# Patient Record
Sex: FEMALE | Race: WHITE | NOT HISPANIC OR LATINO | Employment: OTHER | RURAL
[De-identification: names, ages, dates, MRNs, and addresses within clinical notes are randomized per-mention and may not be internally consistent; named-entity substitution may affect disease eponyms.]

---

## 2020-04-23 ENCOUNTER — HISTORICAL (OUTPATIENT)
Dept: ADMINISTRATIVE | Facility: HOSPITAL | Age: 60
End: 2020-04-23

## 2020-07-06 ENCOUNTER — HISTORICAL (OUTPATIENT)
Dept: ADMINISTRATIVE | Facility: HOSPITAL | Age: 60
End: 2020-07-06

## 2020-07-20 ENCOUNTER — HISTORICAL (OUTPATIENT)
Dept: ADMINISTRATIVE | Facility: HOSPITAL | Age: 60
End: 2020-07-20

## 2020-07-24 ENCOUNTER — HISTORICAL (OUTPATIENT)
Dept: ADMINISTRATIVE | Facility: HOSPITAL | Age: 60
End: 2020-07-24

## 2020-07-25 LAB — SARS-COV-2 RNA AMPLIFICATION, QUAL: NEGATIVE

## 2020-08-11 ENCOUNTER — HISTORICAL (OUTPATIENT)
Dept: ADMINISTRATIVE | Facility: HOSPITAL | Age: 60
End: 2020-08-11

## 2020-08-18 ENCOUNTER — HISTORICAL (OUTPATIENT)
Dept: ADMINISTRATIVE | Facility: HOSPITAL | Age: 60
End: 2020-08-18

## 2020-09-15 ENCOUNTER — HISTORICAL (OUTPATIENT)
Dept: ADMINISTRATIVE | Facility: HOSPITAL | Age: 60
End: 2020-09-15

## 2020-09-17 LAB
LAB AP CLINICAL INFORMATION: NORMAL
LAB AP GENERAL CAT - HISTORICAL: NORMAL
LAB AP INTERPRETATION/RESULT - HISTORICAL: NEGATIVE
LAB AP SPECIMEN ADEQUACY - HISTORICAL: NORMAL
LAB AP SPECIMEN SUBMITTED - HISTORICAL: NORMAL

## 2020-09-18 ENCOUNTER — HISTORICAL (OUTPATIENT)
Dept: ADMINISTRATIVE | Facility: HOSPITAL | Age: 60
End: 2020-09-18

## 2020-09-22 ENCOUNTER — HISTORICAL (OUTPATIENT)
Dept: ADMINISTRATIVE | Facility: HOSPITAL | Age: 60
End: 2020-09-22

## 2020-10-06 ENCOUNTER — HISTORICAL (OUTPATIENT)
Dept: ADMINISTRATIVE | Facility: HOSPITAL | Age: 60
End: 2020-10-06

## 2020-10-06 LAB
ALBUMIN SERPL BCP-MCNC: 3.7 G/DL (ref 3.5–5)
ALBUMIN/GLOB SERPL: 1 {RATIO}
ALP SERPL-CCNC: 109 U/L (ref 50–130)
ALT SERPL W P-5'-P-CCNC: 16 U/L (ref 13–56)
ANION GAP SERPL CALCULATED.3IONS-SCNC: 7 MMOL/L (ref 7–16)
AST SERPL W P-5'-P-CCNC: 13 U/L (ref 15–37)
BACTERIA #/AREA URNS HPF: ABNORMAL /HPF
BASOPHILS # BLD AUTO: 0.05 X10E3/UL (ref 0–0.2)
BASOPHILS NFR BLD AUTO: 0.5 % (ref 0–1)
BILIRUB SERPL-MCNC: 0.2 MG/DL (ref 0–1.2)
BILIRUB UR QL STRIP: NEGATIVE MG/DL
BUN SERPL-MCNC: 12 MG/DL (ref 7–18)
BUN/CREAT SERPL: 19
CALCIUM SERPL-MCNC: 9 MG/DL (ref 8.5–10.1)
CHLORIDE SERPL-SCNC: 106 MMOL/L (ref 98–107)
CLARITY UR: CLEAR
CO2 SERPL-SCNC: 32 MMOL/L (ref 21–32)
COLOR UR: YELLOW
CREAT SERPL-MCNC: 0.64 MG/DL (ref 0.5–1.02)
EOSINOPHIL # BLD AUTO: 0.16 X10E3/UL (ref 0–0.5)
EOSINOPHIL NFR BLD AUTO: 1.7 % (ref 1–4)
ERYTHROCYTE [DISTWIDTH] IN BLOOD BY AUTOMATED COUNT: 16.6 % (ref 11.5–14.5)
GLOBULIN SER-MCNC: 3.8 G/DL (ref 2–4)
GLUCOSE SERPL-MCNC: 64 MG/DL (ref 74–106)
GLUCOSE UR STRIP-MCNC: NEGATIVE MG/DL
HCT VFR BLD AUTO: 41.5 % (ref 38–47)
HGB BLD-MCNC: 13.2 G/DL (ref 12–16)
IMM GRANULOCYTES # BLD AUTO: 0.01 X10E3/UL (ref 0–0.04)
IMM GRANULOCYTES NFR BLD: 0.1 % (ref 0–0.4)
KETONES UR STRIP-SCNC: NEGATIVE MG/DL
LEUKOCYTE ESTERASE UR QL STRIP: NEGATIVE LEU/UL
LYMPHOCYTES # BLD AUTO: 2.63 X10E3/UL (ref 1–4.8)
LYMPHOCYTES NFR BLD AUTO: 27.4 % (ref 27–41)
MCH RBC QN AUTO: 29.3 PG (ref 27–31)
MCHC RBC AUTO-ENTMCNC: 31.8 G/DL (ref 32–36)
MCV RBC AUTO: 92 FL (ref 80–96)
MONOCYTES # BLD AUTO: 0.98 X10E3/UL (ref 0–0.8)
MONOCYTES NFR BLD AUTO: 10.2 % (ref 2–6)
MPC BLD CALC-MCNC: 9.9 FL (ref 9.4–12.4)
NEUTROPHILS # BLD AUTO: 5.77 X10E3/UL (ref 1.8–7.7)
NEUTROPHILS NFR BLD AUTO: 60.1 % (ref 53–65)
NITRITE UR QL STRIP: NEGATIVE
NRBC # BLD AUTO: 0 X10E3/UL (ref 0–0)
NRBC, AUTO (.00): 0 /100 (ref 0–0)
PH UR STRIP: 6 PH UNITS (ref 5–8)
PLATELET # BLD AUTO: 297 X10E3/UL (ref 150–400)
POTASSIUM SERPL-SCNC: 4.1 MMOL/L (ref 3.5–5.1)
PROT SERPL-MCNC: 7.5 G/DL (ref 6.4–8.2)
PROT UR QL STRIP: NEGATIVE MG/DL
RBC # BLD AUTO: 4.51 X10E6/UL (ref 4.2–5.4)
RBC # UR STRIP: NEGATIVE ERY/UL
RBC #/AREA URNS HPF: ABNORMAL /HPF (ref 0–3)
SODIUM SERPL-SCNC: 141 MMOL/L (ref 136–145)
SP GR UR STRIP: 1.01 (ref 1–1.03)
SQUAMOUS #/AREA URNS LPF: ABNORMAL /LPF
UROBILINOGEN UR STRIP-ACNC: 0.2 EU/DL
WBC # BLD AUTO: 9.6 X10E3/UL (ref 4.5–11)
WBC #/AREA URNS HPF: ABNORMAL /HPF (ref 0–5)

## 2020-10-12 ENCOUNTER — HISTORICAL (OUTPATIENT)
Dept: ADMINISTRATIVE | Facility: HOSPITAL | Age: 60
End: 2020-10-12

## 2020-10-12 LAB
ALBUMIN SERPL BCP-MCNC: 4 G/DL (ref 3.5–5)
ALBUMIN/GLOB SERPL: 1 {RATIO}
ALP SERPL-CCNC: 121 U/L (ref 50–130)
ALT SERPL W P-5'-P-CCNC: 18 U/L (ref 13–56)
ANION GAP SERPL CALCULATED.3IONS-SCNC: 7 MMOL/L (ref 7–16)
AST SERPL W P-5'-P-CCNC: 16 U/L (ref 15–37)
BACTERIA #/AREA URNS HPF: NORMAL /HPF
BASOPHILS # BLD AUTO: 0.07 X10E3/UL (ref 0–0.2)
BASOPHILS NFR BLD AUTO: 0.6 % (ref 0–1)
BILIRUB SERPL-MCNC: 0.3 MG/DL (ref 0–1.2)
BILIRUB UR QL STRIP: NEGATIVE MG/DL
BUN SERPL-MCNC: 11 MG/DL (ref 7–18)
BUN/CREAT SERPL: 16
CALCIUM SERPL-MCNC: 9.5 MG/DL (ref 8.5–10.1)
CHLORIDE SERPL-SCNC: 104 MMOL/L (ref 98–107)
CLARITY UR: CLEAR
CO2 SERPL-SCNC: 32 MMOL/L (ref 21–32)
COLOR UR: YELLOW
CREAT SERPL-MCNC: 0.7 MG/DL (ref 0.5–1.02)
EOSINOPHIL # BLD AUTO: 0.04 X10E3/UL (ref 0–0.5)
EOSINOPHIL NFR BLD AUTO: 0.3 % (ref 1–4)
ERYTHROCYTE [DISTWIDTH] IN BLOOD BY AUTOMATED COUNT: 16.4 % (ref 11.5–14.5)
GLOBULIN SER-MCNC: 4 G/DL (ref 2–4)
GLUCOSE SERPL-MCNC: 94 MG/DL (ref 74–106)
GLUCOSE UR STRIP-MCNC: NEGATIVE MG/DL
HCT VFR BLD AUTO: 41.5 % (ref 38–47)
HGB BLD-MCNC: 13.4 G/DL (ref 12–16)
IMM GRANULOCYTES # BLD AUTO: 0.04 X10E3/UL (ref 0–0.04)
IMM GRANULOCYTES NFR BLD: 0.3 % (ref 0–0.4)
KETONES UR STRIP-SCNC: NEGATIVE MG/DL
LEUKOCYTE ESTERASE UR QL STRIP: NEGATIVE LEU/UL
LYMPHOCYTES # BLD AUTO: 2.99 X10E3/UL (ref 1–4.8)
LYMPHOCYTES NFR BLD AUTO: 24.1 % (ref 27–41)
MCH RBC QN AUTO: 29.6 PG (ref 27–31)
MCHC RBC AUTO-ENTMCNC: 32.3 G/DL (ref 32–36)
MCV RBC AUTO: 91.8 FL (ref 80–96)
MONOCYTES # BLD AUTO: 1.02 X10E3/UL (ref 0–0.8)
MONOCYTES NFR BLD AUTO: 8.2 % (ref 2–6)
MPC BLD CALC-MCNC: 9.9 FL (ref 9.4–12.4)
NEUTROPHILS # BLD AUTO: 8.27 X10E3/UL (ref 1.8–7.7)
NEUTROPHILS NFR BLD AUTO: 66.5 % (ref 53–65)
NITRITE UR QL STRIP: NEGATIVE
NRBC # BLD AUTO: 0 X10E3/UL (ref 0–0)
NRBC, AUTO (.00): 0 /100 (ref 0–0)
PH UR STRIP: 6.5 PH UNITS (ref 5–8)
PLATELET # BLD AUTO: 356 X10E3/UL (ref 150–400)
POTASSIUM SERPL-SCNC: 4.4 MMOL/L (ref 3.5–5.1)
PROT SERPL-MCNC: 8 G/DL (ref 6.4–8.2)
PROT UR QL STRIP: NEGATIVE MG/DL
RBC # BLD AUTO: 4.52 X10E6/UL (ref 4.2–5.4)
RBC # UR STRIP: NEGATIVE ERY/UL
RBC #/AREA URNS HPF: NORMAL /HPF (ref 0–3)
SARS-COV+SARS-COV-2 AG RESP QL IA.RAPID: NEGATIVE
SODIUM SERPL-SCNC: 139 MMOL/L (ref 136–145)
SP GR UR STRIP: 1.02 (ref 1–1.03)
SQUAMOUS #/AREA URNS LPF: NORMAL /LPF
UROBILINOGEN UR STRIP-ACNC: 0.2 EU/DL
WBC # BLD AUTO: 12.43 X10E3/UL (ref 4.5–11)
WBC #/AREA URNS HPF: NORMAL /HPF (ref 0–5)

## 2020-10-26 ENCOUNTER — HISTORICAL (OUTPATIENT)
Dept: ADMINISTRATIVE | Facility: HOSPITAL | Age: 60
End: 2020-10-26

## 2020-10-27 ENCOUNTER — HISTORICAL (OUTPATIENT)
Dept: ADMINISTRATIVE | Facility: HOSPITAL | Age: 60
End: 2020-10-27

## 2020-11-11 ENCOUNTER — HISTORICAL (OUTPATIENT)
Dept: ADMINISTRATIVE | Facility: HOSPITAL | Age: 60
End: 2020-11-11

## 2020-12-14 ENCOUNTER — HISTORICAL (OUTPATIENT)
Dept: ADMINISTRATIVE | Facility: HOSPITAL | Age: 60
End: 2020-12-14

## 2021-01-06 ENCOUNTER — HISTORICAL (OUTPATIENT)
Dept: ADMINISTRATIVE | Facility: HOSPITAL | Age: 61
End: 2021-01-06

## 2021-01-11 ENCOUNTER — HISTORICAL (OUTPATIENT)
Dept: ADMINISTRATIVE | Facility: HOSPITAL | Age: 61
End: 2021-01-11

## 2021-02-23 ENCOUNTER — HISTORICAL (OUTPATIENT)
Dept: ADMINISTRATIVE | Facility: HOSPITAL | Age: 61
End: 2021-02-23

## 2021-02-23 LAB
APTT PPP: 27.2 SECONDS (ref 25.2–37.3)
INR BLD: 1.07 (ref 0–3.3)
PROTHROMBIN TIME: 13.4 SECONDS (ref 11.7–14.7)

## 2021-05-26 ENCOUNTER — OFFICE VISIT (OUTPATIENT)
Dept: FAMILY MEDICINE | Facility: CLINIC | Age: 61
End: 2021-05-26
Payer: MEDICAID

## 2021-05-26 VITALS
TEMPERATURE: 98 F | RESPIRATION RATE: 18 BRPM | OXYGEN SATURATION: 97 % | HEIGHT: 64 IN | BODY MASS INDEX: 30.42 KG/M2 | SYSTOLIC BLOOD PRESSURE: 144 MMHG | DIASTOLIC BLOOD PRESSURE: 80 MMHG | WEIGHT: 178.19 LBS | HEART RATE: 91 BPM

## 2021-05-26 DIAGNOSIS — M19.90 ARTHRITIS: Primary | ICD-10-CM

## 2021-05-26 DIAGNOSIS — R13.19 ESOPHAGEAL DYSPHAGIA: ICD-10-CM

## 2021-05-26 PROCEDURE — 96372 THER/PROPH/DIAG INJ SC/IM: CPT | Mod: ,,, | Performed by: NURSE PRACTITIONER

## 2021-05-26 PROCEDURE — 99214 PR OFFICE/OUTPT VISIT, EST, LEVL IV, 30-39 MIN: ICD-10-PCS | Mod: 25,,, | Performed by: NURSE PRACTITIONER

## 2021-05-26 PROCEDURE — 99214 OFFICE O/P EST MOD 30 MIN: CPT | Mod: 25,,, | Performed by: NURSE PRACTITIONER

## 2021-05-26 PROCEDURE — 96372 PR INJECTION,THERAP/PROPH/DIAG2ST, IM OR SUBCUT: ICD-10-PCS | Mod: ,,, | Performed by: NURSE PRACTITIONER

## 2021-05-26 RX ORDER — HYDROCHLOROTHIAZIDE 12.5 MG/1
1 TABLET ORAL DAILY
COMMUNITY
Start: 2021-05-20 | End: 2021-05-26

## 2021-05-26 RX ORDER — METHYLPREDNISOLONE ACETATE 40 MG/ML
40 INJECTION, SUSPENSION INTRA-ARTICULAR; INTRALESIONAL; INTRAMUSCULAR; SOFT TISSUE
Status: COMPLETED | OUTPATIENT
Start: 2021-05-26 | End: 2021-05-26

## 2021-05-26 RX ORDER — LOVASTATIN 20 MG/1
1 TABLET ORAL DAILY
COMMUNITY
Start: 2021-05-03 | End: 2021-10-18 | Stop reason: SDUPTHER

## 2021-05-26 RX ORDER — LISINOPRIL 10 MG/1
1 TABLET ORAL DAILY
COMMUNITY
Start: 2021-05-03 | End: 2021-09-07

## 2021-05-26 RX ORDER — ALBUTEROL SULFATE 90 UG/1
2 AEROSOL, METERED RESPIRATORY (INHALATION) EVERY 4 HOURS PRN
COMMUNITY
Start: 2021-02-23 | End: 2022-11-22 | Stop reason: SDUPTHER

## 2021-05-26 RX ORDER — PANTOPRAZOLE SODIUM 40 MG/1
1 TABLET, DELAYED RELEASE ORAL DAILY
COMMUNITY
Start: 2021-05-03 | End: 2022-02-28

## 2021-05-26 RX ORDER — CITALOPRAM 20 MG/1
1 TABLET, FILM COATED ORAL DAILY
COMMUNITY
End: 2021-10-18 | Stop reason: SDUPTHER

## 2021-05-26 RX ORDER — CITALOPRAM 40 MG/1
1 TABLET, FILM COATED ORAL DAILY
COMMUNITY
Start: 2021-03-04 | End: 2021-06-23 | Stop reason: HOSPADM

## 2021-05-26 RX ORDER — METHYLPREDNISOLONE 4 MG/1
TABLET ORAL
Qty: 1 PACKAGE | Refills: 0 | Status: SHIPPED | OUTPATIENT
Start: 2021-05-26 | End: 2021-06-16

## 2021-05-26 RX ORDER — KETOROLAC TROMETHAMINE 30 MG/ML
60 INJECTION, SOLUTION INTRAMUSCULAR; INTRAVENOUS
Status: COMPLETED | OUTPATIENT
Start: 2021-05-26 | End: 2021-05-26

## 2021-05-26 RX ORDER — ASPIRIN 81 MG/1
81 TABLET ORAL DAILY
Status: ON HOLD | COMMUNITY
End: 2022-01-17 | Stop reason: HOSPADM

## 2021-05-26 RX ORDER — DEXAMETHASONE SODIUM PHOSPHATE 4 MG/ML
4 INJECTION, SOLUTION INTRA-ARTICULAR; INTRALESIONAL; INTRAMUSCULAR; INTRAVENOUS; SOFT TISSUE
Status: COMPLETED | OUTPATIENT
Start: 2021-05-26 | End: 2021-05-26

## 2021-05-26 RX ORDER — MELOXICAM 15 MG/1
1 TABLET ORAL DAILY
COMMUNITY
Start: 2021-05-25 | End: 2021-08-24 | Stop reason: SDUPTHER

## 2021-05-26 RX ADMIN — KETOROLAC TROMETHAMINE 60 MG: 30 INJECTION, SOLUTION INTRAMUSCULAR; INTRAVENOUS at 04:05

## 2021-05-26 RX ADMIN — DEXAMETHASONE SODIUM PHOSPHATE 4 MG: 4 INJECTION, SOLUTION INTRA-ARTICULAR; INTRALESIONAL; INTRAMUSCULAR; INTRAVENOUS; SOFT TISSUE at 04:05

## 2021-05-26 RX ADMIN — METHYLPREDNISOLONE ACETATE 40 MG: 40 INJECTION, SUSPENSION INTRA-ARTICULAR; INTRALESIONAL; INTRAMUSCULAR; SOFT TISSUE at 04:05

## 2021-05-28 ENCOUNTER — TELEPHONE (OUTPATIENT)
Dept: FAMILY MEDICINE | Facility: CLINIC | Age: 61
End: 2021-05-28

## 2021-05-28 DIAGNOSIS — G47.00 INSOMNIA, UNSPECIFIED TYPE: Primary | ICD-10-CM

## 2021-05-28 RX ORDER — ZOLPIDEM TARTRATE 5 MG/1
5 TABLET ORAL NIGHTLY PRN
Qty: 30 TABLET | Refills: 0 | Status: SHIPPED | OUTPATIENT
Start: 2021-05-28 | End: 2021-07-02 | Stop reason: SDUPTHER

## 2021-06-02 DIAGNOSIS — R19.5 HEME POSITIVE STOOL: Primary | ICD-10-CM

## 2021-06-10 ENCOUNTER — OFFICE VISIT (OUTPATIENT)
Dept: FAMILY MEDICINE | Facility: CLINIC | Age: 61
End: 2021-06-10
Payer: MEDICAID

## 2021-06-10 ENCOUNTER — APPOINTMENT (OUTPATIENT)
Dept: RADIOLOGY | Facility: CLINIC | Age: 61
End: 2021-06-10
Attending: NURSE PRACTITIONER
Payer: MEDICAID

## 2021-06-10 VITALS
DIASTOLIC BLOOD PRESSURE: 70 MMHG | OXYGEN SATURATION: 96 % | BODY MASS INDEX: 30.28 KG/M2 | HEART RATE: 86 BPM | SYSTOLIC BLOOD PRESSURE: 110 MMHG | HEIGHT: 64 IN | RESPIRATION RATE: 18 BRPM | WEIGHT: 177.38 LBS | TEMPERATURE: 98 F

## 2021-06-10 DIAGNOSIS — M54.6 PAIN IN THORACIC SPINE: ICD-10-CM

## 2021-06-10 DIAGNOSIS — M25.512 PAIN IN JOINT OF LEFT SHOULDER: ICD-10-CM

## 2021-06-10 DIAGNOSIS — M75.52 BURSITIS OF LEFT SHOULDER: ICD-10-CM

## 2021-06-10 DIAGNOSIS — E78.2 MIXED HYPERLIPIDEMIA: ICD-10-CM

## 2021-06-10 DIAGNOSIS — M25.552 PAIN IN LEFT HIP: Primary | ICD-10-CM

## 2021-06-10 LAB
CHOLEST SERPL-MCNC: 173 MG/DL (ref 0–200)
CHOLEST/HDLC SERPL: 2.4 {RATIO}
HDLC SERPL-MCNC: 71 MG/DL (ref 40–60)
LDLC SERPL CALC-MCNC: 85 MG/DL
LDLC/HDLC SERPL: 1.2 {RATIO}
NONHDLC SERPL-MCNC: 102 MG/DL
TRIGL SERPL-MCNC: 86 MG/DL (ref 35–150)
VLDLC SERPL-MCNC: 17 MG/DL

## 2021-06-10 PROCEDURE — 99214 PR OFFICE/OUTPT VISIT, EST, LEVL IV, 30-39 MIN: ICD-10-PCS | Mod: 25,,, | Performed by: NURSE PRACTITIONER

## 2021-06-10 PROCEDURE — 73030 X-RAY EXAM OF SHOULDER: CPT | Mod: TC,RHCUB,LT | Performed by: NURSE PRACTITIONER

## 2021-06-10 PROCEDURE — 80061 LIPID PANEL: ICD-10-PCS | Mod: ,,, | Performed by: CLINICAL MEDICAL LABORATORY

## 2021-06-10 PROCEDURE — 96372 PR INJECTION,THERAP/PROPH/DIAG2ST, IM OR SUBCUT: ICD-10-PCS | Mod: ,,, | Performed by: NURSE PRACTITIONER

## 2021-06-10 PROCEDURE — 96372 THER/PROPH/DIAG INJ SC/IM: CPT | Mod: ,,, | Performed by: NURSE PRACTITIONER

## 2021-06-10 PROCEDURE — 73030 X-RAY EXAM OF SHOULDER: CPT | Mod: 26,LT,, | Performed by: RADIOLOGY

## 2021-06-10 PROCEDURE — 99214 OFFICE O/P EST MOD 30 MIN: CPT | Mod: 25,,, | Performed by: NURSE PRACTITIONER

## 2021-06-10 PROCEDURE — 80061 LIPID PANEL: CPT | Mod: ,,, | Performed by: CLINICAL MEDICAL LABORATORY

## 2021-06-10 PROCEDURE — 73030 XR SHOULDER COMPLETE 2 OR MORE VIEWS LEFT: ICD-10-PCS | Mod: 26,LT,, | Performed by: RADIOLOGY

## 2021-06-10 RX ORDER — FUROSEMIDE 20 MG/1
20 TABLET ORAL DAILY
COMMUNITY
End: 2021-11-01

## 2021-06-10 RX ORDER — ALBUTEROL SULFATE 0.83 MG/ML
SOLUTION RESPIRATORY (INHALATION)
COMMUNITY

## 2021-06-10 RX ORDER — KETOROLAC TROMETHAMINE 30 MG/ML
60 INJECTION, SOLUTION INTRAMUSCULAR; INTRAVENOUS
Status: COMPLETED | OUTPATIENT
Start: 2021-06-10 | End: 2021-06-10

## 2021-06-10 RX ORDER — DICLOFENAC SODIUM 10 MG/G
2 GEL TOPICAL 3 TIMES DAILY
COMMUNITY
Start: 2021-05-26 | End: 2022-12-07

## 2021-06-10 RX ADMIN — KETOROLAC TROMETHAMINE 60 MG: 30 INJECTION, SOLUTION INTRAMUSCULAR; INTRAVENOUS at 10:06

## 2021-06-14 ENCOUNTER — HOSPITAL ENCOUNTER (OUTPATIENT)
Dept: RADIOLOGY | Facility: HOSPITAL | Age: 61
Discharge: HOME | End: 2021-06-14
Attending: NURSE PRACTITIONER
Payer: MEDICAID

## 2021-06-14 DIAGNOSIS — M25.552 PAIN IN LEFT HIP: ICD-10-CM

## 2021-06-14 PROCEDURE — 73700 CT LOWER EXTREMITY W/O DYE: CPT | Mod: TC,LT

## 2021-06-16 DIAGNOSIS — M25.552 PAIN IN LEFT HIP: Primary | ICD-10-CM

## 2021-06-17 ENCOUNTER — OFFICE VISIT (OUTPATIENT)
Dept: FAMILY MEDICINE | Facility: CLINIC | Age: 61
End: 2021-06-17
Payer: MEDICAID

## 2021-06-17 VITALS
HEIGHT: 63 IN | BODY MASS INDEX: 31.12 KG/M2 | TEMPERATURE: 98 F | OXYGEN SATURATION: 96 % | WEIGHT: 175.63 LBS | HEART RATE: 71 BPM | DIASTOLIC BLOOD PRESSURE: 68 MMHG | RESPIRATION RATE: 20 BRPM | SYSTOLIC BLOOD PRESSURE: 112 MMHG

## 2021-06-17 DIAGNOSIS — M25.552 PAIN IN LEFT HIP: Primary | ICD-10-CM

## 2021-06-17 DIAGNOSIS — G89.29 CHRONIC LEFT SHOULDER PAIN: ICD-10-CM

## 2021-06-17 DIAGNOSIS — M25.512 CHRONIC LEFT SHOULDER PAIN: ICD-10-CM

## 2021-06-17 PROCEDURE — 96372 THER/PROPH/DIAG INJ SC/IM: CPT | Mod: ,,, | Performed by: NURSE PRACTITIONER

## 2021-06-17 PROCEDURE — 99214 OFFICE O/P EST MOD 30 MIN: CPT | Mod: 25,,, | Performed by: NURSE PRACTITIONER

## 2021-06-17 PROCEDURE — 96372 PR INJECTION,THERAP/PROPH/DIAG2ST, IM OR SUBCUT: ICD-10-PCS | Mod: ,,, | Performed by: NURSE PRACTITIONER

## 2021-06-17 PROCEDURE — 99214 PR OFFICE/OUTPT VISIT, EST, LEVL IV, 30-39 MIN: ICD-10-PCS | Mod: 25,,, | Performed by: NURSE PRACTITIONER

## 2021-06-17 RX ORDER — KETOROLAC TROMETHAMINE 30 MG/ML
60 INJECTION, SOLUTION INTRAMUSCULAR; INTRAVENOUS
Status: COMPLETED | OUTPATIENT
Start: 2021-06-17 | End: 2021-06-17

## 2021-06-17 RX ORDER — OXYCODONE AND ACETAMINOPHEN 7.5; 325 MG/1; MG/1
1 TABLET ORAL EVERY 12 HOURS PRN
Status: ON HOLD | COMMUNITY
End: 2021-12-14 | Stop reason: HOSPADM

## 2021-06-17 RX ORDER — METHYLPREDNISOLONE ACETATE 40 MG/ML
40 INJECTION, SUSPENSION INTRA-ARTICULAR; INTRALESIONAL; INTRAMUSCULAR; SOFT TISSUE
Status: COMPLETED | OUTPATIENT
Start: 2021-06-17 | End: 2021-06-17

## 2021-06-17 RX ORDER — DEXAMETHASONE SODIUM PHOSPHATE 4 MG/ML
4 INJECTION, SOLUTION INTRA-ARTICULAR; INTRALESIONAL; INTRAMUSCULAR; INTRAVENOUS; SOFT TISSUE
Status: COMPLETED | OUTPATIENT
Start: 2021-06-17 | End: 2021-06-17

## 2021-06-17 RX ADMIN — KETOROLAC TROMETHAMINE 60 MG: 30 INJECTION, SOLUTION INTRAMUSCULAR; INTRAVENOUS at 10:06

## 2021-06-17 RX ADMIN — DEXAMETHASONE SODIUM PHOSPHATE 4 MG: 4 INJECTION, SOLUTION INTRA-ARTICULAR; INTRALESIONAL; INTRAMUSCULAR; INTRAVENOUS; SOFT TISSUE at 10:06

## 2021-06-17 RX ADMIN — METHYLPREDNISOLONE ACETATE 40 MG: 40 INJECTION, SUSPENSION INTRA-ARTICULAR; INTRALESIONAL; INTRAMUSCULAR; SOFT TISSUE at 10:06

## 2021-06-23 ENCOUNTER — ANESTHESIA EVENT (OUTPATIENT)
Dept: GASTROENTEROLOGY | Facility: HOSPITAL | Age: 61
End: 2021-06-23
Payer: MEDICAID

## 2021-06-23 ENCOUNTER — ANESTHESIA (OUTPATIENT)
Dept: GASTROENTEROLOGY | Facility: HOSPITAL | Age: 61
End: 2021-06-23
Payer: MEDICAID

## 2021-06-23 ENCOUNTER — HOSPITAL ENCOUNTER (OUTPATIENT)
Dept: GASTROENTEROLOGY | Facility: HOSPITAL | Age: 61
Discharge: HOME | End: 2021-06-23
Attending: INTERNAL MEDICINE
Payer: MEDICAID

## 2021-06-23 VITALS
DIASTOLIC BLOOD PRESSURE: 53 MMHG | WEIGHT: 175 LBS | BODY MASS INDEX: 30.61 KG/M2 | HEART RATE: 66 BPM | OXYGEN SATURATION: 96 % | RESPIRATION RATE: 22 BRPM | SYSTOLIC BLOOD PRESSURE: 97 MMHG | TEMPERATURE: 98 F

## 2021-06-23 DIAGNOSIS — R19.5 HEME POSITIVE STOOL: ICD-10-CM

## 2021-06-23 DIAGNOSIS — K20.80 FUNGAL ESOPHAGITIS: ICD-10-CM

## 2021-06-23 DIAGNOSIS — K29.00 ACUTE SUPERFICIAL GASTRITIS WITHOUT HEMORRHAGE: ICD-10-CM

## 2021-06-23 DIAGNOSIS — K44.9 HH (HIATUS HERNIA): ICD-10-CM

## 2021-06-23 DIAGNOSIS — B49 FUNGAL ESOPHAGITIS: ICD-10-CM

## 2021-06-23 PROCEDURE — 25000003 PHARM REV CODE 250: Performed by: NURSE ANESTHETIST, CERTIFIED REGISTERED

## 2021-06-23 PROCEDURE — 88305 TISSUE EXAM BY PATHOLOGIST: CPT | Mod: 26,,, | Performed by: PATHOLOGY

## 2021-06-23 PROCEDURE — 63600175 PHARM REV CODE 636 W HCPCS: Performed by: NURSE ANESTHETIST, CERTIFIED REGISTERED

## 2021-06-23 PROCEDURE — C1889 IMPLANT/INSERT DEVICE, NOC: HCPCS

## 2021-06-23 PROCEDURE — D9220A PRA ANESTHESIA: ICD-10-PCS | Mod: ,,, | Performed by: NURSE ANESTHETIST, CERTIFIED REGISTERED

## 2021-06-23 PROCEDURE — D9220A PRA ANESTHESIA: Mod: ,,, | Performed by: NURSE ANESTHETIST, CERTIFIED REGISTERED

## 2021-06-23 PROCEDURE — 88305 TISSUE EXAM BY PATHOLOGIST: CPT | Mod: SUR | Performed by: INTERNAL MEDICINE

## 2021-06-23 PROCEDURE — 88342 SURGICAL PATHOLOGY: ICD-10-PCS | Mod: 26,,, | Performed by: PATHOLOGY

## 2021-06-23 PROCEDURE — 27201423 OPTIME MED/SURG SUP & DEVICES STERILE SUPPLY

## 2021-06-23 PROCEDURE — 88342 IMHCHEM/IMCYTCHM 1ST ANTB: CPT | Mod: 26,,, | Performed by: PATHOLOGY

## 2021-06-23 PROCEDURE — 43239 EGD BIOPSY SINGLE/MULTIPLE: CPT

## 2021-06-23 PROCEDURE — 37000008 HC ANESTHESIA 1ST 15 MINUTES

## 2021-06-23 PROCEDURE — 88305 SURGICAL PATHOLOGY: ICD-10-PCS | Mod: 26,,, | Performed by: PATHOLOGY

## 2021-06-23 RX ORDER — FLUCONAZOLE 100 MG/1
100 TABLET ORAL DAILY
Qty: 14 TABLET | Refills: 0 | Status: SHIPPED | OUTPATIENT
Start: 2021-06-23 | End: 2021-07-23

## 2021-06-23 RX ORDER — SODIUM CHLORIDE 0.9 % (FLUSH) 0.9 %
10 SYRINGE (ML) INJECTION
Status: DISCONTINUED | OUTPATIENT
Start: 2021-06-23 | End: 2021-06-24 | Stop reason: HOSPADM

## 2021-06-23 RX ORDER — PROPOFOL 10 MG/ML
VIAL (ML) INTRAVENOUS
Status: DISCONTINUED | OUTPATIENT
Start: 2021-06-23 | End: 2021-06-23

## 2021-06-23 RX ORDER — LIDOCAINE HYDROCHLORIDE 20 MG/ML
INJECTION INTRAVENOUS
Status: DISCONTINUED | OUTPATIENT
Start: 2021-06-23 | End: 2021-06-23

## 2021-06-23 RX ADMIN — LIDOCAINE HYDROCHLORIDE 50 MG: 20 INJECTION, SOLUTION INTRAVENOUS at 11:06

## 2021-06-23 RX ADMIN — SODIUM CHLORIDE: 9 INJECTION, SOLUTION INTRAVENOUS at 11:06

## 2021-06-23 RX ADMIN — PROPOFOL 100 MG: 10 INJECTION, EMULSION INTRAVENOUS at 11:06

## 2021-06-24 LAB
DHEA SERPL-MCNC: NORMAL
ESTROGEN SERPL-MCNC: NORMAL PG/ML
LAB AP GROSS DESCRIPTION: NORMAL
LAB AP LABORATORY NOTES: NORMAL
T3RU NFR SERPL: NORMAL %

## 2021-06-25 ENCOUNTER — TELEPHONE (OUTPATIENT)
Dept: GASTROENTEROLOGY | Facility: CLINIC | Age: 61
End: 2021-06-25

## 2021-06-30 ENCOUNTER — HOSPITAL ENCOUNTER (OUTPATIENT)
Dept: RADIOLOGY | Facility: HOSPITAL | Age: 61
Discharge: HOME | End: 2021-06-30
Attending: NURSE PRACTITIONER
Payer: MEDICAID

## 2021-06-30 DIAGNOSIS — M25.552 PAIN IN LEFT HIP: ICD-10-CM

## 2021-06-30 DIAGNOSIS — M16.12 OSTEOARTHRITIS OF LEFT HIP: Primary | ICD-10-CM

## 2021-06-30 DIAGNOSIS — Z11.59 SPECIAL SCREENING EXAMINATION FOR UNSPECIFIED VIRAL DISEASE: Primary | ICD-10-CM

## 2021-06-30 PROCEDURE — 73700 CT HIP WITHOUT CONTRAST LEFT W/MAKO PROTOCOL: ICD-10-PCS | Mod: 26,LT,, | Performed by: RADIOLOGY

## 2021-06-30 PROCEDURE — 73700 CT LOWER EXTREMITY W/O DYE: CPT | Mod: TC,LT

## 2021-06-30 PROCEDURE — 73700 CT LOWER EXTREMITY W/O DYE: CPT | Mod: 26,LT,, | Performed by: RADIOLOGY

## 2021-06-30 RX ORDER — CHLORHEXIDINE GLUCONATE ORAL RINSE 1.2 MG/ML
10 SOLUTION DENTAL
Status: CANCELLED | OUTPATIENT
Start: 2021-06-30

## 2021-06-30 RX ORDER — MUPIROCIN 20 MG/G
OINTMENT TOPICAL
Status: CANCELLED | OUTPATIENT
Start: 2021-06-30

## 2021-06-30 RX ORDER — SODIUM CHLORIDE 0.9 % (FLUSH) 0.9 %
10 SYRINGE (ML) INJECTION
Status: DISCONTINUED | OUTPATIENT
Start: 2021-06-30 | End: 2021-07-28

## 2021-07-02 DIAGNOSIS — G47.00 INSOMNIA, UNSPECIFIED TYPE: ICD-10-CM

## 2021-07-02 RX ORDER — ZOLPIDEM TARTRATE 5 MG/1
5 TABLET ORAL NIGHTLY PRN
Qty: 30 TABLET | Refills: 0 | Status: SHIPPED | OUTPATIENT
Start: 2021-07-02 | End: 2021-07-28 | Stop reason: SDUPTHER

## 2021-07-22 ENCOUNTER — OFFICE VISIT (OUTPATIENT)
Dept: FAMILY MEDICINE | Facility: CLINIC | Age: 61
End: 2021-07-22
Payer: MEDICAID

## 2021-07-22 VITALS
OXYGEN SATURATION: 96 % | DIASTOLIC BLOOD PRESSURE: 61 MMHG | WEIGHT: 175 LBS | HEIGHT: 63 IN | HEART RATE: 94 BPM | BODY MASS INDEX: 31.01 KG/M2 | RESPIRATION RATE: 20 BRPM | SYSTOLIC BLOOD PRESSURE: 92 MMHG | TEMPERATURE: 98 F

## 2021-07-22 DIAGNOSIS — J44.9 CHRONIC OBSTRUCTIVE PULMONARY DISEASE, UNSPECIFIED COPD TYPE: Primary | ICD-10-CM

## 2021-07-22 DIAGNOSIS — J40 BRONCHITIS: ICD-10-CM

## 2021-07-22 DIAGNOSIS — Z01.818 PRE-OPERATIVE CLEARANCE: Primary | ICD-10-CM

## 2021-07-22 DIAGNOSIS — J44.9 CHRONIC OBSTRUCTIVE PULMONARY DISEASE, UNSPECIFIED COPD TYPE: ICD-10-CM

## 2021-07-22 LAB
CTP QC/QA: YES
FLUAV AG NPH QL: NEGATIVE
FLUBV AG NPH QL: NEGATIVE
SARS-COV-2 AG RESP QL IA.RAPID: NEGATIVE

## 2021-07-22 PROCEDURE — 87428 SARSCOV & INF VIR A&B AG IA: CPT | Mod: RHCUB | Performed by: NURSE PRACTITIONER

## 2021-07-22 PROCEDURE — 99214 PR OFFICE/OUTPT VISIT, EST, LEVL IV, 30-39 MIN: ICD-10-PCS | Mod: ,,, | Performed by: NURSE PRACTITIONER

## 2021-07-22 PROCEDURE — 99214 OFFICE O/P EST MOD 30 MIN: CPT | Mod: ,,, | Performed by: NURSE PRACTITIONER

## 2021-07-28 ENCOUNTER — OFFICE VISIT (OUTPATIENT)
Dept: FAMILY MEDICINE | Facility: CLINIC | Age: 61
End: 2021-07-28
Payer: MEDICAID

## 2021-07-28 VITALS
DIASTOLIC BLOOD PRESSURE: 78 MMHG | BODY MASS INDEX: 31.01 KG/M2 | HEIGHT: 63 IN | HEART RATE: 92 BPM | OXYGEN SATURATION: 97 % | WEIGHT: 175 LBS | SYSTOLIC BLOOD PRESSURE: 150 MMHG | TEMPERATURE: 99 F | RESPIRATION RATE: 20 BRPM

## 2021-07-28 DIAGNOSIS — M25.552 LEFT HIP PAIN: Primary | ICD-10-CM

## 2021-07-28 DIAGNOSIS — Z79.899 ENCOUNTER FOR LONG-TERM (CURRENT) USE OF OTHER MEDICATIONS: ICD-10-CM

## 2021-07-28 DIAGNOSIS — G47.00 INSOMNIA, UNSPECIFIED TYPE: ICD-10-CM

## 2021-07-28 LAB

## 2021-07-28 PROCEDURE — 99214 PR OFFICE/OUTPT VISIT, EST, LEVL IV, 30-39 MIN: ICD-10-PCS | Mod: 25,,, | Performed by: NURSE PRACTITIONER

## 2021-07-28 PROCEDURE — 96372 PR INJECTION,THERAP/PROPH/DIAG2ST, IM OR SUBCUT: ICD-10-PCS | Mod: ,,, | Performed by: NURSE PRACTITIONER

## 2021-07-28 PROCEDURE — 99214 OFFICE O/P EST MOD 30 MIN: CPT | Mod: 25,,, | Performed by: NURSE PRACTITIONER

## 2021-07-28 PROCEDURE — 80305 DRUG TEST PRSMV DIR OPT OBS: CPT | Mod: RHCUB | Performed by: NURSE PRACTITIONER

## 2021-07-28 PROCEDURE — 96372 THER/PROPH/DIAG INJ SC/IM: CPT | Mod: ,,, | Performed by: NURSE PRACTITIONER

## 2021-07-28 RX ORDER — ZOLPIDEM TARTRATE 5 MG/1
5 TABLET ORAL NIGHTLY PRN
Qty: 30 TABLET | Refills: 0 | Status: SHIPPED | OUTPATIENT
Start: 2021-07-28 | End: 2021-08-24 | Stop reason: SDUPTHER

## 2021-07-28 RX ORDER — KETOROLAC TROMETHAMINE 30 MG/ML
60 INJECTION, SOLUTION INTRAMUSCULAR; INTRAVENOUS
Status: COMPLETED | OUTPATIENT
Start: 2021-07-28 | End: 2021-07-28

## 2021-07-28 RX ADMIN — KETOROLAC TROMETHAMINE 60 MG: 30 INJECTION, SOLUTION INTRAMUSCULAR; INTRAVENOUS at 03:07

## 2021-08-09 ENCOUNTER — OFFICE VISIT (OUTPATIENT)
Dept: PULMONOLOGY | Facility: CLINIC | Age: 61
End: 2021-08-09
Payer: MEDICAID

## 2021-08-09 ENCOUNTER — CLINICAL SUPPORT (OUTPATIENT)
Dept: PULMONOLOGY | Facility: HOSPITAL | Age: 61
End: 2021-08-09
Attending: INTERNAL MEDICINE
Payer: MEDICAID

## 2021-08-09 VITALS
SYSTOLIC BLOOD PRESSURE: 110 MMHG | RESPIRATION RATE: 16 BRPM | WEIGHT: 175 LBS | HEIGHT: 63 IN | HEART RATE: 72 BPM | DIASTOLIC BLOOD PRESSURE: 68 MMHG | OXYGEN SATURATION: 99 % | BODY MASS INDEX: 31.01 KG/M2

## 2021-08-09 DIAGNOSIS — J44.9 CHRONIC OBSTRUCTIVE PULMONARY DISEASE, UNSPECIFIED COPD TYPE: Primary | ICD-10-CM

## 2021-08-09 PROCEDURE — 99214 PR OFFICE/OUTPT VISIT, EST, LEVL IV, 30-39 MIN: ICD-10-PCS | Mod: S$PBB,,, | Performed by: INTERNAL MEDICINE

## 2021-08-09 PROCEDURE — 99214 OFFICE O/P EST MOD 30 MIN: CPT | Mod: S$PBB,,, | Performed by: INTERNAL MEDICINE

## 2021-08-09 PROCEDURE — 36600 WITHDRAWAL OF ARTERIAL BLOOD: CPT

## 2021-08-09 PROCEDURE — 99214 OFFICE O/P EST MOD 30 MIN: CPT | Mod: PBBFAC | Performed by: INTERNAL MEDICINE

## 2021-08-12 ENCOUNTER — TELEPHONE (OUTPATIENT)
Dept: PULMONOLOGY | Facility: CLINIC | Age: 61
End: 2021-08-12

## 2021-08-23 ENCOUNTER — TELEPHONE (OUTPATIENT)
Dept: FAMILY MEDICINE | Facility: CLINIC | Age: 61
End: 2021-08-23

## 2021-08-24 DIAGNOSIS — G47.00 INSOMNIA, UNSPECIFIED TYPE: ICD-10-CM

## 2021-08-30 RX ORDER — ZOLPIDEM TARTRATE 5 MG/1
5 TABLET ORAL NIGHTLY PRN
Qty: 30 TABLET | Refills: 0 | Status: SHIPPED | OUTPATIENT
Start: 2021-08-30 | End: 2021-09-29 | Stop reason: SDUPTHER

## 2021-08-30 RX ORDER — MELOXICAM 15 MG/1
15 TABLET ORAL DAILY
Qty: 30 TABLET | Refills: 0 | Status: SHIPPED | OUTPATIENT
Start: 2021-08-30 | End: 2021-09-29 | Stop reason: SDUPTHER

## 2021-09-07 ENCOUNTER — OFFICE VISIT (OUTPATIENT)
Dept: FAMILY MEDICINE | Facility: CLINIC | Age: 61
End: 2021-09-07
Payer: MEDICAID

## 2021-09-07 VITALS
RESPIRATION RATE: 20 BRPM | TEMPERATURE: 98 F | SYSTOLIC BLOOD PRESSURE: 100 MMHG | DIASTOLIC BLOOD PRESSURE: 64 MMHG | OXYGEN SATURATION: 90 % | HEART RATE: 78 BPM

## 2021-09-07 DIAGNOSIS — M25.552 CHRONIC LEFT HIP PAIN: Primary | ICD-10-CM

## 2021-09-07 DIAGNOSIS — G89.29 CHRONIC LEFT SHOULDER PAIN: ICD-10-CM

## 2021-09-07 DIAGNOSIS — G89.29 CHRONIC LEFT HIP PAIN: Primary | ICD-10-CM

## 2021-09-07 DIAGNOSIS — M25.512 CHRONIC LEFT SHOULDER PAIN: ICD-10-CM

## 2021-09-07 PROCEDURE — 99214 PR OFFICE/OUTPT VISIT, EST, LEVL IV, 30-39 MIN: ICD-10-PCS | Mod: ,,, | Performed by: NURSE PRACTITIONER

## 2021-09-07 PROCEDURE — 99214 OFFICE O/P EST MOD 30 MIN: CPT | Mod: ,,, | Performed by: NURSE PRACTITIONER

## 2021-09-15 DIAGNOSIS — Z79.899 MEDICATION MANAGEMENT: Primary | ICD-10-CM

## 2021-09-20 ENCOUNTER — LAB REQUISITION (OUTPATIENT)
Dept: LAB | Facility: HOSPITAL | Age: 61
End: 2021-09-20
Attending: NURSE PRACTITIONER
Payer: MEDICAID

## 2021-09-20 DIAGNOSIS — J43.9 EMPHYSEMA, UNSPECIFIED: ICD-10-CM

## 2021-09-20 DIAGNOSIS — I10 ESSENTIAL (PRIMARY) HYPERTENSION: ICD-10-CM

## 2021-09-20 LAB
ALBUMIN SERPL BCP-MCNC: 4 G/DL (ref 3.5–5)
ALBUMIN/GLOB SERPL: 1.3 {RATIO}
ALP SERPL-CCNC: 92 U/L (ref 50–130)
ALT SERPL W P-5'-P-CCNC: 17 U/L (ref 13–56)
ANION GAP SERPL CALCULATED.3IONS-SCNC: 14 MMOL/L (ref 7–16)
AST SERPL W P-5'-P-CCNC: 25 U/L (ref 15–37)
BILIRUB SERPL-MCNC: 0.3 MG/DL (ref 0–1.2)
BUN SERPL-MCNC: 9 MG/DL (ref 7–18)
BUN/CREAT SERPL: 16 (ref 6–20)
CALCIUM SERPL-MCNC: 8.8 MG/DL (ref 8.5–10.1)
CHLORIDE SERPL-SCNC: 102 MMOL/L (ref 98–107)
CO2 SERPL-SCNC: 28 MMOL/L (ref 21–32)
CREAT SERPL-MCNC: 0.57 MG/DL (ref 0.55–1.02)
GLOBULIN SER-MCNC: 3.2 G/DL (ref 2–4)
GLUCOSE SERPL-MCNC: 97 MG/DL (ref 74–106)
POTASSIUM SERPL-SCNC: 4.4 MMOL/L (ref 3.5–5.1)
PROT SERPL-MCNC: 7.2 G/DL (ref 6.4–8.2)
SODIUM SERPL-SCNC: 140 MMOL/L (ref 136–145)

## 2021-09-20 PROCEDURE — 80053 COMPREHEN METABOLIC PANEL: CPT | Performed by: NURSE PRACTITIONER

## 2021-09-29 DIAGNOSIS — M54.6 PAIN IN THORACIC SPINE: Primary | ICD-10-CM

## 2021-09-29 DIAGNOSIS — G47.00 INSOMNIA, UNSPECIFIED TYPE: ICD-10-CM

## 2021-09-29 RX ORDER — MELOXICAM 15 MG/1
15 TABLET ORAL DAILY
Qty: 30 TABLET | Refills: 0 | Status: SHIPPED | OUTPATIENT
Start: 2021-09-29 | End: 2021-12-22

## 2021-09-29 RX ORDER — ZOLPIDEM TARTRATE 5 MG/1
5 TABLET ORAL NIGHTLY PRN
Qty: 30 TABLET | Refills: 0 | Status: SHIPPED | OUTPATIENT
Start: 2021-09-29 | End: 2021-11-01

## 2021-10-18 RX ORDER — CITALOPRAM 20 MG/1
20 TABLET, FILM COATED ORAL DAILY
Qty: 30 TABLET | Refills: 2 | Status: SHIPPED | OUTPATIENT
Start: 2021-10-18 | End: 2021-11-29 | Stop reason: SDUPTHER

## 2021-10-18 RX ORDER — CITALOPRAM 40 MG/1
40 TABLET, FILM COATED ORAL DAILY
Qty: 30 TABLET | Refills: 2 | Status: SHIPPED | OUTPATIENT
Start: 2021-10-18 | End: 2021-11-29

## 2021-10-18 RX ORDER — CITALOPRAM 40 MG/1
40 TABLET, FILM COATED ORAL DAILY
COMMUNITY
End: 2021-10-18 | Stop reason: SDUPTHER

## 2021-10-18 RX ORDER — LOVASTATIN 20 MG/1
20 TABLET ORAL DAILY
Qty: 30 TABLET | Refills: 2 | Status: SHIPPED | OUTPATIENT
Start: 2021-10-18 | End: 2022-01-26

## 2021-11-23 ENCOUNTER — OFFICE VISIT (OUTPATIENT)
Dept: ORTHOPEDICS | Facility: CLINIC | Age: 61
End: 2021-11-23
Payer: MEDICAID

## 2021-11-23 DIAGNOSIS — M16.12 OSTEOARTHRITIS OF LEFT HIP: ICD-10-CM

## 2021-11-23 DIAGNOSIS — Z01.812 PRE-PROCEDURE LAB EXAM: ICD-10-CM

## 2021-11-23 DIAGNOSIS — M16.12 PRIMARY OSTEOARTHRITIS OF LEFT HIP: Primary | ICD-10-CM

## 2021-11-23 DIAGNOSIS — Z01.810 PREOP CARDIOVASCULAR EXAM: ICD-10-CM

## 2021-11-23 PROCEDURE — 99213 OFFICE O/P EST LOW 20 MIN: CPT | Mod: ,,, | Performed by: ORTHOPAEDIC SURGERY

## 2021-11-23 PROCEDURE — 99213 PR OFFICE/OUTPT VISIT, EST, LEVL III, 20-29 MIN: ICD-10-PCS | Mod: ,,, | Performed by: ORTHOPAEDIC SURGERY

## 2021-11-23 RX ORDER — SODIUM CHLORIDE 9 MG/ML
INJECTION, SOLUTION INTRAVENOUS CONTINUOUS
Status: CANCELLED | OUTPATIENT
Start: 2021-11-23

## 2021-11-23 RX ORDER — MUPIROCIN 20 MG/G
OINTMENT TOPICAL
Status: CANCELLED | OUTPATIENT
Start: 2021-11-23

## 2021-11-29 ENCOUNTER — OFFICE VISIT (OUTPATIENT)
Dept: SLEEP MEDICINE | Facility: CLINIC | Age: 61
End: 2021-11-29
Attending: FAMILY MEDICINE
Payer: MEDICAID

## 2021-11-29 ENCOUNTER — HOSPITAL ENCOUNTER (OUTPATIENT)
Dept: RADIOLOGY | Facility: HOSPITAL | Age: 61
Discharge: HOME | End: 2021-11-29
Attending: ORTHOPAEDIC SURGERY
Payer: MEDICAID

## 2021-11-29 ENCOUNTER — CLINICAL SUPPORT (OUTPATIENT)
Dept: FAMILY MEDICINE | Facility: CLINIC | Age: 61
End: 2021-11-29
Payer: MEDICAID

## 2021-11-29 VITALS
HEIGHT: 64 IN | OXYGEN SATURATION: 96 % | WEIGHT: 162 LBS | DIASTOLIC BLOOD PRESSURE: 72 MMHG | HEART RATE: 85 BPM | SYSTOLIC BLOOD PRESSURE: 111 MMHG | BODY MASS INDEX: 27.66 KG/M2

## 2021-11-29 DIAGNOSIS — Z01.810 PRE-OPERATIVE CARDIOVASCULAR EXAMINATION: Primary | ICD-10-CM

## 2021-11-29 DIAGNOSIS — Z01.810 PREOP CARDIOVASCULAR EXAM: ICD-10-CM

## 2021-11-29 DIAGNOSIS — Z23 ENCOUNTER FOR VACCINATION: Primary | ICD-10-CM

## 2021-11-29 DIAGNOSIS — J44.9 CHRONIC OBSTRUCTIVE PULMONARY DISEASE, UNSPECIFIED COPD TYPE: ICD-10-CM

## 2021-11-29 DIAGNOSIS — Z01.810 PREOP CARDIOVASCULAR EXAM: Primary | ICD-10-CM

## 2021-11-29 DIAGNOSIS — G47.00 INSOMNIA, UNSPECIFIED TYPE: ICD-10-CM

## 2021-11-29 DIAGNOSIS — Z79.899 LONG-TERM USE OF HIGH-RISK MEDICATION: ICD-10-CM

## 2021-11-29 DIAGNOSIS — G47.33 OSA ON CPAP: Primary | ICD-10-CM

## 2021-11-29 PROCEDURE — 99213 OFFICE O/P EST LOW 20 MIN: CPT | Mod: S$PBB,,, | Performed by: FAMILY MEDICINE

## 2021-11-29 PROCEDURE — 99214 OFFICE O/P EST MOD 30 MIN: CPT | Mod: PBBFAC,25 | Performed by: FAMILY MEDICINE

## 2021-11-29 PROCEDURE — 71046 X-RAY EXAM CHEST 2 VIEWS: CPT | Mod: TC

## 2021-11-29 PROCEDURE — 99213 PR OFFICE/OUTPT VISIT, EST, LEVL III, 20-29 MIN: ICD-10-PCS | Mod: S$PBB,,, | Performed by: FAMILY MEDICINE

## 2021-11-29 RX ORDER — ZOLPIDEM TARTRATE 5 MG/1
TABLET ORAL
Qty: 30 TABLET | Refills: 0 | Status: SHIPPED | OUTPATIENT
Start: 2021-11-29 | End: 2022-06-06

## 2021-11-29 RX ORDER — CITALOPRAM 20 MG/1
20 TABLET, FILM COATED ORAL DAILY
Qty: 30 TABLET | Refills: 0 | Status: SHIPPED | OUTPATIENT
Start: 2021-11-29 | End: 2022-01-05

## 2021-12-08 ENCOUNTER — OFFICE VISIT (OUTPATIENT)
Dept: PULMONOLOGY | Facility: CLINIC | Age: 61
End: 2021-12-08
Payer: MEDICAID

## 2021-12-08 VITALS
OXYGEN SATURATION: 96 % | DIASTOLIC BLOOD PRESSURE: 70 MMHG | SYSTOLIC BLOOD PRESSURE: 120 MMHG | HEIGHT: 64 IN | WEIGHT: 162 LBS | RESPIRATION RATE: 20 BRPM | HEART RATE: 80 BPM | BODY MASS INDEX: 27.66 KG/M2

## 2021-12-08 DIAGNOSIS — J44.9 CHRONIC OBSTRUCTIVE PULMONARY DISEASE, UNSPECIFIED COPD TYPE: Primary | ICD-10-CM

## 2021-12-08 PROCEDURE — 4010F PR ACE/ARB THEARPY RXD/TAKEN: ICD-10-PCS | Mod: CPTII,,, | Performed by: INTERNAL MEDICINE

## 2021-12-08 PROCEDURE — 4010F ACE/ARB THERAPY RXD/TAKEN: CPT | Mod: CPTII,,, | Performed by: INTERNAL MEDICINE

## 2021-12-08 PROCEDURE — 99214 OFFICE O/P EST MOD 30 MIN: CPT | Mod: PBBFAC | Performed by: INTERNAL MEDICINE

## 2021-12-08 PROCEDURE — 99213 OFFICE O/P EST LOW 20 MIN: CPT | Mod: S$PBB,,, | Performed by: INTERNAL MEDICINE

## 2021-12-08 PROCEDURE — 99213 PR OFFICE/OUTPT VISIT, EST, LEVL III, 20-29 MIN: ICD-10-PCS | Mod: S$PBB,,, | Performed by: INTERNAL MEDICINE

## 2021-12-08 RX ORDER — CITALOPRAM 40 MG/1
40 TABLET, FILM COATED ORAL DAILY
COMMUNITY
Start: 2021-12-03 | End: 2022-01-05

## 2021-12-08 RX ORDER — PREDNISONE 5 MG/1
TABLET ORAL
COMMUNITY
Start: 2021-11-10 | End: 2022-06-06

## 2021-12-08 RX ORDER — TIOTROPIUM BROMIDE 18 UG/1
18 CAPSULE ORAL; RESPIRATORY (INHALATION) DAILY
Qty: 30 CAPSULE | Refills: 6 | Status: SHIPPED | OUTPATIENT
Start: 2021-12-08 | End: 2022-11-22 | Stop reason: SDUPTHER

## 2021-12-13 ENCOUNTER — ANESTHESIA EVENT (OUTPATIENT)
Dept: SURGERY | Facility: HOSPITAL | Age: 61
End: 2021-12-13
Payer: MEDICAID

## 2021-12-13 ENCOUNTER — ANESTHESIA (OUTPATIENT)
Dept: SURGERY | Facility: HOSPITAL | Age: 61
End: 2021-12-13
Payer: MEDICAID

## 2021-12-13 ENCOUNTER — HOSPITAL ENCOUNTER (INPATIENT)
Facility: HOSPITAL | Age: 61
LOS: 1 days | Discharge: NURSING, REHAB OR LONG-TERM CARE | End: 2021-12-14
Attending: ORTHOPAEDIC SURGERY | Admitting: ORTHOPAEDIC SURGERY
Payer: MEDICAID

## 2021-12-13 DIAGNOSIS — R06.2 WHEEZING: ICD-10-CM

## 2021-12-13 DIAGNOSIS — M16.12 OSTEOARTHRITIS OF LEFT HIP: ICD-10-CM

## 2021-12-13 DIAGNOSIS — M16.12 PRIMARY OSTEOARTHRITIS OF LEFT HIP: ICD-10-CM

## 2021-12-13 DIAGNOSIS — M25.552 LEFT HIP PAIN: Primary | ICD-10-CM

## 2021-12-13 DIAGNOSIS — J44.9 CHRONIC OBSTRUCTIVE PULMONARY DISEASE, UNSPECIFIED COPD TYPE: ICD-10-CM

## 2021-12-13 PROBLEM — F32.A ANXIETY AND DEPRESSION: Chronic | Status: ACTIVE | Noted: 2021-12-13

## 2021-12-13 PROBLEM — I10 HYPERTENSION: Chronic | Status: ACTIVE | Noted: 2021-12-13

## 2021-12-13 PROBLEM — F41.9 ANXIETY AND DEPRESSION: Chronic | Status: ACTIVE | Noted: 2021-12-13

## 2021-12-13 LAB
INDIRECT COOMBS: NORMAL
RH BLD: NORMAL

## 2021-12-13 PROCEDURE — 27201423 OPTIME MED/SURG SUP & DEVICES STERILE SUPPLY: Performed by: ORTHOPAEDIC SURGERY

## 2021-12-13 PROCEDURE — 25000003 PHARM REV CODE 250: Performed by: ORTHOPAEDIC SURGERY

## 2021-12-13 PROCEDURE — D9220A PRA ANESTHESIA: ICD-10-PCS | Mod: CRNA,,, | Performed by: NURSE ANESTHETIST, CERTIFIED REGISTERED

## 2021-12-13 PROCEDURE — D9220A PRA ANESTHESIA: Mod: CRNA,,, | Performed by: NURSE ANESTHETIST, CERTIFIED REGISTERED

## 2021-12-13 PROCEDURE — 27130 TOTAL HIP ARTHROPLASTY: CPT | Mod: LT,,, | Performed by: ORTHOPAEDIC SURGERY

## 2021-12-13 PROCEDURE — 27130 PR TOTAL HIP ARTHROPLASTY: ICD-10-PCS | Mod: LT,,, | Performed by: ORTHOPAEDIC SURGERY

## 2021-12-13 PROCEDURE — 36415 COLL VENOUS BLD VENIPUNCTURE: CPT | Performed by: ORTHOPAEDIC SURGERY

## 2021-12-13 PROCEDURE — 63600175 PHARM REV CODE 636 W HCPCS: Performed by: NURSE ANESTHETIST, CERTIFIED REGISTERED

## 2021-12-13 PROCEDURE — 71000033 HC RECOVERY, INTIAL HOUR: Performed by: ORTHOPAEDIC SURGERY

## 2021-12-13 PROCEDURE — 27000689 HC BLADE LARYNGOSCOPE ANY SIZE: Performed by: ANESTHESIOLOGY

## 2021-12-13 PROCEDURE — 11000001 HC ACUTE MED/SURG PRIVATE ROOM

## 2021-12-13 PROCEDURE — C1713 ANCHOR/SCREW BN/BN,TIS/BN: HCPCS | Performed by: ORTHOPAEDIC SURGERY

## 2021-12-13 PROCEDURE — 94640 AIRWAY INHALATION TREATMENT: CPT

## 2021-12-13 PROCEDURE — 25000242 PHARM REV CODE 250 ALT 637 W/ HCPCS: Performed by: NURSE PRACTITIONER

## 2021-12-13 PROCEDURE — 88304 TISSUE EXAM BY PATHOLOGIST: CPT | Mod: 26,,, | Performed by: PATHOLOGY

## 2021-12-13 PROCEDURE — 27000510 HC BLANKET BAIR HUGGER ANY SIZE: Performed by: ANESTHESIOLOGY

## 2021-12-13 PROCEDURE — 94761 N-INVAS EAR/PLS OXIMETRY MLT: CPT

## 2021-12-13 PROCEDURE — 63600175 PHARM REV CODE 636 W HCPCS: Performed by: ORTHOPAEDIC SURGERY

## 2021-12-13 PROCEDURE — 36000713 HC OR TIME LEV V EA ADD 15 MIN: Performed by: ORTHOPAEDIC SURGERY

## 2021-12-13 PROCEDURE — 37000008 HC ANESTHESIA 1ST 15 MINUTES: Performed by: ORTHOPAEDIC SURGERY

## 2021-12-13 PROCEDURE — 99219 PR INITIAL OBSERVATION CARE,LEVL II: ICD-10-PCS | Mod: ,,, | Performed by: FAMILY MEDICINE

## 2021-12-13 PROCEDURE — C1776 JOINT DEVICE (IMPLANTABLE): HCPCS | Performed by: ORTHOPAEDIC SURGERY

## 2021-12-13 PROCEDURE — 27000716 HC OXISENSOR PROBE, ANY SIZE: Performed by: ANESTHESIOLOGY

## 2021-12-13 PROCEDURE — 27000260 *HC AIRWAY ORAL: Performed by: ANESTHESIOLOGY

## 2021-12-13 PROCEDURE — 25000003 PHARM REV CODE 250: Performed by: ANESTHESIOLOGY

## 2021-12-13 PROCEDURE — D9220A PRA ANESTHESIA: ICD-10-PCS | Mod: ANES,,, | Performed by: ANESTHESIOLOGY

## 2021-12-13 PROCEDURE — 25000242 PHARM REV CODE 250 ALT 637 W/ HCPCS: Performed by: ANESTHESIOLOGY

## 2021-12-13 PROCEDURE — 97161 PT EVAL LOW COMPLEX 20 MIN: CPT

## 2021-12-13 PROCEDURE — 36000712 HC OR TIME LEV V 1ST 15 MIN: Performed by: ORTHOPAEDIC SURGERY

## 2021-12-13 PROCEDURE — D9220A PRA ANESTHESIA: Mod: ANES,,, | Performed by: ANESTHESIOLOGY

## 2021-12-13 PROCEDURE — 86900 BLOOD TYPING SEROLOGIC ABO: CPT | Performed by: ORTHOPAEDIC SURGERY

## 2021-12-13 PROCEDURE — 25000003 PHARM REV CODE 250: Performed by: NURSE ANESTHETIST, CERTIFIED REGISTERED

## 2021-12-13 PROCEDURE — 88304 SURGICAL PATHOLOGY: ICD-10-PCS | Mod: 26,,, | Performed by: PATHOLOGY

## 2021-12-13 PROCEDURE — 88304 TISSUE EXAM BY PATHOLOGIST: CPT | Mod: SUR | Performed by: ORTHOPAEDIC SURGERY

## 2021-12-13 PROCEDURE — 88311 DECALCIFY TISSUE: CPT | Mod: 26,,, | Performed by: PATHOLOGY

## 2021-12-13 PROCEDURE — 99219 PR INITIAL OBSERVATION CARE,LEVL II: CPT | Mod: ,,, | Performed by: FAMILY MEDICINE

## 2021-12-13 PROCEDURE — 37000009 HC ANESTHESIA EA ADD 15 MINS: Performed by: ORTHOPAEDIC SURGERY

## 2021-12-13 PROCEDURE — 88311 SURGICAL PATHOLOGY: ICD-10-PCS | Mod: 26,,, | Performed by: PATHOLOGY

## 2021-12-13 PROCEDURE — 97166 OT EVAL MOD COMPLEX 45 MIN: CPT

## 2021-12-13 PROCEDURE — A4648 IMPLANTABLE TISSUE MARKER: HCPCS | Performed by: ORTHOPAEDIC SURGERY

## 2021-12-13 PROCEDURE — 88311 DECALCIFY TISSUE: CPT | Mod: SUR | Performed by: ORTHOPAEDIC SURGERY

## 2021-12-13 PROCEDURE — 27000221 HC OXYGEN, UP TO 24 HOURS

## 2021-12-13 PROCEDURE — 27000509 HC TUBE SALEM SUMP ANY SIZE: Performed by: ANESTHESIOLOGY

## 2021-12-13 PROCEDURE — 27000165 HC TUBE, ETT CUFFED: Performed by: ANESTHESIOLOGY

## 2021-12-13 DEVICE — IMPLANTABLE DEVICE: Type: IMPLANTABLE DEVICE | Site: HIP | Status: FUNCTIONAL

## 2021-12-13 DEVICE — IMP CUP TRI II CLUSTERHOLE 52E: Type: IMPLANTABLE DEVICE | Site: HIP | Status: FUNCTIONAL

## 2021-12-13 DEVICE — IMP INSERT RESTORATION ADM X3 ID 28MM FOR CUP OD 48MM: Type: IMPLANTABLE DEVICE | Site: HIP | Status: FUNCTIONAL

## 2021-12-13 DEVICE — IMP LINER MDM 42MM: Type: IMPLANTABLE DEVICE | Site: HIP | Status: FUNCTIONAL

## 2021-12-13 RX ORDER — HYDROMORPHONE HYDROCHLORIDE 2 MG/ML
0.5 INJECTION, SOLUTION INTRAMUSCULAR; INTRAVENOUS; SUBCUTANEOUS EVERY 5 MIN PRN
Status: DISCONTINUED | OUTPATIENT
Start: 2021-12-13 | End: 2021-12-13 | Stop reason: HOSPADM

## 2021-12-13 RX ORDER — LOPERAMIDE HYDROCHLORIDE 2 MG/1
4 CAPSULE ORAL ONCE
Status: DISCONTINUED | OUTPATIENT
Start: 2021-12-13 | End: 2021-12-14 | Stop reason: HOSPADM

## 2021-12-13 RX ORDER — DIPHENHYDRAMINE HYDROCHLORIDE 50 MG/ML
INJECTION INTRAMUSCULAR; INTRAVENOUS
Status: DISCONTINUED | OUTPATIENT
Start: 2021-12-13 | End: 2021-12-13

## 2021-12-13 RX ORDER — SODIUM CHLORIDE 0.9 % (FLUSH) 0.9 %
3 SYRINGE (ML) INJECTION EVERY 6 HOURS PRN
Status: DISCONTINUED | OUTPATIENT
Start: 2021-12-13 | End: 2021-12-14 | Stop reason: HOSPADM

## 2021-12-13 RX ORDER — CELECOXIB 100 MG/1
200 CAPSULE ORAL 2 TIMES DAILY
Status: DISCONTINUED | OUTPATIENT
Start: 2021-12-13 | End: 2021-12-14 | Stop reason: HOSPADM

## 2021-12-13 RX ORDER — BISACODYL 10 MG
10 SUPPOSITORY, RECTAL RECTAL DAILY PRN
Status: DISCONTINUED | OUTPATIENT
Start: 2021-12-13 | End: 2021-12-14 | Stop reason: HOSPADM

## 2021-12-13 RX ORDER — PROPOFOL 10 MG/ML
VIAL (ML) INTRAVENOUS
Status: DISCONTINUED | OUTPATIENT
Start: 2021-12-13 | End: 2021-12-13

## 2021-12-13 RX ORDER — ALBUTEROL SULFATE 0.83 MG/ML
2.5 SOLUTION RESPIRATORY (INHALATION) EVERY 6 HOURS PRN
Status: DISCONTINUED | OUTPATIENT
Start: 2021-12-13 | End: 2021-12-14 | Stop reason: HOSPADM

## 2021-12-13 RX ORDER — MIDAZOLAM HYDROCHLORIDE 1 MG/ML
INJECTION INTRAMUSCULAR; INTRAVENOUS
Status: DISCONTINUED | OUTPATIENT
Start: 2021-12-13 | End: 2021-12-13

## 2021-12-13 RX ORDER — CITALOPRAM 20 MG/1
20 TABLET, FILM COATED ORAL DAILY
Status: DISCONTINUED | OUTPATIENT
Start: 2021-12-13 | End: 2021-12-14 | Stop reason: HOSPADM

## 2021-12-13 RX ORDER — CEFAZOLIN SODIUM 1 G/3ML
INJECTION, POWDER, FOR SOLUTION INTRAMUSCULAR; INTRAVENOUS
Status: DISCONTINUED | OUTPATIENT
Start: 2021-12-13 | End: 2021-12-13

## 2021-12-13 RX ORDER — CEFAZOLIN SODIUM 2 G/50ML
2 SOLUTION INTRAVENOUS
Status: COMPLETED | OUTPATIENT
Start: 2021-12-13 | End: 2021-12-13

## 2021-12-13 RX ORDER — LIDOCAINE HYDROCHLORIDE 20 MG/ML
INJECTION, SOLUTION EPIDURAL; INFILTRATION; INTRACAUDAL; PERINEURAL
Status: DISCONTINUED | OUTPATIENT
Start: 2021-12-13 | End: 2021-12-13

## 2021-12-13 RX ORDER — GLYCOPYRROLATE 0.2 MG/ML
INJECTION INTRAMUSCULAR; INTRAVENOUS
Status: DISCONTINUED | OUTPATIENT
Start: 2021-12-13 | End: 2021-12-13

## 2021-12-13 RX ORDER — ALBUTEROL SULFATE 0.83 MG/ML
2.5 SOLUTION RESPIRATORY (INHALATION) EVERY 4 HOURS
Status: DISCONTINUED | OUTPATIENT
Start: 2021-12-13 | End: 2021-12-14 | Stop reason: HOSPADM

## 2021-12-13 RX ORDER — DIPHENHYDRAMINE HYDROCHLORIDE 50 MG/ML
25 INJECTION INTRAMUSCULAR; INTRAVENOUS EVERY 6 HOURS PRN
Status: DISCONTINUED | OUTPATIENT
Start: 2021-12-13 | End: 2021-12-13 | Stop reason: HOSPADM

## 2021-12-13 RX ORDER — DEXAMETHASONE SODIUM PHOSPHATE 4 MG/ML
INJECTION, SOLUTION INTRA-ARTICULAR; INTRALESIONAL; INTRAMUSCULAR; INTRAVENOUS; SOFT TISSUE
Status: DISCONTINUED | OUTPATIENT
Start: 2021-12-13 | End: 2021-12-13

## 2021-12-13 RX ORDER — NAPROXEN SODIUM 220 MG/1
325 TABLET, FILM COATED ORAL 2 TIMES DAILY
Status: DISCONTINUED | OUTPATIENT
Start: 2021-12-13 | End: 2021-12-14 | Stop reason: HOSPADM

## 2021-12-13 RX ORDER — OXYCODONE HYDROCHLORIDE 5 MG/1
5 TABLET ORAL EVERY 4 HOURS PRN
Status: DISCONTINUED | OUTPATIENT
Start: 2021-12-13 | End: 2021-12-14 | Stop reason: HOSPADM

## 2021-12-13 RX ORDER — ONDANSETRON 2 MG/ML
4 INJECTION INTRAMUSCULAR; INTRAVENOUS DAILY PRN
Status: DISCONTINUED | OUTPATIENT
Start: 2021-12-13 | End: 2021-12-13 | Stop reason: HOSPADM

## 2021-12-13 RX ORDER — POLYETHYLENE GLYCOL 3350 17 G/17G
17 POWDER, FOR SOLUTION ORAL DAILY
Status: DISCONTINUED | OUTPATIENT
Start: 2021-12-13 | End: 2021-12-14 | Stop reason: HOSPADM

## 2021-12-13 RX ORDER — ROPIVACAINE/EPI/CLONIDINE/KET 2.46-0.005
SYRINGE (ML) INJECTION
Status: DISCONTINUED | OUTPATIENT
Start: 2021-12-13 | End: 2021-12-13 | Stop reason: HOSPADM

## 2021-12-13 RX ORDER — ACETAMINOPHEN 325 MG/1
650 TABLET ORAL EVERY 4 HOURS PRN
Status: DISCONTINUED | OUTPATIENT
Start: 2021-12-13 | End: 2021-12-14 | Stop reason: HOSPADM

## 2021-12-13 RX ORDER — OXYCODONE HYDROCHLORIDE 5 MG/1
10 TABLET ORAL EVERY 4 HOURS PRN
Status: DISCONTINUED | OUTPATIENT
Start: 2021-12-13 | End: 2021-12-14 | Stop reason: HOSPADM

## 2021-12-13 RX ORDER — ACETAMINOPHEN 500 MG
1000 TABLET ORAL ONCE
Status: COMPLETED | OUTPATIENT
Start: 2021-12-13 | End: 2021-12-13

## 2021-12-13 RX ORDER — ONDANSETRON 2 MG/ML
INJECTION INTRAMUSCULAR; INTRAVENOUS
Status: DISCONTINUED | OUTPATIENT
Start: 2021-12-13 | End: 2021-12-13

## 2021-12-13 RX ORDER — IPRATROPIUM BROMIDE 0.5 MG/2.5ML
0.5 SOLUTION RESPIRATORY (INHALATION) EVERY 4 HOURS
Status: DISCONTINUED | OUTPATIENT
Start: 2021-12-13 | End: 2021-12-14 | Stop reason: HOSPADM

## 2021-12-13 RX ORDER — ALBUTEROL SULFATE 0.83 MG/ML
2.5 SOLUTION RESPIRATORY (INHALATION) EVERY 12 HOURS
Status: DISCONTINUED | OUTPATIENT
Start: 2021-12-13 | End: 2021-12-13

## 2021-12-13 RX ORDER — IPRATROPIUM BROMIDE 0.5 MG/2.5ML
0.5 SOLUTION RESPIRATORY (INHALATION) EVERY 6 HOURS
Status: DISCONTINUED | OUTPATIENT
Start: 2021-12-13 | End: 2021-12-13

## 2021-12-13 RX ORDER — ATORVASTATIN CALCIUM 20 MG/1
20 TABLET, FILM COATED ORAL DAILY
Status: DISCONTINUED | OUTPATIENT
Start: 2021-12-13 | End: 2021-12-14 | Stop reason: HOSPADM

## 2021-12-13 RX ORDER — SODIUM CHLORIDE 9 MG/ML
INJECTION, SOLUTION INTRAVENOUS CONTINUOUS
Status: DISCONTINUED | OUTPATIENT
Start: 2021-12-13 | End: 2021-12-14

## 2021-12-13 RX ORDER — MORPHINE SULFATE 10 MG/ML
4 INJECTION INTRAMUSCULAR; INTRAVENOUS; SUBCUTANEOUS EVERY 5 MIN PRN
Status: DISCONTINUED | OUTPATIENT
Start: 2021-12-13 | End: 2021-12-13 | Stop reason: HOSPADM

## 2021-12-13 RX ORDER — DOCUSATE SODIUM 100 MG/1
100 CAPSULE, LIQUID FILLED ORAL EVERY 12 HOURS
Status: DISCONTINUED | OUTPATIENT
Start: 2021-12-13 | End: 2021-12-14 | Stop reason: HOSPADM

## 2021-12-13 RX ORDER — PREGABALIN 75 MG/1
75 CAPSULE ORAL 2 TIMES DAILY
Status: DISCONTINUED | OUTPATIENT
Start: 2021-12-13 | End: 2021-12-14 | Stop reason: HOSPADM

## 2021-12-13 RX ORDER — MEPERIDINE HYDROCHLORIDE 25 MG/ML
25 INJECTION INTRAMUSCULAR; INTRAVENOUS; SUBCUTANEOUS EVERY 10 MIN PRN
Status: DISCONTINUED | OUTPATIENT
Start: 2021-12-13 | End: 2021-12-13 | Stop reason: HOSPADM

## 2021-12-13 RX ORDER — ONDANSETRON 4 MG/1
8 TABLET, ORALLY DISINTEGRATING ORAL EVERY 8 HOURS PRN
Status: DISCONTINUED | OUTPATIENT
Start: 2021-12-13 | End: 2021-12-14 | Stop reason: HOSPADM

## 2021-12-13 RX ORDER — LIDOCAINE HYDROCHLORIDE 10 MG/ML
1 INJECTION, SOLUTION EPIDURAL; INFILTRATION; INTRACAUDAL; PERINEURAL ONCE
Status: DISCONTINUED | OUTPATIENT
Start: 2021-12-13 | End: 2021-12-13 | Stop reason: HOSPADM

## 2021-12-13 RX ORDER — FAMOTIDINE 20 MG/1
20 TABLET, FILM COATED ORAL 2 TIMES DAILY
Status: DISCONTINUED | OUTPATIENT
Start: 2021-12-13 | End: 2021-12-14 | Stop reason: HOSPADM

## 2021-12-13 RX ORDER — MORPHINE SULFATE 8 MG/ML
INJECTION INTRAMUSCULAR; INTRAVENOUS; SUBCUTANEOUS
Status: DISCONTINUED | OUTPATIENT
Start: 2021-12-13 | End: 2021-12-13

## 2021-12-13 RX ORDER — ALBUTEROL SULFATE 90 UG/1
1 AEROSOL, METERED RESPIRATORY (INHALATION) EVERY 6 HOURS PRN
Status: DISCONTINUED | OUTPATIENT
Start: 2021-12-13 | End: 2021-12-13 | Stop reason: CLARIF

## 2021-12-13 RX ORDER — MUPIROCIN 20 MG/G
1 OINTMENT TOPICAL 2 TIMES DAILY
Status: DISCONTINUED | OUTPATIENT
Start: 2021-12-13 | End: 2021-12-14 | Stop reason: HOSPADM

## 2021-12-13 RX ORDER — MUPIROCIN 20 MG/G
OINTMENT TOPICAL
Status: DISCONTINUED | OUTPATIENT
Start: 2021-12-13 | End: 2021-12-13 | Stop reason: HOSPADM

## 2021-12-13 RX ORDER — ROCURONIUM BROMIDE 10 MG/ML
INJECTION, SOLUTION INTRAVENOUS
Status: DISCONTINUED | OUTPATIENT
Start: 2021-12-13 | End: 2021-12-13

## 2021-12-13 RX ORDER — ZOLPIDEM TARTRATE 5 MG/1
5 TABLET ORAL NIGHTLY PRN
Status: DISCONTINUED | OUTPATIENT
Start: 2021-12-13 | End: 2021-12-14 | Stop reason: HOSPADM

## 2021-12-13 RX ORDER — IPRATROPIUM BROMIDE AND ALBUTEROL SULFATE 2.5; .5 MG/3ML; MG/3ML
3 SOLUTION RESPIRATORY (INHALATION) ONCE
Status: COMPLETED | OUTPATIENT
Start: 2021-12-13 | End: 2021-12-13

## 2021-12-13 RX ORDER — SODIUM CHLORIDE, SODIUM LACTATE, POTASSIUM CHLORIDE, CALCIUM CHLORIDE 600; 310; 30; 20 MG/100ML; MG/100ML; MG/100ML; MG/100ML
INJECTION, SOLUTION INTRAVENOUS CONTINUOUS
Status: DISCONTINUED | OUTPATIENT
Start: 2021-12-13 | End: 2021-12-14 | Stop reason: HOSPADM

## 2021-12-13 RX ORDER — FENTANYL CITRATE 50 UG/ML
INJECTION, SOLUTION INTRAMUSCULAR; INTRAVENOUS
Status: DISCONTINUED | OUTPATIENT
Start: 2021-12-13 | End: 2021-12-13

## 2021-12-13 RX ADMIN — MIDAZOLAM HYDROCHLORIDE 2 MG: 1 INJECTION, SOLUTION INTRAMUSCULAR; INTRAVENOUS at 10:12

## 2021-12-13 RX ADMIN — ALBUTEROL SULFATE 2.5 MG: 2.5 SOLUTION RESPIRATORY (INHALATION) at 04:12

## 2021-12-13 RX ADMIN — ATORVASTATIN CALCIUM 20 MG: 20 TABLET, FILM COATED ORAL at 03:12

## 2021-12-13 RX ADMIN — CITALOPRAM HYDROBROMIDE 20 MG: 20 TABLET ORAL at 03:12

## 2021-12-13 RX ADMIN — MORPHINE SULFATE 8 MG: 8 INJECTION INTRAVENOUS at 12:12

## 2021-12-13 RX ADMIN — ALBUTEROL SULFATE 2.5 MG: 2.5 SOLUTION RESPIRATORY (INHALATION) at 07:12

## 2021-12-13 RX ADMIN — DEXAMETHASONE SODIUM PHOSPHATE 10 MG: 4 INJECTION, SOLUTION INTRA-ARTICULAR; INTRALESIONAL; INTRAMUSCULAR; INTRAVENOUS; SOFT TISSUE at 10:12

## 2021-12-13 RX ADMIN — CELECOXIB 200 MG: 100 CAPSULE ORAL at 10:12

## 2021-12-13 RX ADMIN — PROPOFOL 130 MG: 10 INJECTION, EMULSION INTRAVENOUS at 10:12

## 2021-12-13 RX ADMIN — FAMOTIDINE 20 MG: 20 TABLET, FILM COATED ORAL at 10:12

## 2021-12-13 RX ADMIN — GLYCOPYRROLATE 0.2 MG: 0.2 INJECTION INTRAMUSCULAR; INTRAVENOUS at 11:12

## 2021-12-13 RX ADMIN — LIDOCAINE HYDROCHLORIDE 100 MG: 20 INJECTION, SOLUTION EPIDURAL; INFILTRATION; INTRACAUDAL; PERINEURAL at 10:12

## 2021-12-13 RX ADMIN — IPRATROPIUM BROMIDE AND ALBUTEROL SULFATE 3 ML: .5; 3 SOLUTION RESPIRATORY (INHALATION) at 01:12

## 2021-12-13 RX ADMIN — FENTANYL CITRATE 100 MCG: 50 INJECTION INTRAMUSCULAR; INTRAVENOUS at 10:12

## 2021-12-13 RX ADMIN — SUGAMMADEX 200 MG: 100 INJECTION, SOLUTION INTRAVENOUS at 12:12

## 2021-12-13 RX ADMIN — ACETAMINOPHEN 1000 MG: 500 TABLET ORAL at 09:12

## 2021-12-13 RX ADMIN — SODIUM CHLORIDE: 9 INJECTION, SOLUTION INTRAVENOUS at 10:12

## 2021-12-13 RX ADMIN — FENTANYL CITRATE 100 MCG: 50 INJECTION INTRAMUSCULAR; INTRAVENOUS at 11:12

## 2021-12-13 RX ADMIN — DIPHENHYDRAMINE HYDROCHLORIDE 50 MG: 50 INJECTION, SOLUTION INTRAMUSCULAR; INTRAVENOUS at 10:12

## 2021-12-13 RX ADMIN — MUPIROCIN 1 G: 20 OINTMENT TOPICAL at 10:12

## 2021-12-13 RX ADMIN — DEXTROSE MONOHYDRATE 2 G: 50 INJECTION, SOLUTION INTRAVENOUS at 03:12

## 2021-12-13 RX ADMIN — IPRATROPIUM BROMIDE 0.5 MG: 0.5 SOLUTION RESPIRATORY (INHALATION) at 07:12

## 2021-12-13 RX ADMIN — ASPIRIN 81 MG CHEWABLE TABLET 324 MG: 81 TABLET CHEWABLE at 10:12

## 2021-12-13 RX ADMIN — DOCUSATE SODIUM 100 MG: 100 CAPSULE ORAL at 10:12

## 2021-12-13 RX ADMIN — VANCOMYCIN HYDROCHLORIDE 1250 MG: 1 INJECTION, POWDER, LYOPHILIZED, FOR SOLUTION INTRAVENOUS at 10:12

## 2021-12-13 RX ADMIN — TRANEXAMIC ACID 1000 MG: 100 INJECTION, SOLUTION INTRAVENOUS at 10:12

## 2021-12-13 RX ADMIN — ROCURONIUM BROMIDE 50 MG: 10 INJECTION, SOLUTION INTRAVENOUS at 10:12

## 2021-12-13 RX ADMIN — IPRATROPIUM BROMIDE 0.5 MG: 0.5 SOLUTION RESPIRATORY (INHALATION) at 04:12

## 2021-12-13 RX ADMIN — CEFAZOLIN 2 G: 1 INJECTION, POWDER, FOR SOLUTION INTRAMUSCULAR; INTRAVENOUS; PARENTERAL at 10:12

## 2021-12-13 RX ADMIN — PREGABALIN 75 MG: 75 CAPSULE ORAL at 10:12

## 2021-12-13 RX ADMIN — ONDANSETRON 4 MG: 2 INJECTION INTRAMUSCULAR; INTRAVENOUS at 10:12

## 2021-12-13 RX ADMIN — TRANEXAMIC ACID 1000 MG: 100 INJECTION, SOLUTION INTRAVENOUS at 12:12

## 2021-12-13 RX ADMIN — DEXTROSE MONOHYDRATE 2 G: 50 INJECTION, SOLUTION INTRAVENOUS at 10:12

## 2021-12-14 VITALS
DIASTOLIC BLOOD PRESSURE: 70 MMHG | BODY MASS INDEX: 26.98 KG/M2 | SYSTOLIC BLOOD PRESSURE: 103 MMHG | RESPIRATION RATE: 16 BRPM | HEART RATE: 76 BPM | HEIGHT: 64 IN | OXYGEN SATURATION: 97 % | TEMPERATURE: 99 F | WEIGHT: 158 LBS

## 2021-12-14 LAB
ANION GAP SERPL CALCULATED.3IONS-SCNC: 15 MMOL/L (ref 7–16)
BASOPHILS # BLD AUTO: 0.02 K/UL (ref 0–0.2)
BASOPHILS NFR BLD AUTO: 0.1 % (ref 0–1)
BUN SERPL-MCNC: 11 MG/DL (ref 7–18)
BUN/CREAT SERPL: 18 (ref 6–20)
CALCIUM SERPL-MCNC: 8.1 MG/DL (ref 8.5–10.1)
CHLORIDE SERPL-SCNC: 110 MMOL/L (ref 98–107)
CO2 SERPL-SCNC: 22 MMOL/L (ref 21–32)
CREAT SERPL-MCNC: 0.6 MG/DL (ref 0.55–1.02)
DIFFERENTIAL METHOD BLD: ABNORMAL
EOSINOPHIL # BLD AUTO: 0 K/UL (ref 0–0.5)
EOSINOPHIL NFR BLD AUTO: 0 % (ref 1–4)
ERYTHROCYTE [DISTWIDTH] IN BLOOD BY AUTOMATED COUNT: 16 % (ref 11.5–14.5)
GLUCOSE SERPL-MCNC: 119 MG/DL (ref 74–106)
HCT VFR BLD AUTO: 30.4 % (ref 38–47)
HGB BLD-MCNC: 9.5 G/DL (ref 12–16)
IMM GRANULOCYTES # BLD AUTO: 0.1 K/UL (ref 0–0.04)
IMM GRANULOCYTES NFR BLD: 0.6 % (ref 0–0.4)
LYMPHOCYTES # BLD AUTO: 1.22 K/UL (ref 1–4.8)
LYMPHOCYTES NFR BLD AUTO: 6.8 % (ref 27–41)
MCH RBC QN AUTO: 31.7 PG (ref 27–31)
MCHC RBC AUTO-ENTMCNC: 31.3 G/DL (ref 32–36)
MCV RBC AUTO: 101.3 FL (ref 80–96)
MONOCYTES # BLD AUTO: 1.37 K/UL (ref 0–0.8)
MONOCYTES NFR BLD AUTO: 7.7 % (ref 2–6)
MPC BLD CALC-MCNC: 9.8 FL (ref 9.4–12.4)
NEUTROPHILS # BLD AUTO: 15.19 K/UL (ref 1.8–7.7)
NEUTROPHILS NFR BLD AUTO: 84.8 % (ref 53–65)
NRBC # BLD AUTO: 0 X10E3/UL
NRBC, AUTO (.00): 0 %
PLATELET # BLD AUTO: 243 K/UL (ref 150–400)
POTASSIUM SERPL-SCNC: 4.3 MMOL/L (ref 3.5–5.1)
RBC # BLD AUTO: 3 M/UL (ref 4.2–5.4)
SARS-COV-2 RDRP RESP QL NAA+PROBE: NEGATIVE
SODIUM SERPL-SCNC: 143 MMOL/L (ref 136–145)
WBC # BLD AUTO: 17.9 K/UL (ref 4.5–11)

## 2021-12-14 PROCEDURE — 85025 COMPLETE CBC W/AUTO DIFF WBC: CPT | Performed by: ORTHOPAEDIC SURGERY

## 2021-12-14 PROCEDURE — 94761 N-INVAS EAR/PLS OXIMETRY MLT: CPT

## 2021-12-14 PROCEDURE — 99232 SBSQ HOSP IP/OBS MODERATE 35: CPT | Mod: ,,, | Performed by: FAMILY MEDICINE

## 2021-12-14 PROCEDURE — 99232 PR SUBSEQUENT HOSPITAL CARE,LEVL II: ICD-10-PCS | Mod: ,,, | Performed by: FAMILY MEDICINE

## 2021-12-14 PROCEDURE — 97116 GAIT TRAINING THERAPY: CPT

## 2021-12-14 PROCEDURE — 25000003 PHARM REV CODE 250: Performed by: ORTHOPAEDIC SURGERY

## 2021-12-14 PROCEDURE — 27000221 HC OXYGEN, UP TO 24 HOURS

## 2021-12-14 PROCEDURE — 25000242 PHARM REV CODE 250 ALT 637 W/ HCPCS: Performed by: NURSE PRACTITIONER

## 2021-12-14 PROCEDURE — 97535 SELF CARE MNGMENT TRAINING: CPT

## 2021-12-14 PROCEDURE — 87635 SARS-COV-2 COVID-19 AMP PRB: CPT | Performed by: ORTHOPAEDIC SURGERY

## 2021-12-14 PROCEDURE — 36415 COLL VENOUS BLD VENIPUNCTURE: CPT | Performed by: ORTHOPAEDIC SURGERY

## 2021-12-14 PROCEDURE — 94640 AIRWAY INHALATION TREATMENT: CPT

## 2021-12-14 PROCEDURE — 80048 BASIC METABOLIC PNL TOTAL CA: CPT | Performed by: ORTHOPAEDIC SURGERY

## 2021-12-14 PROCEDURE — 97110 THERAPEUTIC EXERCISES: CPT

## 2021-12-14 RX ORDER — ONDANSETRON 4 MG/1
8 TABLET, ORALLY DISINTEGRATING ORAL EVERY 8 HOURS PRN
Qty: 30 TABLET | Refills: 0 | Status: SHIPPED | OUTPATIENT
Start: 2021-12-14 | End: 2022-06-06

## 2021-12-14 RX ORDER — DOCUSATE SODIUM 100 MG/1
100 CAPSULE, LIQUID FILLED ORAL EVERY 12 HOURS
Status: DISCONTINUED | OUTPATIENT
Start: 2021-12-14 | End: 2021-12-14 | Stop reason: HOSPADM

## 2021-12-14 RX ORDER — OXYCODONE AND ACETAMINOPHEN 10; 325 MG/1; MG/1
1 TABLET ORAL EVERY 6 HOURS PRN
Qty: 30 TABLET | Refills: 0 | Status: SHIPPED | OUTPATIENT
Start: 2021-12-14 | End: 2022-10-31

## 2021-12-14 RX ORDER — OXYCODONE HYDROCHLORIDE 5 MG/1
10 TABLET ORAL EVERY 4 HOURS PRN
Status: DISCONTINUED | OUTPATIENT
Start: 2021-12-14 | End: 2021-12-14 | Stop reason: HOSPADM

## 2021-12-14 RX ORDER — SODIUM CHLORIDE 0.9 % (FLUSH) 0.9 %
3 SYRINGE (ML) INJECTION EVERY 6 HOURS PRN
Status: DISCONTINUED | OUTPATIENT
Start: 2021-12-14 | End: 2021-12-14 | Stop reason: HOSPADM

## 2021-12-14 RX ADMIN — CELECOXIB 200 MG: 100 CAPSULE ORAL at 09:12

## 2021-12-14 RX ADMIN — ASPIRIN 81 MG CHEWABLE TABLET 324 MG: 81 TABLET CHEWABLE at 09:12

## 2021-12-14 RX ADMIN — ATORVASTATIN CALCIUM 20 MG: 20 TABLET, FILM COATED ORAL at 09:12

## 2021-12-14 RX ADMIN — CITALOPRAM HYDROBROMIDE 20 MG: 20 TABLET ORAL at 09:12

## 2021-12-14 RX ADMIN — OXYCODONE HYDROCHLORIDE 5 MG: 5 TABLET ORAL at 04:12

## 2021-12-14 RX ADMIN — ALBUTEROL SULFATE 2.5 MG: 2.5 SOLUTION RESPIRATORY (INHALATION) at 01:12

## 2021-12-14 RX ADMIN — IPRATROPIUM BROMIDE 0.5 MG: 0.5 SOLUTION RESPIRATORY (INHALATION) at 12:12

## 2021-12-14 RX ADMIN — DOCUSATE SODIUM 100 MG: 100 CAPSULE ORAL at 09:12

## 2021-12-14 RX ADMIN — ALBUTEROL SULFATE 2.5 MG: 2.5 SOLUTION RESPIRATORY (INHALATION) at 12:12

## 2021-12-14 RX ADMIN — IPRATROPIUM BROMIDE 0.5 MG: 0.5 SOLUTION RESPIRATORY (INHALATION) at 01:12

## 2021-12-14 RX ADMIN — POLYETHYLENE GLYCOL 3350 17 G: 17 POWDER, FOR SOLUTION ORAL at 09:12

## 2021-12-14 RX ADMIN — FAMOTIDINE 20 MG: 20 TABLET, FILM COATED ORAL at 09:12

## 2021-12-14 RX ADMIN — IPRATROPIUM BROMIDE 0.5 MG: 0.5 SOLUTION RESPIRATORY (INHALATION) at 04:12

## 2021-12-14 RX ADMIN — IPRATROPIUM BROMIDE 0.5 MG: 0.5 SOLUTION RESPIRATORY (INHALATION) at 08:12

## 2021-12-14 RX ADMIN — PREGABALIN 75 MG: 75 CAPSULE ORAL at 09:12

## 2021-12-14 RX ADMIN — MUPIROCIN 1 G: 20 OINTMENT TOPICAL at 09:12

## 2021-12-14 RX ADMIN — ALBUTEROL SULFATE 2.5 MG: 2.5 SOLUTION RESPIRATORY (INHALATION) at 08:12

## 2021-12-14 RX ADMIN — ALBUTEROL SULFATE 2.5 MG: 2.5 SOLUTION RESPIRATORY (INHALATION) at 04:12

## 2021-12-16 LAB
ESTROGEN SERPL-MCNC: NORMAL PG/ML
LAB AP CLINICAL INFORMATION: NORMAL
LAB AP GROSS DESCRIPTION: NORMAL
LAB AP LABORATORY NOTES: NORMAL
T3RU NFR SERPL: NORMAL %

## 2021-12-28 ENCOUNTER — OFFICE VISIT (OUTPATIENT)
Dept: ORTHOPEDICS | Facility: CLINIC | Age: 61
End: 2021-12-28
Payer: MEDICAID

## 2021-12-28 DIAGNOSIS — Z96.649 STATUS POST TOTAL REPLACEMENT OF HIP, UNSPECIFIED LATERALITY: Primary | ICD-10-CM

## 2021-12-28 PROCEDURE — 4010F ACE/ARB THERAPY RXD/TAKEN: CPT | Mod: CPTII,,, | Performed by: ORTHOPAEDIC SURGERY

## 2021-12-28 PROCEDURE — 99024 POSTOP FOLLOW-UP VISIT: CPT | Mod: ,,, | Performed by: ORTHOPAEDIC SURGERY

## 2021-12-28 PROCEDURE — 4010F PR ACE/ARB THEARPY RXD/TAKEN: ICD-10-PCS | Mod: CPTII,,, | Performed by: ORTHOPAEDIC SURGERY

## 2021-12-28 PROCEDURE — 99024 PR POST-OP FOLLOW-UP VISIT: ICD-10-PCS | Mod: ,,, | Performed by: ORTHOPAEDIC SURGERY

## 2022-01-05 ENCOUNTER — OFFICE VISIT (OUTPATIENT)
Dept: FAMILY MEDICINE | Facility: CLINIC | Age: 62
End: 2022-01-05
Payer: MEDICAID

## 2022-01-05 VITALS
WEIGHT: 155.38 LBS | TEMPERATURE: 98 F | DIASTOLIC BLOOD PRESSURE: 64 MMHG | HEIGHT: 64 IN | OXYGEN SATURATION: 99 % | SYSTOLIC BLOOD PRESSURE: 120 MMHG | RESPIRATION RATE: 18 BRPM | BODY MASS INDEX: 26.53 KG/M2 | HEART RATE: 64 BPM

## 2022-01-05 DIAGNOSIS — Z96.642 STATUS POST TOTAL HIP REPLACEMENT, LEFT: Primary | ICD-10-CM

## 2022-01-05 PROBLEM — M25.552 LEFT HIP PAIN: Status: RESOLVED | Noted: 2021-07-28 | Resolved: 2022-01-05

## 2022-01-05 PROBLEM — Z01.818 PRE-OPERATIVE CLEARANCE: Status: RESOLVED | Noted: 2021-07-22 | Resolved: 2022-01-05

## 2022-01-05 PROBLEM — J40 BRONCHITIS: Status: RESOLVED | Noted: 2021-07-22 | Resolved: 2022-01-05

## 2022-01-05 PROBLEM — B49 FUNGAL ESOPHAGITIS: Status: RESOLVED | Noted: 2021-06-23 | Resolved: 2022-01-05

## 2022-01-05 PROBLEM — K29.00 ACUTE SUPERFICIAL GASTRITIS WITHOUT HEMORRHAGE: Status: RESOLVED | Noted: 2021-06-23 | Resolved: 2022-01-05

## 2022-01-05 PROBLEM — M75.52 BURSITIS OF LEFT SHOULDER: Status: RESOLVED | Noted: 2021-06-10 | Resolved: 2022-01-05

## 2022-01-05 PROBLEM — K20.80 FUNGAL ESOPHAGITIS: Status: RESOLVED | Noted: 2021-06-23 | Resolved: 2022-01-05

## 2022-01-05 PROCEDURE — 3078F DIAST BP <80 MM HG: CPT | Mod: CPTII,,, | Performed by: NURSE PRACTITIONER

## 2022-01-05 PROCEDURE — 99214 OFFICE O/P EST MOD 30 MIN: CPT | Mod: ,,, | Performed by: NURSE PRACTITIONER

## 2022-01-05 PROCEDURE — 1111F DSCHRG MED/CURRENT MED MERGE: CPT | Mod: CPTII,,, | Performed by: NURSE PRACTITIONER

## 2022-01-05 PROCEDURE — 1160F RVW MEDS BY RX/DR IN RCRD: CPT | Mod: CPTII,,, | Performed by: NURSE PRACTITIONER

## 2022-01-05 PROCEDURE — 1160F PR REVIEW ALL MEDS BY PRESCRIBER/CLIN PHARMACIST DOCUMENTED: ICD-10-PCS | Mod: CPTII,,, | Performed by: NURSE PRACTITIONER

## 2022-01-05 PROCEDURE — 3074F PR MOST RECENT SYSTOLIC BLOOD PRESSURE < 130 MM HG: ICD-10-PCS | Mod: CPTII,,, | Performed by: NURSE PRACTITIONER

## 2022-01-05 PROCEDURE — 3078F PR MOST RECENT DIASTOLIC BLOOD PRESSURE < 80 MM HG: ICD-10-PCS | Mod: CPTII,,, | Performed by: NURSE PRACTITIONER

## 2022-01-05 PROCEDURE — 3008F BODY MASS INDEX DOCD: CPT | Mod: CPTII,,, | Performed by: NURSE PRACTITIONER

## 2022-01-05 PROCEDURE — 3008F PR BODY MASS INDEX (BMI) DOCUMENTED: ICD-10-PCS | Mod: CPTII,,, | Performed by: NURSE PRACTITIONER

## 2022-01-05 PROCEDURE — 99214 PR OFFICE/OUTPT VISIT, EST, LEVL IV, 30-39 MIN: ICD-10-PCS | Mod: ,,, | Performed by: NURSE PRACTITIONER

## 2022-01-05 PROCEDURE — 1159F MED LIST DOCD IN RCRD: CPT | Mod: CPTII,,, | Performed by: NURSE PRACTITIONER

## 2022-01-05 PROCEDURE — 1111F PR DISCHARGE MEDS RECONCILED W/ CURRENT OUTPATIENT MED LIST: ICD-10-PCS | Mod: CPTII,,, | Performed by: NURSE PRACTITIONER

## 2022-01-05 PROCEDURE — 1159F PR MEDICATION LIST DOCUMENTED IN MEDICAL RECORD: ICD-10-PCS | Mod: CPTII,,, | Performed by: NURSE PRACTITIONER

## 2022-01-05 PROCEDURE — 3074F SYST BP LT 130 MM HG: CPT | Mod: CPTII,,, | Performed by: NURSE PRACTITIONER

## 2022-01-05 RX ORDER — CITALOPRAM 20 MG/1
40 TABLET, FILM COATED ORAL DAILY
Qty: 60 TABLET | Refills: 0 | Status: SHIPPED | OUTPATIENT
Start: 2022-01-05 | End: 2022-03-29 | Stop reason: SDUPTHER

## 2022-01-05 NOTE — PROGRESS NOTES
Arelis Escobedo NP   [facility]  [address]  [place]     PATIENT NAME: Yany Silverio  : 1960  DATE: 22  MRN: [id number]      Billing Provider: Arelis Escobedo NP  Level of Service:   Patient PCP Information     Provider PCP Type    Arelis Escobedo NP General          Reason for Visit / Chief Complaint: Follow-up (Following up from hip surgery and being in the swing bed at [place]. ) and Hip Surgery       Update PCP  Update Chief Complaint         History of Present Illness / Problem Focused Workflow     Yany Silverio presents to the clinic here for eval after having hip surgery and being in SW at [place],       Review of Systems     Review of Systems   Constitutional: Negative for chills, fatigue and fever.   HENT: Negative for nasal congestion, ear pain, facial swelling, hearing loss, mouth dryness, mouth sores, postnasal drip, rhinorrhea, sinus pressure/congestion and goiter.    Eyes: Negative for discharge and itching.   Respiratory: Negative for cough, shortness of breath and wheezing.    Cardiovascular: Negative for chest pain and leg swelling.   Gastrointestinal: Negative for abdominal pain, change in bowel habit and change in bowel habit.   Genitourinary: Negative for difficulty urinating, dysuria, enuresis, frequency, hematuria and urgency.   Neurological: Negative for dizziness, vertigo, syncope, weakness and headaches.   Psychiatric/Behavioral: Negative for decreased concentration.        Medical / Social / Family History     Past Medical History:   Diagnosis Date    Acute superficial gastritis without hemorrhage 2021    Acute superficial gastritis without hemorrhage 2021    Anxiety     Arthritis     newly dx'd RA: sees MD in [place]    Bronchitis 2021    Bursitis of left shoulder 6/10/2021    Cancer     hx of cervical cancer    COPD (chronic obstructive pulmonary disease)     Depression     Emphysema of lung     Fungal esophagitis 2021     HH (hiatus hernia) 6/23/2021    Hyperlipidemia     Hypertension     Insomnia 04/16/2020    Low back pain 04/28/2021    Pain in joint of left hip 04/28/2021    Pain in joint of left knee 04/28/2021       Past Surgical History:   Procedure Laterality Date    ROBOTIC ARTHROPLASTY, HIP Left 12/13/2021    Procedure: ROBOTIC ARTHROPLASTY,HIP;  Surgeon: Adolfo Randall MD;  Location: [facility];  Service: Orthopedics;  Laterality: Left;    SHOULDER SURGERY Right 2017, 2019       Social History  Ms.  reports that she quit smoking about 5 years ago. Her smoking use included cigarettes. She has a 40.00 pack-year smoking history. She has never used smokeless tobacco. She reports current drug use. Drug: Oxycodone. She reports that she does not drink alcohol.    Family History  Ms.'s family history includes Cancer in her mother; Hypertension in her maternal aunt, maternal uncle, and sister.    Medications and Allergies     Medications  Outpatient Medications Marked as Taking for the 1/5/22 encounter (Office Visit) with Arelis Escobedo NP   Medication Sig Dispense Refill    albuterol (PROVENTIL) 2.5 mg /3 mL (0.083 %) nebulizer solution albuterol sulfate 2.5 mg/3 mL (0.083 %) solution for nebulization      aspirin (ECOTRIN) 81 MG EC tablet Take 81 mg by mouth once daily.      diclofenac sodium (VOLTAREN) 1 % Gel Apply 2 g topically 3 (three) times daily.       furosemide (LASIX) 20 MG tablet TAKE 1 TABLET BY MOUTH DAILY FOR 3 DAYS THEN WEIGH DAILY AND IF NO MORE THAN 3 POUND WEIGHT GAIN, TAKE LASIX 30 tablet 0    lovastatin (MEVACOR) 20 MG tablet Take 1 tablet (20 mg total) by mouth once daily. 30 tablet 2    meloxicam (MOBIC) 15 MG tablet TAKE ONE TABLET BY MOUTH ONE TIME DAILY 30 tablet 1    ondansetron (ZOFRAN-ODT) 4 MG TbDL Take 2 tablets (8 mg total) by mouth every 8 (eight) hours as needed (Nausea). 30 tablet 0    oxyCODONE-acetaminophen (PERCOCET)  mg per tablet Take 1 tablet by mouth every 6  "(six) hours as needed for Pain. 30 tablet 0    pantoprazole (PROTONIX) 40 MG tablet Take 1 tablet by mouth once daily.      predniSONE (DELTASONE) 5 MG tablet TAKE ONE Tablet BY MOUTH DAILY WITH FOOD      tiotropium (SPIRIVA) 18 mcg inhalation capsule Inhale 1 capsule (18 mcg total) into the lungs once daily. Controller 30 capsule 6    VENTOLIN HFA 90 mcg/actuation inhaler Inhale 1 puff into the lungs daily as needed.      zolpidem (AMBIEN) 5 MG Tab TAKE ONE TABLET BY MOUTH AT BEDTIME AS NEEDED//DO NOT TAKE WITHIN 4 HOURS OF TAKING PAIN MEDS// 30 tablet 0    [DISCONTINUED] citalopram (CELEXA) 20 MG tablet Take 1 tablet (20 mg total) by mouth once daily. 30 tablet 0       Allergies  Review of patient's allergies indicates:  No Known Allergies    Physical Examination     Vitals:    01/05/22 1045   BP: 120/64   BP Location: Left arm   Patient Position: Sitting   BP Method: Medium (Manual)   Pulse: 64   Resp: 18   Temp: 97.6 °F (36.4 °C)   TempSrc: Oral   SpO2: 99%   Weight: 70.5 kg (155 lb 6 oz)   Height: 5' 4" (1.626 m)      Physical Exam  Vitals and nursing note reviewed.   Constitutional:       Appearance: Normal appearance.   HENT:      Head: Normocephalic.      Right Ear: Tympanic membrane, ear canal and external ear normal.      Left Ear: Tympanic membrane, ear canal and external ear normal.      Nose: Nose normal.      Mouth/Throat:      Mouth: Mucous membranes are moist.      Pharynx: Oropharynx is clear.   Eyes:      Extraocular Movements: Extraocular movements intact.      Conjunctiva/sclera: Conjunctivae normal.      Pupils: Pupils are equal, round, and reactive to light.   Cardiovascular:      Rate and Rhythm: Normal rate and regular rhythm.      Pulses: Normal pulses.      Heart sounds: Normal heart sounds.   Pulmonary:      Effort: Pulmonary effort is normal.      Breath sounds: Normal breath sounds.   Abdominal:      General: Bowel sounds are normal.      Palpations: Abdomen is soft. "   Musculoskeletal:         General: Normal range of motion.   Skin:     General: Skin is warm and dry.      Capillary Refill: Capillary refill takes less than 2 seconds.   Neurological:      General: No focal deficit present.      Mental Status: She is alert and oriented to person, place, and time.      Gait: Gait abnormal.      Comments: Uses crutches for assistance   Psychiatric:         Mood and Affect: Mood normal.         Behavior: Behavior normal.          Assessment and Plan (including Health Maintenance)      Problem List  Smart Sets  Document Outside HM   :    Plan: cont pt as scheduled, take pain med ju 30min prior to pt, return to clinic as scheduled    Status post total hip replacement, left    Other orders  -     citalopram (CELEXA) 20 MG tablet; Take 2 tablets (40 mg total) by mouth once daily.  Dispense: 60 tablet; Refill: 0            There are no preventive care reminders to display for this patient.    Problem List Items Addressed This Visit        Orthopedic    Status post total hip replacement, left - Primary            Health Maintenance Topics with due status: Not Due       Topic Last Completion Date    Colorectal Cancer Screening 10/01/2020    Lipid Panel 06/10/2021    COVID-19 Vaccine 01/02/2022       Procedures     Future Appointments   Date Time Provider Department Center   2/1/2022  3:45 PM Adolfo [facility] MD OB [facility]THO [facility]   2/7/2022  9:30 AM Juaquin Mantilla DO [facility] SLEEP [place]    2/7/2022  2:00 PM Arelis Escobedo NP [facility]   6/13/2022  1:40 PM Riley Saunders MD OB  PULM [facility]        Follow up in about 1 month (around 2/5/2022) for f\u.       Signature:  Arelis Escobedo NP    Date of encounter: 1/5/22

## 2022-01-05 NOTE — PATIENT INSTRUCTIONS
"Patient Education       Total Hip Replacement Discharge Instructions   About this topic   The hip joint is a "ball and socket" joint. The "ball" part of the joint is the top part of the thigh bone. It is the femoral head. The "socket" is a part of the pelvic bone. The ball fits into the socket in the pelvic bone called the acetabulum. Cartilage covers the parts of the joint in a normal hip. This lets the hip glide easily. The cartilage can become worn and cause bone to rub on other bone. This condition is called osteoarthritis or arthritis. This often leads to pain, stiffness, and trouble walking. Sometimes, drugs and exercises can help you with the pain. When they stop working, you may need hip joint replacement surgery.    "

## 2022-01-10 ENCOUNTER — HOSPITAL ENCOUNTER (OUTPATIENT)
Facility: HOSPITAL | Age: 62
LOS: 7 days | Discharge: NURSING, REHAB OR LONG-TERM CARE | End: 2022-01-18
Attending: INTERNAL MEDICINE | Admitting: INTERNAL MEDICINE
Payer: MEDICAID

## 2022-01-10 ENCOUNTER — OFFICE VISIT (OUTPATIENT)
Dept: FAMILY MEDICINE | Facility: CLINIC | Age: 62
End: 2022-01-10
Payer: MEDICAID

## 2022-01-10 VITALS
SYSTOLIC BLOOD PRESSURE: 118 MMHG | TEMPERATURE: 98 F | DIASTOLIC BLOOD PRESSURE: 74 MMHG | RESPIRATION RATE: 20 BRPM | OXYGEN SATURATION: 99 % | HEART RATE: 86 BPM

## 2022-01-10 DIAGNOSIS — G47.33 OSA ON CPAP: ICD-10-CM

## 2022-01-10 DIAGNOSIS — K44.9 HH (HIATUS HERNIA): ICD-10-CM

## 2022-01-10 DIAGNOSIS — M16.12 OSTEOARTHRITIS OF LEFT HIP: ICD-10-CM

## 2022-01-10 DIAGNOSIS — F41.9 ANXIETY AND DEPRESSION: Chronic | ICD-10-CM

## 2022-01-10 DIAGNOSIS — S72.25XA: ICD-10-CM

## 2022-01-10 DIAGNOSIS — J18.9 PNEUMONIA: ICD-10-CM

## 2022-01-10 DIAGNOSIS — Z87.09 HISTORY OF COPD: ICD-10-CM

## 2022-01-10 DIAGNOSIS — Z98.890: ICD-10-CM

## 2022-01-10 DIAGNOSIS — I10 HYPERTENSION: Chronic | ICD-10-CM

## 2022-01-10 DIAGNOSIS — Z79.899 ENCOUNTER FOR LONG-TERM (CURRENT) USE OF OTHER MEDICATIONS: ICD-10-CM

## 2022-01-10 DIAGNOSIS — S72.25XA CLOSED NONDISPLACED SUBTROCHANTERIC FRACTURE OF LEFT FEMUR, INITIAL ENCOUNTER: ICD-10-CM

## 2022-01-10 DIAGNOSIS — F32.A ANXIETY AND DEPRESSION: Chronic | ICD-10-CM

## 2022-01-10 DIAGNOSIS — G47.00 INSOMNIA: ICD-10-CM
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
## 2022-01-10 DIAGNOSIS — S72.25XA CLOSED NONDISPLACED SUBTROCHANTERIC FRACTURE OF LEFT FEMUR, INITIAL ENCOUNTER: Primary | ICD-10-CM

## 2022-01-10 DIAGNOSIS — R07.9 CHEST PAIN: ICD-10-CM

## 2022-01-10 DIAGNOSIS — Z96.642 STATUS POST TOTAL HIP REPLACEMENT, LEFT: ICD-10-CM

## 2022-01-10 DIAGNOSIS — S72.002A CLOSED FRACTURE OF LEFT HIP, INITIAL ENCOUNTER: Primary | ICD-10-CM

## 2022-01-10 DIAGNOSIS — E78.2 MIXED HYPERLIPIDEMIA: ICD-10-CM

## 2022-01-10 DIAGNOSIS — D64.9 ANEMIA: ICD-10-CM

## 2022-01-10 DIAGNOSIS — J44.9 CHRONIC OBSTRUCTIVE PULMONARY DISEASE: ICD-10-CM

## 2022-01-10 DIAGNOSIS — M54.6 PAIN IN THORACIC SPINE: ICD-10-CM

## 2022-01-10 LAB
ALBUMIN SERPL BCP-MCNC: 3.6 G/DL (ref 3.5–5)
ALBUMIN/GLOB SERPL: 0.9 {RATIO}
ALP SERPL-CCNC: 96 U/L (ref 50–130)
ALT SERPL W P-5'-P-CCNC: 11 U/L (ref 13–56)
ANION GAP SERPL CALCULATED.3IONS-SCNC: 16 MMOL/L (ref 7–16)
APTT PPP: 31.3 SECONDS (ref 25.2–37.3)
AST SERPL W P-5'-P-CCNC: 13 U/L (ref 15–37)
BACTERIA #/AREA URNS HPF: ABNORMAL /HPF
BILIRUB SERPL-MCNC: 0.2 MG/DL (ref 0–1.2)
BILIRUB UR QL STRIP: NEGATIVE
BUN SERPL-MCNC: 15 MG/DL (ref 7–18)
BUN/CREAT SERPL: 22 (ref 6–20)
CALCIUM SERPL-MCNC: 8.4 MG/DL (ref 8.5–10.1)
CAOX CRY #/AREA URNS LPF: ABNORMAL /LPF
CHLORIDE SERPL-SCNC: 108 MMOL/L (ref 98–107)
CLARITY UR: CLEAR
CO2 SERPL-SCNC: 26 MMOL/L (ref 21–32)
COLOR UR: YELLOW
CREAT SERPL-MCNC: 0.69 MG/DL (ref 0.55–1.02)
GLOBULIN SER-MCNC: 3.8 G/DL (ref 2–4)
GLUCOSE SERPL-MCNC: 94 MG/DL (ref 74–106)
GLUCOSE UR STRIP-MCNC: NEGATIVE MG/DL
INDIRECT COOMBS: NORMAL
INR BLD: 1.04 (ref 0.9–1.1)
KETONES UR STRIP-SCNC: ABNORMAL MG/DL
LEUKOCYTE ESTERASE UR QL STRIP: NEGATIVE
NITRITE UR QL STRIP: NEGATIVE
PH UR STRIP: 5 PH UNITS
POTASSIUM SERPL-SCNC: 4.2 MMOL/L (ref 3.5–5.1)
PROT SERPL-MCNC: 7.4 G/DL (ref 6.4–8.2)
PROT UR QL STRIP: ABNORMAL
PROTHROMBIN TIME: 13.6 SECONDS (ref 11.7–14.7)
RBC # UR STRIP: ABNORMAL /UL
RBC #/AREA URNS HPF: ABNORMAL /HPF
RH BLD: NORMAL
SODIUM SERPL-SCNC: 146 MMOL/L (ref 136–145)
SP GR UR STRIP: >=1.03
SQUAMOUS #/AREA URNS LPF: ABNORMAL /LPF
UROBILINOGEN UR STRIP-ACNC: 0.2 MG/DL
WBC #/AREA URNS HPF: ABNORMAL /HPF

## 2022-01-10 PROCEDURE — 99285 EMERGENCY DEPT VISIT HI MDM: CPT | Mod: ,,, | Performed by: NURSE PRACTITIONER

## 2022-01-10 PROCEDURE — 3074F SYST BP LT 130 MM HG: CPT | Mod: CPTII,,, | Performed by: NURSE PRACTITIONER

## 2022-01-10 PROCEDURE — 3074F PR MOST RECENT SYSTOLIC BLOOD PRESSURE < 130 MM HG: ICD-10-PCS | Mod: CPTII,,, | Performed by: NURSE PRACTITIONER

## 2022-01-10 PROCEDURE — 63600175 PHARM REV CODE 636 W HCPCS: Performed by: NURSE PRACTITIONER

## 2022-01-10 PROCEDURE — 85610 PROTHROMBIN TIME: CPT | Performed by: NURSE PRACTITIONER

## 2022-01-10 PROCEDURE — 25000003 PHARM REV CODE 250: Performed by: NURSE PRACTITIONER

## 2022-01-10 PROCEDURE — 93010 EKG 12-LEAD: ICD-10-PCS | Mod: ,,, | Performed by: INTERNAL MEDICINE

## 2022-01-10 PROCEDURE — 1159F PR MEDICATION LIST DOCUMENTED IN MEDICAL RECORD: ICD-10-PCS | Mod: CPTII,,, | Performed by: NURSE PRACTITIONER

## 2022-01-10 PROCEDURE — 99285 PR EMERGENCY DEPT VISIT,LEVEL V: ICD-10-PCS | Mod: ,,, | Performed by: NURSE PRACTITIONER

## 2022-01-10 PROCEDURE — 99214 PR OFFICE/OUTPT VISIT, EST, LEVL IV, 30-39 MIN: ICD-10-PCS | Mod: ,,, | Performed by: NURSE PRACTITIONER

## 2022-01-10 PROCEDURE — 99285 EMERGENCY DEPT VISIT HI MDM: CPT | Mod: 25

## 2022-01-10 PROCEDURE — 81001 URINALYSIS AUTO W/SCOPE: CPT | Performed by: NURSE PRACTITIONER

## 2022-01-10 PROCEDURE — 96372 THER/PROPH/DIAG INJ SC/IM: CPT | Performed by: INTERNAL MEDICINE

## 2022-01-10 PROCEDURE — 63600175 PHARM REV CODE 636 W HCPCS: Performed by: INTERNAL MEDICINE

## 2022-01-10 PROCEDURE — 96361 HYDRATE IV INFUSION ADD-ON: CPT | Performed by: NURSE PRACTITIONER

## 2022-01-10 PROCEDURE — 1111F DSCHRG MED/CURRENT MED MERGE: CPT | Mod: CPTII,,, | Performed by: NURSE PRACTITIONER

## 2022-01-10 PROCEDURE — 3078F DIAST BP <80 MM HG: CPT | Mod: CPTII,,, | Performed by: NURSE PRACTITIONER

## 2022-01-10 PROCEDURE — 11000001 HC ACUTE MED/SURG PRIVATE ROOM

## 2022-01-10 PROCEDURE — 93005 ELECTROCARDIOGRAM TRACING: CPT

## 2022-01-10 PROCEDURE — 96361 HYDRATE IV INFUSION ADD-ON: CPT

## 2022-01-10 PROCEDURE — 36415 COLL VENOUS BLD VENIPUNCTURE: CPT | Performed by: NURSE PRACTITIONER

## 2022-01-10 PROCEDURE — 86901 BLOOD TYPING SEROLOGIC RH(D): CPT | Performed by: NURSE PRACTITIONER

## 2022-01-10 PROCEDURE — 1159F MED LIST DOCD IN RCRD: CPT | Mod: CPTII,,, | Performed by: NURSE PRACTITIONER

## 2022-01-10 PROCEDURE — 96375 TX/PRO/DX INJ NEW DRUG ADDON: CPT

## 2022-01-10 PROCEDURE — 85730 THROMBOPLASTIN TIME PARTIAL: CPT | Performed by: NURSE PRACTITIONER

## 2022-01-10 PROCEDURE — 80053 COMPREHEN METABOLIC PANEL: CPT | Performed by: NURSE PRACTITIONER

## 2022-01-10 PROCEDURE — 3078F PR MOST RECENT DIASTOLIC BLOOD PRESSURE < 80 MM HG: ICD-10-PCS | Mod: CPTII,,, | Performed by: NURSE PRACTITIONER

## 2022-01-10 PROCEDURE — 99214 OFFICE O/P EST MOD 30 MIN: CPT | Mod: ,,, | Performed by: NURSE PRACTITIONER

## 2022-01-10 PROCEDURE — 1111F PR DISCHARGE MEDS RECONCILED W/ CURRENT OUTPATIENT MED LIST: ICD-10-PCS | Mod: CPTII,,, | Performed by: NURSE PRACTITIONER

## 2022-01-10 PROCEDURE — 93010 ELECTROCARDIOGRAM REPORT: CPT | Mod: ,,, | Performed by: INTERNAL MEDICINE

## 2022-01-10 PROCEDURE — 96374 THER/PROPH/DIAG INJ IV PUSH: CPT

## 2022-01-10 RX ORDER — CITALOPRAM 20 MG/1
20 TABLET, FILM COATED ORAL DAILY
Status: DISCONTINUED | OUTPATIENT
Start: 2022-01-11 | End: 2022-01-18 | Stop reason: HOSPADM

## 2022-01-10 RX ORDER — BUDESONIDE 0.5 MG/2ML
0.5 INHALANT ORAL EVERY 12 HOURS
Status: DISCONTINUED | OUTPATIENT
Start: 2022-01-10 | End: 2022-01-15

## 2022-01-10 RX ORDER — CITALOPRAM 20 MG/1
40 TABLET, FILM COATED ORAL DAILY
Status: DISCONTINUED | OUTPATIENT
Start: 2022-01-11 | End: 2022-01-10

## 2022-01-10 RX ORDER — TALC
6 POWDER (GRAM) TOPICAL NIGHTLY PRN
Status: DISCONTINUED | OUTPATIENT
Start: 2022-01-10 | End: 2022-01-10

## 2022-01-10 RX ORDER — ONDANSETRON 2 MG/ML
4 INJECTION INTRAMUSCULAR; INTRAVENOUS EVERY 8 HOURS PRN
Status: DISCONTINUED | OUTPATIENT
Start: 2022-01-10 | End: 2022-01-18 | Stop reason: HOSPADM

## 2022-01-10 RX ORDER — ASPIRIN 81 MG/1
81 TABLET ORAL DAILY
Status: DISCONTINUED | OUTPATIENT
Start: 2022-01-11 | End: 2022-01-12

## 2022-01-10 RX ORDER — ZOLPIDEM TARTRATE 5 MG/1
5 TABLET ORAL NIGHTLY PRN
Status: DISCONTINUED | OUTPATIENT
Start: 2022-01-10 | End: 2022-01-15

## 2022-01-10 RX ORDER — ENOXAPARIN SODIUM 100 MG/ML
40 INJECTION SUBCUTANEOUS ONCE
Status: COMPLETED | OUTPATIENT
Start: 2022-01-10 | End: 2022-01-10

## 2022-01-10 RX ORDER — OXYCODONE AND ACETAMINOPHEN 10; 325 MG/1; MG/1
1 TABLET ORAL EVERY 6 HOURS PRN
Status: DISCONTINUED | OUTPATIENT
Start: 2022-01-10 | End: 2022-01-18 | Stop reason: HOSPADM

## 2022-01-10 RX ORDER — ONDANSETRON 2 MG/ML
4 INJECTION INTRAMUSCULAR; INTRAVENOUS
Status: COMPLETED | OUTPATIENT
Start: 2022-01-10 | End: 2022-01-10

## 2022-01-10 RX ORDER — ACETAMINOPHEN 325 MG/1
650 TABLET ORAL EVERY 4 HOURS PRN
Status: DISCONTINUED | OUTPATIENT
Start: 2022-01-10 | End: 2022-01-18 | Stop reason: HOSPADM

## 2022-01-10 RX ORDER — NALOXONE HCL 0.4 MG/ML
0.02 VIAL (ML) INJECTION
Status: DISCONTINUED | OUTPATIENT
Start: 2022-01-10 | End: 2022-01-18 | Stop reason: HOSPADM

## 2022-01-10 RX ORDER — MORPHINE SULFATE 4 MG/ML
4 INJECTION, SOLUTION INTRAMUSCULAR; INTRAVENOUS
Status: COMPLETED | OUTPATIENT
Start: 2022-01-10 | End: 2022-01-10

## 2022-01-10 RX ORDER — HYDROCODONE BITARTRATE AND ACETAMINOPHEN 5; 325 MG/1; MG/1
1 TABLET ORAL EVERY 6 HOURS PRN
Status: DISCONTINUED | OUTPATIENT
Start: 2022-01-10 | End: 2022-01-18 | Stop reason: HOSPADM

## 2022-01-10 RX ORDER — PANTOPRAZOLE SODIUM 40 MG/1
40 TABLET, DELAYED RELEASE ORAL DAILY
Status: DISCONTINUED | OUTPATIENT
Start: 2022-01-11 | End: 2022-01-18 | Stop reason: HOSPADM

## 2022-01-10 RX ORDER — SODIUM CHLORIDE 0.9 % (FLUSH) 0.9 %
10 SYRINGE (ML) INJECTION EVERY 12 HOURS PRN
Status: DISCONTINUED | OUTPATIENT
Start: 2022-01-10 | End: 2022-01-18 | Stop reason: HOSPADM

## 2022-01-10 RX ORDER — IPRATROPIUM BROMIDE AND ALBUTEROL SULFATE 2.5; .5 MG/3ML; MG/3ML
3 SOLUTION RESPIRATORY (INHALATION)
Status: DISCONTINUED | OUTPATIENT
Start: 2022-01-10 | End: 2022-01-10 | Stop reason: CLARIF

## 2022-01-10 RX ORDER — PROCHLORPERAZINE EDISYLATE 5 MG/ML
5 INJECTION INTRAMUSCULAR; INTRAVENOUS EVERY 6 HOURS PRN
Status: DISCONTINUED | OUTPATIENT
Start: 2022-01-10 | End: 2022-01-18 | Stop reason: HOSPADM

## 2022-01-10 RX ORDER — MORPHINE SULFATE 4 MG/ML
4 INJECTION, SOLUTION INTRAMUSCULAR; INTRAVENOUS EVERY 4 HOURS PRN
Status: DISCONTINUED | OUTPATIENT
Start: 2022-01-10 | End: 2022-01-18 | Stop reason: HOSPADM

## 2022-01-10 RX ORDER — IPRATROPIUM BROMIDE AND ALBUTEROL SULFATE 2.5; .5 MG/3ML; MG/3ML
3 SOLUTION RESPIRATORY (INHALATION) EVERY 6 HOURS
Status: DISCONTINUED | OUTPATIENT
Start: 2022-01-11 | End: 2022-01-15

## 2022-01-10 RX ORDER — SODIUM CHLORIDE 9 MG/ML
INJECTION, SOLUTION INTRAVENOUS
Status: COMPLETED | OUTPATIENT
Start: 2022-01-10 | End: 2022-01-11

## 2022-01-10 RX ORDER — ATORVASTATIN CALCIUM 40 MG/1
40 TABLET, FILM COATED ORAL DAILY
Status: DISCONTINUED | OUTPATIENT
Start: 2022-01-11 | End: 2022-01-18 | Stop reason: HOSPADM

## 2022-01-10 RX ORDER — IPRATROPIUM BROMIDE AND ALBUTEROL SULFATE 2.5; .5 MG/3ML; MG/3ML
3 SOLUTION RESPIRATORY (INHALATION)
Status: DISCONTINUED | OUTPATIENT
Start: 2022-01-10 | End: 2022-01-18 | Stop reason: HOSPADM

## 2022-01-10 RX ADMIN — ONDANSETRON 4 MG: 2 INJECTION INTRAMUSCULAR; INTRAVENOUS at 09:01

## 2022-01-10 RX ADMIN — ENOXAPARIN SODIUM 40 MG: 40 INJECTION SUBCUTANEOUS at 11:01

## 2022-01-10 RX ADMIN — MORPHINE SULFATE 4 MG: 4 INJECTION INTRAVENOUS at 09:01

## 2022-01-10 RX ADMIN — SODIUM CHLORIDE: 9 INJECTION, SOLUTION INTRAVENOUS at 09:01

## 2022-01-10 NOTE — PROGRESS NOTES
Arelis Escobedo NP   [facility]  [address]  [place]     PATIENT NAME: Yany Silverio  : 1960  DATE: 1/10/22  MRN: [id number]      Billing Provider: Arelis Escobedo NP  Level of Service:   Patient PCP Information     Provider PCP Type    Arelis Escobedo NP General          Reason for Visit / Chief Complaint: Pain (Reports fell at home 2 days ago.  Fell on rt side but heard left hip pop twice)       Update PCP  Update Chief Complaint         History of Present Illness / Problem Focused Workflow     Yany Silverio presents to the clinic pt staed that she fell 2 days ago on her right side but heard something on left hip pop twice. C/o pain in left groin and thgh      Review of Systems     Review of Systems   Constitutional: Negative for chills, fatigue and fever.   HENT: Negative for nasal congestion, ear pain, facial swelling, hearing loss, mouth dryness, mouth sores, postnasal drip, rhinorrhea, sinus pressure/congestion and goiter.    Eyes: Negative for discharge and itching.   Respiratory: Negative for cough, shortness of breath and wheezing.    Cardiovascular: Negative for chest pain and leg swelling.   Gastrointestinal: Negative for abdominal pain, change in bowel habit and change in bowel habit.   Genitourinary: Negative for difficulty urinating, dysuria, enuresis, frequency, hematuria and urgency.   Musculoskeletal:        Pain left groin and thigh   Neurological: Negative for dizziness, vertigo, syncope, weakness and headaches.   Psychiatric/Behavioral: Negative for decreased concentration.        Medical / Social / Family History     Past Medical History:   Diagnosis Date    Acute superficial gastritis without hemorrhage 2021    Acute superficial gastritis without hemorrhage 2021    Anxiety     Arthritis     newly dx'd RA: sees MD in [place]    Bronchitis 2021    Bursitis of left shoulder 6/10/2021    Cancer     hx of cervical cancer    COPD (chronic  obstructive pulmonary disease)     Depression     Emphysema of lung     Fungal esophagitis 6/23/2021    HH (hiatus hernia) 6/23/2021    Hyperlipidemia     Hypertension     Insomnia 04/16/2020    Low back pain 04/28/2021    Pain in joint of left hip 04/28/2021    Pain in joint of left knee 04/28/2021       Past Surgical History:   Procedure Laterality Date    ROBOTIC ARTHROPLASTY, HIP Left 12/13/2021    Procedure: ROBOTIC ARTHROPLASTY,HIP;  Surgeon: Adolfo Randall MD;  Location: [facility];  Service: Orthopedics;  Laterality: Left;    SHOULDER SURGERY Right 2017, 2019       Social History  Ms.  reports that she quit smoking about 5 years ago. Her smoking use included cigarettes. She has a 40.00 pack-year smoking history. She has never used smokeless tobacco. She reports current drug use. Drug: Oxycodone. She reports that she does not drink alcohol.    Family History  Ms.'s family history includes Cancer in her mother; Hypertension in her maternal aunt, maternal uncle, and sister.    Medications and Allergies     Medications  Outpatient Medications Marked as Taking for the 1/10/22 encounter (Office Visit) with Arelis Escobedo NP   Medication Sig Dispense Refill    albuterol (PROVENTIL) 2.5 mg /3 mL (0.083 %) nebulizer solution albuterol sulfate 2.5 mg/3 mL (0.083 %) solution for nebulization      citalopram (CELEXA) 20 MG tablet Take 2 tablets (40 mg total) by mouth once daily. 60 tablet 0    diclofenac sodium (VOLTAREN) 1 % Gel Apply 2 g topically 3 (three) times daily.       furosemide (LASIX) 20 MG tablet TAKE 1 TABLET BY MOUTH DAILY FOR 3 DAYS THEN WEIGH DAILY AND IF NO MORE THAN 3 POUND WEIGHT GAIN, TAKE LASIX 30 tablet 0    lovastatin (MEVACOR) 20 MG tablet Take 1 tablet (20 mg total) by mouth once daily. 30 tablet 2    meloxicam (MOBIC) 15 MG tablet TAKE ONE TABLET BY MOUTH ONE TIME DAILY 30 tablet 1    ondansetron (ZOFRAN-ODT) 4 MG TbDL Take 2 tablets (8 mg total) by mouth every 8  (eight) hours as needed (Nausea). 30 tablet 0    oxyCODONE-acetaminophen (PERCOCET)  mg per tablet Take 1 tablet by mouth every 6 (six) hours as needed for Pain. 30 tablet 0    pantoprazole (PROTONIX) 40 MG tablet Take 1 tablet by mouth once daily.      tiotropium (SPIRIVA) 18 mcg inhalation capsule Inhale 1 capsule (18 mcg total) into the lungs once daily. Controller 30 capsule 6    VENTOLIN HFA 90 mcg/actuation inhaler Inhale 1 puff into the lungs daily as needed.      zolpidem (AMBIEN) 5 MG Tab TAKE ONE TABLET BY MOUTH AT BEDTIME AS NEEDED//DO NOT TAKE WITHIN 4 HOURS OF TAKING PAIN MEDS// 30 tablet 0       Allergies  Review of patient's allergies indicates:  No Known Allergies    Physical Examination     Vitals:    01/10/22 1441   BP: 118/74   Pulse: 86   Resp: 20   Temp: 97.7 °F (36.5 °C)   SpO2: 99%      Physical Exam  Constitutional:       Appearance: Normal appearance.   HENT:      Head: Normocephalic.      Right Ear: Tympanic membrane, ear canal and external ear normal.      Left Ear: Tympanic membrane, ear canal and external ear normal.      Nose: Nose normal.      Mouth/Throat:      Mouth: Mucous membranes are moist.      Pharynx: Oropharynx is clear.   Eyes:      Extraocular Movements: Extraocular movements intact.      Conjunctiva/sclera: Conjunctivae normal.      Pupils: Pupils are equal, round, and reactive to light.   Cardiovascular:      Rate and Rhythm: Normal rate and regular rhythm.      Pulses: Normal pulses.      Heart sounds: Normal heart sounds.   Pulmonary:      Effort: Pulmonary effort is normal.      Breath sounds: Normal breath sounds.   Abdominal:      General: Bowel sounds are normal.      Palpations: Abdomen is soft.   Musculoskeletal:         General: Tenderness (left groin and thigh pain, tender to palpate, ) present. Normal range of motion.   Skin:     General: Skin is warm and dry.      Capillary Refill: Capillary refill takes less than 2 seconds.   Neurological:       General: No focal deficit present.      Mental Status: She is alert and oriented to person, place, and time.      Gait: Gait abnormal (using wheelchair for assistance).   Psychiatric:         Mood and Affect: Mood normal.         Behavior: Behavior normal.          Assessment and Plan (including Health Maintenance)      Problem List  Smart Sets  Document Outside HM   :    Plan: refer back to DR Adolfo Randall for admission, pt is to go to [facility] and is scheduled for surgery WED.     Closed nondisplaced subtrochanteric fracture of left femur, initial encounter    Fall, initial encounter  -     X-Ray Hip 2 or 3 views Left (with Pelvis when performed); Future; Expected date: 01/10/2022  -     Cancel: X-Ray Hip 2 or 3 views Right (with Pelvis when performed); Future; Expected date: 01/10/2022            Health Maintenance Due   Topic Date Due    Hepatitis C Screening  Never done    Influenza Vaccine (1) Never done       Problem List Items Addressed This Visit        Orthopedic    Closed nondisplaced subtrochanteric fracture of left femur - Primary      Other Visit Diagnoses     Fall, initial encounter        Relevant Orders    X-Ray Hip 2 or 3 views Left (with Pelvis when performed)            Health Maintenance Topics with due status: Not Due       Topic Last Completion Date    Colorectal Cancer Screening 10/01/2020    Lipid Panel 06/10/2021       Procedures     Future Appointments   Date Time Provider Department Center   2/1/2022  3:45 PM Adolfo [facility] MD [facility] ORTHO [place] [facility]   2/7/2022  9:30 AM Juaquin Mantilla DO [facility] SLEEP [facility]   2/7/2022  2:00 PM Arelis Escobedo NP [facility] [place]   6/13/2022  1:40 PM Riley Saunders [facility]        Follow up for as scheduled and as needed.       Signature:  Arelis Escobedo NP    Date of encounter: 1/10/22

## 2022-01-10 NOTE — PATIENT INSTRUCTIONS
Patient Education       Preventing Falls   The Basics   Written by the doctors and editors at [facility]   Am I at risk of falling? -- Your risk of falling increases as you grow older. That's because getting older can make it harder to walk steadily and keep your balance. Also, the effects of falls are more serious in older people.  Overall, 3 to 4 out of every 10 people over the age of 65 fall each year. Up to 75 percent of people who fracture a hip never recover to the point they were before they had their fracture. If you have fallen in the past, you are at higher risk of falling again.  Several things can increase your risk of a fall, including:  · Illness  · A change in the medicines you take  · An unsafe or unfamiliar setting (for example, a room with rugs or furniture that might trip you, or an area you don't know well)  How can my doctor help me to avoid falling? -- Your doctor can talk to you about the following things:  · Past falls - It is important to tell your doctor about any times you have fallen or almost fallen. He or she can then suggest ways to prevent another fall.  · Your health conditions - Some health problems can put you at risk of falling. These include conditions that affect eyesight, hearing, muscle strength, or balance.  · The medicines you take - Certain medicines can increase the risk of falling. These include some medicines that are used for sleeping problems, anxiety, high blood pressure, or depression. Adding new medicines, or changing doses of some medicines, can also affect your risk of falling.  The more your doctor knows about your situation, the better he or she will be able to help you. For example, if you fell because you have a condition that causes pain, your doctor might suggest treatments to deal with the pain. Or if one of your medicines is making you dizzy and more likely to fall, your doctor might switch you to a different medicine.  Is there anything I can do on my  own? -- Yes. To help keep from falling, you can:  · Make your home safer - To avoid falling at home, get rid of things that might make you trip or slip. This might include furniture, electrical cords, clutter, and loose rugs (figure 1). Keep your home well-lit so that you can easily see where you are going. Avoid storing things in high places so you don't have to reach or climb.  · Wear sturdy shoes that fit well - Wearing shoes with high heels or slippery soles, or shoes that are too loose, can lead to falls. Walking around in bare feet, or only socks, can also increase your risk of falling.  · Take vitamin D pills - Taking vitamin D might lower the risk of falls in older people. This is because vitamin D helps make bones and muscles stronger. Your doctor can talk to you about whether you should take extra vitamin D, and how much.  · Stay active - Exercising on a regular basis can help lower your risk of falling. It might also help prevent you from getting hurt if you do fall. It is best to do a few different activities that help with both strength and balance. There are many kinds of exercise that can be safe for older people. These include walking, swimming, and Emile Chi (a Chinese martial art that involves slow, gentle movements).  · Use a cane, walker, and other safety devices - If your doctor recommends that you use a cane or walker, be sure that it's the right size and you know how to use it. There are other devices that might help you avoid falling, too. These include grab bars or a sturdy seat for the shower, non-slip bath mats, and hand rails or treads for the stairs (to prevent slipping).  If you worry that you could fall, there are also alarm buttons that let you call for help if you fall and can't get up.  What should I do if I fall? -- If you fall, see your doctor right away, even if you aren't hurt. Your doctor can try to figure out what caused you to fall, and how likely you are to fall again. He or  she will do an exam and talk to you about your health problems, medicines, and activities. Then he or she can suggest things you can do to avoid falling again.  Many older people have a hard time recovering after a fall. Doing things to prevent falling can help you to protect your health and independence.  All topics are updated as new evidence becomes available and our peer review process is complete.  This topic retrieved from [employer] on: Sep 21, 2021.  Topic 60577 Version 18.0  Release: 29.4.2 - C29.263  © 2021 UpToDate, Inc. and/or its affiliates. All rights reserved.  figure 1: How to avoid falling at home     This picture shows some of the things that can cause a fall in your home. Look around and remove any loose rugs, electrical cords, clutter, or furniture that could trip you.  Graphic 37867 Version 1.0    Consumer Information Use and Disclaimer   This information is not specific medical advice and does not replace information you receive from your health care provider. This is only a brief summary of general information. It does NOT include all information about conditions, illnesses, injuries, tests, procedures, treatments, therapies, discharge instructions or life-style choices that may apply to you. You must talk with your health care provider for complete information about your health and treatment options. This information should not be used to decide whether or not to accept your health care provider's advice, instructions or recommendations. Only your health care provider has the knowledge and training to provide advice that is right for you. The use of this information is governed by the [employer] End User License Agreement, available at https://www.The Training Room (TTR).[employer] ([employer])/en/solutions/[employer]/about/serafin.The use of [employer] content is governed by the [employer] Terms of Use. ©2021 UpToDate, Inc. All rights reserved.  Copyright   © 2021 UpToDate, Inc. and/or its affiliates. All rights reserved.  Patient  Education       Femur Fracture Discharge Instructions   About this topic   A femur fracture is a broken bone in your thigh or upper leg. You will need to limit movement of your leg to help the bone heal in the correct position.  You may need to have more tests on your leg. You may need surgery to repair a very serious injury.         What care is needed at home?   · Ask your doctor what you need to do when you go home. Make sure you ask questions if you do not understand what the doctor says. This way you will know what you need to do.  · Wear your splint or brace to support your leg. You will probably need to have surgery or get a cast after the swelling goes down.  · You will need to use crutches or a walker to help you get around. You will not be able to put weight on your leg at first.  · Prop your leg on pillows, keeping it above the level of your heart. This may help lessen pain and swelling.  · You may want to take medicine like ibuprofen or naproxen for swelling and pain. These are nonsteroidal anti-inflammatory drugs (NSAIDS). You might also have gotten a prescription for stronger pain medicines to take for a short time. If so, be sure to follow the instructions for taking them.  · Ice may help you ease pain and swelling.  · Place an ice pack or a bag of frozen vegetables wrapped in a towel over the painful part. Never put ice right on the skin. Do not leave the ice on more than 10 to 15 minutes at a time. Use for the first 24 to 48 hours after an injury.  · If you smoke, try to quit. Broken bones take longer to heal if you smoke.  What follow-up care is needed?   · Your doctor may ask you to make visits to the office to check on your progress. Be sure to keep these visits.  · If you have stitches or staples, you will need to have them taken out. Your doctor will often want to do this in 1 to 2 weeks.  · If you have a cast, brace, or splint, your doctor will remove it in a few weeks.  · An x-ray test may be  taken to make sure that your bone is fully healed.  · You may need to see a physical therapist to start your exercise. This will help regain strength and restore full movement of the broken leg. Your doctor will let you know when to start putting some weight on your leg. If you had surgery, you may need to start your exercise program after the injury is fully healed.  What drugs may be needed?   The doctor may order drugs to:  · Help with pain and swelling  · Fight an infection  · Ease muscle spasms  · Prevent blood clots  Will physical activity be limited?   It may take 3 to 6 months for the broken bone to heal. Do not play sports until the fracture is fully healed. Ask your doctor when it is safe to do so. Talk to your doctor about the right amount of activity for you.   What problems could happen?   · Damage to nerves and blood vessels  · Bleeding  · Weakness in the leg  · Pain  · Bone does not heal well  · Infection  · Blood clots  · Compartment syndrome where the pressure in the muscles gets dangerously high  · Trouble walking  · Arthritis of the hip or knee  · Uneven leg lengths  What can be done to prevent this health problem?   · Always wear a seat belt. Drive safely. Obey speed limits. Do not drink and drive.  · Stay active and work out to keep your muscles strong and flexible.  · Warm up slowly and stretch before you work out. Use good ways to train, such as slowly adding to how far you run. Do not work out if you are overly tired. Take extra care if working out in cold weather.  · Keep a healthy weight so there is not extra stress on your joints. Eat a healthy diet with calcium and vitamin D to keep your bones healthy.  · Wear the right equipment when playing sports.  · Be careful when doing activities that may put you at risk for bone injury. These may include sports, falls, or accidents.  · Prevent falls by not standing on chairs or other unstable things. Remove throw rugs and electric cords from floor  areas.  · If you have osteoporosis, talk to your doctor about the drugs you may be able to take.  When do I need to call the doctor?   · You start to have trouble breathing.  · The leg with the cast becomes swollen or starts to hurt more.  · Your cast becomes too tight and uncomfortable, or your toes turn cold or blue.  · There is a bad smell or drainage coming from your cast.  · Your cast feels too loose.  · You notice a crack in your cast or it becomes soft.  · You have less feeling or movement in your toes.  · The skin becomes red and irritated around the cast.  Teach Back: Helping You Understand   The Teach Back Method helps you understand the information we are giving you. After you talk with the staff, tell them in your own words what you learned. This helps to make sure the staff has described each thing clearly. It also helps to explain things that may have been confusing. Before going home, make sure you can do these:  · I can tell you about my fracture.  · I can tell you how to care for my injured area.  · I can tell you what may help ease my pain.  · I can tell you what I will do if I have more pain or I have more numbness and tingling and swelling.  Where can I learn more?   American Association of Orthopedic Surgeons  http://orthoinfo.aaos.org/topic.cfm?gqbkz=Z20004   Last Reviewed Date   2021-06-11  Consumer Information Use and Disclaimer   This information is not specific medical advice and does not replace information you receive from your health care provider. This is only a brief summary of general information. It does NOT include all information about conditions, illnesses, injuries, tests, procedures, treatments, therapies, discharge instructions or life-style choices that may apply to you. You must talk with your health care provider for complete information about your health and treatment options. This information should not be used to decide whether or not to accept your health care providers  advice, instructions or recommendations. Only your health care provider has the knowledge and training to provide advice that is right for you.  Copyright   Copyright © 2021 [employer] and its affiliates and/or licensors. All rights reserved.

## 2022-01-11 LAB
BASOPHILS # BLD AUTO: 0.07 K/UL (ref 0–0.2)
BASOPHILS NFR BLD AUTO: 0.7 % (ref 0–1)
DIFFERENTIAL METHOD BLD: ABNORMAL
EOSINOPHIL # BLD AUTO: 0.44 K/UL (ref 0–0.5)
EOSINOPHIL NFR BLD AUTO: 4.4 % (ref 1–4)
ERYTHROCYTE [DISTWIDTH] IN BLOOD BY AUTOMATED COUNT: 15.9 % (ref 11.5–14.5)
FLUAV AG UPPER RESP QL IA.RAPID: NEGATIVE
FLUBV AG UPPER RESP QL IA.RAPID: NEGATIVE
HCT VFR BLD AUTO: 30.3 % (ref 38–47)
HGB BLD-MCNC: 9.9 G/DL (ref 12–16)
IMM GRANULOCYTES # BLD AUTO: 0.03 K/UL (ref 0–0.04)
IMM GRANULOCYTES NFR BLD: 0.3 % (ref 0–0.4)
LYMPHOCYTES # BLD AUTO: 3.27 K/UL (ref 1–4.8)
LYMPHOCYTES NFR BLD AUTO: 32.5 % (ref 27–41)
MCH RBC QN AUTO: 30.7 PG (ref 27–31)
MCHC RBC AUTO-ENTMCNC: 32.7 G/DL (ref 32–36)
MCV RBC AUTO: 93.8 FL (ref 80–96)
MONOCYTES # BLD AUTO: 1.01 K/UL (ref 0–0.8)
MONOCYTES NFR BLD AUTO: 10 % (ref 2–6)
MPC BLD CALC-MCNC: 9.9 FL (ref 9.4–12.4)
NEUTROPHILS # BLD AUTO: 5.23 K/UL (ref 1.8–7.7)
NEUTROPHILS NFR BLD AUTO: 52.1 % (ref 53–65)
NRBC # BLD AUTO: 0 X10E3/UL
NRBC, AUTO (.00): 0 %
PLATELET # BLD AUTO: 273 K/UL (ref 150–400)
RBC # BLD AUTO: 3.23 M/UL (ref 4.2–5.4)
SARS-COV+SARS-COV-2 AG RESP QL IA.RAPID: NEGATIVE
WBC # BLD AUTO: 10.05 K/UL (ref 4.5–11)

## 2022-01-11 PROCEDURE — 99900035 HC TECH TIME PER 15 MIN (STAT)

## 2022-01-11 PROCEDURE — 63600175 PHARM REV CODE 636 W HCPCS: Performed by: INTERNAL MEDICINE

## 2022-01-11 PROCEDURE — 85025 COMPLETE CBC W/AUTO DIFF WBC: CPT | Performed by: NURSE PRACTITIONER

## 2022-01-11 PROCEDURE — 99232 PR SUBSEQUENT HOSPITAL CARE,LEVL II: ICD-10-PCS | Mod: GC,,, | Performed by: FAMILY MEDICINE

## 2022-01-11 PROCEDURE — 96361 HYDRATE IV INFUSION ADD-ON: CPT | Performed by: NURSE PRACTITIONER

## 2022-01-11 PROCEDURE — 25000242 PHARM REV CODE 250 ALT 637 W/ HCPCS: Performed by: INTERNAL MEDICINE

## 2022-01-11 PROCEDURE — 87428 SARSCOV & INF VIR A&B AG IA: CPT | Performed by: INTERNAL MEDICINE

## 2022-01-11 PROCEDURE — 36415 COLL VENOUS BLD VENIPUNCTURE: CPT | Performed by: NURSE PRACTITIONER

## 2022-01-11 PROCEDURE — 25000003 PHARM REV CODE 250: Performed by: INTERNAL MEDICINE

## 2022-01-11 PROCEDURE — 96376 TX/PRO/DX INJ SAME DRUG ADON: CPT | Performed by: INTERNAL MEDICINE

## 2022-01-11 PROCEDURE — 99232 SBSQ HOSP IP/OBS MODERATE 35: CPT | Mod: GC,,, | Performed by: FAMILY MEDICINE

## 2022-01-11 PROCEDURE — 11000001 HC ACUTE MED/SURG PRIVATE ROOM

## 2022-01-11 PROCEDURE — 25000003 PHARM REV CODE 250: Mod: GY | Performed by: INTERNAL MEDICINE

## 2022-01-11 PROCEDURE — 94640 AIRWAY INHALATION TREATMENT: CPT

## 2022-01-11 PROCEDURE — 27000221 HC OXYGEN, UP TO 24 HOURS

## 2022-01-11 PROCEDURE — 94761 N-INVAS EAR/PLS OXIMETRY MLT: CPT | Mod: 59

## 2022-01-11 RX ADMIN — BUDESONIDE 0.5 MG: 0.5 INHALANT RESPIRATORY (INHALATION) at 07:01

## 2022-01-11 RX ADMIN — IPRATROPIUM BROMIDE AND ALBUTEROL SULFATE 3 ML: .5; 3 SOLUTION RESPIRATORY (INHALATION) at 06:01

## 2022-01-11 RX ADMIN — ATORVASTATIN CALCIUM 40 MG: 40 TABLET, FILM COATED ORAL at 08:01

## 2022-01-11 RX ADMIN — BUDESONIDE 0.5 MG: 0.5 INHALANT RESPIRATORY (INHALATION) at 08:01

## 2022-01-11 RX ADMIN — MORPHINE SULFATE 4 MG: 4 INJECTION INTRAVENOUS at 09:01

## 2022-01-11 RX ADMIN — ASPIRIN 81 MG: 81 TABLET, COATED ORAL at 08:01

## 2022-01-11 RX ADMIN — HYDROCODONE BITARTRATE AND ACETAMINOPHEN 1 TABLET: 5; 325 TABLET ORAL at 01:01

## 2022-01-11 RX ADMIN — ONDANSETRON 4 MG: 2 INJECTION INTRAMUSCULAR; INTRAVENOUS at 09:01

## 2022-01-11 RX ADMIN — IPRATROPIUM BROMIDE AND ALBUTEROL SULFATE 3 ML: .5; 3 SOLUTION RESPIRATORY (INHALATION) at 12:01

## 2022-01-11 RX ADMIN — IPRATROPIUM BROMIDE AND ALBUTEROL SULFATE 3 ML: .5; 3 SOLUTION RESPIRATORY (INHALATION) at 07:01

## 2022-01-11 RX ADMIN — PANTOPRAZOLE SODIUM 40 MG: 40 TABLET, DELAYED RELEASE ORAL at 08:01

## 2022-01-11 RX ADMIN — HYDROCODONE BITARTRATE AND ACETAMINOPHEN 1 TABLET: 5; 325 TABLET ORAL at 09:01

## 2022-01-11 NOTE — MEDICAL/APP STUDENT
Medical Student Subjective & Objective     Principal Problem: Closed nondisplaced subtrochanteric fracture of left femur    Chief Complaint:   Chief Complaint   Patient presents with    Hip Pain       HPI: The patient is a 62yo  female with a PMH of essential HTN, HLD, superficial gastritis, and COPD with home oxygen who presents with left hip pain after a fall on Saturday 1/08. She has a history of left total hip replacement on 12/13/21 secondary to end-stage OA. She was seen by her PCP today and diagnosed with a closed, nondisplaced subtrochanteric fracture of the left femur. Dr. Adolfo Randall was contacted, and surgery is currently planned for Wednesday.     Hospital Course: Patient was admitted to the Family Medicine Service for medical management pending surgery.       Past Medical History:   Diagnosis Date    Acute superficial gastritis without hemorrhage 6/23/2021    Acute superficial gastritis without hemorrhage 6/23/2021    Anxiety     Arthritis     newly dx'd RA: sees MD in [place]    Bronchitis 7/22/2021    Bursitis of left shoulder 6/10/2021    Cancer     hx of cervical cancer    COPD (chronic obstructive pulmonary disease)     Depression     Emphysema of lung     Fungal esophagitis 6/23/2021    HH (hiatus hernia) 6/23/2021    Hyperlipidemia     Hypertension     Insomnia 04/16/2020    Low back pain 04/28/2021    Pain in joint of left hip 04/28/2021    Pain in joint of left knee 04/28/2021       Past Surgical History:   Procedure Laterality Date    ROBOTIC ARTHROPLASTY, HIP Left 12/13/2021    Procedure: ROBOTIC ARTHROPLASTY,HIP;  Surgeon: Adolfo Randall MD;  Location: [facility];  Service: Orthopedics;  Laterality: Left;    SHOULDER SURGERY Right 2017, 2019       Review of patient's allergies indicates:  No Known Allergies    No current facility-administered medications on file prior to encounter.     Current Outpatient Medications on File Prior to Encounter   Medication  Sig    albuterol (PROVENTIL) 2.5 mg /3 mL (0.083 %) nebulizer solution albuterol sulfate 2.5 mg/3 mL (0.083 %) solution for nebulization    aspirin (ECOTRIN) 81 MG EC tablet Take 81 mg by mouth once daily.    citalopram (CELEXA) 20 MG tablet Take 2 tablets (40 mg total) by mouth once daily.    diclofenac sodium (VOLTAREN) 1 % Gel Apply 2 g topically 3 (three) times daily.     furosemide (LASIX) 20 MG tablet TAKE 1 TABLET BY MOUTH DAILY FOR 3 DAYS THEN WEIGH DAILY AND IF NO MORE THAN 3 POUND WEIGHT GAIN, TAKE LASIX    lovastatin (MEVACOR) 20 MG tablet Take 1 tablet (20 mg total) by mouth once daily.    meloxicam (MOBIC) 15 MG tablet TAKE ONE TABLET BY MOUTH ONE TIME DAILY    ondansetron (ZOFRAN-ODT) 4 MG TbDL Take 2 tablets (8 mg total) by mouth every 8 (eight) hours as needed (Nausea).    oxyCODONE-acetaminophen (PERCOCET)  mg per tablet Take 1 tablet by mouth every 6 (six) hours as needed for Pain.    pantoprazole (PROTONIX) 40 MG tablet Take 1 tablet by mouth once daily.    predniSONE (DELTASONE) 5 MG tablet TAKE ONE Tablet BY MOUTH DAILY WITH FOOD    tiotropium (SPIRIVA) 18 mcg inhalation capsule Inhale 1 capsule (18 mcg total) into the lungs once daily. Controller    VENTOLIN HFA 90 mcg/actuation inhaler Inhale 1 puff into the lungs daily as needed.    zolpidem (AMBIEN) 5 MG Tab TAKE ONE TABLET BY MOUTH AT BEDTIME AS NEEDED//DO NOT TAKE WITHIN 4 HOURS OF TAKING PAIN MEDS//     Family History     Problem Relation (Age of Onset)    Cancer Mother    Hypertension Sister, Maternal Aunt, Maternal Uncle        Tobacco Use    Smoking status: Former Smoker     Packs/day: 2.00     Years: 20.00     Pack years: 40.00     Types: Cigarettes     Quit date:      Years since quittin.0    Smokeless tobacco: Never Used   Substance and Sexual Activity    Alcohol use: Never    Drug use: Yes     Types: Oxycodone     Comment: rx for hip pain: surg planned    Sexual activity: Not on file     Review of  Systems   MSK: endorses left hip pain  Neuro: denies LOC    Objective:     Vital Signs (Most Recent):  Temp: 98.7 °F (37.1 °C) (01/11/22 0756)  Pulse: 73 (01/11/22 0846)  Resp: 18 (01/11/22 0846)  BP: (!) 97/59 (01/11/22 0756)  SpO2: 98 % (01/11/22 0756) Vital Signs (24h Range):  Temp:  [97.7 °F (36.5 °C)-98.7 °F (37.1 °C)] 98.7 °F (37.1 °C)  Pulse:  [69-86] 73  Resp:  [15-20] 18  SpO2:  [93 %-99 %] 98 %  BP: ()/(51-74) 97/59     Weight: 73.5 kg (162 lb)  Body mass index is 27.81 kg/m².    Intake/Output Summary (Last 24 hours) at 1/11/2022 0855  Last data filed at 1/11/2022 0848  Gross per 24 hour   Intake 863.75 ml   Output --   Net 863.75 ml      Physical Exam   Gen: well-developed, well-nourished, NAD  MSK: Left hip tenderness, limited ROM due to pain    Significant Labs:   All pertinent labs within the past 24 hours have been reviewed.  CBC:   Recent Labs   Lab 01/11/22 0722   WBC 10.05   HGB 9.9*   HCT 30.3*        CMP:   Recent Labs   Lab 01/10/22  2026   *   K 4.2   *   CO2 26   GLU 94   BUN 15   CREATININE 0.69   CALCIUM 8.4*   PROT 7.4   ALBUMIN 3.6   BILITOT 0.2   ALKPHOS 96   AST 13*   ALT 11*   ANIONGAP 16   EGFRNONAA 92     Coagulation:   Recent Labs   Lab 01/10/22  2026   INR 1.04   APTT 31.3       Significant Imaging: I have reviewed all pertinent imaging results/findings within the past 24 hours.      Assessment: Yany Silverio is a 62yo female status-post total left hip replacement on 12/13/2021 who presented with left hip fracture secondary to a fall on 1/08. The patient was admitted to inpatient for medical management. Dr. Adolfo Randall has been notified, and surgery is currently planned for Wednesday.    Plan  1. Left femur fracture  -admit to [facility] for eval prior to surgical repair  -acetaminophen 650mg q4hr prn for mild pain  -oxycodone-acetaminophen 10-325mg q6hr prn for moderate pain  - morphine 4mg q4hr for severe pain   -surgery planned for tomorrow    2. COPD  with oxygen-dependence   -holding home Sprirva and Proventil  -budesonide neb  -Duonebs as needed for wheezing  -continue CPAP at night    3. Hypertension  -holding home lasix  -continue home aspirin at 81mg daily  -regular vital checks    4. Hyperlipidemia  -holding home lovastatin, giving atorvastatin 40mg instead    5. Gastritis   -continue home pantoprazole 40mg daily    6. Depression/Anxiety  -continue home medications citalopram 20mg daily and ambien 5mg qhs      Medical Student Subjective & Objective

## 2022-01-11 NOTE — ASSESSMENT & PLAN NOTE
- Hx of COPD required home oxygen NC 2L and chronic cough. Patient is at mild-to-moderate perioperative respiratory risk but is felt to be stable to proceed with surgery.  - DuoNeb q6h  - Budesonide 0.5 mg BID

## 2022-01-11 NOTE — ASSESSMENT & PLAN NOTE
- Patient recently had total left hip replacement by Dr. Randall on 12/13/2021 for end-stage OA.  Reportedly, patient had an accidental fall 2 days ago and started to experience left hip pain.   - Hip Xray (01/10) shows acute nondisplaced fracture through the medial aspect of the sub trochanteric region of the left hip in this patient who is status post total left hip arthroplasty and no fractures  elsewhere in the bony pelvis or right hip  - Patient had open reduction internal fixation L periprosthetic proximal femur fracture on 1/13 and ortho is following  - pain medication PRN:   - PT, OT and social service on board  - Pending transfer to [facility]

## 2022-01-11 NOTE — ED TRIAGE NOTES
Pt here for left hip pain, pt reports having total hip replacement done on Dec 13 with Dr. REINALDO Randall. Pt reports trip and fall on Saturday

## 2022-01-11 NOTE — H&P
Chief Complaint   Patient presents with    Hip Pain       History of Present Illness  Patient is a 61-year-old female with a history of essential hypertension, hyperlipidemia, superficial gastritis, O2 dependent COPD, and status post total L hip replacement on 2021 for end-stage OA who reportedly fell 2 days ago and developed left hip pain.  Patient was subsequently seen by her PCP today and diagnosed with closed nondisplaced subtrochanteric fracture of the left femur.  Dr. Adolfo Randall was contacted and surgery is planned for Wednesday.  Patient will be admitted.      Past Medical History:   Diagnosis Date    Acute superficial gastritis without hemorrhage 2021    Acute superficial gastritis without hemorrhage 2021    Anxiety     Arthritis     newly dx'd RA: sees MD in [place]    Bronchitis 2021    Bursitis of left shoulder 6/10/2021    Cancer     hx of cervical cancer    COPD (chronic obstructive pulmonary disease)     Depression     Emphysema of lung     Fungal esophagitis 2021    HH (hiatus hernia) 2021    Hyperlipidemia     Hypertension     Insomnia 2020    Low back pain 2021    Pain in joint of left hip 2021    Pain in joint of left knee 2021       Past Surgical History:   Procedure Laterality Date    ROBOTIC ARTHROPLASTY, HIP Left 2021    Procedure: ROBOTIC ARTHROPLASTY,HIP;  Surgeon: Adolfo Randall MD;  Location: [facility];  Service: Orthopedics;  Laterality: Left;    SHOULDER SURGERY Right ,        Family History   Problem Relation Age of Onset    Cancer Mother     Hypertension Sister     Hypertension Maternal Aunt     Hypertension Maternal Uncle        Social History     Socioeconomic History    Marital status:    Tobacco Use    Smoking status: Former Smoker     Packs/day: 2.00     Years: 20.00     Pack years: 40.00     Types: Cigarettes     Quit date:      Years since quittin.0    Smokeless  tobacco: Never Used   Substance and Sexual Activity    Alcohol use: Never    Drug use: Yes     Types: Oxycodone     Comment: rx for hip pain: surg planned       Current Facility-Administered Medications   Medication Dose Route Frequency Provider Last Rate Last Admin    [START ON 1/11/2022] albuterol-ipratropium 2.5 mg-0.5 mg/3 mL nebulizer solution 3 mL  3 mL Nebulization Q6H Min ALFIE Garcia MD        [START ON 1/11/2022] aspirin EC tablet 81 mg  81 mg Oral Daily Min ALFIE Garcia MD        [START ON 1/11/2022] atorvastatin tablet 40 mg  40 mg Oral Daily Min ALFIE Garcia MD        [START ON 1/11/2022] citalopram tablet 40 mg  40 mg Oral Daily Min ALFIE Garcia MD        oxyCODONE-acetaminophen  mg per tablet 1 tablet  1 tablet Oral Q6H PRN Min ALFIE Garcia MD        [START ON 1/11/2022] pantoprazole EC tablet 40 mg  40 mg Oral Daily Min ALFIE Garcia MD        zolpidem tablet 5 mg  5 mg Oral Nightly PRN Min ALFIE Garcia MD         Current Outpatient Medications   Medication Sig Dispense Refill    albuterol (PROVENTIL) 2.5 mg /3 mL (0.083 %) nebulizer solution albuterol sulfate 2.5 mg/3 mL (0.083 %) solution for nebulization      aspirin (ECOTRIN) 81 MG EC tablet Take 81 mg by mouth once daily.      citalopram (CELEXA) 20 MG tablet Take 2 tablets (40 mg total) by mouth once daily. 60 tablet 0    diclofenac sodium (VOLTAREN) 1 % Gel Apply 2 g topically 3 (three) times daily.       furosemide (LASIX) 20 MG tablet TAKE 1 TABLET BY MOUTH DAILY FOR 3 DAYS THEN WEIGH DAILY AND IF NO MORE THAN 3 POUND WEIGHT GAIN, TAKE LASIX 30 tablet 0    lovastatin (MEVACOR) 20 MG tablet Take 1 tablet (20 mg total) by mouth once daily. 30 tablet 2    meloxicam (MOBIC) 15 MG tablet TAKE ONE TABLET BY MOUTH ONE TIME DAILY 30 tablet 1    ondansetron (ZOFRAN-ODT) 4 MG TbDL Take 2 tablets (8 mg total) by mouth every 8 (eight) hours as needed (Nausea). 30 tablet 0    oxyCODONE-acetaminophen (PERCOCET)  mg per tablet Take 1 tablet by mouth every 6 (six) hours  "as needed for Pain. 30 tablet 0    pantoprazole (PROTONIX) 40 MG tablet Take 1 tablet by mouth once daily.      predniSONE (DELTASONE) 5 MG tablet TAKE ONE Tablet BY MOUTH DAILY WITH FOOD      tiotropium (SPIRIVA) 18 mcg inhalation capsule Inhale 1 capsule (18 mcg total) into the lungs once daily. Controller 30 capsule 6    VENTOLIN HFA 90 mcg/actuation inhaler Inhale 1 puff into the lungs daily as needed.      zolpidem (AMBIEN) 5 MG Tab TAKE ONE TABLET BY MOUTH AT BEDTIME AS NEEDED//DO NOT TAKE WITHIN 4 HOURS OF TAKING PAIN MEDS// 30 tablet 0       Review of patient's allergies indicates:  No Known Allergies    Review of Systems   Constitutional: Negative for chills, fever and weight loss.   HENT: Negative for ear pain, hearing loss, nosebleeds, sore throat and tinnitus.    Eyes: Negative for blurred vision, double vision, photophobia and pain.   Respiratory: Negative for sputum production, shortness of breath and wheezing.    Cardiovascular: Negative for chest pain, palpitations, orthopnea, leg swelling and PND.   Gastrointestinal: Negative for abdominal pain, blood in stool, constipation, diarrhea, heartburn, melena, nausea and vomiting.   Genitourinary: Negative for dysuria, flank pain, frequency, hematuria and urgency.   Musculoskeletal: Negative for back pain, joint pain and neck pain.        Left hip pain reported   Skin: Negative for itching and rash.   Neurological: Negative for dizziness, tremors, sensory change, speech change, focal weakness and headaches.   Endo/Heme/Allergies: Negative for polydipsia. Does not bruise/bleed easily.        Vitals:    01/10/22 1910 01/10/22 2117   BP: 117/66    Pulse: 84    Resp: 18 16   Temp: 98.6 °F (37 °C)    TempSrc: Oral    SpO2: 95%    Weight: 73.5 kg (162 lb)    Height: 5' 4" (1.626 m)         Physical Exam  Constitutional:       Appearance: Normal appearance.   HENT:      Head: Normocephalic and atraumatic.      Nose: Nose normal.      Mouth/Throat:      " Mouth: Mucous membranes are moist.      Pharynx: Oropharynx is clear.   Eyes:      Extraocular Movements: Extraocular movements intact.      Conjunctiva/sclera: Conjunctivae normal.      Pupils: Pupils are equal, round, and reactive to light.   Neck:      Vascular: No carotid bruit.   Cardiovascular:      Rate and Rhythm: Normal rate and regular rhythm.   Pulmonary:      Effort: Pulmonary effort is normal.      Breath sounds: No stridor. Wheezing present.   Abdominal:      General: Abdomen is flat. There is no distension.      Palpations: Abdomen is soft. There is no mass.      Tenderness: There is no abdominal tenderness. There is no guarding.      Hernia: No hernia is present.   Musculoskeletal:      Cervical back: No rigidity or tenderness.   Lymphadenopathy:      Cervical: No cervical adenopathy.   Skin:     Coloration: Skin is not jaundiced.      Findings: No lesion or rash.   Neurological:      General: No focal deficit present.      Mental Status: She is alert and oriented to person, place, and time.      Cranial Nerves: No cranial nerve deficit.      Sensory: No sensory deficit.      Motor: No weakness.          Labs: All pertinent labs reviewed.       * Closed nondisplaced subtrochanteric fracture of left femur  Patient recently had total left hip replacement by Dr. Adolfo Randall on 12/13/2021 for end-stage OA.  Reportedly, patient had an accidental fall 2 days ago and started to experience left hip pain and was subsequently diagnosed with closed nondisplaced subtrochanteric fracture of left femur by her PCP today.  Surgery is planned for Wednesday.  Since surgery is planned for Wednesday, will give pt one dose of Lovenox tonight.      Chronic obstructive pulmonary disease  According to Dr. Saunders's note, pt's FEV1 was 1.6 and had a mild obstructive defect on the last PFT. ABG reportedly showed normal PCO2 but low PO2.  Patient's baseline O2 requirement is 2 L per nasal cannula.  Patient stated that patient has  not had any respiratory issues lately and is at her baseline at this time.  Audible wheezing on physical examination.  Will continue patient on her home regimen consisting of Spiriva and DuoNeb q.6 hours and add inhaled corticosteroids.  Patient is at mild-to-moderate perioperative respiratory risk but feel that she is stable to proceed with surgery.  .      Hypertension  Adequately controlled.  Continue present management      NGOZI on CPAP  Will continue CPAP q.h.s.

## 2022-01-11 NOTE — HPI
Patient is a 61-year-old female with a history of essential hypertension, hyperlipidemia, superficial gastritis, O2 dependent COPD, and status post total L hip replacement on 12/13/2021 for end-stage OA who reportedly fell 2 days ago and developed left hip pain.  Patient was subsequently seen by her PCP today and diagnosed with closed nondisplaced subtrochanteric fracture of the left femur.  Dr. Adolfo Randall was contacted and surgery is planned for Wednesday.  Patient will be admitted.

## 2022-01-11 NOTE — ED PROVIDER NOTES
Encounter Date: 1/10/2022       History     Chief Complaint   Patient presents with    Hip Pain     Patient presents to ER with complaint of fall on Saturday that caused left hip pain.  Patient was seen today by PCP and xray noted left hip fracture.  Patient had repair left hip fracture on 21 per Dr Adolfo Randall.  Dr Adolfo Randall is aware of patient.  Patient has history of COPD.  She admits to losing balance and falling.  Denies LOC.      The history is provided by the patient. No  was used.     Review of patient's allergies indicates:  No Known Allergies  Past Medical History:   Diagnosis Date    Acute superficial gastritis without hemorrhage 2021    Acute superficial gastritis without hemorrhage 2021    Anxiety     Arthritis     newly dx'd RA: sees MD in [place]    Bronchitis 2021    Bursitis of left shoulder 6/10/2021    Cancer     hx of cervical cancer    COPD (chronic obstructive pulmonary disease)     Depression     Emphysema of lung     Fungal esophagitis 2021    HH (hiatus hernia) 2021    Hyperlipidemia     Hypertension     Insomnia 2020    Low back pain 2021    Pain in joint of left hip 2021    Pain in joint of left knee 2021     Past Surgical History:   Procedure Laterality Date    ROBOTIC ARTHROPLASTY, HIP Left 2021    Procedure: ROBOTIC ARTHROPLASTY,HIP;  Surgeon: Adolfo Randall MD;  Location: [facility];  Service: Orthopedics;  Laterality: Left;    SHOULDER SURGERY Right ,      Family History   Problem Relation Age of Onset    Cancer Mother     Hypertension Sister     Hypertension Maternal Aunt     Hypertension Maternal Uncle      Social History     Tobacco Use    Smoking status: Former Smoker     Packs/day: 2.00     Years: 20.00     Pack years: 40.00     Types: Cigarettes     Quit date:      Years since quittin.0    Smokeless tobacco: Never Used   Substance Use Topics     Alcohol use: Never    Drug use: Yes     Types: Oxycodone     Comment: rx for hip pain: surg planned     Review of Systems   Musculoskeletal: Positive for arthralgias and myalgias.        Left hip pain after fall   All other systems reviewed and are negative.      Physical Exam     Initial Vitals [01/10/22 1910]   BP Pulse Resp Temp SpO2   117/66 84 18 98.6 °F (37 °C) 95 %      MAP       --         Physical Exam    Nursing note and vitals reviewed.  Constitutional: She appears well-developed and well-nourished.   HENT:   Head: Normocephalic.   Right Ear: External ear normal.   Left Ear: External ear normal.   Nose: Nose normal.   Mouth/Throat: Oropharynx is clear and moist.   Eyes: Conjunctivae and EOM are normal. Pupils are equal, round, and reactive to light.   Neck: Neck supple.   Normal range of motion.  Cardiovascular: Normal rate, regular rhythm, normal heart sounds and intact distal pulses.   Pulmonary/Chest: Breath sounds normal.   Abdominal: Abdomen is soft. Bowel sounds are normal.   Musculoskeletal:         General: Tenderness and edema present.      Cervical back: Normal range of motion and neck supple.      Comments: Left hip deformity, painful to palpation.  Limited ROM due to pain.      Neurological: She is alert and oriented to person, place, and time. She has normal strength. GCS score is 15. GCS eye subscore is 4. GCS verbal subscore is 5. GCS motor subscore is 6.   Skin: Skin is warm and dry. Capillary refill takes less than 2 seconds.   Psychiatric: She has a normal mood and affect. Her behavior is normal. Judgment and thought content normal.         Medical Screening Exam   See Full Note    ED Course   Procedures  Labs Reviewed   CBC W/ AUTO DIFFERENTIAL    Narrative:     The following orders were created for panel order CBC auto differential.  Procedure                               Abnormality         Status                     ---------                               -----------         ------                      CBC with Differential[[id number]]                                                         Please view results for these tests on the individual orders.   COMPREHENSIVE METABOLIC PANEL   APTT   PROTIME-INR   URINALYSIS, REFLEX TO URINE CULTURE   CBC WITH DIFFERENTIAL   TYPE & SCREEN          Imaging Results          X-Ray Chest AP Portable (In process)                  Medications   0.9%  NaCl infusion (has no administration in time range)   morphine injection 4 mg (has no administration in time range)   ondansetron injection 4 mg (has no administration in time range)           APC / Resident Notes:   1950 Dr Adolfo Randall called in consult.  Recommendation to admit to medicine for clearance for Surgery on Wednesday.   2015 Dr Garcia called called in consult for admission.               Clinical Impression:   Final diagnoses:  [S72.002A] Closed fracture of left hip, initial encounter (Primary)  [Z98.890] History of repair of left hip joint  [Z87.09] History of COPD  [W19.XXXA] Fall, initial encounter          ED Disposition Condition    Admit               PREET Valverde  01/10/22 2025

## 2022-01-11 NOTE — ASSESSMENT & PLAN NOTE
- Last blood pressure at 104/60  - Continue to hold home Lasix 20 mg daily  - Continue to monitor

## 2022-01-12 ENCOUNTER — ANESTHESIA EVENT (OUTPATIENT)
Dept: SURGERY | Facility: HOSPITAL | Age: 62
End: 2022-01-12
Payer: MEDICAID

## 2022-01-12 ENCOUNTER — ANESTHESIA (OUTPATIENT)
Dept: SURGERY | Facility: HOSPITAL | Age: 62
End: 2022-01-12
Payer: MEDICAID

## 2022-01-12 PROCEDURE — 27201423 OPTIME MED/SURG SUP & DEVICES STERILE SUPPLY: Performed by: ORTHOPAEDIC SURGERY

## 2022-01-12 PROCEDURE — C1713 ANCHOR/SCREW BN/BN,TIS/BN: HCPCS | Performed by: ORTHOPAEDIC SURGERY

## 2022-01-12 PROCEDURE — 94640 AIRWAY INHALATION TREATMENT: CPT | Mod: XB

## 2022-01-12 PROCEDURE — 37000009 HC ANESTHESIA EA ADD 15 MINS: Performed by: ORTHOPAEDIC SURGERY

## 2022-01-12 PROCEDURE — 27000655: Performed by: ANESTHESIOLOGY

## 2022-01-12 PROCEDURE — 63600175 PHARM REV CODE 636 W HCPCS: Performed by: NURSE ANESTHETIST, CERTIFIED REGISTERED

## 2022-01-12 PROCEDURE — 27000689 HC BLADE LARYNGOSCOPE ANY SIZE: Performed by: ANESTHESIOLOGY

## 2022-01-12 PROCEDURE — 99232 PR SUBSEQUENT HOSPITAL CARE,LEVL II: ICD-10-PCS | Mod: GC,,, | Performed by: FAMILY MEDICINE

## 2022-01-12 PROCEDURE — D9220A PRA ANESTHESIA: ICD-10-PCS | Mod: CRNA,,, | Performed by: NURSE ANESTHETIST, CERTIFIED REGISTERED

## 2022-01-12 PROCEDURE — 27000260 *HC AIRWAY ORAL: Performed by: ANESTHESIOLOGY

## 2022-01-12 PROCEDURE — 97161 PT EVAL LOW COMPLEX 20 MIN: CPT

## 2022-01-12 PROCEDURE — 25000003 PHARM REV CODE 250: Mod: GY | Performed by: INTERNAL MEDICINE

## 2022-01-12 PROCEDURE — 27000221 HC OXYGEN, UP TO 24 HOURS

## 2022-01-12 PROCEDURE — C1776 JOINT DEVICE (IMPLANTABLE): HCPCS | Performed by: ORTHOPAEDIC SURGERY

## 2022-01-12 PROCEDURE — 25000003 PHARM REV CODE 250: Mod: GY | Performed by: ORTHOPAEDIC SURGERY

## 2022-01-12 PROCEDURE — 25000242 PHARM REV CODE 250 ALT 637 W/ HCPCS: Mod: GY | Performed by: INTERNAL MEDICINE

## 2022-01-12 PROCEDURE — D9220A PRA ANESTHESIA: Mod: ANES,,, | Performed by: ANESTHESIOLOGY

## 2022-01-12 PROCEDURE — 99232 SBSQ HOSP IP/OBS MODERATE 35: CPT | Mod: GC,,, | Performed by: FAMILY MEDICINE

## 2022-01-12 PROCEDURE — 25000003 PHARM REV CODE 250: Performed by: NURSE ANESTHETIST, CERTIFIED REGISTERED

## 2022-01-12 PROCEDURE — 63600175 PHARM REV CODE 636 W HCPCS: Performed by: ANESTHESIOLOGY

## 2022-01-12 PROCEDURE — D9220A PRA ANESTHESIA: ICD-10-PCS | Mod: ANES,,, | Performed by: ANESTHESIOLOGY

## 2022-01-12 PROCEDURE — 63600175 PHARM REV CODE 636 W HCPCS: Performed by: INTERNAL MEDICINE

## 2022-01-12 PROCEDURE — 25000003 PHARM REV CODE 250: Performed by: FAMILY MEDICINE

## 2022-01-12 PROCEDURE — 27000510 HC BLANKET BAIR HUGGER ANY SIZE: Performed by: ANESTHESIOLOGY

## 2022-01-12 PROCEDURE — 01215 ANES OPN REVJ TOT HIP ARTHRP: CPT | Performed by: ORTHOPAEDIC SURGERY

## 2022-01-12 PROCEDURE — D9220A PRA ANESTHESIA: Mod: CRNA,,, | Performed by: NURSE ANESTHETIST, CERTIFIED REGISTERED

## 2022-01-12 PROCEDURE — 27138 PR REVISE FEM PART OFTOTAL HIP: ICD-10-PCS | Mod: 78,LT,, | Performed by: ORTHOPAEDIC SURGERY

## 2022-01-12 PROCEDURE — 97165 OT EVAL LOW COMPLEX 30 MIN: CPT

## 2022-01-12 PROCEDURE — 99900035 HC TECH TIME PER 15 MIN (STAT)

## 2022-01-12 PROCEDURE — 27000716 HC OXISENSOR PROBE, ANY SIZE: Performed by: ANESTHESIOLOGY

## 2022-01-12 PROCEDURE — 27000165 HC TUBE, ETT CUFFED: Performed by: ANESTHESIOLOGY

## 2022-01-12 PROCEDURE — 27000190 HC CPAP FULL FACE MASK W/VALVE

## 2022-01-12 PROCEDURE — 11000001 HC ACUTE MED/SURG PRIVATE ROOM

## 2022-01-12 PROCEDURE — 37000008 HC ANESTHESIA 1ST 15 MINUTES: Performed by: ORTHOPAEDIC SURGERY

## 2022-01-12 PROCEDURE — 71000033 HC RECOVERY, INTIAL HOUR: Performed by: ORTHOPAEDIC SURGERY

## 2022-01-12 PROCEDURE — 63600175 PHARM REV CODE 636 W HCPCS: Performed by: ORTHOPAEDIC SURGERY

## 2022-01-12 PROCEDURE — 27138 REVISE HIP JOINT REPLACEMENT: CPT | Mod: 78,LT,, | Performed by: ORTHOPAEDIC SURGERY

## 2022-01-12 PROCEDURE — 94761 N-INVAS EAR/PLS OXIMETRY MLT: CPT

## 2022-01-12 PROCEDURE — 94660 CPAP INITIATION&MGMT: CPT

## 2022-01-12 PROCEDURE — 36000711: Performed by: ORTHOPAEDIC SURGERY

## 2022-01-12 PROCEDURE — 36000710: Performed by: ORTHOPAEDIC SURGERY

## 2022-01-12 DEVICE — IMPLANTABLE DEVICE: Type: IMPLANTABLE DEVICE | Site: HIP | Status: FUNCTIONAL

## 2022-01-12 DEVICE — IMP INSERT RESTORATION ADM X3 ID 28MM FOR CUP OD 48MM: Type: IMPLANTABLE DEVICE | Site: HIP | Status: FUNCTIONAL

## 2022-01-12 RX ORDER — ZOLPIDEM TARTRATE 5 MG/1
5 TABLET ORAL NIGHTLY PRN
Status: DISCONTINUED | OUTPATIENT
Start: 2022-01-12 | End: 2022-01-18 | Stop reason: HOSPADM

## 2022-01-12 RX ORDER — PROPOFOL 10 MG/ML
VIAL (ML) INTRAVENOUS
Status: DISCONTINUED | OUTPATIENT
Start: 2022-01-12 | End: 2022-01-12

## 2022-01-12 RX ORDER — LOPERAMIDE HYDROCHLORIDE 2 MG/1
4 CAPSULE ORAL ONCE
Status: DISCONTINUED | OUTPATIENT
Start: 2022-01-12 | End: 2022-01-18 | Stop reason: HOSPADM

## 2022-01-12 RX ORDER — DOCUSATE SODIUM 100 MG/1
100 CAPSULE, LIQUID FILLED ORAL EVERY 12 HOURS
Status: DISCONTINUED | OUTPATIENT
Start: 2022-01-12 | End: 2022-01-18 | Stop reason: HOSPADM

## 2022-01-12 RX ORDER — CELECOXIB 100 MG/1
200 CAPSULE ORAL 2 TIMES DAILY
Status: DISCONTINUED | OUTPATIENT
Start: 2022-01-12 | End: 2022-01-18 | Stop reason: HOSPADM

## 2022-01-12 RX ORDER — ONDANSETRON 4 MG/1
8 TABLET, ORALLY DISINTEGRATING ORAL EVERY 8 HOURS PRN
Status: DISCONTINUED | OUTPATIENT
Start: 2022-01-12 | End: 2022-01-18 | Stop reason: HOSPADM

## 2022-01-12 RX ORDER — BISACODYL 10 MG
10 SUPPOSITORY, RECTAL RECTAL DAILY PRN
Status: DISCONTINUED | OUTPATIENT
Start: 2022-01-12 | End: 2022-01-18 | Stop reason: HOSPADM

## 2022-01-12 RX ORDER — OXYCODONE HYDROCHLORIDE 5 MG/1
10 TABLET ORAL EVERY 4 HOURS PRN
Status: DISCONTINUED | OUTPATIENT
Start: 2022-01-12 | End: 2022-01-18 | Stop reason: HOSPADM

## 2022-01-12 RX ORDER — LIDOCAINE HYDROCHLORIDE 20 MG/ML
INJECTION, SOLUTION EPIDURAL; INFILTRATION; INTRACAUDAL; PERINEURAL
Status: DISCONTINUED | OUTPATIENT
Start: 2022-01-12 | End: 2022-01-12

## 2022-01-12 RX ORDER — CEFAZOLIN SODIUM 1 G/3ML
INJECTION, POWDER, FOR SOLUTION INTRAMUSCULAR; INTRAVENOUS
Status: DISCONTINUED | OUTPATIENT
Start: 2022-01-12 | End: 2022-01-12

## 2022-01-12 RX ORDER — HYDROMORPHONE HYDROCHLORIDE 2 MG/ML
0.5 INJECTION, SOLUTION INTRAMUSCULAR; INTRAVENOUS; SUBCUTANEOUS EVERY 5 MIN PRN
Status: DISCONTINUED | OUTPATIENT
Start: 2022-01-12 | End: 2022-01-12 | Stop reason: HOSPADM

## 2022-01-12 RX ORDER — HYDROMORPHONE HYDROCHLORIDE 2 MG/ML
1 INJECTION, SOLUTION INTRAMUSCULAR; INTRAVENOUS; SUBCUTANEOUS EVERY 4 HOURS PRN
Status: DISCONTINUED | OUTPATIENT
Start: 2022-01-12 | End: 2022-01-18 | Stop reason: HOSPADM

## 2022-01-12 RX ORDER — MUPIROCIN 20 MG/G
1 OINTMENT TOPICAL 2 TIMES DAILY
Status: DISPENSED | OUTPATIENT
Start: 2022-01-12 | End: 2022-01-17

## 2022-01-12 RX ORDER — MORPHINE SULFATE 10 MG/ML
4 INJECTION INTRAMUSCULAR; INTRAVENOUS; SUBCUTANEOUS EVERY 5 MIN PRN
Status: DISCONTINUED | OUTPATIENT
Start: 2022-01-12 | End: 2022-01-12 | Stop reason: HOSPADM

## 2022-01-12 RX ORDER — MEPERIDINE HYDROCHLORIDE 25 MG/ML
25 INJECTION INTRAMUSCULAR; INTRAVENOUS; SUBCUTANEOUS EVERY 10 MIN PRN
Status: DISCONTINUED | OUTPATIENT
Start: 2022-01-12 | End: 2022-01-12 | Stop reason: HOSPADM

## 2022-01-12 RX ORDER — ROCURONIUM BROMIDE 10 MG/ML
INJECTION, SOLUTION INTRAVENOUS
Status: DISCONTINUED | OUTPATIENT
Start: 2022-01-12 | End: 2022-01-12

## 2022-01-12 RX ORDER — FENTANYL CITRATE 50 UG/ML
INJECTION, SOLUTION INTRAMUSCULAR; INTRAVENOUS
Status: DISCONTINUED | OUTPATIENT
Start: 2022-01-12 | End: 2022-01-12

## 2022-01-12 RX ORDER — TRANEXAMIC ACID 100 MG/ML
INJECTION, SOLUTION INTRAVENOUS
Status: DISCONTINUED | OUTPATIENT
Start: 2022-01-12 | End: 2022-01-12

## 2022-01-12 RX ORDER — FAMOTIDINE 20 MG/1
20 TABLET, FILM COATED ORAL 2 TIMES DAILY
Status: DISCONTINUED | OUTPATIENT
Start: 2022-01-12 | End: 2022-01-15

## 2022-01-12 RX ORDER — LOPERAMIDE HYDROCHLORIDE 2 MG/1
4 CAPSULE ORAL ONCE
Status: COMPLETED | OUTPATIENT
Start: 2022-01-12 | End: 2022-01-12

## 2022-01-12 RX ORDER — OXYCODONE HYDROCHLORIDE 5 MG/1
5 TABLET ORAL EVERY 4 HOURS PRN
Status: DISCONTINUED | OUTPATIENT
Start: 2022-01-12 | End: 2022-01-18 | Stop reason: HOSPADM

## 2022-01-12 RX ORDER — DIPHENHYDRAMINE HYDROCHLORIDE 50 MG/ML
25 INJECTION INTRAMUSCULAR; INTRAVENOUS EVERY 6 HOURS PRN
Status: DISCONTINUED | OUTPATIENT
Start: 2022-01-12 | End: 2022-01-12 | Stop reason: HOSPADM

## 2022-01-12 RX ORDER — POLYETHYLENE GLYCOL 3350 17 G/17G
17 POWDER, FOR SOLUTION ORAL DAILY
Status: DISCONTINUED | OUTPATIENT
Start: 2022-01-12 | End: 2022-01-18 | Stop reason: HOSPADM

## 2022-01-12 RX ORDER — ONDANSETRON 2 MG/ML
4 INJECTION INTRAMUSCULAR; INTRAVENOUS DAILY PRN
Status: DISCONTINUED | OUTPATIENT
Start: 2022-01-12 | End: 2022-01-12 | Stop reason: HOSPADM

## 2022-01-12 RX ORDER — ACETAMINOPHEN 325 MG/1
650 TABLET ORAL EVERY 4 HOURS PRN
Status: DISCONTINUED | OUTPATIENT
Start: 2022-01-12 | End: 2022-01-15

## 2022-01-12 RX ORDER — PREGABALIN 75 MG/1
75 CAPSULE ORAL 2 TIMES DAILY
Status: DISCONTINUED | OUTPATIENT
Start: 2022-01-12 | End: 2022-01-18 | Stop reason: HOSPADM

## 2022-01-12 RX ORDER — CEFAZOLIN SODIUM 2 G/50ML
2 SOLUTION INTRAVENOUS
Status: COMPLETED | OUTPATIENT
Start: 2022-01-12 | End: 2022-01-13

## 2022-01-12 RX ADMIN — IPRATROPIUM BROMIDE AND ALBUTEROL SULFATE 3 ML: .5; 3 SOLUTION RESPIRATORY (INHALATION) at 07:01

## 2022-01-12 RX ADMIN — CELECOXIB 200 MG: 100 CAPSULE ORAL at 09:01

## 2022-01-12 RX ADMIN — LOPERAMIDE HYDROCHLORIDE 4 MG: 2 CAPSULE ORAL at 05:01

## 2022-01-12 RX ADMIN — BUDESONIDE 0.5 MG: 0.5 INHALANT RESPIRATORY (INHALATION) at 07:01

## 2022-01-12 RX ADMIN — TRANEXAMIC ACID 250 MG: 100 INJECTION, SOLUTION INTRAVENOUS at 11:01

## 2022-01-12 RX ADMIN — TRANEXAMIC ACID 250 MG: 100 INJECTION, SOLUTION INTRAVENOUS at 12:01

## 2022-01-12 RX ADMIN — ROCURONIUM BROMIDE 25 MG: 10 INJECTION INTRAVENOUS at 12:01

## 2022-01-12 RX ADMIN — MORPHINE SULFATE 4 MG: 4 INJECTION INTRAVENOUS at 10:01

## 2022-01-12 RX ADMIN — FENTANYL CITRATE 100 MCG: 50 INJECTION INTRAMUSCULAR; INTRAVENOUS at 11:01

## 2022-01-12 RX ADMIN — POLYETHYLENE GLYCOL 3350 17 G: 17 POWDER, FOR SOLUTION ORAL at 03:01

## 2022-01-12 RX ADMIN — IPRATROPIUM BROMIDE AND ALBUTEROL SULFATE 3 ML: .5; 3 SOLUTION RESPIRATORY (INHALATION) at 12:01

## 2022-01-12 RX ADMIN — ROCURONIUM BROMIDE 25 MG: 10 INJECTION INTRAVENOUS at 11:01

## 2022-01-12 RX ADMIN — MORPHINE SULFATE 4 MG: 4 INJECTION INTRAVENOUS at 06:01

## 2022-01-12 RX ADMIN — MORPHINE SULFATE 2 MG: 10 INJECTION INTRAVENOUS at 01:01

## 2022-01-12 RX ADMIN — DOCUSATE SODIUM 100 MG: 100 CAPSULE, LIQUID FILLED ORAL at 09:01

## 2022-01-12 RX ADMIN — PROPOFOL 100 MG: 10 INJECTION, EMULSION INTRAVENOUS at 11:01

## 2022-01-12 RX ADMIN — HYDROCODONE BITARTRATE AND ACETAMINOPHEN 1 TABLET: 5; 325 TABLET ORAL at 03:01

## 2022-01-12 RX ADMIN — MORPHINE SULFATE 4 MG: 4 INJECTION INTRAVENOUS at 12:01

## 2022-01-12 RX ADMIN — LIDOCAINE HYDROCHLORIDE 100 MG: 20 INJECTION, SOLUTION INTRAVENOUS at 11:01

## 2022-01-12 RX ADMIN — DEXTROSE MONOHYDRATE 2 G: 50 INJECTION, SOLUTION INTRAVENOUS at 06:01

## 2022-01-12 RX ADMIN — SUGAMMADEX 200 MG: 100 INJECTION, SOLUTION INTRAVENOUS at 12:01

## 2022-01-12 RX ADMIN — VANCOMYCIN HYDROCHLORIDE 1000 MG: 1 INJECTION, POWDER, LYOPHILIZED, FOR SOLUTION INTRAVENOUS at 11:01

## 2022-01-12 RX ADMIN — MUPIROCIN 1 G: 20 OINTMENT TOPICAL at 09:01

## 2022-01-12 RX ADMIN — FAMOTIDINE 20 MG: 20 TABLET, FILM COATED ORAL at 09:01

## 2022-01-12 RX ADMIN — MORPHINE SULFATE 4 MG: 4 INJECTION INTRAVENOUS at 04:01

## 2022-01-12 RX ADMIN — CEFAZOLIN 2 G: 1 INJECTION, POWDER, FOR SOLUTION INTRAMUSCULAR; INTRAVENOUS; PARENTERAL at 11:01

## 2022-01-12 RX ADMIN — PREGABALIN 75 MG: 75 CAPSULE ORAL at 09:01

## 2022-01-12 RX ADMIN — ROCURONIUM BROMIDE 45 MG: 10 INJECTION INTRAVENOUS at 11:01

## 2022-01-12 NOTE — ANESTHESIA PREPROCEDURE EVALUATION
"                                                                                                             01/12/2022  Yany Silverio is a 61 y.o., female.    Anesthesia Evaluation    I have reviewed the Patient Summary Reports.   I have reviewed the NPO Status.   I have reviewed the Medications.     Review of Systems         Anesthesia Plan  Type of Anesthesia, risks & benefits discussed:  Anesthesia Type:  general    Patient's Preference:   Plan Factors:          Intra-op Monitoring Plan: standard ASA monitors  Intra-op Monitoring Plan Comments:   Post Op Pain Control Plan: IV/PO Opioids PRN  Post Op Pain Control Plan Comments:     Induction:   IV  Beta Blocker:         Informed Consent: Patient understands risks and agrees with Anesthesia plan.  Questions answered. Anesthesia consent signed with patient.  ASA Score: 3     Day of Surgery Review of History & Physical:            Ready For Surgery From Anesthesia Perspective.     NPO greater than 8 hours  NAC  NKDA     Hct 30  1/10/22 EKG: NSR 69  1/11/22 CXR: "Minimal blunting left costophrenic angle may represent minimal residual or recurrent atelectasis or infiltrate."     Anxiety Depression   COPD (chronic obstructive pulmonary disease) Hyperlipidemia   Hypertension Pain in joint of left hip   Pain in joint of left knee Cancer   Emphysema of lung Insomnia   Fungal esophagitis Acute superficial gastritis without hemorrhage   HH (hiatus hernia) Arthritis   NGOZI (CPAP)  Home oxygen at night and PRN  H/o cervical cancer  GERD  SaO2 at home runs 89 - 96%     Airway exam deferred (COVID precautions); adequate ROM at neck.        "

## 2022-01-12 NOTE — TRANSFER OF CARE
"Anesthesia Transfer of Care Note    Patient: Yany Silverio    Procedure(s) Performed: Procedure(s) (LRB):  ORIF, HIP (Left)    Patient location: PACU    Anesthesia Type: general    Transport from OR: Transported from OR on 6-10 L/min O2 by face mask with adequate spontaneous ventilation    Post pain: adequate analgesia    Post assessment: no apparent anesthetic complications    Post vital signs: stable    Level of consciousness: sedated and awake    Nausea/Vomiting: no nausea/vomiting    Complications: none    Transfer of care protocol was followed      Last vitals:   Visit Vitals  /86 (BP Location: Right arm, Patient Position: Lying)   Pulse 89   Temp 36.4 °C (97.6 °F) (Oral)   Resp 18   Ht 5' 4" (1.626 m)   Wt 76.2 kg (168 lb)   SpO2 (!) 94%   Breastfeeding No   BMI 28.84 kg/m²     "

## 2022-01-12 NOTE — HOSPITAL COURSE
01/13: CXR shows possible atelectasis vs pneumonia in the right middle lobe. Patient was started on rocephin and Azithromycin.    01/14: WBC 10.4 down from 12.2 yesterday and patient is afebrile with a Tmax of 100.5 in the last 24h. H&H dropped to 8.0/24.5 from 9.2/28.4 yesterday. Continue  mg PO daily for prophylaxis. Pending H&H check in PM.    01/15: Patient has been approved by [facility] and is waiting Covid testing and other paper work. Continue to monitor H&H.    Ms Silverio is a 62 yo female with past medical history of HTN, HLD, COPD on home oxygen and gastritis who was admitted to [facility] on 1/10/2022 for left hip fracture following a fall. Patient had a total left hip total replacement with Dr. Adolfo Randall and re-injured the same hip after the fall. She underwent as a successfully surgery with Dr. Randall. On day 1 post surgery, patient had a CXR which showed a possible pneumonia and she was treated with rocephin and azithromycin for 5 days. Patient has been stable since after the surgery.    Today, it was determined that patient has gained the maximum benefit from this hospitalization and is stable to be discharged to [facility]. Patient will follow up with Dr. Randall in one week and her PCP in one week.

## 2022-01-12 NOTE — PLAN OF CARE
PT/OT consults completed, faxed medical documentation to [facility] for IP rehab referral. Will follow for response.

## 2022-01-12 NOTE — MEDICAL/APP STUDENT
Medical Student Subjective & Objective     Principal Problem: Closed nondisplaced subtrochanteric fracture of left femur    Chief Complaint:   Chief Complaint   Patient presents with    Hip Pain       HPI: The patient is a 60yo  female with a PMH of essential HTN, HLD, superficial gastritis, and COPD with home oxygen who presents with left hip pain after a fall on Saturday 1/08. She has a history of left total hip replacement on 12/13/21 secondary to end-stage OA. She was seen by her PCP today and diagnosed with a closed, nondisplaced subtrochanteric fracture of the left femur. Dr. Adolfo Randall was contacted, and surgery is currently planned for Wednesday January 12.    Interval History: There were no events overnight. Patient is resting comfortably in bed this morning and states that her pain has improved. States that pain is well controlled with current management and that she is tolerating pain medication well.     Past Medical History:   Diagnosis Date    Acute superficial gastritis without hemorrhage 6/23/2021    Acute superficial gastritis without hemorrhage 6/23/2021    Anxiety     Arthritis     newly dx'd RA: sees MD in [place]    Bronchitis 7/22/2021    Bursitis of left shoulder 6/10/2021    Cancer     hx of cervical cancer    COPD (chronic obstructive pulmonary disease)     Depression     Emphysema of lung     Fungal esophagitis 6/23/2021    HH (hiatus hernia) 6/23/2021    Hyperlipidemia     Hypertension     Insomnia 04/16/2020    Low back pain 04/28/2021    Pain in joint of left hip 04/28/2021    Pain in joint of left knee 04/28/2021       Past Surgical History:   Procedure Laterality Date    ROBOTIC ARTHROPLASTY, HIP Left 12/13/2021    Procedure: ROBOTIC ARTHROPLASTY,HIP;  Surgeon: Adolfo Randall MD;  Location: [facility];  Service: Orthopedics;  Laterality: Left;    SHOULDER SURGERY Right 2017, 2019       Review of patient's allergies indicates:  No Known Allergies    No current  facility-administered medications on file prior to encounter.     Current Outpatient Medications on File Prior to Encounter   Medication Sig    albuterol (PROVENTIL) 2.5 mg /3 mL (0.083 %) nebulizer solution albuterol sulfate 2.5 mg/3 mL (0.083 %) solution for nebulization    aspirin (ECOTRIN) 81 MG EC tablet Take 81 mg by mouth once daily.    citalopram (CELEXA) 20 MG tablet Take 2 tablets (40 mg total) by mouth once daily.    diclofenac sodium (VOLTAREN) 1 % Gel Apply 2 g topically 3 (three) times daily.     furosemide (LASIX) 20 MG tablet TAKE 1 TABLET BY MOUTH DAILY FOR 3 DAYS THEN WEIGH DAILY AND IF NO MORE THAN 3 POUND WEIGHT GAIN, TAKE LASIX    lovastatin (MEVACOR) 20 MG tablet Take 1 tablet (20 mg total) by mouth once daily.    meloxicam (MOBIC) 15 MG tablet TAKE ONE TABLET BY MOUTH ONE TIME DAILY    ondansetron (ZOFRAN-ODT) 4 MG TbDL Take 2 tablets (8 mg total) by mouth every 8 (eight) hours as needed (Nausea).    oxyCODONE-acetaminophen (PERCOCET)  mg per tablet Take 1 tablet by mouth every 6 (six) hours as needed for Pain.    pantoprazole (PROTONIX) 40 MG tablet Take 1 tablet by mouth once daily.    predniSONE (DELTASONE) 5 MG tablet TAKE ONE Tablet BY MOUTH DAILY WITH FOOD    tiotropium (SPIRIVA) 18 mcg inhalation capsule Inhale 1 capsule (18 mcg total) into the lungs once daily. Controller    VENTOLIN HFA 90 mcg/actuation inhaler Inhale 1 puff into the lungs daily as needed.    zolpidem (AMBIEN) 5 MG Tab TAKE ONE TABLET BY MOUTH AT BEDTIME AS NEEDED//DO NOT TAKE WITHIN 4 HOURS OF TAKING PAIN MEDS//     Family History       Problem Relation (Age of Onset)    Cancer Mother    Hypertension Sister, Maternal Aunt, Maternal Uncle          Tobacco Use    Smoking status: Former Smoker     Packs/day: 2.00     Years: 20.00     Pack years: 40.00     Types: Cigarettes     Quit date:      Years since quittin.0    Smokeless tobacco: Never Used   Substance and Sexual Activity    Alcohol use: Never     Drug use: Yes     Types: Oxycodone     Comment: rx for hip pain: surg planned    Sexual activity: Not on file     Review of Systems  Objective:     Vital Signs (Most Recent):  Temp: 97.8 °F (36.6 °C) (01/12/22 0400)  Pulse: 76 (01/12/22 0400)  Resp: 20 (01/12/22 0426)  BP: 105/63 (01/12/22 0400)  SpO2: 95 % (01/12/22 0400) Vital Signs (24h Range):  Temp:  [97.8 °F (36.6 °C)-100.3 °F (37.9 °C)] 97.8 °F (36.6 °C)  Pulse:  [73-90] 76  Resp:  [15-20] 20  SpO2:  [93 %-98 %] 95 %  BP: ()/(51-63) 105/63     Weight: 76.2 kg (168 lb)  Body mass index is 28.84 kg/m².    Intake/Output Summary (Last 24 hours) at 1/12/2022 0815  Last data filed at 1/12/2022 0500  Gross per 24 hour   Intake 863.75 ml   Output 1650 ml   Net -786.25 ml      Physical Exam  Constitutional:       General: She is not in acute distress.  HENT:      Head: Normocephalic and atraumatic.      Right Ear: External ear normal.      Left Ear: External ear normal.   Cardiovascular:      Rate and Rhythm: Normal rate and regular rhythm.      Heart sounds: Normal heart sounds.   Pulmonary:      Effort: Pulmonary effort is normal.      Breath sounds: Normal breath sounds.   Musculoskeletal:      Left hip: Tenderness present. Decreased range of motion.   Neurological:      Mental Status: She is alert and oriented to person, place, and time.         Significant Labs: All pertinent labs within the past 24 hours have been reviewed.    Significant Imaging: I have reviewed all pertinent imaging results/findings within the past 24 hours.    Medical Student Assessment and Plan    Assessment: Yany Silverio is a 60yo female status-post total left hip replacement on 12/13/2021 who presented with left hip fracture secondary to a fall on 1/08. The patient was admitted to inpatient for medical management and evaluation for surgery. Surgery is scheduled for today.     Plan  1. Left femur fracture  - Patient will go to surgery today with Dr. Adolfo Randall  - Acetaminophen 650mg  q4hr prn for mild pain  - Oxycodone-acetaminophen 10-325mg q6hr prn for moderate pain  - Morphine 4mg q4hr PRN for severe pain   - Pending OT, PT, and social service consult     2. COPD with oxygen-dependence   - Holding home Sprirva and Proventil  - Budesonide neb  - Duonebs as needed for wheezing  - Continue CPAP at night     3. Hypertension  - /63 this morning  - Holding home lasix  - Holding home aspirin pending surgery  - Regular vital checks     4. Hyperlipidemia  - Holding home lovastatin, giving atorvastatin 40mg instead     5. Gastritis   - Continue home pantoprazole 40mg daily     6. Depression/Anxiety  - Continue home medications citalopram 20mg daily   - Ambien 5mg qhs PRN

## 2022-01-12 NOTE — PT/OT/SLP EVAL
Physical Therapy Evaluation    Patient Name:  Yany Silverio   MRN:  [id number]    Recommendations:     Discharge Recommendations:  nursing facility, skilled,rehabilitation facility   Discharge Equipment Recommendations: none   Barriers to discharge: awaiting swing bed/rehab placement    Assessment:     Yany Silverio is a 61 y.o. female admitted with a medical diagnosis of Closed nondisplaced subtrochanteric fracture of left femur, with left THR 12/13/2021. She presents with the following impairments/functional limitations:  weakness,impaired endurance,impaired self care skills,impaired functional mobilty,gait instability,impaired balance,decreased lower extremity function,pain,decreased ROM,orthopedic precautions .    Patient participated well with PT interventions and adhered to TTWB left restriction after verbal/tactile feedback/coaching. Patient will benefit from swing bed/inpatient rehab to optimize return to independent mobility as pt lives alone.    Rehab Prognosis: Good; patient would benefit from acute skilled PT services to address these deficits and reach maximum level of function.    Recent Surgery: Procedure(s) (LRB):  ORIF, HIP (Left) Day of Surgery    Plan:     During this hospitalization, patient to be seen BID (5x/week; daily 2x/week) to address the identified rehab impairments via gait training,therapeutic activities,therapeutic exercises and progress toward the following goals:    · Plan of Care Expires:  02/12/22    Subjective     Chief Complaint: subtrochanteric femur fracture adjacent to THR prosthesis (placed 12/13/2021)   Patient/Family Comments/goals: To get back to independent mobility  Pain/Comfort:  Pain Rating 1: 6/10  Location - Side 1: Left  Location 1: hip  Pain Addressed 1: Nurse notified,Cessation of Activity  Pain Rating Post-Intervention 1: 6/10    Patients cultural, spiritual, Sabianism conflicts given the current situation: no    Living Environment:  Pt lives alone in a  single story home with 3 steps/one rail to enter.  Prior to admission, patients level of function was independent with RW or crutches.  Equipment used at home: 3-in-1 commode,crutches,shower chair,crutches, axillary,hip kit,CPAP.  DME owned (not currently used): none.  Upon discharge, patient will have assistance from swing bed/rehab staff.    Objective:     Communicated with SHALINI Mims prior to session.  Patient found supine with blood pressure cuff,pulse ox (continuous),hip abduction pillow,oxygen  upon PT entry to room.    General Precautions: Standard, fall   Orthopedic Precautions:LLE toe touch weight bearing,LLE posterior precautions   Braces: N/A  Respiratory Status: Nasal cannula, flow 2 L/min    Exams:  · Cognitive Exam:  Patient is oriented to Person, Place, Time and Situation  · Sensation:    · -       Intact  · RLE ROM: WNL  · RLE Strength: WNL  · LLE ROM: Deficits: hip flexion to 90 in sitting; knee/ankle WFL  · LLE Strength: Deficits: hip 2/5; knee 4/5; ankle 5/5    Functional Mobility:  · Bed Mobility:     · Scooting: minimum assistance  · Supine to Sit: minimum assistance and increased time/effort  · Transfers:     · Sit to Stand:  minimum assistance, of 2 persons and verbal/tactile cues for sequencing and TTWB restriction with rolling walker  · Bed to Chair: minimum assistance with  rolling walker  using  Step Transfer  · Gait: 25' with RW, min A; step to pattern, slow ana paula, short step lengths, verbal/tactile feedback for TTWB left LE. Pt able to adhere to WB restriction after coaching; slow ana paula, easy fatigue; harsh productive sounding cough after gait trial  · Balance: sitting- good static and dynamic; standing with RW: good static, fair dynamic    Therapeutic Activities and Exercises:  ·  Pt educated on PT role/POC.   · Importance of OOB activity with staff assistance.  · Importance of sitting up in the chair throughout the day as tolerated, especially for meals   · Safety during  functional t/f and mobility with use of rolling walker TTWB left LE  · White board updated   · All questions/concerns answered within PT scope of practice    Pt educated in posterior hip precautions:   1. no bending/flexing surgical hip  >  90 degrees  2.   no crossing of surgical leg beyond midline  3.   no internal rotation of surgical leg beyond neutral (don't twist body toward surgical leg).       AM-PAC 6 CLICK MOBILITY  Total Score:15     Patient left up in chair with all lines intact, call button in reach and RN Paulette Mims notified.    GOALS:   Multidisciplinary Problems     Physical Therapy Goals        Problem: Physical Therapy Goal    Goal Priority Disciplines Outcome Goal Variances Interventions   Physical Therapy Goal     PT, PT/OT Ongoing, Progressing     Description: Short Term Goals to be met by: 1/26/2022    Patient will increase functional independence and safety with mobility by performing    1. Supine to sit modified independent  2. Sit to stand modified independent with Rolling walker; Toe-touch weight-bearing: left LE  3. Gait x 100' modified independent with Rolling walker; Toe-touch weight-bearing: left LE  4. Patient with complete illustrated home exercise program x 30 reps with assist as needed.  5. Patient will verbalize 3/3 hip precautions      Long Term Goal to be met by 3/12/2022    1. Patient will regain independent functional mobility with lowest level of assistive device to return to home situation and prior ADLs.                    History:     Past Medical History:   Diagnosis Date    Acute superficial gastritis without hemorrhage 6/23/2021    Acute superficial gastritis without hemorrhage 6/23/2021    Anxiety     Arthritis     newly dx'd RA: sees MD in [place]    Bronchitis 7/22/2021    Bursitis of left shoulder 6/10/2021    Cancer     hx of cervical cancer    COPD (chronic obstructive pulmonary disease)     Depression     Emphysema of lung     Fungal esophagitis  6/23/2021    HH (hiatus hernia) 6/23/2021    Hyperlipidemia     Hypertension     Insomnia 04/16/2020    Low back pain 04/28/2021    Pain in joint of left hip 04/28/2021    Pain in joint of left knee 04/28/2021       Past Surgical History:   Procedure Laterality Date    ROBOTIC ARTHROPLASTY, HIP Left 12/13/2021    Procedure: ROBOTIC ARTHROPLASTY,HIP;  Surgeon: Adolfo Randall MD;  Location: [facility];  Service: Orthopedics;  Laterality: Left;    SHOULDER SURGERY Right 2017, 2019       Time Tracking:     PT Received On: 01/12/22  PT Start Time: 1459     PT Stop Time: 1522  PT Total Time (min): 23 min     Billable Minutes: Evaluation Low complexity      01/12/2022

## 2022-01-12 NOTE — OP NOTE
[facility] - Orthopedic Periop Services  Operative Note    Surgery Date: 1/10/2022 - 1/12/2022      Surgeon(s) and Role:     * Adolfo Randall MD - Primary    Assistant: Sathish Plasencia MD    Pre-op Diagnosis:   Closed fracture of left hip, initial encounter [S72.002A]     Post-op Diagnosis:  Post-Op Diagnosis Codes:     * Closed fracture of left hip, initial encounter [S72.002A]     Procedure:    1) open reduction internal fixation left periprosthetic proximal femur fracture  2) revision total hip arthroplasty involving reimplantation of the femoral stem head and polyethylene cup    Anesthesia:  General     EBL:  100 cc    Implants:   Implant Name Type Inv. Item Serial No.  Lot No. LRB No. Used Action   IMP CABLE SET 2.0MM BEADED - [id number] Implant IMP CABLE SET 2.0MM BEADED  [employer] ([facility]) [id number] Left 1 Implanted   IMP CABLE SET 2.0MM BEADED - [id number] Implant IMP CABLE SET 2.0MM BEADED  [employer] ([facility]) [id number] Left 1 Implanted   IMP CABLE SET 2.0MM BEADED - [id number] Implant IMP CABLE SET 2.0MM BEADED  [employer] ([facility]) [id number] Left 1 Implanted   IMP CABLE SET 2.0MM BEADED - [id number] Implant IMP CABLE SET 2.0MM BEADED  [employer] ([facility]) [id number] Left 1 Implanted   IMP HEAD FEM BIOLOX DELTA V40 SZ0 28MM +0MM - [id number] Prosthesis IMP HEAD FEM BIOLOX DELTA V40 SZ0 28MM +0MM  [employer] ([facility]) [id number] Left 1 Implanted   IMP INSERT RESTORATION ADM X3 ID 28MM FOR CUP OD 48MM - [id number] Prosthesis IMP INSERT RESTORATION ADM X3 ID 28MM FOR CUP OD 48MM  [employer] ([facility]) [id number] Left 1 Implanted       Tourniquet time: 0 mins    Complication:   none    Procedure: The patient was taken to the operating room and placedlateral, right side up.  Anesthesia was administered and pre-operative antibiotics were given.  A timeout was performed.  Patient was positioned  appropriately and prepped and draped in the usual sterile fashion.    The previously established posterolateral incision was identified on the hip and 12 cm incision was carried out at this point through the skin and subcutaneous tissue.  The ITB band fashion gluteus tangela fascia was incised in line with the incision.  Previously placed sutures reapproximating our short external rotators were able to be identified and these were released and dissection was carried down to the hip joint.  The short external rotators were tagged for later reapproximation.  The hip was well reduced but was able to be easily dislocated.  The stem was loose and able to be removed as well and placed on the back table.  The head was uncoupled from the stem atraumatically on the back table.  Dissection was then carried through the vastus lateralis and the fracture involving the lesser trochanter and medial calcar was able to be exposed.  The fracture was dissected distally and provisionally reduced with point-to-point reduction clamps.  With the fracture reduced we utilized 4 Dall-Miles cables and sequentially tensioned these in order to provide anatomic fixation of the medial calcar and lesser trochanter fractures.  Following reduction and fixation we then reimplanted the stem and had satisfactory stability of the stem.  The MDM head and cup were reimplanted as well and the hip was reduced.  Satisfactory restoration of her leg length was able to once again be restored.  The area was copiously irrigated at this point.  5. Ethibond suture was then shuttled through 2 bone tunnels within the greater trochanter and the short external rotators were reapproximated at this point.  Layered closure was performed utilizing 1. Vicryl 2-0 Vicryl and staples.  Sterile dressings were applied as was a hip abduction pillow.  Patient was were compared incision taking the curve room in stable condition.    She will be toe-touch weight-bearing on her left  lower extremity.    Disposition:awakened from anesthesia, extubated and taken to the recovery room in a stable condition, having suffered no apparent untoward event.

## 2022-01-12 NOTE — PLAN OF CARE
[facility] - Orthopedic Periop Services  Initial Discharge Assessment       Primary Care Provider: Arelis Escobedo NP    Admission Diagnosis: Mixed hyperlipidemia [E78.2]  Pain in thoracic spine [M54.6]  Encounter for long-term (current) use of other medications [Z79.899]  Insomnia [G47.00]  Hypertension [I10]  HH (hiatus hernia) [K44.9]  Closed nondisplaced subtrochanteric fracture of left femur [S72.25XA]  Chronic obstructive pulmonary disease [J44.9]  Osteoarthritis of left hip [M16.12]  Chest pain [R07.9]  Anxiety and depression [F41.9, F32.A]  History of COPD [Z87.09]  NGOZI on CPAP [G47.33, Z99.89]  Closed fracture of left hip, initial encounter [S72.002A]  Fall, initial encounter [W19.XXXA]  Closed nondisplaced subtrochanteric fracture of left femur, initial encounter [S72.25XA]  History of repair of left hip joint [Z98.890]  Status post total hip replacement, left [Z96.642]    Admission Date: 1/10/2022  Expected Discharge Date:     Discharge Barriers Identified: None    Pay[place] / Plan[place] / Product Type: Government /     Extended Emergency Contact Information  Primary Emergency Contact: Julio Cesar Silverio  Mobile Phone: [phone]  Relation: Son  Preferred language: English   needed? No  Secondary Emergency Contact: Deana Silverio  Mobile Phone: [phone]  Relation: Daughter  Preferred language: English    Discharge Plan A: Rehab  Discharge Plan B: Rehab      [facility] [place] - [address] [place] [address]  Phone: [phone] Fax: [phone]      Initial Assessment (most recent)     Adult Discharge Assessment - 01/12/22 1441        Discharge Assessment    Assessment Type Discharge Planning Assessment     Source of Information family     If unable to respond/provide information was family/caregiver contacted? Yes     Contact Name/Number deana silverio, daughter in law     Lives With alone     Equipment Currently Used at Home  walker, standard;wheelchair;oxygen;CPAP     Do you currently have service(s) that help you manage your care at home? Yes     Name and Contact number of agency unsure of company     Who is going to help you get home at discharge? rusty silverio, son     Discharge Plan A Rehab     Discharge Plan B Rehab     DME Needed Upon Discharge  none     Discharge Plan discussed with: Adult children     Discharge Barriers Identified None               SS rec'd consult for swing bed. Spoke with pt's daughter in law Deana Silverio, states pt lives at home alone. Current with HH but unsure of company. Uses walker, wc, and oxygen. Plan at d/s is to go to rehab /swing bed. Informed Deana that with pt's insurance swing bed is not an option but we can refer to [facility] for inpatient rehab, choice obtained. SS will fax referral once pt/ot eval completed. SS following for d/c needs.

## 2022-01-12 NOTE — CONSULTS
[facility] Orth[facility]  Orthopedics  Consult Note    Patient Name: Yany Silverio  MRN: [id number]  Admission Date: 1/10/2022  Hospital Length of Stay: 2 days  Attending Provider: Elie Bhatt MD  Primary Care Provider: Arelis Escobedo NP    Patient information was obtained from patient and ER records.     Inpatient consult to Orthopedics  Consult performed by: Adolfo Randall MD  Consult ordered by: Jose Alejandro Garcia MD  Reason for consult: left hip fracture  Assessment/Recommendations: To OR for left revision JAXSON, with ORIF of proximal femur fracture        Subjective:     Principal Problem:Closed nondisplaced subtrochanteric fracture of left femur    Chief Complaint:   Chief Complaint   Patient presents with    Hip Pain        HPI:  61-year-old female well known to me who has recently underwent a left total hip arthroplasty.  She fell at home over the weekend sustaining a periprosthetic fracture involving the proximal femur.  She states she is in significant pain and has been unable to ambulate since the injury.  She was doing quite well prior to this re-injury.    Past Medical History:   Diagnosis Date    Acute superficial gastritis without hemorrhage 6/23/2021    Acute superficial gastritis without hemorrhage 6/23/2021    Anxiety     Arthritis     newly dx'd RA: seelea NAVARRO in [place]    Bronchitis 7/22/2021    Bursitis of left shoulder 6/10/2021    Cancer     hx of cervical cancer    COPD (chronic obstructive pulmonary disease)     Depression     Emphysema of lung     Fungal esophagitis 6/23/2021    HH (hiatus hernia) 6/23/2021    Hyperlipidemia     Hypertension     Insomnia 04/16/2020    Low back pain 04/28/2021    Pain in joint of left hip 04/28/2021    Pain in joint of left knee 04/28/2021       Past Surgical History:   Procedure Laterality Date    ROBOTIC ARTHROPLASTY, HIP Left 12/13/2021    Procedure: ROBOTIC ARTHROPLASTY,HIP;  Surgeon: Adolfo Randall MD;  Location: [facility];   Service: Orthopedics;  Laterality: Left;    SHOULDER SURGERY Right ,        Review of patient's allergies indicates:  No Known Allergies    Current Facility-Administered Medications   Medication    acetaminophen tablet 650 mg    albuterol-ipratropium 2.5 mg-0.5 mg/3 mL nebulizer solution 3 mL    albuterol-ipratropium 2.5 mg-0.5 mg/3 mL nebulizer solution 3 mL    atorvastatin tablet 40 mg    budesonide nebulizer solution 0.5 mg    citalopram tablet 20 mg    HYDROcodone-acetaminophen 5-325 mg per tablet 1 tablet    morphine injection 4 mg    naloxone 0.4 mg/mL injection 0.02 mg    ondansetron injection 4 mg    oxyCODONE-acetaminophen  mg per tablet 1 tablet    pantoprazole EC tablet 40 mg    prochlorperazine injection Soln 5 mg    sodium chloride 0.9% flush 10 mL    zolpidem tablet 5 mg     Family History     Problem Relation (Age of Onset)    Cancer Mother    Hypertension Sister, Maternal Aunt, Maternal Uncle        Tobacco Use    Smoking status: Former Smoker     Packs/day: 2.00     Years: 20.00     Pack years: 40.00     Types: Cigarettes     Quit date:      Years since quittin.0    Smokeless tobacco: Never Used   Substance and Sexual Activity    Alcohol use: Never    Drug use: Yes     Types: Oxycodone     Comment: rx for hip pain: surg planned    Sexual activity: Not on file     Review of Systems   Constitutional: Negative for chills and decreased appetite.   Cardiovascular: Negative for chest pain and palpitations.   Respiratory: Negative for cough and shortness of breath.    Gastrointestinal: Negative for abdominal pain.   Neurological: Negative for focal weakness, numbness, sensory change and weakness.   Psychiatric/Behavioral: Negative for altered mental status.     Objective:     Vital Signs (Most Recent):  Temp: 97.8 °F (36.6 °C) (22)  Pulse: 76 (22)  Resp: 20 (22 0426)  BP: 105/63 (22)  SpO2: 95 % (22) Vital Signs  "(24h Range):  Temp:  [97.8 °F (36.6 °C)-100.3 °F (37.9 °C)] 97.8 °F (36.6 °C)  Pulse:  [73-90] 76  Resp:  [15-20] 20  SpO2:  [93 %-98 %] 95 %  BP: ()/(51-63) 105/63     Weight: 76.2 kg (168 lb)  Height: 5' 4" (162.6 cm)  Body mass index is 28.84 kg/m².      Intake/Output Summary (Last 24 hours) at 1/12/2022 0743  Last data filed at 1/12/2022 0500  Gross per 24 hour   Intake 863.75 ml   Output 1650 ml   Net -786.25 ml       General    Nursing note and vitals reviewed.  Constitutional: She is oriented to person, place, and time. She appears well-developed and well-nourished.   HENT:   Head: Normocephalic and atraumatic.   Nose: Nose normal.   Eyes: EOM are normal. Pupils are equal, round, and reactive to light.   Neck: Neck supple.   Cardiovascular: Normal rate and intact distal pulses.    Pulmonary/Chest: Effort normal. No respiratory distress. She exhibits no tenderness.   Abdominal: Soft. She exhibits no distension. There is no abdominal tenderness.   Neurological: She is alert and oriented to person, place, and time. She has normal reflexes.   Psychiatric: She has a normal mood and affect. Her behavior is normal. Judgment and thought content normal.         Left Hip Exam     Inspection   Swelling: present  Deformity of hip.  Quadriceps Atrophy:  positive  Erythema: present  Bruising: present    Tenderness   The patient tender to palpation of the trochanteric bursa.    Crepitus   The patient has crepitus of the trochanteric bursa.    Range of Motion   Abduction: abnormal   Adduction: abnormal   Extension: abnormal   Flexion: abnormal   External rotation: abnormal   Internal rotation: abnormal     Tests   Pain w/ forced internal rotation (ANTONIO): present  Pain w/ forced external rotation (FADIR): present  Circumduction test: positive  Log Roll: positive          Muscle Strength   Left Lower Extremity   Hip Flexion: 2/5       Significant Labs: All pertinent labs within the past 24 hours have been " reviewed.    Significant Imaging: I have reviewed all pertinent imaging results/findings.  I have reviewed and interpreted all pertinent imaging results/findings.    Assessment/Plan:     Active Diagnoses:    Diagnosis Date Noted POA    PRINCIPAL PROBLEM:  Closed nondisplaced subtrochanteric fracture of left femur [S72.25XA] 01/10/2022 Yes    Hypertension [I10] 12/13/2021 Yes     Chronic    Anxiety and depression [F41.9, F32.A] 12/13/2021 Yes     Chronic    NGOZI on CPAP [G47.33, Z99.89] 11/29/2021 Not Applicable    Chronic obstructive pulmonary disease [J44.9] 07/22/2021 Yes    Mixed hyperlipidemia [E78.2] 06/10/2021 Yes      Problems Resolved During this Admission:       Thank you for your consult. I will follow-up with patient. Please contact us if you have any additional questions.    Adolfo Randall MD  Orthopedics  [facility]

## 2022-01-12 NOTE — OR NURSING
1326- received pt. To pacu via bed. Color pink. Resp. Easy. Has on 02 at 5 liter face mask. Awake and talking. Hob elevated at 30-45 degree angle. Has iv of lr at kvo in left hand without problems. Abd. Soft. Dressing to left hip d/i with abduction pillow between legs. Pt. Has waldron catheter with 500 ml of dark yellow urine noted.niraj's well. Has brad and scd on rt. Leg only connected to pump. Able to move toes and has good capillary refill of less than 3 seconds. Pt. Connected to v/s monitors.    1330- xray into do xray of the pelvis.     1333- pt. C/o pain. Morphine sulfate 2 mg given for pain. On a scale of 0-10. Pt. reports a 8.     1345- pt. C/o pain again after first dose. Morphine 2 mg iv given for pain. now rates scale 0-10 a 6.    1400- pt. Resting quietly in be. 02 changed to 2 liters NC.    1422- pt. returned to room 469 via bed. Color pink. Skin warm and dry. Resp. Easy. 02 at 2 liters nc on. Room air at of 95% noted. Awake and talking. Hob elevated. Iv fluids of lr at kvo in left wrist without problems. Dressing to left hip d/i. Pt. Has abduction pillow between legs. no circulatory compromise noted. Pt. Able to move toes and has capillary refil less than 3 seconds. Waldron patent and secured and draining 500 ml of dark yellow urine noted. Has on brad and scd to rt. Leg. No discoloration, severe pain or numbness reported from pt. Monitors disconnected. No reports of pain. V/sd stable. Condition stable.

## 2022-01-12 NOTE — PLAN OF CARE
Problem: Occupational Therapy Goal  Goal: Occupational Therapy Goal  Description: ST.Pt will perform bathing with Louie with setup at EOB  2.Pt will perform UE dressing with Louie  3.Pt will perform LE dressing with Susanna with adaptive equipment and maintaining hip precautions  4.Pt will transfer bed/chair/bsc with CGA with RW maintaining weightbearing status  5.Pt will perform standing task x 4 min with CGA with RW  6.Tolerate 15 min of tx without fatigue.      LTG:   Restore to max I with selfcare and mobility.      Outcome: Ongoing, Progressing

## 2022-01-12 NOTE — PLAN OF CARE
Problem: Physical Therapy Goal  Goal: Physical Therapy Goal  Description: Short Term Goals to be met by: 1/26/2022    Patient will increase functional independence and safety with mobility by performing    1. Supine to sit modified independent  2. Sit to stand modified independent with Rolling walker; Toe-touch weight-bearing: left LE  3. Gait x 100' modified independent with Rolling walker; Toe-touch weight-bearing: left LE  4. Patient with complete illustrated home exercise program x 30 reps with assist as needed.  5. Patient will verbalize 3/3 hip precautions      Long Term Goal to be met by 3/12/2022    1. Patient will regain independent functional mobility with lowest level of assistive device to return to home situation and prior ADLs.   Outcome: Ongoing, Progressing     Patient participated well with PT interventions and adhered to TTWB left restriction after verbal/tactile feedback/coaching. Patient will benefit from swing bed/inpatient rehab to optimize return to independent mobility as pt lives alone.

## 2022-01-12 NOTE — SUBJECTIVE & OBJECTIVE
Interval History:   Patient is post op day 1 and  was seen in bed with no acute event over night and denies fever, chills, chest pain, palpitations, shortness of breath, and abdominal pain. Patient is pending transfer [facility].      Review of Systems   Constitutional: Negative for fatigue and fever.   HENT: Negative for tinnitus and trouble swallowing.    Eyes: Negative for visual disturbance.   Respiratory: Negative for cough, chest tightness and shortness of breath.    Cardiovascular: Negative for chest pain, palpitations and leg swelling.   Gastrointestinal: Negative for abdominal pain, diarrhea, nausea and vomiting.   Genitourinary: Negative for dysuria.   Musculoskeletal: Negative for neck stiffness.   Neurological: Negative for dizziness, syncope, speech difficulty, light-headedness and headaches.   Psychiatric/Behavioral: Negative for agitation, behavioral problems and confusion.     Objective:     Vital Signs (Most Recent):  Temp: 97.8 °F (36.6 °C) (01/12/22 0400)  Pulse: 76 (01/12/22 0400)  Resp: 20 (01/12/22 0426)  BP: 105/63 (01/12/22 0400)  SpO2: 95 % (01/12/22 0400) Vital Signs (24h Range):  Temp:  [97.8 °F (36.6 °C)-100.3 °F (37.9 °C)] 97.8 °F (36.6 °C)  Pulse:  [73-90] 76  Resp:  [15-20] 20  SpO2:  [93 %-98 %] 95 %  BP: ()/(51-63) 105/63     Weight: 76.2 kg (168 lb)  Body mass index is 28.84 kg/m².    Intake/Output Summary (Last 24 hours) at 1/12/2022 0650  Last data filed at 1/12/2022 0500  Gross per 24 hour   Intake 863.75 ml   Output 1650 ml   Net -786.25 ml      Physical Exam  Constitutional:       General: She is not in acute distress.     Appearance: She is normal weight. She is not ill-appearing.   HENT:      Head: Normocephalic and atraumatic.      Right Ear: External ear normal.      Left Ear: External ear normal.      Nose: Nose normal. No congestion or rhinorrhea.      Mouth/Throat:      Mouth: Mucous membranes are moist.   Eyes:      Conjunctiva/sclera: Conjunctivae normal.    Cardiovascular:      Rate and Rhythm: Normal rate and regular rhythm.      Pulses: Normal pulses.      Heart sounds: Normal heart sounds. No murmur heard.  No friction rub.   Pulmonary:      Effort: Pulmonary effort is normal. No respiratory distress.      Breath sounds: Normal breath sounds. No wheezing, rhonchi or rales.   Abdominal:      General: Bowel sounds are normal.      Tenderness: There is no abdominal tenderness. There is no guarding or rebound.   Musculoskeletal:         General: No swelling.      Cervical back: Neck supple. No tenderness.      Right lower leg: No edema.      Left lower leg: No edema.   Lymphadenopathy:      Cervical: No cervical adenopathy.   Skin:     General: Skin is warm.      Coloration: Skin is not jaundiced.   Neurological:      General: No focal deficit present.      Mental Status: She is alert and oriented to person, place, and time. Mental status is at baseline.   Psychiatric:         Mood and Affect: Mood normal.         Behavior: Behavior normal.         Thought Content: Thought content normal.         Significant Labs: All pertinent labs within the past 24 hours have been reviewed.    Significant Imaging: I have reviewed all pertinent imaging results/findings within the past 24 hours.     Scheduled Meds:   albuterol-ipratropium  3 mL Nebulization Q6H    aspirin  324 mg Oral BID    aspirin  81 mg Oral Daily    atorvastatin  40 mg Oral Daily    budesonide  0.5 mg Nebulization Q12H    celecoxib  200 mg Oral BID    citalopram  20 mg Oral Daily    docusate sodium  100 mg Oral Q12H    famotidine  20 mg Oral BID    loperamide  4 mg Oral Once    mupirocin  1 g Nasal BID    pantoprazole  40 mg Oral Daily    polyethylene glycol  17 g Oral Daily    pregabalin  75 mg Oral BID     Continuous Infusions:  PRN Meds:.acetaminophen, acetaminophen, albuterol-ipratropium, bisacodyL, HYDROcodone-acetaminophen, HYDROmorphone, morphine, naloxone, ondansetron, ondansetron,  oxyCODONE, oxyCODONE, oxyCODONE-acetaminophen, prochlorperazine, sodium chloride 0.9%, sodium chloride 0.9%, zolpidem, zolpidem

## 2022-01-12 NOTE — PLAN OF CARE
Problem: Infection  Goal: Absence of Infection Signs and Symptoms  Outcome: Ongoing, Progressing     Problem: Adult Inpatient Plan of Care  Goal: Plan of Care Review  Outcome: Ongoing, Progressing  Goal: Patient-Specific Goal (Individualized)  Outcome: Ongoing, Progressing  Goal: Absence of Hospital-Acquired Illness or Injury  Outcome: Ongoing, Progressing  Goal: Optimal Comfort and Wellbeing  Outcome: Ongoing, Progressing  Goal: Readiness for Transition of Care  Outcome: Ongoing, Not Progressing     Problem: Fall Injury Risk  Goal: Absence of Fall and Fall-Related Injury  Outcome: Ongoing, Progressing     Problem: Skin Injury Risk Increased  Goal: Skin Health and Integrity  Outcome: Ongoing, Progressing

## 2022-01-12 NOTE — PT/OT/SLP EVAL
Occupational Therapy   Evaluation    Name: Yany Silverio  MRN: [id number]  Admitting Diagnosis:  Closed nondisplaced subtrochanteric fracture of left femur  Recent Surgery: Procedure(s) (LRB):  ORIF, HIP (Left) Day of Surgery    Recommendations:     Discharge Recommendations: nursing facility, skilled  Discharge Equipment Recommendations:  none  Barriers to discharge:  Decreased caregiver support (pt lives alone)    Assessment:     Yany Silverio is a 61 y.o. female with a medical diagnosis of Closed nondisplaced subtrochanteric fracture of left femur.  She presents with s/p L hip ORIF. Performance deficits affecting function: weakness,impaired self care skills,impaired functional mobilty,gait instability,impaired balance,orthopedic precautions.  Pt reports L hip fracture d/t fall at home. Pt lives alone and has recent L THR. Pt would benefit from discharge to swingbed.     Rehab Prognosis: Good; patient would benefit from acute skilled OT services to address these deficits and reach maximum level of function.       Plan:     Patient to be seen 5 x/week to address the above listed problems via self-care/home management,therapeutic activities,therapeutic exercises  · Plan of Care Expires: 02/09/22  · Plan of Care Reviewed with: patient    Subjective     Chief Complaint: L hip ORIF  Patient/Family Comments/goals: pt agreeable to participate in OT eval    Occupational Profile:  Living Environment: pt lives alone in one story home with 3 steps to enter with one handrail  Previous level of function: independent with all ADL tasks with adaptive equipment  Roles and Routines: perform self care  Equipment Used at Home:  3-in-1 commode,walker, rolling,oxygen,crutches,shower chair,hip kit  Assistance upon Discharge: swingbed    Pain/Comfort:  · Pain Rating 1: 6/10  · Location - Side 1: Left  · Location 1: hip  · Pain Addressed 1: Nurse notified    Patients cultural, spiritual, Anglican conflicts given the current  situation: no    Objective:     Communicated with: SHALINI Caldwell prior to session.  Patient found supine with blood pressure cuff,waldron catheter,hip abduction pillow,oxygen,peripheral IV,pulse ox (continuous),telemetry upon OT entry to room.    General Precautions: Standard, fall   Orthopedic Precautions:LLE toe touch weight bearing,LLE posterior precautions   Braces:    Respiratory Status: Nasal cannula, flow 3 L/min    Occupational Performance:    Bed Mobility:    · Patient completed Supine to Sit with minimum assistance    Functional Mobility/Transfers:  · Patient completed Sit <> Stand Transfer with minimum assistance and of 2 persons  with  rolling walker   · Patient completed Bed <> Chair Transfer using Step Transfer technique with minimum assistance with rolling walker  · Functional Mobility: pt performed functional mobility with RW with Susanna for balance with good adherence to weightbearing status    Activities of Daily Living:  · Upper Body Dressing: minimum assistance to lindsay gown  · Lower Body Dressing: maximal assistance to lindsay socks    Cognitive/Visual Perceptual:  Cognitive/Psychosocial Skills:     -       Oriented to: Person, Place, Time and Situation   -       Follows Commands/attention:Follows multistep  commands  -       Mood/Affect/Coping skills/emotional control: Cooperative    Physical Exam:  Balance: -       decreased standing balance d/t s/p surgery  Upper Extremity Range of Motion:     -       Right Upper Extremity: WFL  -       Left Upper Extremity: WFL  Upper Extremity Strength:    -       Right Upper Extremity: WFL  -       Left Upper Extremity: WFL  Gross motor coordination:   WFL    AMPAC 6 Click ADL:  AMPAC Total Score: 20    Treatment & Education:  · Pt educated on OT role/POC.   · Importance of OOB activity with staff assistance.  · Importance of sitting up in the chair throughout the day as tolerated, especially for meals   · Safety during functional t/f and mobility with use of  RW  · Importance of assisting with self-care activities   · All questions/concerns answered within OT scope of practice    Education:    Patient left up in chair with all lines intact, call button in reach and SHALINI Caldwell notified    GOALS:   Multidisciplinary Problems     Occupational Therapy Goals        Problem: Occupational Therapy Goal    Goal Priority Disciplines Outcome Interventions   Occupational Therapy Goal     OT, PT/OT Ongoing, Progressing    Description: ST.Pt will perform bathing with Louie with setup at EOB  2.Pt will perform UE dressing with Louie  3.Pt will perform LE dressing with Susanna with adaptive equipment and maintaining hip precautions  4.Pt will transfer bed/chair/bsc with CGA with RW maintaining weightbearing status  5.Pt will perform standing task x 4 min with CGA with RW  6.Tolerate 15 min of tx without fatigue.      LTG:   Restore to max I with selfcare and mobility.                       History:     Past Medical History:   Diagnosis Date    Acute superficial gastritis without hemorrhage 2021    Acute superficial gastritis without hemorrhage 2021    Anxiety     Arthritis     newly dx'd RA: sees MD in [place]    Bronchitis 2021    Bursitis of left shoulder 6/10/2021    Cancer     hx of cervical cancer    COPD (chronic obstructive pulmonary disease)     Depression     Emphysema of lung     Fungal esophagitis 2021    HH (hiatus hernia) 2021    Hyperlipidemia     Hypertension     Insomnia 2020    Low back pain 2021    Pain in joint of left hip 2021    Pain in joint of left knee 2021       Past Surgical History:   Procedure Laterality Date    ROBOTIC ARTHROPLASTY, HIP Left 2021    Procedure: ROBOTIC ARTHROPLASTY,HIP;  Surgeon: Adolfo Randall MD;  Location: [facility];  Service: Orthopedics;  Laterality: Left;    SHOULDER SURGERY Right 2019       Time Tracking:     OT Date of Treatment: 22  OT  Start Time: 1459  OT Stop Time: 1523  OT Total Time (min): 24 min    Billable Minutes:Evaluation OT min complexity eval    1/12/2022

## 2022-01-12 NOTE — ANESTHESIA POSTPROCEDURE EVALUATION
Anesthesia Post Evaluation    Patient: Yany Silverio    Procedure(s) Performed: Procedure(s) (LRB):  ORIF, HIP (Left)    Final Anesthesia Type: general      Patient location during evaluation: PACU  Post-procedure vital signs: reviewed and stable  Pain management: adequate  Airway patency: patent    PONV status at discharge: No PONV  Anesthetic complications: no      Cardiovascular status: hemodynamically stable  Respiratory status: unassisted  Hydration status: euvolemic  Follow-up not needed.          Vitals Value Taken Time   /68 01/12/22 1423   Temp 36.4 °C (97.6 °F) 01/12/22 1329   Pulse 76 01/12/22 1423   Resp 12 01/12/22 1423   SpO2 98 % 01/12/22 1423   Vitals shown include unvalidated device data.      No case tracking events are documented in the log.      Pain/Consuelo Score: Pain Rating Prior to Med Admin: 8 (1/12/2022  2:18 PM)  Pain Rating Post Med Admin: 2 (1/12/2022  2:18 PM)  Consuelo Score: 10 (1/12/2022  2:18 PM)

## 2022-01-13 LAB
ALBUMIN SERPL BCP-MCNC: 2.7 G/DL (ref 3.5–5)
ANION GAP SERPL CALCULATED.3IONS-SCNC: 12 MMOL/L (ref 7–16)
BASOPHILS # BLD AUTO: 0.04 K/UL (ref 0–0.2)
BASOPHILS NFR BLD AUTO: 0.3 % (ref 0–1)
BUN SERPL-MCNC: 6 MG/DL (ref 7–18)
BUN/CREAT SERPL: 11 (ref 6–20)
CALCIUM SERPL-MCNC: 7.7 MG/DL (ref 8.5–10.1)
CHLORIDE SERPL-SCNC: 102 MMOL/L (ref 98–107)
CO2 SERPL-SCNC: 25 MMOL/L (ref 21–32)
CREAT SERPL-MCNC: 0.54 MG/DL (ref 0.55–1.02)
DIFFERENTIAL METHOD BLD: ABNORMAL
EOSINOPHIL # BLD AUTO: 0.18 K/UL (ref 0–0.5)
EOSINOPHIL NFR BLD AUTO: 1.5 % (ref 1–4)
ERYTHROCYTE [DISTWIDTH] IN BLOOD BY AUTOMATED COUNT: 15.2 % (ref 11.5–14.5)
GLUCOSE SERPL-MCNC: 153 MG/DL (ref 74–106)
HCT VFR BLD AUTO: 28.4 % (ref 38–47)
HGB BLD-MCNC: 9.2 G/DL (ref 12–16)
IMM GRANULOCYTES # BLD AUTO: 0.05 K/UL (ref 0–0.04)
IMM GRANULOCYTES NFR BLD: 0.4 % (ref 0–0.4)
LYMPHOCYTES # BLD AUTO: 2.15 K/UL (ref 1–4.8)
LYMPHOCYTES NFR BLD AUTO: 17.6 % (ref 27–41)
MCH RBC QN AUTO: 30.7 PG (ref 27–31)
MCHC RBC AUTO-ENTMCNC: 32.4 G/DL (ref 32–36)
MCV RBC AUTO: 94.7 FL (ref 80–96)
MONOCYTES # BLD AUTO: 0.76 K/UL (ref 0–0.8)
MONOCYTES NFR BLD AUTO: 6.2 % (ref 2–6)
MPC BLD CALC-MCNC: 9.7 FL (ref 9.4–12.4)
NEUTROPHILS # BLD AUTO: 9.02 K/UL (ref 1.8–7.7)
NEUTROPHILS NFR BLD AUTO: 74 % (ref 53–65)
NRBC # BLD AUTO: 0 X10E3/UL
NRBC, AUTO (.00): 0 %
PLATELET # BLD AUTO: 229 K/UL (ref 150–400)
POTASSIUM SERPL-SCNC: 4.4 MMOL/L (ref 3.5–5.1)
RBC # BLD AUTO: 3 M/UL (ref 4.2–5.4)
SODIUM SERPL-SCNC: 135 MMOL/L (ref 136–145)
WBC # BLD AUTO: 12.2 K/UL (ref 4.5–11)

## 2022-01-13 PROCEDURE — 25000242 PHARM REV CODE 250 ALT 637 W/ HCPCS: Performed by: ORTHOPAEDIC SURGERY

## 2022-01-13 PROCEDURE — 97110 THERAPEUTIC EXERCISES: CPT

## 2022-01-13 PROCEDURE — 85025 COMPLETE CBC W/AUTO DIFF WBC: CPT

## 2022-01-13 PROCEDURE — 27000221 HC OXYGEN, UP TO 24 HOURS

## 2022-01-13 PROCEDURE — 36415 COLL VENOUS BLD VENIPUNCTURE: CPT

## 2022-01-13 PROCEDURE — 94660 CPAP INITIATION&MGMT: CPT

## 2022-01-13 PROCEDURE — 97535 SELF CARE MNGMENT TRAINING: CPT

## 2022-01-13 PROCEDURE — 25000003 PHARM REV CODE 250: Mod: GY | Performed by: ORTHOPAEDIC SURGERY

## 2022-01-13 PROCEDURE — 94761 N-INVAS EAR/PLS OXIMETRY MLT: CPT

## 2022-01-13 PROCEDURE — 99232 PR SUBSEQUENT HOSPITAL CARE,LEVL II: ICD-10-PCS | Mod: GC,,, | Performed by: FAMILY MEDICINE

## 2022-01-13 PROCEDURE — 82040 ASSAY OF SERUM ALBUMIN: CPT | Performed by: FAMILY MEDICINE

## 2022-01-13 PROCEDURE — 63600175 PHARM REV CODE 636 W HCPCS: Performed by: INTERNAL MEDICINE

## 2022-01-13 PROCEDURE — 25000003 PHARM REV CODE 250: Mod: GY | Performed by: FAMILY MEDICINE

## 2022-01-13 PROCEDURE — 25000003 PHARM REV CODE 250: Performed by: ORTHOPAEDIC SURGERY

## 2022-01-13 PROCEDURE — 99900031 HC PATIENT EDUCATION (STAT)

## 2022-01-13 PROCEDURE — 11000001 HC ACUTE MED/SURG PRIVATE ROOM

## 2022-01-13 PROCEDURE — 25000242 PHARM REV CODE 250 ALT 637 W/ HCPCS: Performed by: INTERNAL MEDICINE

## 2022-01-13 PROCEDURE — 63700000 PHARM REV CODE 250 ALT 637 W/O HCPCS: Mod: GY | Performed by: FAMILY MEDICINE

## 2022-01-13 PROCEDURE — 99900035 HC TECH TIME PER 15 MIN (STAT)

## 2022-01-13 PROCEDURE — 63600175 PHARM REV CODE 636 W HCPCS: Performed by: ORTHOPAEDIC SURGERY

## 2022-01-13 PROCEDURE — 99232 SBSQ HOSP IP/OBS MODERATE 35: CPT | Mod: GC,,, | Performed by: FAMILY MEDICINE

## 2022-01-13 PROCEDURE — 63600175 PHARM REV CODE 636 W HCPCS: Performed by: FAMILY MEDICINE

## 2022-01-13 PROCEDURE — 80048 BASIC METABOLIC PNL TOTAL CA: CPT | Performed by: ORTHOPAEDIC SURGERY

## 2022-01-13 PROCEDURE — 94640 AIRWAY INHALATION TREATMENT: CPT

## 2022-01-13 PROCEDURE — 97116 GAIT TRAINING THERAPY: CPT

## 2022-01-13 RX ORDER — AZITHROMYCIN 250 MG/1
250 TABLET, FILM COATED ORAL DAILY
Status: COMPLETED | OUTPATIENT
Start: 2022-01-14 | End: 2022-01-17

## 2022-01-13 RX ORDER — SODIUM CHLORIDE 0.9 % (FLUSH) 0.9 %
2 SYRINGE (ML) INJECTION EVERY 6 HOURS PRN
Status: DISCONTINUED | OUTPATIENT
Start: 2022-01-13 | End: 2022-01-15

## 2022-01-13 RX ORDER — NAPROXEN SODIUM 220 MG/1
325 TABLET, FILM COATED ORAL 2 TIMES DAILY
Status: DISCONTINUED | OUTPATIENT
Start: 2022-01-13 | End: 2022-01-14

## 2022-01-13 RX ORDER — AZITHROMYCIN 250 MG/1
500 TABLET, FILM COATED ORAL ONCE
Status: COMPLETED | OUTPATIENT
Start: 2022-01-13 | End: 2022-01-13

## 2022-01-13 RX ORDER — ASPIRIN 81 MG/1
81 TABLET ORAL DAILY
Status: DISCONTINUED | OUTPATIENT
Start: 2022-01-13 | End: 2022-01-14

## 2022-01-13 RX ADMIN — HYDROCODONE BITARTRATE AND ACETAMINOPHEN 1 TABLET: 5; 325 TABLET ORAL at 08:01

## 2022-01-13 RX ADMIN — CITALOPRAM HYDROBROMIDE 20 MG: 20 TABLET ORAL at 08:01

## 2022-01-13 RX ADMIN — PANTOPRAZOLE SODIUM 40 MG: 40 TABLET, DELAYED RELEASE ORAL at 08:01

## 2022-01-13 RX ADMIN — AZITHROMYCIN MONOHYDRATE 500 MG: 250 TABLET ORAL at 02:01

## 2022-01-13 RX ADMIN — IPRATROPIUM BROMIDE AND ALBUTEROL SULFATE 3 ML: .5; 3 SOLUTION RESPIRATORY (INHALATION) at 07:01

## 2022-01-13 RX ADMIN — PREGABALIN 75 MG: 75 CAPSULE ORAL at 09:01

## 2022-01-13 RX ADMIN — ASPIRIN 81 MG CHEWABLE TABLET 324 MG: 81 TABLET CHEWABLE at 09:01

## 2022-01-13 RX ADMIN — MUPIROCIN 1 G: 20 OINTMENT TOPICAL at 09:01

## 2022-01-13 RX ADMIN — DOCUSATE SODIUM 100 MG: 100 CAPSULE, LIQUID FILLED ORAL at 09:01

## 2022-01-13 RX ADMIN — CEFTRIAXONE SODIUM 1 G: 1 INJECTION, POWDER, FOR SOLUTION INTRAMUSCULAR; INTRAVENOUS at 02:01

## 2022-01-13 RX ADMIN — POLYETHYLENE GLYCOL 3350 17 G: 17 POWDER, FOR SOLUTION ORAL at 09:01

## 2022-01-13 RX ADMIN — ASPIRIN 81 MG: 81 TABLET, COATED ORAL at 08:01

## 2022-01-13 RX ADMIN — HYDROCODONE BITARTRATE AND ACETAMINOPHEN 1 TABLET: 5; 325 TABLET ORAL at 02:01

## 2022-01-13 RX ADMIN — HYDROCODONE BITARTRATE AND ACETAMINOPHEN 1 TABLET: 5; 325 TABLET ORAL at 10:01

## 2022-01-13 RX ADMIN — PREGABALIN 75 MG: 75 CAPSULE ORAL at 08:01

## 2022-01-13 RX ADMIN — IPRATROPIUM BROMIDE AND ALBUTEROL SULFATE 3 ML: .5; 3 SOLUTION RESPIRATORY (INHALATION) at 12:01

## 2022-01-13 RX ADMIN — DEXTROSE MONOHYDRATE 2 G: 50 INJECTION, SOLUTION INTRAVENOUS at 03:01

## 2022-01-13 RX ADMIN — BUDESONIDE 0.5 MG: 0.5 INHALANT RESPIRATORY (INHALATION) at 07:01

## 2022-01-13 RX ADMIN — ATORVASTATIN CALCIUM 40 MG: 40 TABLET, FILM COATED ORAL at 08:01

## 2022-01-13 RX ADMIN — CELECOXIB 200 MG: 100 CAPSULE ORAL at 09:01

## 2022-01-13 RX ADMIN — FAMOTIDINE 20 MG: 20 TABLET, FILM COATED ORAL at 08:01

## 2022-01-13 RX ADMIN — CELECOXIB 200 MG: 100 CAPSULE ORAL at 08:01

## 2022-01-13 RX ADMIN — FAMOTIDINE 20 MG: 20 TABLET, FILM COATED ORAL at 09:01

## 2022-01-13 RX ADMIN — DOCUSATE SODIUM 100 MG: 100 CAPSULE, LIQUID FILLED ORAL at 08:01

## 2022-01-13 RX ADMIN — MUPIROCIN 1 G: 20 OINTMENT TOPICAL at 10:01

## 2022-01-13 RX ADMIN — MORPHINE SULFATE 4 MG: 4 INJECTION INTRAVENOUS at 03:01

## 2022-01-13 NOTE — DISCHARGE INSTRUCTIONS
-Continue with current pain control regimen  -Continue with current physical therapy plan (TTWB LLE)  -Continue with DVT prophylaxis ( mg po daily)  Keep dressing dry and intact, do not remove dressing, if dressing becomes wet or bloody notify home health staff. Nurse will change your dressing on post op day #3, and then on post op day #10, give them that special dressing we sent home with you.  *Continue incentive spirometry at least every 2 hours while awake.  *Continue white stockings remove 2 times a day for 1 hour and replace.  *Keep pillows between legs for abduction  *Take laxative of choice to have a bowel movement at least by tomorrow and then every other day.  *Increase fluids by mouth.  *Staples will be removed at follow up appointment  *Notify home health staff if any concerns.  Physical Therapy Instructions:    Continue with frequent walks using Rolling walker and following Toe-touch weight-bearing: left.  Take rest breaks as required.       Ankle Pump        Bend ankles up and down, alternating feet.  Repeat 30 times. Do 2 sessions per day.       Quad Set        Slowly tighten muscles on thigh of left straight leg while counting out loud to 5 .   Repeat 30 times. Do 2 sessions per day.           Gluteal Sets        Squeeze pelvic floor and hold. Tighten bottom. Repeat 30 times. Do 2 sessions a day.           Heel Slide        Bend knee and pull left heel toward buttocks. Straighten out the leg.  Repeat 30 times.  Do 2 sessions per day.           Hip Abduction / Adduction: with Extended Knee (Supine)        Bring left leg out to side and return to midline position. Keep knee straight.  Repeat 30 times. Do 2 sessions per day.            Straight Leg Raise        Bend right leg. Raise left leg 8-12 inches with knee locked. Exhale and tighten thigh muscles while raising leg.   Repeat 30 times. Do 2 sessions per day.           KNEE: Extension, Long Arc Quads - Sitting        Raise left foot until  knee is straight. Return foot to the floor.  Repeat 30 times. Do 2 sessions per day.       Copyright © I. All rights reserved.

## 2022-01-13 NOTE — PT/OT/SLP PROGRESS
Occupational Therapy   Treatment    Name: Yany Silverio  MRN: [id number]  Admitting Diagnosis:  Closed nondisplaced subtrochanteric fracture of left femur  1 Day Post-Op    Recommendations:     Discharge Recommendations: nursing facility, skilled  Discharge Equipment Recommendations:  none  Barriers to discharge:  Decreased caregiver support (pt lives alone)    Assessment:     Yany Silverio is a 61 y.o. female with a medical diagnosis of Closed nondisplaced subtrochanteric fracture of left femur.  She presents with s/p L hip ORIF. Performance deficits affecting function are weakness,impaired endurance,impaired self care skills,impaired functional mobilty,gait instability,impaired balance,orthopedic precautions.     Rehab Prognosis:  Good; patient would benefit from acute skilled OT services to address these deficits and reach maximum level of function.       Plan:     Patient to be seen 5 x/week to address the above listed problems via self-care/home management,therapeutic activities,therapeutic exercises  · Plan of Care Expires: 02/09/22  · Plan of Care Reviewed with: patient    Subjective     Pain/Comfort:  · Pain Rating 1: 8/10  · Location - Side 1: Left  · Location 1: hip    Objective:     Communicated with: SHALINI Cassidy prior to session.  Patient found supine with oxygen,peripheral IV upon OT entry to room.    General Precautions: Standard, fall   Orthopedic Precautions:LLE toe touch weight bearing,LLE posterior precautions   Braces:    Respiratory Status: Nasal cannula, flow 3 L/min     Occupational Performance:     Bed Mobility:    · Patient completed Supine to Sit with minimum assistance     Functional Mobility/Transfers:  · Patient completed Sit <> Stand Transfer with contact guard assistance  with  rolling walker   · Patient completed Toilet Transfer Step Transfer technique with contact guard assistance with  rolling walker  · Functional Mobility: Pt performed functional mobility with RW to bathroom door  and back to chair with CGA     Activities of Daily Living:  · Lower Body Dressing: modified independence to lindsay underwear and socks with hip kit adaptive equipment  · Toileting: stand by assistance to perform all aspects of toileting at [facility] 6 Click ADL: 22    Treatment & Education:  Pt completed 2 sets x 15 reps each bicep curls 2#db, chest press 2#db, and IR/ER 2#db in order to improve BUE strength and endurance to perform ADL and ADL t/f with less assist. Pt completed ADL training with LB dressing with good adherence to hip precautions.     Patient left up in chair with all lines intact, call button in reach and PT presentEducation:      GOALS:   Multidisciplinary Problems     Occupational Therapy Goals        Problem: Occupational Therapy Goal    Goal Priority Disciplines Outcome Interventions   Occupational Therapy Goal     OT, PT/OT Ongoing, Progressing    Description: ST.Pt will perform bathing with Louie with setup at EOB  2.Pt will perform UE dressing with Louie  3.Pt will perform LE dressing with Susanna with adaptive equipment and maintaining hip precautions  4.Pt will transfer bed/chair/[facility] with CGA with RW maintaining weightbearing status  5.Pt will perform standing task x 4 min with CGA with RW  6.Tolerate 15 min of tx without fatigue.      LTG:   Restore to max I with selfcare and mobility.                       Time Tracking:     OT Date of Treatment: 22  OT Start Time: 936  OT Stop Time:   OT Total Time (min): 36 min    Billable Minutes:Self Care/Home Management 20 minutes  Therapeutic Exercise 16 minutes     OT/EDA: OT          2022

## 2022-01-13 NOTE — PROGRESS NOTES
[facility] Orthopedic  [facility] Medicine  Progress Note    Patient Name: Yany Silverio  MRN: [id number]  Patient Class: IP- Inpatient   Admission Date: 1/10/2022  Length of Stay: 3 days  Attending Physician: Elie Bhatt MD  Primary Care Provider: Arelis Escobedo NP        Subjective:     Principal Problem:Closed nondisplaced subtrochanteric fracture of left femur        HPI:  Patient is a 61-year-old female with a history of essential hypertension, hyperlipidemia, superficial gastritis, O2 dependent COPD, and status post total L hip replacement on 12/13/2021 for end-stage OA who reportedly fell 2 days ago and developed left hip pain.  Patient was subsequently seen by her PCP today and diagnosed with closed nondisplaced subtrochanteric fracture of the left femur.  Dr. Adolfo Randall was contacted and surgery is planned for Wednesday.  Patient will be admitted.      Overview/Hospital Course:        Interval History:   Patient is post op day 1 and  was seen in bed with no acute event over night. She denies fever, chills, chest pain, palpitations, shortness of breath and abdominal pain. Patient is pending transfer to [facility].      Review of Systems   Constitutional: Negative for fatigue and fever.   HENT: Negative for tinnitus and trouble swallowing.    Eyes: Negative for visual disturbance.   Respiratory: Negative for cough, chest tightness and shortness of breath.    Cardiovascular: Negative for chest pain, palpitations and leg swelling.   Gastrointestinal: Negative for abdominal pain, diarrhea, nausea and vomiting.   Genitourinary: Negative for dysuria.   Musculoskeletal: Negative for neck stiffness.   Neurological: Negative for dizziness, syncope, speech difficulty, light-headedness and headaches.   Psychiatric/Behavioral: Negative for agitation, behavioral problems and confusion.     Objective:     Vital Signs (Most Recent):  Temp: 97.8 °F (36.6 °C) (01/12/22 0400)  Pulse: 76 (01/12/22 0400)  Resp: 20  (01/12/22 0426)  BP: 105/63 (01/12/22 0400)  SpO2: 95 % (01/12/22 0400) Vital Signs (24h Range):  Temp:  [97.8 °F (36.6 °C)-100.3 °F (37.9 °C)] 97.8 °F (36.6 °C)  Pulse:  [73-90] 76  Resp:  [15-20] 20  SpO2:  [93 %-98 %] 95 %  BP: ()/(51-63) 105/63     Weight: 76.2 kg (168 lb)  Body mass index is 28.84 kg/m².    Intake/Output Summary (Last 24 hours) at 1/12/2022 0650  Last data filed at 1/12/2022 0500  Gross per 24 hour   Intake 863.75 ml   Output 1650 ml   Net -786.25 ml      Physical Exam  Constitutional:       General: She is not in acute distress.     Appearance: She is normal weight. She is not ill-appearing.   HENT:      Head: Normocephalic and atraumatic.      Right Ear: External ear normal.      Left Ear: External ear normal.      Nose: Nose normal. No congestion or rhinorrhea.      Mouth/Throat:      Mouth: Mucous membranes are moist.   Eyes:      Conjunctiva/sclera: Conjunctivae normal.   Cardiovascular:      Rate and Rhythm: Normal rate and regular rhythm.      Pulses: Normal pulses.      Heart sounds: Normal heart sounds. No murmur heard.  No friction rub.   Pulmonary:      Effort: Pulmonary effort is normal. No respiratory distress.      Breath sounds: Normal breath sounds. No wheezing, rhonchi or rales.   Abdominal:      General: Bowel sounds are normal.      Tenderness: There is no abdominal tenderness. There is no guarding or rebound.   Musculoskeletal:         General: No swelling.      Cervical back: Neck supple. No tenderness.      Right lower leg: No edema.      Left lower leg: No edema.   Lymphadenopathy:      Cervical: No cervical adenopathy.   Skin:     General: Skin is warm.      Coloration: Skin is not jaundiced.   Neurological:      General: No focal deficit present.      Mental Status: She is alert and oriented to person, place, and time. Mental status is at baseline.   Psychiatric:         Mood and Affect: Mood normal.         Behavior: Behavior normal.         Thought Content:  Thought content normal.         Significant Labs: All pertinent labs within the past 24 hours have been reviewed.    Significant Imaging: I have reviewed all pertinent imaging results/findings within the past 24 hours.     Scheduled Meds:   albuterol-ipratropium  3 mL Nebulization Q6H    aspirin  324 mg Oral BID    aspirin  81 mg Oral Daily    atorvastatin  40 mg Oral Daily    budesonide  0.5 mg Nebulization Q12H    celecoxib  200 mg Oral BID    citalopram  20 mg Oral Daily    docusate sodium  100 mg Oral Q12H    famotidine  20 mg Oral BID    loperamide  4 mg Oral Once    mupirocin  1 g Nasal BID    pantoprazole  40 mg Oral Daily    polyethylene glycol  17 g Oral Daily    pregabalin  75 mg Oral BID     Continuous Infusions:  PRN Meds:.acetaminophen, acetaminophen, albuterol-ipratropium, bisacodyL, HYDROcodone-acetaminophen, HYDROmorphone, morphine, naloxone, ondansetron, ondansetron, oxyCODONE, oxyCODONE, oxyCODONE-acetaminophen, prochlorperazine, sodium chloride 0.9%, sodium chloride 0.9%, zolpidem, zolpidem      Assessment/Plan:      * Closed nondisplaced subtrochanteric fracture of left femur  - Patient recently had total left hip replacement by Dr. Randall on 12/13/2021 for end-stage OA.  Reportedly, patient had an accidental fall 2 days ago and started to experience left hip pain.   - Hip Xray (01/10) shows acute nondisplaced fracture through the medial aspect of the sub trochanteric region of the left hip in this patient who is status post total left hip arthroplasty and no fractures  elsewhere in the bony pelvis or right hip  - Patient had open reduction internal fixation L periprosthetic proximal femur fracture on 1/13 and ortho is following  - pain medication PRN:   - PT, OT and social service on board  - Pending transfer to [facility]          Anxiety and depression  - Continue home Citalopram 20 mg daily      Hypertension  - Last blood pressure at 104/60  - Continue to hold home Lasix 20 mg  daily  - Continue to monitor      NGOZI on CPAP  - Patient uses CPAP so we will continue CPAP at night      Chronic obstructive pulmonary disease  - Hx of COPD required home oxygen NC 2L and chronic cough. Patient is at mild-to-moderate perioperative respiratory risk but is felt to be stable to proceed with surgery.  - DuoNeb q6h  - Budesonide 0.5 mg BID         Mixed hyperlipidemia  - Continue home atorvastatin 40 mg daily      VTE Risk Mitigation (From admission, onward)         Ordered     IP VTE LOW RISK PATIENT  Once         01/13/22 0819     Place sequential compression device  Until discontinued         01/13/22 0819     Place sequential compression device  Until discontinued         01/10/22 2227                Discharge Planning   PARVEZ:      Code Status: Full Code   Is the patient medically ready for discharge?:     Reason for patient still in hospital (select all that apply): Treatment  Discharge Plan A: Rehab                  Brian Foy MD  Department of Hospital Medicine   [facility] - Orthopedic

## 2022-01-13 NOTE — PT/OT/SLP PROGRESS
"Physical Therapy Treatment    Patient Name:  Yany Silverio   MRN:  [id number]    Recommendations:     Discharge Recommendations:  nursing facility, skilled,rehabilitation facility   Discharge Equipment Recommendations: none   Barriers to discharge: awaiting swing bed/rehab placement    Assessment:     Yany Silverio is a 61 y.o. female admitted with a medical diagnosis of Closed nondisplaced subtrochanteric fracture of left femur.  She presents with the following impairments/functional limitations:  weakness,impaired endurance,impaired self care skills,impaired functional mobilty,gait instability,impaired balance,decreased lower extremity function,pain,decreased ROM,orthopedic precautions .    Patient demonstrated increased ease of mobility this afternoon with decreased pain complain compared with this am. Harsh, productive sounding cough evoked after gait trial. Patient very motivated and will benefit from rehab at d/c to optimize rate of recovery.    Rehab Prognosis: Good; patient would benefit from acute skilled PT services to address these deficits and reach maximum level of function.    Recent Surgery: Procedure(s) (LRB):  ORIF, HIP (Left) 1 Day Post-Op    Plan:     During this hospitalization, patient to be seen BID (5x/week; daily 2x/week) to address the identified rehab impairments via gait training,therapeutic activities,therapeutic exercises and progress toward the following goals:    · Plan of Care Expires:  02/12/22    Subjective     Chief Complaint: L femur fracture with recent THR  Patient/Family Comments/goals: 'I am not as sore as this morning."  Pain/Comfort:  · Pain Rating 1: 5/10  · Location - Side 1: Left  · Location 1: hip  · Pain Addressed 1: Pre-medicate for activity,Reposition,Distraction,Cessation of Activity  · Pain Rating Post-Intervention 1: 5/10      Objective:     Communicated with SHALINI Morgan prior to session.  Patient found up in chair with peripheral IV, SCD,oxygen upon PT entry " to room.     General Precautions: Standard, fall   Orthopedic Precautions:LLE posterior precautions,LLE toe touch weight bearing   Braces: N/A  Respiratory Status: Nasal cannula, flow 3 L/min     Functional Mobility:  · Bed Mobility:     · Sit to Supine: minimum assistance and verbal cues for sequencing/efficiency  · Transfers:     · Sit to Stand:  contact guard assistance with rolling walker and verbal cues  · Bed to Chair: contact guard assistance with  rolling walker  using  Step Transfer and TTWB L LE  · Gait: 80' with RW, step to pattern, TTWB Left, short step lengths, slow ana paula, increased effort to advance left LE, no LOB      AM-PAC 6 CLICK MOBILITY  Turning over in bed (including adjusting bedclothes, sheets and blankets)?: 3  Sitting down on and standing up from a chair with arms (e.g., wheelchair, bedside commode, etc.): 3  Moving from lying on back to sitting on the side of the bed?: 3  Moving to and from a bed to a chair (including a wheelchair)?: 3  Need to walk in hospital room?: 3  Climbing 3-5 steps with a railing?: 1  Basic Mobility Total Score: 16       Therapeutic Activities and Exercises:   Bilateral LE exercises x 30 reps: Ankle pumps, quad sets, long arc quad, heel slides, hip abduction/adduction, straight leg raise, bridging    Gait per above    Pt able to verbalize 3/3 hip precautions    Patient left supine with all lines intact, call button in reach and RN Khanh Morgan notified..    GOALS:   Multidisciplinary Problems     Physical Therapy Goals        Problem: Physical Therapy Goal    Goal Priority Disciplines Outcome Goal Variances Interventions   Physical Therapy Goal     PT, PT/OT Ongoing, Progressing     Description: Short Term Goals to be met by: 1/26/2022    Patient will increase functional independence and safety with mobility by performing    1. Supine to sit modified independent  2. Sit to stand modified independent with Rolling walker; Toe-touch weight-bearing: left LE  3. Gait x  100' modified independent with Rolling walker; Toe-touch weight-bearing: left LE  4. Patient with complete illustrated home exercise program x 30 reps with assist as needed.  5. Patient will verbalize 3/3 hip precautions      Long Term Goal to be met by 3/12/2022    1. Patient will regain independent functional mobility with lowest level of assistive device to return to home situation and prior ADLs.                    Time Tracking:     PT Received On: 01/13/22  PT Start Time: 1329     PT Stop Time: 1358  PT Total Time (min): 29 min     Billable Minutes: Gait Training 10 minutes and Therapeutic Exercise 15 minutes    Treatment Type: Treatment  PT/PTA: PT     PTA Visit Number: 0     01/13/2022

## 2022-01-13 NOTE — PT/OT/SLP PROGRESS
"Physical Therapy Treatment    Patient Name:  Yany Silverio   MRN:  [id number]    Recommendations:     Discharge Recommendations:  nursing facility, skilled,rehabilitation facility   Discharge Equipment Recommendations: none   Barriers to discharge: awaiting swing bed placement    Assessment:     Yany Silverio is a 61 y.o. female admitted with a medical diagnosis of Closed nondisplaced subtrochanteric fracture of left femur.  She presents with the following impairments/functional limitations:  weakness,impaired endurance,impaired self care skills,impaired functional mobilty,gait instability,impaired balance,decreased lower extremity function,pain,decreased ROM,orthopedic precautions .    Patient slowly improving with functional mobility despite continued complaints of significant pain during movement. Patient lives alone and will benefit from swing bed/rehab at discharge to optimize safe, fall-free mobility and recovery.    Rehab Prognosis: Good; patient would benefit from acute skilled PT services to address these deficits and reach maximum level of function.    Recent Surgery: Procedure(s) (LRB):  ORIF, HIP (Left) 1 Day Post-Op    Plan:     During this hospitalization, patient to be seen BID (5x/week; daily 2x/week) to address the identified rehab impairments via gait training,therapeutic activities,therapeutic exercises and progress toward the following goals:    · Plan of Care Expires:  02/12/22    Subjective     Chief Complaint: Left hip fracture with recent THR  Patient/Family Comments/goals: "I don't like to be such a cry-baby" patient reassured and more cheerful by the end of the session.  Pain/Comfort:  · Pain Rating 1: 8/10  · Location - Side 1: Left  · Location 1: hip  · Pain Addressed 1: Pre-medicate for activity,Reposition,Distraction,Cessation of Activity  · Pain Rating Post-Intervention 1: 6/10      Objective:     Communicated with SHALINI Morgan prior to session.  Patient found transfering to the " EOB with OT assist. Significant c/o pain with peripheral IV,hip abduction pillow,SCD,oxygen upon PT entry to room.     General Precautions: Standard, fall   Orthopedic Precautions:LLE posterior precautions,LLE toe touch weight bearing   Braces: N/A  Respiratory Status: Nasal cannula, flow 3 L/min     Functional Mobility:  · Bed Mobility:     · Supine to Sit: minimum assistance  · Transfers:     · Sit to Stand:  minimum assistance with rolling walker  · Bed to Chair: minimum assistance with  rolling walker  using  Step Transfer  · Gait: 40' with RW, CGA and verbal cues for sequencing; step to pattern, slow ana paula, short step lengths, good adherence to left TTWB restriction      AM-PAC 6 CLICK MOBILITY  Turning over in bed (including adjusting bedclothes, sheets and blankets)?: 3  Sitting down on and standing up from a chair with arms (e.g., wheelchair, bedside commode, etc.): 3  Moving from lying on back to sitting on the side of the bed?: 3  Moving to and from a bed to a chair (including a wheelchair)?: 3  Need to walk in hospital room?: 3  Climbing 3-5 steps with a railing?: 1  Basic Mobility Total Score: 16       Therapeutic Activities and Exercises:   Bilateral LE exercises x 30 reps: Ankle pumps, quad sets, long arc quad, heel slides, hip abduction/adduction, straight leg raise    Gait per above    Pt able to verbalize 3/3 hip precautions    Patient left up in chair with all lines intact, call button in reach and RN Khanh Morgan notified..    GOALS:   Multidisciplinary Problems     Physical Therapy Goals        Problem: Physical Therapy Goal    Goal Priority Disciplines Outcome Goal Variances Interventions   Physical Therapy Goal     PT, PT/OT Ongoing, Progressing     Description: Short Term Goals to be met by: 1/26/2022    Patient will increase functional independence and safety with mobility by performing    1. Supine to sit modified independent  2. Sit to stand modified independent with Rolling walker;  Toe-touch weight-bearing: left LE  3. Gait x 100' modified independent with Rolling walker; Toe-touch weight-bearing: left LE  4. Patient with complete illustrated home exercise program x 30 reps with assist as needed.  5. Patient will verbalize 3/3 hip precautions      Long Term Goal to be met by 3/12/2022    1. Patient will regain independent functional mobility with lowest level of assistive device to return to home situation and prior ADLs.                    Time Tracking:     PT Received On: 01/13/22  PT Start Time: 0941     PT Stop Time: 1022  PT Total Time (min): 41 min     Billable Minutes: Gait Training 15 minutes and Therapeutic Exercise 10 minutes    Treatment Type: Treatment  PT/PTA: PT     PTA Visit Number: 0     01/13/2022

## 2022-01-13 NOTE — PROGRESS NOTES
Daily Orthopaedic Progress Note    Yany Silverio is a 61 y.o. female admitted on 1/10/2022  Hospital Day: 3  Post Op Day: 1 Day Post-Op    Antibiotics (From admission, onward)            Start     Stop Route Frequency Ordered    01/12/22 2100  mupirocin 2 % ointment 1 g         01/17 2059 Nasl 2 times daily 01/12/22 1511           The patient was seen and examined this morning at the bedside. Patient reports no acute issues overnight and adequate control of pain on current regimen.  Patient worked with physical therapy over the last 24 hours.      PHYSICAL EXAM:  Awake/alert/oriented x3, No acute distress, Afebrile, Vital signs stable  Normocephalic, Atraumatic  Good inspiratory effort with unlaboured breathing  Dressings clean/dry/intact, Operative limb neurovascularly intact with 5/5 strength distally and sensation intact to light touch distally; some decreased sensation over the area of the regional block and palpable pulses  Vitals:    01/13/22 0756 01/13/22 0824 01/13/22 1100 01/13/22 1207   BP:   104/60    BP Location:       Patient Position:       Pulse: 73  105 65   Resp: 20 18 20 20   Temp:   (!) 100.5 °F (38.1 °C)    TempSrc:       SpO2: 98%  97% 98%   Weight:       Height:         I/O last 3 completed shifts:  In: 370 [P.O.:320; I.V.:50]  Out: 2400 [Urine:2400]    Significant Labs:  Recent Lab Results       01/13/22  0902        Albumin 2.7       Anion Gap 12       Baso # 0.04       Basophil % 0.3       BUN 6       BUN/CREAT RATIO 11       Calcium 7.7       Chloride 102       CO2 25       Creatinine 0.54       Differential Type Auto       eGFR if non  122       Eos # 0.18       Eosinophil % 1.5       Glucose 153       Hematocrit 28.4       Hemoglobin 9.2       Immature Grans (Abs) 0.05       Immature Granulocytes 0.4       Lymph # 2.15       Lymph % 17.6       MCH 30.7       MCHC 32.4       MCV 94.7       Mono # 0.76       Mono % 6.2       MPV 9.7       Neutrophils, Abs 9.02        Neutrophils Relative 74.0       nRBC 0.0       NUCLEATED RBC ABSOLUTE 0.00       Platelets 229       Potassium 4.4       RBC 3.00       RDW 15.2       Sodium 135       WBC 12.20             Significant Diagnostics:  X-Ray Chest AP Portable  Narrative: EXAMINATION:  XR CHEST AP PORTABLE    CLINICAL HISTORY:  pneumonia;    TECHNIQUE:  Single frontal view of the chest was performed.    COMPARISON:  01/10/2022    FINDINGS:  There is some atelectasis suggested in the medial right middle lobe.  Remainder of the lungs are clear.  Impression: Some atelectasis suggested in the right middle lobe could represent infectious component as well.    Electronically signed by: Jeet Hermosillo  Date:    01/13/2022  Time:    08:17       A/P: 61 y.o. female 1 Day Post-Op s/p left hip ORIF  -Continue with current pain control regimen  -Continue with current physical therapy plan (TTWB LLE)  -Continue with DVT prophylaxis ( mg po daily)  -Anticipate discharge to swing bed tomorrow    Adolfo Randall MD  Orthopaedic Staff Surgeon

## 2022-01-14 PROBLEM — J18.9 PNEUMONIA: Status: ACTIVE | Noted: 2022-01-14

## 2022-01-14 LAB
ANION GAP SERPL CALCULATED.3IONS-SCNC: 11 MMOL/L (ref 7–16)
BASOPHILS # BLD AUTO: 0.03 K/UL (ref 0–0.2)
BASOPHILS NFR BLD AUTO: 0.3 % (ref 0–1)
BUN SERPL-MCNC: 5 MG/DL (ref 7–18)
BUN/CREAT SERPL: 12 (ref 6–20)
CALCIUM SERPL-MCNC: 7.5 MG/DL (ref 8.5–10.1)
CHLORIDE SERPL-SCNC: 105 MMOL/L (ref 98–107)
CO2 SERPL-SCNC: 25 MMOL/L (ref 21–32)
CREAT SERPL-MCNC: 0.41 MG/DL (ref 0.55–1.02)
DIFFERENTIAL METHOD BLD: ABNORMAL
EOSINOPHIL # BLD AUTO: 0.3 K/UL (ref 0–0.5)
EOSINOPHIL NFR BLD AUTO: 3 % (ref 1–4)
ERYTHROCYTE [DISTWIDTH] IN BLOOD BY AUTOMATED COUNT: 15.2 % (ref 11.5–14.5)
GLUCOSE SERPL-MCNC: 88 MG/DL (ref 74–106)
HCT VFR BLD AUTO: 24 % (ref 38–47)
HCT VFR BLD AUTO: 24.5 % (ref 38–47)
HGB BLD-MCNC: 7.7 G/DL (ref 12–16)
HGB BLD-MCNC: 8 G/DL (ref 12–16)
IMM GRANULOCYTES # BLD AUTO: 0.07 K/UL (ref 0–0.04)
IMM GRANULOCYTES NFR BLD: 0.7 % (ref 0–0.4)
LYMPHOCYTES # BLD AUTO: 1.49 K/UL (ref 1–4.8)
LYMPHOCYTES NFR BLD AUTO: 14.8 % (ref 27–41)
MCH RBC QN AUTO: 30.2 PG (ref 27–31)
MCHC RBC AUTO-ENTMCNC: 32.7 G/DL (ref 32–36)
MCV RBC AUTO: 92.5 FL (ref 80–96)
MONOCYTES # BLD AUTO: 0.81 K/UL (ref 0–0.8)
MONOCYTES NFR BLD AUTO: 8.1 % (ref 2–6)
MPC BLD CALC-MCNC: 10.6 FL (ref 9.4–12.4)
NEUTROPHILS # BLD AUTO: 7.34 K/UL (ref 1.8–7.7)
NEUTROPHILS NFR BLD AUTO: 73.1 % (ref 53–65)
NRBC # BLD AUTO: 0 X10E3/UL
NRBC, AUTO (.00): 0 %
PLATELET # BLD AUTO: 207 K/UL (ref 150–400)
POTASSIUM SERPL-SCNC: 3.7 MMOL/L (ref 3.5–5.1)
RBC # BLD AUTO: 2.65 M/UL (ref 4.2–5.4)
SODIUM SERPL-SCNC: 137 MMOL/L (ref 136–145)
WBC # BLD AUTO: 10.04 K/UL (ref 4.5–11)

## 2022-01-14 PROCEDURE — 94640 AIRWAY INHALATION TREATMENT: CPT

## 2022-01-14 PROCEDURE — 25000003 PHARM REV CODE 250: Performed by: ORTHOPAEDIC SURGERY

## 2022-01-14 PROCEDURE — 97535 SELF CARE MNGMENT TRAINING: CPT

## 2022-01-14 PROCEDURE — 94660 CPAP INITIATION&MGMT: CPT | Mod: 59

## 2022-01-14 PROCEDURE — 99232 SBSQ HOSP IP/OBS MODERATE 35: CPT | Mod: GC,,, | Performed by: FAMILY MEDICINE

## 2022-01-14 PROCEDURE — 25000003 PHARM REV CODE 250: Mod: GY | Performed by: ORTHOPAEDIC SURGERY

## 2022-01-14 PROCEDURE — 99900035 HC TECH TIME PER 15 MIN (STAT)

## 2022-01-14 PROCEDURE — 25000003 PHARM REV CODE 250: Mod: GY

## 2022-01-14 PROCEDURE — 25000003 PHARM REV CODE 250: Performed by: FAMILY MEDICINE

## 2022-01-14 PROCEDURE — 97116 GAIT TRAINING THERAPY: CPT

## 2022-01-14 PROCEDURE — 85014 HEMATOCRIT: CPT

## 2022-01-14 PROCEDURE — 27000221 HC OXYGEN, UP TO 24 HOURS

## 2022-01-14 PROCEDURE — 80048 BASIC METABOLIC PNL TOTAL CA: CPT

## 2022-01-14 PROCEDURE — 25000242 PHARM REV CODE 250 ALT 637 W/ HCPCS: Performed by: ORTHOPAEDIC SURGERY

## 2022-01-14 PROCEDURE — 97110 THERAPEUTIC EXERCISES: CPT

## 2022-01-14 PROCEDURE — 11000001 HC ACUTE MED/SURG PRIVATE ROOM

## 2022-01-14 PROCEDURE — 99232 PR SUBSEQUENT HOSPITAL CARE,LEVL II: ICD-10-PCS | Mod: GC,,, | Performed by: FAMILY MEDICINE

## 2022-01-14 PROCEDURE — 63600175 PHARM REV CODE 636 W HCPCS: Performed by: FAMILY MEDICINE

## 2022-01-14 PROCEDURE — 36415 COLL VENOUS BLD VENIPUNCTURE: CPT

## 2022-01-14 PROCEDURE — 63700000 PHARM REV CODE 250 ALT 637 W/O HCPCS: Mod: GY | Performed by: FAMILY MEDICINE

## 2022-01-14 PROCEDURE — 85025 COMPLETE CBC W/AUTO DIFF WBC: CPT

## 2022-01-14 PROCEDURE — 94761 N-INVAS EAR/PLS OXIMETRY MLT: CPT

## 2022-01-14 RX ORDER — NAPROXEN SODIUM 220 MG/1
325 TABLET, FILM COATED ORAL DAILY
Status: DISCONTINUED | OUTPATIENT
Start: 2022-01-14 | End: 2022-01-18 | Stop reason: HOSPADM

## 2022-01-14 RX ADMIN — IPRATROPIUM BROMIDE AND ALBUTEROL SULFATE 3 ML: .5; 3 SOLUTION RESPIRATORY (INHALATION) at 08:01

## 2022-01-14 RX ADMIN — CITALOPRAM HYDROBROMIDE 20 MG: 20 TABLET ORAL at 09:01

## 2022-01-14 RX ADMIN — AZITHROMYCIN MONOHYDRATE 250 MG: 250 TABLET ORAL at 09:01

## 2022-01-14 RX ADMIN — HYDROCODONE BITARTRATE AND ACETAMINOPHEN 1 TABLET: 5; 325 TABLET ORAL at 09:01

## 2022-01-14 RX ADMIN — HYDROCODONE BITARTRATE AND ACETAMINOPHEN 1 TABLET: 5; 325 TABLET ORAL at 04:01

## 2022-01-14 RX ADMIN — MUPIROCIN 1 G: 20 OINTMENT TOPICAL at 09:01

## 2022-01-14 RX ADMIN — FAMOTIDINE 20 MG: 20 TABLET, FILM COATED ORAL at 09:01

## 2022-01-14 RX ADMIN — BUDESONIDE 0.5 MG: 0.5 INHALANT RESPIRATORY (INHALATION) at 08:01

## 2022-01-14 RX ADMIN — PREGABALIN 75 MG: 75 CAPSULE ORAL at 09:01

## 2022-01-14 RX ADMIN — DOCUSATE SODIUM 100 MG: 100 CAPSULE, LIQUID FILLED ORAL at 09:01

## 2022-01-14 RX ADMIN — IPRATROPIUM BROMIDE AND ALBUTEROL SULFATE 3 ML: .5; 3 SOLUTION RESPIRATORY (INHALATION) at 12:01

## 2022-01-14 RX ADMIN — OXYCODONE HYDROCHLORIDE 10 MG: 5 TABLET ORAL at 11:01

## 2022-01-14 RX ADMIN — POLYETHYLENE GLYCOL 3350 17 G: 17 POWDER, FOR SOLUTION ORAL at 09:01

## 2022-01-14 RX ADMIN — PANTOPRAZOLE SODIUM 40 MG: 40 TABLET, DELAYED RELEASE ORAL at 09:01

## 2022-01-14 RX ADMIN — CEFTRIAXONE SODIUM 1 G: 1 INJECTION, POWDER, FOR SOLUTION INTRAMUSCULAR; INTRAVENOUS at 01:01

## 2022-01-14 RX ADMIN — BUDESONIDE 0.5 MG: 0.5 INHALANT RESPIRATORY (INHALATION) at 07:01

## 2022-01-14 RX ADMIN — ATORVASTATIN CALCIUM 40 MG: 40 TABLET, FILM COATED ORAL at 09:01

## 2022-01-14 RX ADMIN — CELECOXIB 200 MG: 100 CAPSULE ORAL at 09:01

## 2022-01-14 RX ADMIN — ASPIRIN 81 MG CHEWABLE TABLET 324 MG: 81 TABLET CHEWABLE at 09:01

## 2022-01-14 RX ADMIN — IPRATROPIUM BROMIDE AND ALBUTEROL SULFATE 3 ML: .5; 3 SOLUTION RESPIRATORY (INHALATION) at 01:01

## 2022-01-14 RX ADMIN — IPRATROPIUM BROMIDE AND ALBUTEROL SULFATE 3 ML: .5; 3 SOLUTION RESPIRATORY (INHALATION) at 07:01

## 2022-01-14 NOTE — PT/OT/SLP PROGRESS
"  Physical Therapy Treatment    Patient Name:  Yany Silverio   MRN:  [id number]    Recommendations:     Discharge Recommendations:  nursing facility, skilled,rehabilitation facility   Discharge Equipment Recommendations: none   Barriers to discharge: awaiting swing bed placement    Assessment:     Yany Silverio is a 61 y.o. female admitted with a medical diagnosis of Closed nondisplaced subtrochanteric fracture of left femur.  She presents with the following impairments/functional limitations:  weakness,impaired endurance,impaired self care skills,impaired functional mobilty,gait instability,impaired balance,decreased lower extremity function,pain,decreased ROM,orthopedic precautions .    Patient improving daily with ease of mobility and adheres well to left TTWB restriction. Patient will benefit from swing bed to maximize recovery as pt lives alone    Rehab Prognosis: Good; patient would benefit from acute skilled PT services to address these deficits and reach maximum level of function.    Recent Surgery: Procedure(s) (LRB):  ORIF, HIP (Left) 2 Days Post-Op    Plan:     During this hospitalization, patient to be seen BID (5x/week; daily 2x/week) to address the identified rehab impairments via gait training,therapeutic activities,therapeutic exercises and progress toward the following goals:    · Plan of Care Expires:  02/12/22    Subjective     Chief Complaint: left hip fracture with recent THR  Patient/Family Comments/goals: "I got myself back into the bed with the nurse beside me."  Pain/Comfort:  Pain Rating 1: 3/10  Location - Side 1: Left  Location 1: hip  Pain Addressed 1: Pre-medicate for activity,Reposition,Distraction,Cessation of Activity  Pain Rating Post-Intervention 1: 3/10      Objective:     Communicated with SHALINI Mims prior to session.  Patient found supine with peripheral IV,oxygen upon PT entry to room.     General Precautions: Standard, fall   Orthopedic Precautions:LLE posterior " precautions,LLE toe touch weight bearing   Braces:    Respiratory Status: Nasal cannula, flow 3 L/min     Functional Mobility:  · Transfers:   · Supine to sit: SBA with increased time and effort  · Sit to supine: SBA with increased time and effort as well as compensatory strategies    · Sit to Stand:  contact guard assistance and verbal cues for sequencing/safety to reduce reisk of exceeding WB precaution with rolling walker  · Gait: 80' with RW, SBA, verbal cues for sequencing, mild SOB/DC, slow ana paula, improved ease of advancing left LE      AM-PAC 6 CLICK MOBILITY  Turning over in bed (including adjusting bedclothes, sheets and blankets)?: 3  Sitting down on and standing up from a chair with arms (e.g., wheelchair, bedside commode, etc.): 3  Moving from lying on back to sitting on the side of the bed?: 4  Moving to and from a bed to a chair (including a wheelchair)?: 4  Need to walk in hospital room?: 3  Climbing 3-5 steps with a railing?: 1  Basic Mobility Total Score: 18       Therapeutic Activities and Exercises:   Bilateral LE exercises x 30 reps: Ankle pumps, quad sets, long arc quad, heel slides, hip abduction/adduction, straight leg raise, bridging    Gait per above    Pt able to verbalize 3/3 hip precautions    Patient left up in chair with all lines intact, call button in reach and RN Andreina Mims notified..    GOALS:   Multidisciplinary Problems     Physical Therapy Goals        Problem: Physical Therapy Goal    Goal Priority Disciplines Outcome Goal Variances Interventions   Physical Therapy Goal     PT, PT/OT Ongoing, Progressing     Description: Short Term Goals to be met by: 1/26/2022    Patient will increase functional independence and safety with mobility by performing    1. Supine to sit modified independent  2. Sit to stand modified independent with Rolling walker; Toe-touch weight-bearing: left LE  3. Gait x 100' modified independent with Rolling walker; Toe-touch weight-bearing: left  LE  4. Patient with complete illustrated home exercise program x 30 reps with assist as needed.  5. Patient will verbalize 3/3 hip precautions      Long Term Goal to be met by 3/12/2022    1. Patient will regain independent functional mobility with lowest level of assistive device to return to home situation and prior ADLs.                    Time Tracking:     PT Received On: 01/14/22  PT Start Time: 1400     PT Stop Time: 1431  PT Total Time (min): 31 min     Billable Minutes: Gait Training 10 minutes and Therapeutic Exercise 15 minutes    Treatment Type: Treatment  PT/PTA: PT     PTA Visit Number: 0     01/14/2022

## 2022-01-14 NOTE — PLAN OF CARE
LEN faxed updates to [facility] and spoke with Claribel. Pt approved for admission on 1/16. SW informed Dr. Samuel that pt will need a covid test completed on Saturday. Pkt made and left in [facility]. Pt informed of acceptance and insurance approval. SW following.

## 2022-01-14 NOTE — ASSESSMENT & PLAN NOTE
- CXR on 01/14 showed some atelectasis vs PNA in the right middle lobe.  - Continue Rocephin 1 mg daily for 5 days until 01/18 and azithromycin for 5 days until 01/18

## 2022-01-14 NOTE — SUBJECTIVE & OBJECTIVE
Interval History:   Patient is post op day 4 and was seen sitting by her bed with no acute event over night. She denies fever, chills, chest pain, palpitations, and shortness of breath. WBC is trending down and cough has significantly improved.    Review of Systems   Constitutional: Negative for fatigue and fever.   HENT: Negative for tinnitus and trouble swallowing.    Eyes: Negative for visual disturbance.   Respiratory: Negative for cough, chest tightness and shortness of breath.    Cardiovascular: Negative for chest pain, palpitations and leg swelling.   Gastrointestinal: Negative for abdominal pain, diarrhea, nausea and vomiting.   Genitourinary: Negative for dysuria.   Musculoskeletal: Negative for neck stiffness.   Neurological: Negative for dizziness, syncope, speech difficulty, light-headedness and headaches.   Psychiatric/Behavioral: Negative for agitation, behavioral problems and confusion.     Objective:     Vital Signs (Most Recent):  Temp: 98 °F (36.7 °C) (01/14/22 0400)  Pulse: 82 (01/14/22 0738)  Resp: 20 (01/14/22 0738)  BP: 101/64 (01/14/22 0400)  SpO2: (!) 92 % (01/14/22 0738) Vital Signs (24h Range):  Temp:  [97.6 °F (36.4 °C)-100.5 °F (38.1 °C)] 98 °F (36.7 °C)  Pulse:  [] 82  Resp:  [17-20] 20  SpO2:  [92 %-98 %] 92 %  BP: (101-108)/(56-64) 101/64     Weight: 76.2 kg (168 lb)  Body mass index is 28.84 kg/m².  No intake or output data in the 24 hours ending 01/14/22 0744   Physical Exam  Constitutional:       General: She is not in acute distress.     Appearance: She is normal weight. She is not ill-appearing.   HENT:      Head: Normocephalic and atraumatic.      Right Ear: External ear normal.      Left Ear: External ear normal.      Nose: Nose normal. No congestion or rhinorrhea.      Mouth/Throat:      Mouth: Mucous membranes are moist.   Eyes:      Conjunctiva/sclera: Conjunctivae normal.   Cardiovascular:      Rate and Rhythm: Normal rate and regular rhythm.      Pulses: Normal  pulses.      Heart sounds: Normal heart sounds. No murmur heard.  No friction rub.   Pulmonary:      Effort: Pulmonary effort is normal. No respiratory distress.      Breath sounds: Normal breath sounds. No wheezing, rhonchi or rales.   Abdominal:      General: Bowel sounds are normal.      Tenderness: There is no abdominal tenderness. There is no guarding or rebound.   Musculoskeletal:         General: No swelling.      Cervical back: Neck supple. No tenderness.      Right lower leg: No edema.      Left lower leg: No edema.   Lymphadenopathy:      Cervical: No cervical adenopathy.   Skin:     General: Skin is warm.      Coloration: Skin is not jaundiced.   Neurological:      General: No focal deficit present.      Mental Status: She is alert and oriented to person, place, and time.   Psychiatric:         Mood and Affect: Mood normal.         Behavior: Behavior normal.         Significant Labs: All pertinent labs within the past 24 hours have been reviewed.    Significant Imaging: I have reviewed all pertinent imaging results/findings within the past 24 hours.     Scheduled Meds:   albuterol-ipratropium  3 mL Nebulization Q6H    aspirin  324 mg Oral Daily    atorvastatin  40 mg Oral Daily    azithromycin  250 mg Oral Daily    budesonide  0.5 mg Nebulization Q12H    cefTRIAXone (ROCEPHIN) IVPB  1 g Intravenous Q24H    celecoxib  200 mg Oral BID    citalopram  20 mg Oral Daily    docusate sodium  100 mg Oral Q12H    famotidine  20 mg Oral BID    loperamide  4 mg Oral Once    mupirocin  1 g Nasal BID    pantoprazole  40 mg Oral Daily    polyethylene glycol  17 g Oral Daily    pregabalin  75 mg Oral BID     Continuous Infusions:  PRN Meds:.acetaminophen, acetaminophen, albuterol-ipratropium, bisacodyL, HYDROcodone-acetaminophen, HYDROmorphone, morphine, naloxone, ondansetron, ondansetron, oxyCODONE, oxyCODONE, oxyCODONE-acetaminophen, prochlorperazine, sodium chloride 0.9%, sodium chloride 0.9%, zolpidem,  zolpidem

## 2022-01-14 NOTE — PT/OT/SLP PROGRESS
Occupational Therapy   Treatment    Name: Yany Silverio  MRN: [id number]  Admitting Diagnosis:  Closed nondisplaced subtrochanteric fracture of left femur  2 Days Post-Op    Recommendations:     Discharge Recommendations: nursing facility, skilled  Discharge Equipment Recommendations:  none  Barriers to discharge:  Decreased caregiver support (pt lives alone)    Assessment:     Yany Silverio is a 61 y.o. female with a medical diagnosis of Closed nondisplaced subtrochanteric fracture of left femur.  She presents with s/p L hip ORIF. Performance deficits affecting function are impaired self care skills,impaired functional mobilty,weakness,impaired balance,gait instability,orthopedic precautions.     Rehab Prognosis:  Good; patient would benefit from acute skilled OT services to address these deficits and reach maximum level of function.       Plan:     Patient to be seen 5 x/week to address the above listed problems via self-care/home management,therapeutic activities,therapeutic exercises  · Plan of Care Expires: 02/09/22  · Plan of Care Reviewed with: patient    Subjective     Pain/Comfort:  · Pain Rating 1: 6/10  · Location - Side 1: Left  · Location 1: hip    Objective:     Communicated with: SHALINI Caldwell prior to session.  Patient found up in chair with peripheral IV,oxygen upon OT entry to room.    General Precautions: Standard, fall   Orthopedic Precautions:LLE toe touch weight bearing,LLE posterior precautions   Braces:    Respiratory Status: Nasal cannula, flow 2 L/min     Occupational Performance:     Bed Mobility:    · Not performed    Functional Mobility/Transfers:  · Patient completed Sit <> Stand Transfer with contact guard assistance  with  rolling walker   · Functional Mobility: not performed    Activities of Daily Living:  · Bathing: modified independence to perform UB bathing and bathe perineal area with setup at EOB  · Upper Body Dressing: modified independence to lindsay shirt  · Lower Body  Dressing: minimum assistance to lindsay pants and underwear      [facility] 6 Click ADL:      Treatment & Education:  Pt completed ADL training with bathing at chair with setup and total body dressing. Pt with good adherence to hip precautions with dressing and weight bearing status with sit to stand t/f.     Patient left up in chair with all lines intact, call button in reach and SHALINI Caldwell notifiedEducation:      GOALS:   Multidisciplinary Problems     Occupational Therapy Goals        Problem: Occupational Therapy Goal    Goal Priority Disciplines Outcome Interventions   Occupational Therapy Goal     OT, PT/OT Ongoing, Progressing    Description: ST.Pt will perform bathing with Louie with setup at EOB  2.Pt will perform UE dressing with Louie  3.Pt will perform LE dressing with Susanna with adaptive equipment and maintaining hip precautions  4.Pt will transfer bed/chair/bsc with CGA with RW maintaining weightbearing status  5.Pt will perform standing task x 4 min with CGA with RW  6.Tolerate 15 min of tx without fatigue.      LTG:   Restore to max I with selfcare and mobility.                       Time Tracking:     OT Date of Treatment: 22  OT Start Time: 858  OT Stop Time: 921  OT Total Time (min): 23 min    Billable Minutes:Self Care/Home Management 23 minutes    OT/EDA: OT          2022

## 2022-01-14 NOTE — PROGRESS NOTES
[facility]  [facility]  Progress Note    Patient Name: Yany Silverio  MRN: [id number]  Patient Class: IP- Inpatient   Admission Date: 1/10/2022  Length of Stay: 4 days  Attending Physician: Elie Bhatt MD  Primary Care Provider: Arelis Escobedo NP        Subjective:     Principal Problem:Closed nondisplaced subtrochanteric fracture of left femur        HPI:  Patient is a 61-year-old female with a history of essential hypertension, hyperlipidemia, superficial gastritis, O2 dependent COPD, and status post total L hip replacement on 12/13/2021 for end-stage OA who reportedly fell 2 days ago and developed left hip pain.  Patient was subsequently seen by her PCP today and diagnosed with closed nondisplaced subtrochanteric fracture of the left femur.  Dr. Adolfo Randall was contacted and surgery is planned for Wednesday.  Patient will be admitted.      Overview/Hospital Course:  01/13: CXR shows possible atelectasis vs pneumonia in the right middle lobe. Patient was started on rocephin and Azithromycin.    01/14: WBC 10.4 down from 12.2 yesterday and patient is afebrile with a Tmax of 100.5 in the last 24h. H&H dropped to 8.0/24.5 from 9.2/28.4 yesterday. Continue  mg PO daily for prophylaxis. Pending H&H check in PM.      Interval History:   Patient is post op day 4 and was seen sitting by her bed with no acute event over night. She denies fever, chills, chest pain, palpitations, and shortness of breath. WBC is trending down and cough has significantly improved.    Review of Systems   Constitutional: Negative for fatigue and fever.   HENT: Negative for tinnitus and trouble swallowing.    Eyes: Negative for visual disturbance.   Respiratory: Negative for cough, chest tightness and shortness of breath.    Cardiovascular: Negative for chest pain, palpitations and leg swelling.   Gastrointestinal: Negative for abdominal pain, diarrhea, nausea and vomiting.   Genitourinary: Negative for  dysuria.   Musculoskeletal: Negative for neck stiffness.   Neurological: Negative for dizziness, syncope, speech difficulty, light-headedness and headaches.   Psychiatric/Behavioral: Negative for agitation, behavioral problems and confusion.     Objective:     Vital Signs (Most Recent):  Temp: 98 °F (36.7 °C) (01/14/22 0400)  Pulse: 82 (01/14/22 0738)  Resp: 20 (01/14/22 0738)  BP: 101/64 (01/14/22 0400)  SpO2: (!) 92 % (01/14/22 0738) Vital Signs (24h Range):  Temp:  [97.6 °F (36.4 °C)-100.5 °F (38.1 °C)] 98 °F (36.7 °C)  Pulse:  [] 82  Resp:  [17-20] 20  SpO2:  [92 %-98 %] 92 %  BP: (101-108)/(56-64) 101/64     Weight: 76.2 kg (168 lb)  Body mass index is 28.84 kg/m².  No intake or output data in the 24 hours ending 01/14/22 0744   Physical Exam  Constitutional:       General: She is not in acute distress.     Appearance: She is normal weight. She is not ill-appearing.   HENT:      Head: Normocephalic and atraumatic.      Right Ear: External ear normal.      Left Ear: External ear normal.      Nose: Nose normal. No congestion or rhinorrhea.      Mouth/Throat:      Mouth: Mucous membranes are moist.   Eyes:      Conjunctiva/sclera: Conjunctivae normal.   Cardiovascular:      Rate and Rhythm: Normal rate and regular rhythm.      Pulses: Normal pulses.      Heart sounds: Normal heart sounds. No murmur heard.  No friction rub.   Pulmonary:      Effort: Pulmonary effort is normal. No respiratory distress.      Breath sounds: Normal breath sounds. No wheezing, rhonchi or rales.   Abdominal:      General: Bowel sounds are normal.      Tenderness: There is no abdominal tenderness. There is no guarding or rebound.   Musculoskeletal:         General: No swelling.      Cervical back: Neck supple. No tenderness.      Right lower leg: No edema.      Left lower leg: No edema.   Lymphadenopathy:      Cervical: No cervical adenopathy.   Skin:     General: Skin is warm.      Coloration: Skin is not jaundiced.   Neurological:       General: No focal deficit present.      Mental Status: She is alert and oriented to person, place, and time.   Psychiatric:         Mood and Affect: Mood normal.         Behavior: Behavior normal.         Significant Labs: All pertinent labs within the past 24 hours have been reviewed.    Significant Imaging: I have reviewed all pertinent imaging results/findings within the past 24 hours.     Scheduled Meds:   albuterol-ipratropium  3 mL Nebulization Q6H    aspirin  324 mg Oral Daily    atorvastatin  40 mg Oral Daily    azithromycin  250 mg Oral Daily    budesonide  0.5 mg Nebulization Q12H    cefTRIAXone (ROCEPHIN) IVPB  1 g Intravenous Q24H    celecoxib  200 mg Oral BID    citalopram  20 mg Oral Daily    docusate sodium  100 mg Oral Q12H    famotidine  20 mg Oral BID    loperamide  4 mg Oral Once    mupirocin  1 g Nasal BID    pantoprazole  40 mg Oral Daily    polyethylene glycol  17 g Oral Daily    pregabalin  75 mg Oral BID     Continuous Infusions:  PRN Meds:.acetaminophen, acetaminophen, albuterol-ipratropium, bisacodyL, HYDROcodone-acetaminophen, HYDROmorphone, morphine, naloxone, ondansetron, ondansetron, oxyCODONE, oxyCODONE, oxyCODONE-acetaminophen, prochlorperazine, sodium chloride 0.9%, sodium chloride 0.9%, zolpidem, zolpidem      Assessment/Plan:      * Closed nondisplaced subtrochanteric fracture of left femur  *Post op day 2 of open reduction internal fixation with Dr. Adolfo Randall*  - Patient recently had total left hip replacement by Dr. Randall on 12/13/2021 for end-stage OA.  Reportedly, patient had an accidental fall 2 days ago and started to experience left hip pain.   - Hip Xray (01/10) shows acute nondisplaced fracture through the medial aspect of the sub trochanteric region of the left hip in this patient who is status post total left hip arthroplasty and no fractures  elsewhere in the bony pelvis or right hip  - Patient had open reduction internal fixation L periprosthetic  proximal femur fracture on 1/12 and ortho is following  - pain medication PRN:   - PT, OT and social service on board  - Continue  mg PO daily for propylaxis  - Pending transfer to [facility]          Pneumonia  - CXR on 01/14 showed some atelectasis vs PNA in the right middle lobe.  - Continue Rocephin 1 mg daily for 5 days until 01/18 and azithromycin for 5 days until 01/18          Hypertension  - Last blood pressure at 101/64  - Continue to hold home Lasix 20 mg daily        Anxiety and depression  - Continue home Citalopram 20 mg daily      Chronic obstructive pulmonary disease  - Hx of COPD required home oxygen NC 2L and chronic cough. Patient is at mild-to-moderate perioperative respiratory risk but is felt to be stable to proceed with surgery.  - DuoNeb q6h  - Budesonide 0.5 mg BID         Mixed hyperlipidemia  - Continue home atorvastatin 40 mg daily      NGOZI on CPAP  - Patient uses CPAP so we will continue CPAP at night      VTE Risk Mitigation (From admission, onward)         Ordered     IP VTE LOW RISK PATIENT  Once         01/13/22 0819     Place sequential compression device  Until discontinued         01/13/22 0819     Place sequential compression device  Until discontinued         01/10/22 2227                Discharge Planning   PARVEZ:      Code Status: Full Code   Is the patient medically ready for discharge?:     Reason for patient still in hospital (select all that apply): Treatment  Discharge Plan A: Rehab                  Brian Foy MD  Department of Hospital Medicine   [facility] [place] - Orthopedic

## 2022-01-14 NOTE — PT/OT/SLP PROGRESS
"Physical Therapy Treatment    Patient Name:  Yany Silverio   MRN:  [id number]    Recommendations:     Discharge Recommendations:  nursing facility, skilled,rehabilitation facility   Discharge Equipment Recommendations: none   Barriers to discharge: awaiting swing bed placement    Assessment:     Yany Silverio is a 61 y.o. female admitted with a medical diagnosis of Closed nondisplaced subtrochanteric fracture of left femur.  She presents with the following impairments/functional limitations:  weakness,impaired endurance,impaired self care skills,impaired functional mobilty,gait instability,impaired balance,decreased lower extremity function,pain,decreased ROM,orthopedic precautions .    Patient improving daily with ease of mobility and adheres well to left TTWB restriction. Patient will benefit from swing bed to maximize recovery as pt lives alone    Rehab Prognosis: Good; patient would benefit from acute skilled PT services to address these deficits and reach maximum level of function.    Recent Surgery: Procedure(s) (LRB):  ORIF, HIP (Left) 2 Days Post-Op    Plan:     During this hospitalization, patient to be seen BID (5x/week; daily 2x/week) to address the identified rehab impairments via gait training,therapeutic activities,therapeutic exercises and progress toward the following goals:    · Plan of Care Expires:  02/12/22    Subjective     Chief Complaint: left hip fracture with recent THR  Patient/Family Comments/goals: "The nurse got me up earlier because I had to use the BSC."  Pain/Comfort:  Pain Rating 1: 3/10  Location - Side 1: Left  Location 1: hip  Pain Addressed 1: Pre-medicate for activity,Reposition,Distraction,Cessation of Activity  Pain Rating Post-Intervention 1: 3/10      Objective:     Communicated with SHALINI Mims prior to session.  Patient found up in chair with peripheral IV,oxygen upon PT entry to room.     General Precautions: Standard, fall   Orthopedic Precautions:LLE " posterior precautions,LLE toe touch weight bearing   Braces:    Respiratory Status: Nasal cannula, flow 3 L/min     Functional Mobility:  · Transfers:     · Sit to Stand:  contact guard assistance and verbal cues for sequencing/safety to reduce reisk of exceeding WB precaution with rolling walker  · Gait: 80' with RW, SBA, verbal cues for sequencing, mild SOB/DC, slow ana paula, improved ease of advancing left LE      AM-PAC 6 CLICK MOBILITY          Therapeutic Activities and Exercises:   Bilateral LE exercises x 30 reps: Ankle pumps, quad sets, long arc quad, heel slides, hip abduction/adduction, straight leg raise, bridging    Gait per above    Pt able to verbalize 3/3 hip precautions    Patient left up in chair with all lines intact, call button in reach and RN Andreina Mims notified..    GOALS:   Multidisciplinary Problems     Physical Therapy Goals        Problem: Physical Therapy Goal    Goal Priority Disciplines Outcome Goal Variances Interventions   Physical Therapy Goal     PT, PT/OT Ongoing, Progressing     Description: Short Term Goals to be met by: 1/26/2022    Patient will increase functional independence and safety with mobility by performing    1. Supine to sit modified independent  2. Sit to stand modified independent with Rolling walker; Toe-touch weight-bearing: left LE  3. Gait x 100' modified independent with Rolling walker; Toe-touch weight-bearing: left LE  4. Patient with complete illustrated home exercise program x 30 reps with assist as needed.  5. Patient will verbalize 3/3 hip precautions      Long Term Goal to be met by 3/12/2022    1. Patient will regain independent functional mobility with lowest level of assistive device to return to home situation and prior ADLs.                    Time Tracking:     PT Received On: 01/14/22  PT Start Time: 0830     PT Stop Time: 0856  PT Total Time (min): 26 min     Billable Minutes: Gait Training 10 minutes and Therapeutic Exercise 15  minutes    Treatment Type: Treatment  PT/PTA: PT     PTA Visit Number: 0     01/14/2022

## 2022-01-14 NOTE — ASSESSMENT & PLAN NOTE
*Post op day 2 of open reduction internal fixation with Dr. Adolfo Randall*  - Patient recently had total left hip replacement by Dr. Randall on 12/13/2021 for end-stage OA.  Reportedly, patient had an accidental fall 2 days ago and started to experience left hip pain.   - Hip Xray (01/10) shows acute nondisplaced fracture through the medial aspect of the sub trochanteric region of the left hip in this patient who is status post total left hip arthroplasty and no fractures  elsewhere in the bony pelvis or right hip  - Patient had open reduction internal fixation L periprosthetic proximal femur fracture on 1/12 and ortho is following  - pain medication PRN:   - PT, OT and social service on board  - Continue  mg PO daily for propylaxis  - Pending transfer to [facility]

## 2022-01-14 NOTE — PROGRESS NOTES
Daily Orthopaedic Progress Note    Yany Silverio is a 61 y.o. female admitted on 1/10/2022  Hospital Day: 4  Post Op Day: 2 Days Post-Op    Antibiotics (From admission, onward)            Start     Stop Route Frequency Ordered    01/14/22 0900  azithromycin tablet 250 mg         01/18 0859 Oral Daily 01/13/22 1344    01/13/22 1353  cefTRIAXone (ROCEPHIN) 1 g in dextrose 5 % in water (D5W) 5 % 50 mL IVPB (MB+)         01/18 1359 IV Every 24 hours (non-standard times) 01/13/22 1354    01/12/22 2100  mupirocin 2 % ointment 1 g         01/17 2059 Nasl 2 times daily 01/12/22 1511           The patient was seen and examined this morning at the bedside. Patient reports no acute issues overnight and adequate control of pain on current regimen.  Patient worked with physical therapy over the last 24 hours.      PHYSICAL EXAM:  Awake/alert/oriented x3, No acute distress, Afebrile, Vital signs stable  Normocephalic, Atraumatic  Good inspiratory effort with unlaboured breathing  Dressings clean/dry/intact, Operative limb neurovascularly intact with 5/5 strength distally and sensation intact to light touch distally; some decreased sensation over the area of the regional block and palpable pulses  Vitals:    01/14/22 0400 01/14/22 0734 01/14/22 0735 01/14/22 0738   BP: 101/64 96/66     BP Location:       Patient Position:       Pulse: 79 83 82 82   Resp: 18 18 20 20   Temp: 98 °F (36.7 °C) 98.7 °F (37.1 °C)     TempSrc: Axillary Oral     SpO2: 97% (!) 93% (!) 92% (!) 92%   Weight:       Height:         No intake/output data recorded.    Significant Labs:  Recent Lab Results       01/14/22  0441   01/13/22  0902        Albumin   2.7       Anion Gap 11   12       Baso # 0.03   0.04       Basophil % 0.3   0.3       BUN 5   6       BUN/CREAT RATIO 12   11       Calcium 7.5   7.7       Chloride 105   102       CO2 25   25       Creatinine 0.41   0.54       Differential Type Auto   Auto       eGFR if non  167   122        Eos # 0.30   0.18       Eosinophil % 3.0   1.5       Glucose 88   153       Hematocrit 24.5   28.4       Hemoglobin 8.0   9.2       Immature Grans (Abs) 0.07   0.05       Immature Granulocytes 0.7   0.4       Lymph # 1.49   2.15       Lymph % 14.8   17.6       MCH 30.2   30.7       MCHC 32.7   32.4       MCV 92.5   94.7       Mono # 0.81   0.76       Mono % 8.1   6.2       MPV 10.6   9.7       Neutrophils, Abs 7.34   9.02       Neutrophils Relative 73.1   74.0       nRBC 0.0   0.0       NUCLEATED RBC ABSOLUTE 0.00   0.00       Platelets 207   229       Potassium 3.7   4.4       RBC 2.65   3.00       RDW 15.2   15.2       Sodium 137   135       WBC 10.04   12.20             Significant Diagnostics:  X-Ray Chest AP Portable  Narrative: EXAMINATION:  XR CHEST AP PORTABLE    CLINICAL HISTORY:  pneumonia;    TECHNIQUE:  Single frontal view of the chest was performed.    COMPARISON:  01/10/2022    FINDINGS:  There is some atelectasis suggested in the medial right middle lobe.  Remainder of the lungs are clear.  Impression: Some atelectasis suggested in the right middle lobe could represent infectious component as well.    Electronically signed by: Jeet Hermosillo  Date:    01/13/2022  Time:    08:17       A/P: 61 y.o. female 2 Days Post-Op s/p left hip ORIF  -Continue with current pain control regimen  -Continue with current physical therapy plan  -Continue with DVT prophylaxis ( mg PO q day)   -Anticipate discharge to swing bed today    Adolfo Randall MD  Orthopaedic Staff Surgeon

## 2022-01-15 LAB
BASOPHILS # BLD AUTO: 0.07 K/UL (ref 0–0.2)
BASOPHILS NFR BLD AUTO: 0.8 % (ref 0–1)
DIFFERENTIAL METHOD BLD: ABNORMAL
EOSINOPHIL # BLD AUTO: 0.36 K/UL (ref 0–0.5)
EOSINOPHIL NFR BLD AUTO: 3.9 % (ref 1–4)
ERYTHROCYTE [DISTWIDTH] IN BLOOD BY AUTOMATED COUNT: 15.7 % (ref 11.5–14.5)
HCT VFR BLD AUTO: 27.2 % (ref 38–47)
HGB BLD-MCNC: 8.6 G/DL (ref 12–16)
IMM GRANULOCYTES # BLD AUTO: 0.13 K/UL (ref 0–0.04)
IMM GRANULOCYTES NFR BLD: 1.4 % (ref 0–0.4)
LYMPHOCYTES # BLD AUTO: 1.41 K/UL (ref 1–4.8)
LYMPHOCYTES NFR BLD AUTO: 15.2 % (ref 27–41)
MCH RBC QN AUTO: 30.4 PG (ref 27–31)
MCHC RBC AUTO-ENTMCNC: 31.6 G/DL (ref 32–36)
MCV RBC AUTO: 96.1 FL (ref 80–96)
MONOCYTES # BLD AUTO: 0.74 K/UL (ref 0–0.8)
MONOCYTES NFR BLD AUTO: 8 % (ref 2–6)
MPC BLD CALC-MCNC: 10.4 FL (ref 9.4–12.4)
NEUTROPHILS # BLD AUTO: 6.58 K/UL (ref 1.8–7.7)
NEUTROPHILS NFR BLD AUTO: 70.7 % (ref 53–65)
NRBC # BLD AUTO: 0 X10E3/UL
NRBC, AUTO (.00): 0 %
PLATELET # BLD AUTO: 208 K/UL (ref 150–400)
RBC # BLD AUTO: 2.83 M/UL (ref 4.2–5.4)
SARS-COV-2 RDRP RESP QL NAA+PROBE: NEGATIVE
WBC # BLD AUTO: 9.29 K/UL (ref 4.5–11)

## 2022-01-15 PROCEDURE — 94660 CPAP INITIATION&MGMT: CPT

## 2022-01-15 PROCEDURE — 63600175 PHARM REV CODE 636 W HCPCS: Performed by: FAMILY MEDICINE

## 2022-01-15 PROCEDURE — 25000003 PHARM REV CODE 250: Mod: GY | Performed by: ORTHOPAEDIC SURGERY

## 2022-01-15 PROCEDURE — 99232 PR SUBSEQUENT HOSPITAL CARE,LEVL II: ICD-10-PCS | Mod: GC,,, | Performed by: FAMILY MEDICINE

## 2022-01-15 PROCEDURE — 27000221 HC OXYGEN, UP TO 24 HOURS

## 2022-01-15 PROCEDURE — 25000242 PHARM REV CODE 250 ALT 637 W/ HCPCS

## 2022-01-15 PROCEDURE — 94761 N-INVAS EAR/PLS OXIMETRY MLT: CPT | Mod: 59

## 2022-01-15 PROCEDURE — 97110 THERAPEUTIC EXERCISES: CPT

## 2022-01-15 PROCEDURE — 99900035 HC TECH TIME PER 15 MIN (STAT)

## 2022-01-15 PROCEDURE — 97116 GAIT TRAINING THERAPY: CPT

## 2022-01-15 PROCEDURE — 85025 COMPLETE CBC W/AUTO DIFF WBC: CPT

## 2022-01-15 PROCEDURE — 36415 COLL VENOUS BLD VENIPUNCTURE: CPT

## 2022-01-15 PROCEDURE — 11000001 HC ACUTE MED/SURG PRIVATE ROOM

## 2022-01-15 PROCEDURE — 25000003 PHARM REV CODE 250: Performed by: ORTHOPAEDIC SURGERY

## 2022-01-15 PROCEDURE — 25000003 PHARM REV CODE 250: Performed by: FAMILY MEDICINE

## 2022-01-15 PROCEDURE — 25000242 PHARM REV CODE 250 ALT 637 W/ HCPCS: Mod: GY | Performed by: ORTHOPAEDIC SURGERY

## 2022-01-15 PROCEDURE — 63700000 PHARM REV CODE 250 ALT 637 W/O HCPCS: Mod: GY | Performed by: FAMILY MEDICINE

## 2022-01-15 PROCEDURE — 87635 SARS-COV-2 COVID-19 AMP PRB: CPT | Performed by: FAMILY MEDICINE

## 2022-01-15 PROCEDURE — 87635 SARS-COV-2 COVID-19 AMP PRB: CPT | Mod: 91 | Performed by: FAMILY MEDICINE

## 2022-01-15 PROCEDURE — 99232 SBSQ HOSP IP/OBS MODERATE 35: CPT | Mod: GC,,, | Performed by: FAMILY MEDICINE

## 2022-01-15 PROCEDURE — 94640 AIRWAY INHALATION TREATMENT: CPT

## 2022-01-15 PROCEDURE — 25000003 PHARM REV CODE 250

## 2022-01-15 RX ORDER — IPRATROPIUM BROMIDE AND ALBUTEROL SULFATE 2.5; .5 MG/3ML; MG/3ML
3 SOLUTION RESPIRATORY (INHALATION) EVERY 12 HOURS
Status: DISCONTINUED | OUTPATIENT
Start: 2022-01-15 | End: 2022-01-18 | Stop reason: HOSPADM

## 2022-01-15 RX ADMIN — DOCUSATE SODIUM 100 MG: 100 CAPSULE, LIQUID FILLED ORAL at 09:01

## 2022-01-15 RX ADMIN — CEFTRIAXONE SODIUM 1 G: 1 INJECTION, POWDER, FOR SOLUTION INTRAMUSCULAR; INTRAVENOUS at 02:01

## 2022-01-15 RX ADMIN — ACETAMINOPHEN 650 MG: 325 TABLET ORAL at 11:01

## 2022-01-15 RX ADMIN — CITALOPRAM HYDROBROMIDE 20 MG: 20 TABLET ORAL at 09:01

## 2022-01-15 RX ADMIN — BUDESONIDE 0.5 MG: 0.5 INHALANT RESPIRATORY (INHALATION) at 08:01

## 2022-01-15 RX ADMIN — ZOLPIDEM TARTRATE 5 MG: 5 TABLET, COATED ORAL at 09:01

## 2022-01-15 RX ADMIN — MUPIROCIN 1 G: 20 OINTMENT TOPICAL at 09:01

## 2022-01-15 RX ADMIN — ATORVASTATIN CALCIUM 40 MG: 40 TABLET, FILM COATED ORAL at 09:01

## 2022-01-15 RX ADMIN — PREGABALIN 75 MG: 75 CAPSULE ORAL at 09:01

## 2022-01-15 RX ADMIN — IPRATROPIUM BROMIDE AND ALBUTEROL SULFATE 3 ML: .5; 3 SOLUTION RESPIRATORY (INHALATION) at 08:01

## 2022-01-15 RX ADMIN — CELECOXIB 200 MG: 100 CAPSULE ORAL at 09:01

## 2022-01-15 RX ADMIN — AZITHROMYCIN MONOHYDRATE 250 MG: 250 TABLET ORAL at 09:01

## 2022-01-15 RX ADMIN — IPRATROPIUM BROMIDE AND ALBUTEROL SULFATE 3 ML: .5; 3 SOLUTION RESPIRATORY (INHALATION) at 12:01

## 2022-01-15 RX ADMIN — FAMOTIDINE 20 MG: 20 TABLET, FILM COATED ORAL at 09:01

## 2022-01-15 RX ADMIN — FAMOTIDINE 20 MG: 20 TABLET, FILM COATED ORAL at 08:01

## 2022-01-15 RX ADMIN — PANTOPRAZOLE SODIUM 40 MG: 40 TABLET, DELAYED RELEASE ORAL at 09:01

## 2022-01-15 RX ADMIN — ASPIRIN 81 MG CHEWABLE TABLET 324 MG: 81 TABLET CHEWABLE at 08:01

## 2022-01-15 RX ADMIN — POLYETHYLENE GLYCOL 3350 17 G: 17 POWDER, FOR SOLUTION ORAL at 08:01

## 2022-01-15 NOTE — PLAN OF CARE
Problem: Respiratory Compromise (Pneumonia)  Goal: Effective Oxygenation and Ventilation  Outcome: Ongoing, Progressing

## 2022-01-15 NOTE — PLAN OF CARE
Problem: Infection  Goal: Absence of Infection Signs and Symptoms  Outcome: Ongoing, Progressing     Problem: Adult Inpatient Plan of Care  Goal: Plan of Care Review  Outcome: Ongoing, Progressing  Goal: Patient-Specific Goal (Individualized)  Outcome: Ongoing, Progressing  Goal: Absence of Hospital-Acquired Illness or Injury  Outcome: Ongoing, Progressing  Goal: Optimal Comfort and Wellbeing  Outcome: Ongoing, Progressing  Goal: Readiness for Transition of Care  Outcome: Ongoing, Progressing     Problem: Gas Exchange Impaired  Goal: Optimal Gas Exchange  Outcome: Ongoing, Progressing     Problem: Skin Injury Risk Increased  Goal: Skin Health and Integrity  Outcome: Ongoing, Progressing     Problem: Fall Injury Risk  Goal: Absence of Fall and Fall-Related Injury  Outcome: Ongoing, Progressing     Problem: Fluid Imbalance (Pneumonia)  Goal: Fluid Balance  Outcome: Ongoing, Progressing     Problem: Infection (Pneumonia)  Goal: Resolution of Infection Signs and Symptoms  Outcome: Ongoing, Progressing

## 2022-01-15 NOTE — ASSESSMENT & PLAN NOTE
*Post op day 3 of open reduction internal fixation with Dr. Adolfo Randall*  - Patient recently had total left hip replacement by Dr. Randall on 12/13/2021 for end-stage OA.  Reportedly, patient had an accidental fall 2 days ago and started to experience left hip pain.   - Hip Xray (01/10) shows acute nondisplaced fracture through the medial aspect of the sub trochanteric region of the left hip in this patient who is status post total left hip arthroplasty and no fractures  elsewhere in the bony pelvis or right hip  - Patient had open reduction internal fixation L periprosthetic proximal femur fracture on 1/12 and ortho is following  - pain medication PRN:   - PT, OT and social service on board  - Continue  mg PO daily for propylaxis  - Pending Covid 19 result for transfer to [facility]

## 2022-01-15 NOTE — PT/OT/SLP PROGRESS
Physical Therapy Treatment    Patient Name:  Yany Silverio   MRN:  [id number]    Recommendations:     Discharge Recommendations:  nursing facility, skilled,rehabilitation facility   Discharge Equipment Recommendations: none   Barriers to discharge: awaiting swing bed placement    Assessment:     Yany Silverio is a 61 y.o. female admitted with a medical diagnosis of Closed nondisplaced subtrochanteric fracture of left femur.  She presents with the following impairments/functional limitations:  weakness,impaired endurance,impaired self care skills,impaired functional mobilty,gait instability,impaired balance,decreased lower extremity function,pain,decreased ROM,orthopedic precautions .    Patient puts forth a good effort with all PT interventions. Mild SOB/DC during gait training but improved from initial sessions and close to baseline per pt.     Rehab Prognosis: Good; patient would benefit from acute skilled PT services to address these deficits and reach maximum level of function.    Recent Surgery: Procedure(s) (LRB):  ORIF, HIP (Left) 3 Days Post-Op    Plan:     During this hospitalization, patient to be seen BID (5x/week; daily 2x/week) to address the identified rehab impairments via gait training,therapeutic activities,therapeutic exercises and progress toward the following goals:    · Plan of Care Expires:  02/12/22    Subjective     Chief Complaint: left hip fracture with recent L THR  Patient/Family Comments/goals: Pt agreeable to PT treatment.  Pain/Comfort:  · Pain Rating 1: 5/10  · Location - Side 1: Left  · Location 1: hip  · Pain Addressed 1: Pre-medicate for activity,Reposition,Distraction,Cessation of Activity  · Pain Rating Post-Intervention 1: 5/10      Objective:     Communicated with RN Rebecca Julien prior to session.  Patient found up in chair with peripheral IV,oxygen upon PT entry to room.     General Precautions: Standard, fall   Orthopedic Precautions:LLE posterior precautions,LLE toe  touch weight bearing   Braces: N/A  Respiratory Status: Nasal cannula, flow 3 L/min     Functional Mobility:  · Transfers:     · Sit to Stand:  stand by assistance with rolling walker and verbal cues for safety/sequencing to maintain TTWB left through transfer  · Gait: 80' with RW, CGA, step to pattern, slow ana paula, midl SOB/DC, no LOB, good adherence to TTWB restriction      AM-PAC 6 CLICK MOBILITY          Therapeutic Activities and Exercises:   gait and transfers as above  Bilateral lower extremity exercise x 30 reps: ankle pumps, Quad sets, glut sets, heel slides, hip abduction/adduction, straight leg raises and Long arc quads with active assist ROM, verbal cues and tactile cues    Patient left up in chair with all lines intact, call button in reach and RN Rebecca Julien notified..    GOALS:   Multidisciplinary Problems     Physical Therapy Goals        Problem: Physical Therapy Goal    Goal Priority Disciplines Outcome Goal Variances Interventions   Physical Therapy Goal     PT, PT/OT Ongoing, Progressing     Description: Short Term Goals to be met by: 1/26/2022    Patient will increase functional independence and safety with mobility by performing    1. Supine to sit modified independent  2. Sit to stand modified independent with Rolling walker; Toe-touch weight-bearing: left LE  3. Gait x 100' modified independent with Rolling walker; Toe-touch weight-bearing: left LE  4. Patient with complete illustrated home exercise program x 30 reps with assist as needed.  5. Patient will verbalize 3/3 hip precautions      Long Term Goal to be met by 3/12/2022    1. Patient will regain independent functional mobility with lowest level of assistive device to return to home situation and prior ADLs.                    Time Tracking:     PT Received On: 01/15/22  PT Start Time: 0910     PT Stop Time: 0946  PT Total Time (min): 36 min     Billable Minutes: Gait Training 15 minutes and Therapeutic Exercise 15  minutes    Treatment Type: Treatment  PT/PTA: PT     PTA Visit Number: 0     01/15/2022

## 2022-01-15 NOTE — PROGRESS NOTES
[facility]  [facility] Medicine  Progress Note    Patient Name: Yany Silverio  MRN: [id number]  Patient Class: IP- Inpatient   Admission Date: 1/10/2022  Length of Stay: 5 days  Attending Physician: Elie Bhatt MD  Primary Care Provider: Arelis Escobedo NP        Subjective:     Principal Problem:Closed nondisplaced subtrochanteric fracture of left femur        HPI:  Patient is a 61-year-old female with a history of essential hypertension, hyperlipidemia, superficial gastritis, O2 dependent COPD, and status post total L hip replacement on 12/13/2021 for end-stage OA who reportedly fell 2 days ago and developed left hip pain.  Patient was subsequently seen by her PCP today and diagnosed with closed nondisplaced subtrochanteric fracture of the left femur.  Dr. Adolfo Randall was contacted and surgery is planned for Wednesday.  Patient will be admitted.      Overview/Hospital Course:  01/13: CXR shows possible atelectasis vs pneumonia in the right middle lobe. Patient was started on rocephin and Azithromycin.    01/14: WBC 10.4 down from 12.2 yesterday and patient is afebrile with a Tmax of 100.5 in the last 24h. H&H dropped to 8.0/24.5 from 9.2/28.4 yesterday. Continue  mg PO daily for prophylaxis. Pending H&H check in PM.    01/15: Patient has been approved by [facility] and is waiting Covid testing and other paper work. Continue to monitor H&H.      Interval History:   Patient was seen in bed with no acute event over night. She denies fever, chills, headache, chest pain, palpitations, and shortness of breath. Pending covid test result for [facility]. H&H of 7.7/24 yesterday afternoon with pending H&H for today.    Review of Systems   Constitutional: Negative for fatigue and fever.   HENT: Negative for tinnitus and trouble swallowing.    Eyes: Negative for visual disturbance.   Respiratory: Negative for cough, chest tightness and shortness of breath.    Cardiovascular: Negative for chest  pain, palpitations and leg swelling.   Gastrointestinal: Negative for abdominal pain, diarrhea, nausea and vomiting.   Genitourinary: Negative for dysuria.   Musculoskeletal: Negative for neck stiffness.   Neurological: Negative for dizziness, tremors, syncope, speech difficulty, light-headedness and headaches.   Psychiatric/Behavioral: Negative for agitation, behavioral problems and confusion.     Objective:     Vital Signs (Most Recent):  Temp: 98.3 °F (36.8 °C) (01/15/22 0400)  Pulse: 67 (01/15/22 0400)  Resp: 18 (01/15/22 0400)  BP: 96/60 (01/15/22 0400)  SpO2: 99 % (01/15/22 0400) Vital Signs (24h Range):  Temp:  [98.1 °F (36.7 °C)-98.7 °F (37.1 °C)] 98.3 °F (36.8 °C)  Pulse:  [67-85] 67  Resp:  [13-20] 18  SpO2:  [92 %-99 %] 99 %  BP: ()/(60-66) 96/60     Weight: 76.2 kg (168 lb)  Body mass index is 28.84 kg/m².    Intake/Output Summary (Last 24 hours) at 1/15/2022 0659  Last data filed at 1/14/2022 1344  Gross per 24 hour   Intake 340 ml   Output --   Net 340 ml      Physical Exam  Constitutional:       General: She is not in acute distress.     Appearance: She is normal weight. She is not ill-appearing.   HENT:      Head: Normocephalic and atraumatic.      Right Ear: External ear normal.      Left Ear: External ear normal.      Nose: Nose normal. No congestion or rhinorrhea.      Mouth/Throat:      Mouth: Mucous membranes are moist.      Pharynx: Oropharynx is clear.   Eyes:      General: No scleral icterus.     Conjunctiva/sclera: Conjunctivae normal.   Cardiovascular:      Rate and Rhythm: Normal rate and regular rhythm.      Pulses: Normal pulses.      Heart sounds: Normal heart sounds. No murmur heard.  No friction rub.   Pulmonary:      Effort: Pulmonary effort is normal. No respiratory distress.      Breath sounds: Normal breath sounds. No wheezing or rhonchi.   Abdominal:      General: Bowel sounds are normal.      Tenderness: There is no abdominal tenderness. There is no guarding or rebound.    Musculoskeletal:         General: No swelling.      Cervical back: Neck supple. No tenderness.      Right lower leg: No edema.      Left lower leg: No edema.   Lymphadenopathy:      Cervical: No cervical adenopathy.   Skin:     General: Skin is warm.      Coloration: Skin is not jaundiced.   Neurological:      General: No focal deficit present.      Mental Status: She is alert and oriented to person, place, and time. Mental status is at baseline.   Psychiatric:         Mood and Affect: Mood normal.         Behavior: Behavior normal.         Thought Content: Thought content normal.         Judgment: Judgment normal.         Significant Labs: All pertinent labs within the past 24 hours have been reviewed.    Significant Imaging: I have reviewed all pertinent imaging results/findings within the past 24 hours.     Scheduled Meds:   albuterol-ipratropium  3 mL Nebulization Q12H    aspirin  324 mg Oral Daily    atorvastatin  40 mg Oral Daily    azithromycin  250 mg Oral Daily    budesonide  0.5 mg Nebulization Q12H    cefTRIAXone (ROCEPHIN) IVPB  1 g Intravenous Q24H    celecoxib  200 mg Oral BID    citalopram  20 mg Oral Daily    docusate sodium  100 mg Oral Q12H    famotidine  20 mg Oral BID    loperamide  4 mg Oral Once    mupirocin  1 g Nasal BID    pantoprazole  40 mg Oral Daily    polyethylene glycol  17 g Oral Daily    pregabalin  75 mg Oral BID     Continuous Infusions:  PRN Meds:.acetaminophen, acetaminophen, albuterol-ipratropium, bisacodyL, HYDROcodone-acetaminophen, HYDROmorphone, morphine, naloxone, ondansetron, ondansetron, oxyCODONE, oxyCODONE, oxyCODONE-acetaminophen, prochlorperazine, sodium chloride 0.9%, sodium chloride 0.9%, zolpidem, zolpidem      Assessment/Plan:      * Closed nondisplaced subtrochanteric fracture of left femur  *Post op day 3 of open reduction internal fixation with Dr. Adolfo Randall*  - Patient recently had total left hip replacement by Dr. Randall on 12/13/2021 for  end-stage OA.  Reportedly, patient had an accidental fall 2 days ago and started to experience left hip pain.   - Hip Xray (01/10) shows acute nondisplaced fracture through the medial aspect of the sub trochanteric region of the left hip in this patient who is status post total left hip arthroplasty and no fractures  elsewhere in the bony pelvis or right hip  - Patient had open reduction internal fixation L periprosthetic proximal femur fracture on 1/12 and ortho is following  - pain medication PRN:   - PT, OT and social service on board  - Continue  mg PO daily for propylaxis  - Pending Covid 19 result for transfer to [facility]          Pneumonia  - CXR on 01/14 showed some atelectasis vs PNA in the right middle lobe.  - Continue Rocephin 1 mg daily for 5 days until 01/18 and azithromycin for 5 days until 01/18          Hypertension  - Last blood pressure at 96/64  - Continue to hold home Lasix 20 mg daily        Anxiety and depression  - Continue home Citalopram 20 mg daily      Chronic obstructive pulmonary disease  - Hx of COPD required home oxygen NC 2L and chronic cough. Patient is at mild-to-moderate perioperative respiratory risk but is felt to be stable to proceed with surgery.  - DuoNeb BID from q6h  - Budesonide 0.5 mg BID         Mixed hyperlipidemia  - Continue home atorvastatin 40 mg daily      NGOZI on CPAP  - Patient uses CPAP at home so continue CPAP at night        VTE Risk Mitigation (From admission, onward)         Ordered     IP VTE LOW RISK PATIENT  Once         01/13/22 0819     Place sequential compression device  Until discontinued         01/13/22 0819     Place sequential compression device  Until discontinued         01/10/22 2094                Discharge Planning   PARVEZ:      Code Status: Full Code   Is the patient medically ready for discharge?:     Reason for patient still in hospital (select all that apply): Treatment  Discharge Plan A: Rehab                  Brian Foy  MD  Department of Hospital Medicine   [facility] [place] - Orthopedic

## 2022-01-15 NOTE — ASSESSMENT & PLAN NOTE
- Hx of COPD required home oxygen NC 2L and chronic cough. Patient is at mild-to-moderate perioperative respiratory risk but is felt to be stable to proceed with surgery.  - DuoNeb BID from q6h  - Budesonide 0.5 mg BID

## 2022-01-15 NOTE — NURSING
Pt is requesting pain med stronger than Tylenol. Pt is aware that her current bp is too low for a narcotic at this time. Current bp is 91/51.

## 2022-01-15 NOTE — SUBJECTIVE & OBJECTIVE
Interval History:   Patient was seen in bed with no acute event over night. She denies fever, chills, headache, chest pain, palpitations, and shortness of breath. Pending covid test result for [facility]. H&H of 7.7/24 yesterday afternoon with pending H&H for today.    Review of Systems   Constitutional: Negative for fatigue and fever.   HENT: Negative for tinnitus and trouble swallowing.    Eyes: Negative for visual disturbance.   Respiratory: Negative for cough, chest tightness and shortness of breath.    Cardiovascular: Negative for chest pain, palpitations and leg swelling.   Gastrointestinal: Negative for abdominal pain, diarrhea, nausea and vomiting.   Genitourinary: Negative for dysuria.   Musculoskeletal: Negative for neck stiffness.   Neurological: Negative for dizziness, tremors, syncope, speech difficulty, light-headedness and headaches.   Psychiatric/Behavioral: Negative for agitation, behavioral problems and confusion.     Objective:     Vital Signs (Most Recent):  Temp: 98.3 °F (36.8 °C) (01/15/22 0400)  Pulse: 67 (01/15/22 0400)  Resp: 18 (01/15/22 0400)  BP: 96/60 (01/15/22 0400)  SpO2: 99 % (01/15/22 0400) Vital Signs (24h Range):  Temp:  [98.1 °F (36.7 °C)-98.7 °F (37.1 °C)] 98.3 °F (36.8 °C)  Pulse:  [67-85] 67  Resp:  [13-20] 18  SpO2:  [92 %-99 %] 99 %  BP: ()/(60-66) 96/60     Weight: 76.2 kg (168 lb)  Body mass index is 28.84 kg/m².    Intake/Output Summary (Last 24 hours) at 1/15/2022 0659  Last data filed at 1/14/2022 1344  Gross per 24 hour   Intake 340 ml   Output --   Net 340 ml      Physical Exam  Constitutional:       General: She is not in acute distress.     Appearance: She is normal weight. She is not ill-appearing.   HENT:      Head: Normocephalic and atraumatic.      Right Ear: External ear normal.      Left Ear: External ear normal.      Nose: Nose normal. No congestion or rhinorrhea.      Mouth/Throat:      Mouth: Mucous membranes are moist.      Pharynx: Oropharynx is  clear.   Eyes:      General: No scleral icterus.     Conjunctiva/sclera: Conjunctivae normal.   Cardiovascular:      Rate and Rhythm: Normal rate and regular rhythm.      Pulses: Normal pulses.      Heart sounds: Normal heart sounds. No murmur heard.  No friction rub.   Pulmonary:      Effort: Pulmonary effort is normal. No respiratory distress.      Breath sounds: Normal breath sounds. No wheezing or rhonchi.   Abdominal:      General: Bowel sounds are normal.      Tenderness: There is no abdominal tenderness. There is no guarding or rebound.   Musculoskeletal:         General: No swelling.      Cervical back: Neck supple. No tenderness.      Right lower leg: No edema.      Left lower leg: No edema.   Lymphadenopathy:      Cervical: No cervical adenopathy.   Skin:     General: Skin is warm.      Coloration: Skin is not jaundiced.   Neurological:      General: No focal deficit present.      Mental Status: She is alert and oriented to person, place, and time. Mental status is at baseline.   Psychiatric:         Mood and Affect: Mood normal.         Behavior: Behavior normal.         Thought Content: Thought content normal.         Judgment: Judgment normal.         Significant Labs: All pertinent labs within the past 24 hours have been reviewed.    Significant Imaging: I have reviewed all pertinent imaging results/findings within the past 24 hours.     Scheduled Meds:   albuterol-ipratropium  3 mL Nebulization Q12H    aspirin  324 mg Oral Daily    atorvastatin  40 mg Oral Daily    azithromycin  250 mg Oral Daily    budesonide  0.5 mg Nebulization Q12H    cefTRIAXone (ROCEPHIN) IVPB  1 g Intravenous Q24H    celecoxib  200 mg Oral BID    citalopram  20 mg Oral Daily    docusate sodium  100 mg Oral Q12H    famotidine  20 mg Oral BID    loperamide  4 mg Oral Once    mupirocin  1 g Nasal BID    pantoprazole  40 mg Oral Daily    polyethylene glycol  17 g Oral Daily    pregabalin  75 mg Oral BID      Continuous Infusions:  PRN Meds:.acetaminophen, acetaminophen, albuterol-ipratropium, bisacodyL, HYDROcodone-acetaminophen, HYDROmorphone, morphine, naloxone, ondansetron, ondansetron, oxyCODONE, oxyCODONE, oxyCODONE-acetaminophen, prochlorperazine, sodium chloride 0.9%, sodium chloride 0.9%, zolpidem, zolpidem

## 2022-01-16 PROBLEM — D64.9 ANEMIA: Status: ACTIVE | Noted: 2022-01-16

## 2022-01-16 LAB
HCT VFR BLD AUTO: 22.6 % (ref 38–47)
HGB BLD-MCNC: 7.3 G/DL (ref 12–16)

## 2022-01-16 PROCEDURE — 36415 COLL VENOUS BLD VENIPUNCTURE: CPT | Performed by: FAMILY MEDICINE

## 2022-01-16 PROCEDURE — 99232 PR SUBSEQUENT HOSPITAL CARE,LEVL II: ICD-10-PCS | Mod: GC,,, | Performed by: FAMILY MEDICINE

## 2022-01-16 PROCEDURE — 63600175 PHARM REV CODE 636 W HCPCS: Performed by: FAMILY MEDICINE

## 2022-01-16 PROCEDURE — 97116 GAIT TRAINING THERAPY: CPT

## 2022-01-16 PROCEDURE — 99900031 HC PATIENT EDUCATION (STAT)

## 2022-01-16 PROCEDURE — 25000003 PHARM REV CODE 250: Performed by: FAMILY MEDICINE

## 2022-01-16 PROCEDURE — 25000003 PHARM REV CODE 250: Performed by: ORTHOPAEDIC SURGERY

## 2022-01-16 PROCEDURE — 25000003 PHARM REV CODE 250

## 2022-01-16 PROCEDURE — 94761 N-INVAS EAR/PLS OXIMETRY MLT: CPT

## 2022-01-16 PROCEDURE — 63700000 PHARM REV CODE 250 ALT 637 W/O HCPCS: Performed by: FAMILY MEDICINE

## 2022-01-16 PROCEDURE — 97110 THERAPEUTIC EXERCISES: CPT

## 2022-01-16 PROCEDURE — 94660 CPAP INITIATION&MGMT: CPT

## 2022-01-16 PROCEDURE — 99900035 HC TECH TIME PER 15 MIN (STAT)

## 2022-01-16 PROCEDURE — 99232 SBSQ HOSP IP/OBS MODERATE 35: CPT | Mod: GC,,, | Performed by: FAMILY MEDICINE

## 2022-01-16 PROCEDURE — A4216 STERILE WATER/SALINE, 10 ML: HCPCS | Performed by: ORTHOPAEDIC SURGERY

## 2022-01-16 PROCEDURE — 27000221 HC OXYGEN, UP TO 24 HOURS

## 2022-01-16 PROCEDURE — 82271 OCCULT BLOOD OTHER SOURCES: CPT | Performed by: FAMILY MEDICINE

## 2022-01-16 PROCEDURE — 25000003 PHARM REV CODE 250: Mod: GY | Performed by: ORTHOPAEDIC SURGERY

## 2022-01-16 PROCEDURE — 85014 HEMATOCRIT: CPT

## 2022-01-16 PROCEDURE — 94640 AIRWAY INHALATION TREATMENT: CPT | Mod: 59

## 2022-01-16 PROCEDURE — 36415 COLL VENOUS BLD VENIPUNCTURE: CPT

## 2022-01-16 PROCEDURE — 25000242 PHARM REV CODE 250 ALT 637 W/ HCPCS: Mod: GY

## 2022-01-16 PROCEDURE — 11000001 HC ACUTE MED/SURG PRIVATE ROOM

## 2022-01-16 RX ADMIN — ASPIRIN 81 MG CHEWABLE TABLET 324 MG: 81 TABLET CHEWABLE at 09:01

## 2022-01-16 RX ADMIN — PREGABALIN 75 MG: 75 CAPSULE ORAL at 08:01

## 2022-01-16 RX ADMIN — CEFTRIAXONE SODIUM 1 G: 1 INJECTION, POWDER, FOR SOLUTION INTRAMUSCULAR; INTRAVENOUS at 02:01

## 2022-01-16 RX ADMIN — CITALOPRAM HYDROBROMIDE 20 MG: 20 TABLET ORAL at 09:01

## 2022-01-16 RX ADMIN — DOCUSATE SODIUM 100 MG: 100 CAPSULE, LIQUID FILLED ORAL at 08:01

## 2022-01-16 RX ADMIN — PREGABALIN 75 MG: 75 CAPSULE ORAL at 09:01

## 2022-01-16 RX ADMIN — SODIUM CHLORIDE, PRESERVATIVE FREE 10 ML: 5 INJECTION INTRAVENOUS at 02:01

## 2022-01-16 RX ADMIN — ATORVASTATIN CALCIUM 40 MG: 40 TABLET, FILM COATED ORAL at 09:01

## 2022-01-16 RX ADMIN — CELECOXIB 200 MG: 100 CAPSULE ORAL at 08:01

## 2022-01-16 RX ADMIN — BISACODYL 10 MG: 10 SUPPOSITORY RECTAL at 07:01

## 2022-01-16 RX ADMIN — MUPIROCIN 1 G: 20 OINTMENT TOPICAL at 08:01

## 2022-01-16 RX ADMIN — IPRATROPIUM BROMIDE AND ALBUTEROL SULFATE 3 ML: .5; 3 SOLUTION RESPIRATORY (INHALATION) at 08:01

## 2022-01-16 RX ADMIN — CELECOXIB 200 MG: 100 CAPSULE ORAL at 09:01

## 2022-01-16 RX ADMIN — POLYETHYLENE GLYCOL 3350 17 G: 17 POWDER, FOR SOLUTION ORAL at 09:01

## 2022-01-16 RX ADMIN — OXYCODONE HYDROCHLORIDE AND ACETAMINOPHEN 1 TABLET: 10; 325 TABLET ORAL at 08:01

## 2022-01-16 RX ADMIN — MUPIROCIN 1 G: 20 OINTMENT TOPICAL at 12:01

## 2022-01-16 RX ADMIN — AZITHROMYCIN MONOHYDRATE 250 MG: 250 TABLET ORAL at 09:01

## 2022-01-16 RX ADMIN — IPRATROPIUM BROMIDE AND ALBUTEROL SULFATE 3 ML: .5; 3 SOLUTION RESPIRATORY (INHALATION) at 07:01

## 2022-01-16 RX ADMIN — PANTOPRAZOLE SODIUM 40 MG: 40 TABLET, DELAYED RELEASE ORAL at 09:01

## 2022-01-16 RX ADMIN — DOCUSATE SODIUM 100 MG: 100 CAPSULE, LIQUID FILLED ORAL at 09:01

## 2022-01-16 NOTE — PLAN OF CARE
Problem: Infection  Goal: Absence of Infection Signs and Symptoms  Outcome: Ongoing, Progressing     Problem: Adult Inpatient Plan of Care  Goal: Plan of Care Review  Outcome: Ongoing, Progressing  Goal: Patient-Specific Goal (Individualized)  Outcome: Ongoing, Progressing  Goal: Absence of Hospital-Acquired Illness or Injury  Outcome: Ongoing, Progressing  Goal: Optimal Comfort and Wellbeing  Outcome: Ongoing, Progressing  Goal: Readiness for Transition of Care  Outcome: Ongoing, Progressing     Problem: Gas Exchange Impaired  Goal: Optimal Gas Exchange  Outcome: Ongoing, Progressing     Problem: Fall Injury Risk  Goal: Absence of Fall and Fall-Related Injury  Outcome: Ongoing, Progressing     Problem: Skin Injury Risk Increased  Goal: Skin Health and Integrity  Outcome: Ongoing, Progressing     Problem: Fluid Imbalance (Pneumonia)  Goal: Fluid Balance  Outcome: Ongoing, Progressing     Problem: Infection (Pneumonia)  Goal: Resolution of Infection Signs and Symptoms  Outcome: Ongoing, Progressing     Problem: Respiratory Compromise (Pneumonia)  Goal: Effective Oxygenation and Ventilation  Outcome: Ongoing, Progressing       Pt had no fall today and was ambulate and transfer using walker with minimal assist. Pt is able to cough to clear airway and deep breathe. Nasal cannula remains in place. Pt encouraged to increase fluid intake.

## 2022-01-16 NOTE — PROGRESS NOTES
[facility]  [facility] Medicine  Progress Note    Patient Name: Yany Silverio  MRN: [id number]  Patient Class: IP- Inpatient   Admission Date: 1/10/2022  Length of Stay: 6 days  Attending Physician: Elie Bhatt MD  Primary Care Provider: Arelis Escobedo NP        Subjective:     Principal Problem:Closed nondisplaced subtrochanteric fracture of left femur        HPI:  Patient is a 61-year-old female with a history of essential hypertension, hyperlipidemia, superficial gastritis, O2 dependent COPD, and status post total L hip replacement on 12/13/2021 for end-stage OA who reportedly fell 2 days ago and developed left hip pain.  Patient was subsequently seen by her PCP today and diagnosed with closed nondisplaced subtrochanteric fracture of the left femur.  Dr. Adolfo Randall was contacted and surgery is planned for Wednesday.  Patient will be admitted.      Overview/Hospital Course:  01/13: CXR shows possible atelectasis vs pneumonia in the right middle lobe. Patient was started on rocephin and Azithromycin.    01/14: WBC 10.4 down from 12.2 yesterday and patient is afebrile with a Tmax of 100.5 in the last 24h. H&H dropped to 8.0/24.5 from 9.2/28.4 yesterday. Continue  mg PO daily for prophylaxis. Pending H&H check in PM.    01/15: Patient has been approved by [facility] and is waiting Covid testing and other paper work. Continue to monitor H&H.      Interval History:     No acute events overnight, patient resting comfortably this morning with no complaints or concerns. Continuing to work well with PT/OT. Plans for discharge to [facility] for continued therapy.     Decrease in H/H over the past few days with no obvious source of bleeding. Patient on ASA daily following surgery, will check FOBT and continue to monitor closely.     Review of Systems   Constitutional: Negative for fatigue and fever.   HENT: Negative for tinnitus and trouble swallowing.    Eyes: Negative for visual disturbance.    Respiratory: Negative for cough, chest tightness and shortness of breath.    Cardiovascular: Negative for chest pain, palpitations and leg swelling.   Gastrointestinal: Negative for abdominal pain, diarrhea, nausea and vomiting.   Genitourinary: Negative for dysuria.   Musculoskeletal: Negative for neck stiffness.   Neurological: Negative for dizziness, tremors, syncope, speech difficulty, light-headedness and headaches.   Psychiatric/Behavioral: Negative for agitation, behavioral problems and confusion.     Objective:     Vital Signs (Most Recent):  Temp: 98.5 °F (36.9 °C) (01/16/22 0400)  Pulse: 82 (01/16/22 0753)  Resp: 16 (01/16/22 0753)  BP: (!) 100/56 (01/16/22 0500)  SpO2: 96 % (01/16/22 0753) Vital Signs (24h Range):  Temp:  [97.1 °F (36.2 °C)-98.9 °F (37.2 °C)] 98.5 °F (36.9 °C)  Pulse:  [72-86] 82  Resp:  [16-18] 16  SpO2:  [94 %-98 %] 96 %  BP: ()/(51-64) 100/56     Weight: 76.2 kg (168 lb)  Body mass index is 28.84 kg/m².  No intake or output data in the 24 hours ending 01/16/22 1116   Physical Exam  Constitutional:       General: She is not in acute distress.     Appearance: She is normal weight. She is not ill-appearing.   HENT:      Head: Normocephalic and atraumatic.      Right Ear: External ear normal.      Left Ear: External ear normal.      Nose: Nose normal. No congestion or rhinorrhea.      Mouth/Throat:      Mouth: Mucous membranes are moist.      Pharynx: Oropharynx is clear.   Eyes:      General: No scleral icterus.     Conjunctiva/sclera: Conjunctivae normal.   Cardiovascular:      Rate and Rhythm: Normal rate and regular rhythm.      Pulses: Normal pulses.      Heart sounds: Normal heart sounds. No murmur heard.  No friction rub.   Pulmonary:      Effort: Pulmonary effort is normal. No respiratory distress.      Breath sounds: Normal breath sounds. No wheezing or rhonchi.   Abdominal:      General: Bowel sounds are normal.      Tenderness: There is no abdominal tenderness. There is  no guarding or rebound.   Musculoskeletal:         General: No swelling.      Cervical back: Neck supple. No tenderness.      Right lower leg: No edema.      Left lower leg: No edema.   Lymphadenopathy:      Cervical: No cervical adenopathy.   Skin:     General: Skin is warm.      Coloration: Skin is not jaundiced.   Neurological:      General: No focal deficit present.      Mental Status: She is alert and oriented to person, place, and time. Mental status is at baseline.   Psychiatric:         Mood and Affect: Mood normal.         Behavior: Behavior normal.         Thought Content: Thought content normal.         Judgment: Judgment normal.         Significant Labs: All pertinent labs within the past 24 hours have been reviewed.    Significant Imaging: I have reviewed all pertinent imaging results/findings within the past 24 hours.      Assessment/Plan:      * Closed nondisplaced subtrochanteric fracture of left femur  S/P ORIF by Dr. Adolfo Randall for fracture following a fall. Previous eft total hip replacement by Dr. Randall 12/13/21. *Post op day 3 of open reduction internal fixation with Dr. Adolfo Randall*    Awaiting discharge to [facility] when medically ready, COVID-19 test negative 11/15/22    - pain medication PRN  - PT, OT and social service on board  - Continue  mg PO daily for propylaxis            Pneumonia  CXR on 01/14 showed some atelectasis vs PNA in the right middle lobe.    - Rocephin 1mg qd x 5 days (1/18)  - azithromycin for 5 days until 01/18          Anemia  H/H 7.3/22.6 this AM, slowly trending downward over the past few days. No obvious source of bleeding noted, however patient on daily ASA therapy following surgery. FOBT pending.       Anxiety and depression  - Continue home Citalopram 20 mg daily      Hypertension  - Last blood pressure at 96/64  - Continue to hold home Lasix 20 mg daily        NGOZI on CPAP  - Patient uses CPAP at home so continue CPAP at night      Chronic obstructive  pulmonary disease  - Hx of COPD required home oxygen NC 2L and chronic cough. Patient is at mild-to-moderate perioperative respiratory risk but is felt to be stable to proceed with surgery.  - DuoNeb BID from q6h  - Budesonide 0.5 mg BID         Mixed hyperlipidemia  - Continue home atorvastatin 40 mg daily        VTE Risk Mitigation (From admission, onward)         Ordered     IP VTE LOW RISK PATIENT  Once         01/13/22 0819     Place sequential compression device  Until discontinued         01/13/22 0819     Place sequential compression device  Until discontinued         01/10/22 2227                Discharge Planning   PARVEZ:      Code Status: Full Code   Is the patient medically ready for discharge?:     Reason for patient still in hospital (select all that apply): Patient trending condition and Treatment  Discharge Plan A: Rehab                  Kathy Samuel DO  Department of Hospital Medicine   [facility]

## 2022-01-16 NOTE — ASSESSMENT & PLAN NOTE
CXR on 01/14 showed some atelectasis vs PNA in the right middle lobe.    - Rocephin 1mg qd x 5 days (1/18)  - azithromycin for 5 days until 01/18

## 2022-01-16 NOTE — ASSESSMENT & PLAN NOTE
S/P ORIF by Dr. Adolfo Randall for fracture following a fall. Previous eft total hip replacement by Dr. Randall 12/13/21. *Post op day 3 of open reduction internal fixation with Dr. Adolfo Randall*    Awaiting discharge to [facility] when medically ready, COVID-19 test negative 11/15/22    - pain medication PRN  - PT, OT and social service on board  - Continue  mg PO daily for propylaxis

## 2022-01-16 NOTE — SUBJECTIVE & OBJECTIVE
Interval History:     No acute events overnight, patient resting comfortably this morning with no complaints or concerns. Continuing to work well with PT/OT. Plans for discharge to [facility] for continued therapy.     Decrease in H/H over the past few days with no obvious source of bleeding. Patient on ASA daily following surgery, will check FOBT and continue to monitor closely.     Review of Systems   Constitutional: Negative for fatigue and fever.   HENT: Negative for tinnitus and trouble swallowing.    Eyes: Negative for visual disturbance.   Respiratory: Negative for cough, chest tightness and shortness of breath.    Cardiovascular: Negative for chest pain, palpitations and leg swelling.   Gastrointestinal: Negative for abdominal pain, diarrhea, nausea and vomiting.   Genitourinary: Negative for dysuria.   Musculoskeletal: Negative for neck stiffness.   Neurological: Negative for dizziness, tremors, syncope, speech difficulty, light-headedness and headaches.   Psychiatric/Behavioral: Negative for agitation, behavioral problems and confusion.     Objective:     Vital Signs (Most Recent):  Temp: 98.5 °F (36.9 °C) (01/16/22 0400)  Pulse: 82 (01/16/22 0753)  Resp: 16 (01/16/22 0753)  BP: (!) 100/56 (01/16/22 0500)  SpO2: 96 % (01/16/22 0753) Vital Signs (24h Range):  Temp:  [97.1 °F (36.2 °C)-98.9 °F (37.2 °C)] 98.5 °F (36.9 °C)  Pulse:  [72-86] 82  Resp:  [16-18] 16  SpO2:  [94 %-98 %] 96 %  BP: ()/(51-64) 100/56     Weight: 76.2 kg (168 lb)  Body mass index is 28.84 kg/m².  No intake or output data in the 24 hours ending 01/16/22 1116   Physical Exam  Constitutional:       General: She is not in acute distress.     Appearance: She is normal weight. She is not ill-appearing.   HENT:      Head: Normocephalic and atraumatic.      Right Ear: External ear normal.      Left Ear: External ear normal.      Nose: Nose normal. No congestion or rhinorrhea.      Mouth/Throat:      Mouth: Mucous membranes are moist.       Pharynx: Oropharynx is clear.   Eyes:      General: No scleral icterus.     Conjunctiva/sclera: Conjunctivae normal.   Cardiovascular:      Rate and Rhythm: Normal rate and regular rhythm.      Pulses: Normal pulses.      Heart sounds: Normal heart sounds. No murmur heard.  No friction rub.   Pulmonary:      Effort: Pulmonary effort is normal. No respiratory distress.      Breath sounds: Normal breath sounds. No wheezing or rhonchi.   Abdominal:      General: Bowel sounds are normal.      Tenderness: There is no abdominal tenderness. There is no guarding or rebound.   Musculoskeletal:         General: No swelling.      Cervical back: Neck supple. No tenderness.      Right lower leg: No edema.      Left lower leg: No edema.   Lymphadenopathy:      Cervical: No cervical adenopathy.   Skin:     General: Skin is warm.      Coloration: Skin is not jaundiced.   Neurological:      General: No focal deficit present.      Mental Status: She is alert and oriented to person, place, and time. Mental status is at baseline.   Psychiatric:         Mood and Affect: Mood normal.         Behavior: Behavior normal.         Thought Content: Thought content normal.         Judgment: Judgment normal.         Significant Labs: All pertinent labs within the past 24 hours have been reviewed.    Significant Imaging: I have reviewed all pertinent imaging results/findings within the past 24 hours.

## 2022-01-16 NOTE — ASSESSMENT & PLAN NOTE
H/H 7.3/22.6 this AM, slowly trending downward over the past few days. No obvious source of bleeding noted, however patient on daily ASA therapy following surgery. FOBT pending.

## 2022-01-16 NOTE — PT/OT/SLP PROGRESS
"Physical Therapy Treatment    Patient Name:  Yany Silverio   MRN:  [id number]    Recommendations:     Discharge Recommendations:  rehabilitation facility,nursing facility, skilled   Discharge Equipment Recommendations: none   Barriers to discharge: None    Assessment:     Yany Silverio is a 61 y.o. female admitted with a medical diagnosis of Closed nondisplaced subtrochanteric fracture of left femur.  She presents with the following impairments/functional limitations:  weakness,impaired endurance,impaired self care skills,impaired functional mobilty,gait instability,impaired balance,decreased lower extremity function,pain,decreased ROM,orthopedic precautions .    Patient participating well with PT interventions despite c/o pain during gait. Patient reports she did not have pain medicine last night due to low BP. Patient to discharge to [facility] today.    Rehab Prognosis: Good; patient would benefit from acute skilled PT services to address these deficits and reach maximum level of function.    Recent Surgery: Procedure(s) (LRB):  ORIF, HIP (Left) 4 Days Post-Op    Plan:     During this hospitalization, patient to be seen BID (5x/week; daily 2x/week) to address the identified rehab impairments via gait training,therapeutic activities,therapeutic exercises and progress toward the following goals:    · Plan of Care Expires:  02/12/22    Subjective     Chief Complaint: left hip fracture with recent THR  Patient/Family Comments/goals: "I am dressed and ready to go to [facility] but I don't know when the d/c will happen. I am waiting to call my son until I know more."  Pain/Comfort:  · Location - Side 1: Left  · Location 1: hip  · Pain Addressed 1: Distraction,Cessation of Activity  · Pain Rating Post-Intervention 1: 5/10      Objective:     Communicated with SHALINI Echeverria prior to session.  Patient found up in chair with peripheral IV upon PT entry to room.     General Precautions: Standard, fall   Orthopedic Precautions:LLE " posterior precautions,LLE toe touch weight bearing   Braces: N/A  Respiratory Status: Room air     Functional Mobility:  · Transfers:     · Sit to Stand:  stand by assistance with rolling walker  · Gait: 100' with RW, step to pattern, SBA, Coaching to place left foot flat but not increase weight bearing, slow ana paula, no LOB.       AM-PAC 6 CLICK MOBILITY          Therapeutic Activities and Exercises:   gait and t/f as above  Bilateral lower extremity exercise x 30 reps: ankle pumps, Quad sets, glut sets, heel slides, hip abduction/adduction, straight leg raises and Long arc quads with active assist ROM, verbal cues and tactile cues    Patient left up in chair with all lines intact, call button in reach and RN Brianna Echeverria notified..    GOALS:   Multidisciplinary Problems     Physical Therapy Goals        Problem: Physical Therapy Goal    Goal Priority Disciplines Outcome Goal Variances Interventions   Physical Therapy Goal     PT, PT/OT Ongoing, Progressing     Description: Short Term Goals to be met by: 1/26/2022    Patient will increase functional independence and safety with mobility by performing    1. Supine to sit modified independent  2. Sit to stand modified independent with Rolling walker; Toe-touch weight-bearing: left LE  3. Gait x 100' modified independent with Rolling walker; Toe-touch weight-bearing: left LE  4. Patient with complete illustrated home exercise program x 30 reps with assist as needed.  5. Patient will verbalize 3/3 hip precautions      Long Term Goal to be met by 3/12/2022    1. Patient will regain independent functional mobility with lowest level of assistive device to return to home situation and prior ADLs.                    Time Tracking:     PT Received On: 01/16/22  PT Start Time: 0921     PT Stop Time: 0952  PT Total Time (min): 31 min     Billable Minutes: Gait Training 15 minutes and Therapeutic Exercise 10 minutes    Treatment Type: Treatment  PT/PTA: PT     PTA Visit  Number: 0     01/16/2022

## 2022-01-17 ENCOUNTER — TELEPHONE (OUTPATIENT)
Dept: FAMILY MEDICINE | Facility: CLINIC | Age: 62
End: 2022-01-17
Payer: MEDICAID

## 2022-01-17 PROBLEM — J18.9 PNEUMONIA: Status: RESOLVED | Noted: 2022-01-14 | Resolved: 2022-01-17

## 2022-01-17 LAB
BILIRUB UR QL STRIP: NEGATIVE
CLARITY UR: CLEAR
COLOR UR: YELLOW
GLUCOSE UR STRIP-MCNC: NEGATIVE MG/DL
HCT VFR BLD AUTO: 22.5 % (ref 38–47)
HCT VFR BLD AUTO: 24.1 % (ref 38–47)
HGB BLD-MCNC: 7.2 G/DL (ref 12–16)
HGB BLD-MCNC: 7.7 G/DL (ref 12–16)
KETONES UR STRIP-SCNC: NEGATIVE MG/DL
LEUKOCYTE ESTERASE UR QL STRIP: NEGATIVE
NITRITE UR QL STRIP: NEGATIVE
OCCULT BLOOD: NEGATIVE
PH UR STRIP: 7 PH UNITS
PROT UR QL STRIP: NEGATIVE
RBC # UR STRIP: NEGATIVE /UL
SARS-COV-2 RNA RESP QL NAA+PROBE: NEGATIVE
SP GR UR STRIP: 1.01
UROBILINOGEN UR STRIP-ACNC: 1 MG/DL

## 2022-01-17 PROCEDURE — 94660 CPAP INITIATION&MGMT: CPT | Mod: 59

## 2022-01-17 PROCEDURE — 94640 AIRWAY INHALATION TREATMENT: CPT

## 2022-01-17 PROCEDURE — 25000003 PHARM REV CODE 250: Mod: GY

## 2022-01-17 PROCEDURE — 27000221 HC OXYGEN, UP TO 24 HOURS

## 2022-01-17 PROCEDURE — 25000003 PHARM REV CODE 250: Performed by: ORTHOPAEDIC SURGERY

## 2022-01-17 PROCEDURE — 25000242 PHARM REV CODE 250 ALT 637 W/ HCPCS: Mod: GY

## 2022-01-17 PROCEDURE — 63700000 PHARM REV CODE 250 ALT 637 W/O HCPCS: Mod: GY | Performed by: FAMILY MEDICINE

## 2022-01-17 PROCEDURE — G0378 HOSPITAL OBSERVATION PER HR: HCPCS

## 2022-01-17 PROCEDURE — 97116 GAIT TRAINING THERAPY: CPT

## 2022-01-17 PROCEDURE — 36415 COLL VENOUS BLD VENIPUNCTURE: CPT | Performed by: FAMILY MEDICINE

## 2022-01-17 PROCEDURE — 25000003 PHARM REV CODE 250: Performed by: FAMILY MEDICINE

## 2022-01-17 PROCEDURE — 85014 HEMATOCRIT: CPT | Mod: 91 | Performed by: INTERNAL MEDICINE

## 2022-01-17 PROCEDURE — 85014 HEMATOCRIT: CPT | Performed by: FAMILY MEDICINE

## 2022-01-17 PROCEDURE — 99900035 HC TECH TIME PER 15 MIN (STAT)

## 2022-01-17 PROCEDURE — 99214 OFFICE O/P EST MOD 30 MIN: CPT | Mod: GC,,, | Performed by: INTERNAL MEDICINE

## 2022-01-17 PROCEDURE — 36415 COLL VENOUS BLD VENIPUNCTURE: CPT | Performed by: INTERNAL MEDICINE

## 2022-01-17 PROCEDURE — 94761 N-INVAS EAR/PLS OXIMETRY MLT: CPT | Mod: 59

## 2022-01-17 PROCEDURE — 97110 THERAPEUTIC EXERCISES: CPT

## 2022-01-17 PROCEDURE — 81003 URINALYSIS AUTO W/O SCOPE: CPT | Performed by: INTERNAL MEDICINE

## 2022-01-17 PROCEDURE — 63600175 PHARM REV CODE 636 W HCPCS: Performed by: FAMILY MEDICINE

## 2022-01-17 PROCEDURE — 96366 THER/PROPH/DIAG IV INF ADDON: CPT

## 2022-01-17 PROCEDURE — 99214 PR OFFICE/OUTPT VISIT, EST, LEVL IV, 30-39 MIN: ICD-10-PCS | Mod: GC,,, | Performed by: INTERNAL MEDICINE

## 2022-01-17 RX ORDER — OXYCODONE AND ACETAMINOPHEN 10; 325 MG/1; MG/1
1 TABLET ORAL EVERY 6 HOURS PRN
Qty: 40 TABLET | Refills: 0 | OUTPATIENT
Start: 2022-01-17 | End: 2022-01-27

## 2022-01-17 RX ORDER — NAPROXEN SODIUM 220 MG/1
325 TABLET, FILM COATED ORAL DAILY
Qty: 120 TABLET | Refills: 0 | Status: SHIPPED | OUTPATIENT
Start: 2022-01-18 | End: 2022-10-31

## 2022-01-17 RX ORDER — ASPIRIN 325 MG
325 TABLET ORAL DAILY
Qty: 30 TABLET | Refills: 0 | OUTPATIENT
Start: 2022-01-17 | End: 2022-02-16

## 2022-01-17 RX ADMIN — IPRATROPIUM BROMIDE AND ALBUTEROL SULFATE 3 ML: .5; 3 SOLUTION RESPIRATORY (INHALATION) at 07:01

## 2022-01-17 RX ADMIN — CELECOXIB 200 MG: 100 CAPSULE ORAL at 09:01

## 2022-01-17 RX ADMIN — OXYCODONE HYDROCHLORIDE 10 MG: 5 TABLET ORAL at 04:01

## 2022-01-17 RX ADMIN — IPRATROPIUM BROMIDE AND ALBUTEROL SULFATE 3 ML: .5; 3 SOLUTION RESPIRATORY (INHALATION) at 08:01

## 2022-01-17 RX ADMIN — PREGABALIN 75 MG: 75 CAPSULE ORAL at 09:01

## 2022-01-17 RX ADMIN — DOCUSATE SODIUM 100 MG: 100 CAPSULE, LIQUID FILLED ORAL at 09:01

## 2022-01-17 RX ADMIN — POLYETHYLENE GLYCOL 3350 17 G: 17 POWDER, FOR SOLUTION ORAL at 09:01

## 2022-01-17 RX ADMIN — AZITHROMYCIN MONOHYDRATE 250 MG: 250 TABLET ORAL at 09:01

## 2022-01-17 RX ADMIN — CEFTRIAXONE SODIUM 1 G: 1 INJECTION, POWDER, FOR SOLUTION INTRAMUSCULAR; INTRAVENOUS at 02:01

## 2022-01-17 RX ADMIN — OXYCODONE HYDROCHLORIDE 10 MG: 5 TABLET ORAL at 09:01

## 2022-01-17 RX ADMIN — PANTOPRAZOLE SODIUM 40 MG: 40 TABLET, DELAYED RELEASE ORAL at 09:01

## 2022-01-17 RX ADMIN — MUPIROCIN 1 G: 20 OINTMENT TOPICAL at 09:01

## 2022-01-17 RX ADMIN — ASPIRIN 81 MG CHEWABLE TABLET 324 MG: 81 TABLET CHEWABLE at 09:01

## 2022-01-17 RX ADMIN — ZOLPIDEM TARTRATE 5 MG: 5 TABLET, COATED ORAL at 11:01

## 2022-01-17 RX ADMIN — CITALOPRAM HYDROBROMIDE 20 MG: 20 TABLET ORAL at 09:01

## 2022-01-17 RX ADMIN — ATORVASTATIN CALCIUM 40 MG: 40 TABLET, FILM COATED ORAL at 09:01

## 2022-01-17 NOTE — TELEPHONE ENCOUNTER
Called to follow up with patient after she had her hip fracture repaired. Patient states that she is recovering well and had a good surgery. Pt states that she had some trouble with her blood pressure due to a bleed but they are investigating it to see where she is bleeding from. Pt states that she was going to get to go to swing bed today but this happened and now she would possibly get to go tomorrow. Pt states that she is getting around well with her walker as long as she is not putting too much weight on her hip. Will continue to follow up with patient in the future.

## 2022-01-17 NOTE — PT/OT/SLP PROGRESS
"Physical Therapy Treatment    Patient Name:  Yany Silverio   MRN:  [id number]    Recommendations:     Discharge Recommendations:  rehabilitation facility,nursing facility, skilled   Discharge Equipment Recommendations: none   Barriers to discharge: None    Assessment:     Yany Silverio is a 61 y.o. female admitted with a medical diagnosis of Closed nondisplaced subtrochanteric fracture of left femur.  She presents with the following impairments/functional limitations:  weakness,impaired endurance,impaired self care skills,impaired functional mobilty,gait instability,impaired balance,decreased lower extremity function,pain,decreased ROM,orthopedic precautions .    Patient improving with all functional mobility and demonstrates good adherence to TTWB restriction.    Rehab Prognosis: Good; patient would benefit from acute skilled PT services to address these deficits and reach maximum level of function.    Recent Surgery: Procedure(s) (LRB):  ORIF, HIP (Left) 5 Days Post-Op    Plan:     During this hospitalization, patient to be seen BID (5x/week; daily 2x/week) to address the identified rehab impairments via gait training,therapeutic activities,therapeutic exercises and progress toward the following goals:    · Plan of Care Expires:  02/12/22    Subjective     Chief Complaint: hip fracture ORIF  Patient/Family Comments/goals: "I still have trouble getting my leg back into the bed."  Pain/Comfort:  · Pain Rating 1: 3/10  · Location - Side 1: Left  · Location 1: hip  · Pain Addressed 1: Distraction,Cessation of Activity  · Pain Rating Post-Intervention 1: 3/10      Objective:     Communicated with SHALINI Mims prior to session.  Patient found up in chair with peripheral IV upon PT entry to room.     General Precautions: Standard, fall   Orthopedic Precautions:LLE toe touch weight bearing,LLE posterior precautions   Braces: N/A  Respiratory Status: Room air     Functional Mobility:  · Transfers:     · Sit to " Stand:  stand by assistance with rolling walker  · Gait: 100' SBA with RW, slow ana paula, verbal cues to adjust foot position to more foot flat whist maintaining TTWB L restriction.      AM-PAC 6 CLICK MOBILITY          Therapeutic Activities and Exercises:   gait and t/f as above  Bilateral lower extremity exercise x 30 reps: ankle pumps, Quad sets, glut sets, heel slides, hip abduction/adduction, straight leg raises and Long arc quads with active assist ROM, verbal cues and tactile cues    Patient left up in chair with all lines intact, call button in reach and RN Paulette Mims notified..    GOALS:   Multidisciplinary Problems     Physical Therapy Goals        Problem: Physical Therapy Goal    Goal Priority Disciplines Outcome Goal Variances Interventions   Physical Therapy Goal     PT, PT/OT Ongoing, Progressing     Description: Short Term Goals to be met by: 1/26/2022    Patient will increase functional independence and safety with mobility by performing    1. Supine to sit modified independent  2. Sit to stand modified independent with Rolling walker; Toe-touch weight-bearing: left LE  3. Gait x 100' modified independent with Rolling walker; Toe-touch weight-bearing: left LE  4. Patient with complete illustrated home exercise program x 30 reps with assist as needed.  5. Patient will verbalize 3/3 hip precautions      Long Term Goal to be met by 3/12/2022    1. Patient will regain independent functional mobility with lowest level of assistive device to return to home situation and prior ADLs.                    Time Tracking:     PT Received On: 01/17/22  PT Start Time: 1009     PT Stop Time: 1032  PT Total Time (min): 23 min     Billable Minutes: Gait Training 15 minutes and Therapeutic Exercise 8 minutes    Treatment Type: Treatment  PT/PTA: PT     PTA Visit Number: 0     01/17/2022

## 2022-01-17 NOTE — ASSESSMENT & PLAN NOTE
H/H 7.2/22.5 this AM, slowly trending downward over the past few days. No obvious source of bleeding noted, however patient on daily ASA therapy following surgery. FOBT pending.

## 2022-01-17 NOTE — PT/OT/SLP PROGRESS
"Physical Therapy Treatment    Patient Name:  Yany Silverio   MRN:  [id number]    Recommendations:     Discharge Recommendations:  rehabilitation facility,nursing facility, skilled   Discharge Equipment Recommendations: none   Barriers to discharge: awaiting medical clearance    Assessment:     Yany Silverio is a 61 y.o. female admitted with a medical diagnosis of Closed nondisplaced subtrochanteric fracture of left femur.  She presents with the following impairments/functional limitations:  weakness,impaired endurance,impaired self care skills,impaired functional mobilty,gait instability,impaired balance,decreased lower extremity function,pain,decreased ROM,orthopedic precautions .    Patient d/c delayed for medical reasons. Likely d/c tomorrow. PT will continue to follow until d/c. Patient with c/o new onset itching along surgical dressing. SHALINI Mims in to examine patient.     Rehab Prognosis: Good; patient would benefit from acute skilled PT services to address these deficits and reach maximum level of function.    Recent Surgery: Procedure(s) (LRB):  ORIF, HIP (Left) 5 Days Post-Op    Plan:     During this hospitalization, patient to be seen BID (5x/week; daily 2x/week) to address the identified rehab impairments via gait training,therapeutic activities,therapeutic exercises and progress toward the following goals:    · Plan of Care Expires:  02/12/22    Subjective     Chief Complaint: hip fracture  Patient/Family Comments/goals: "This hip has started to itch so bad this afternoon.  Pain/Comfort:  · Pain Rating 1: 5/10  · Location - Side 1: Left  · Location 1: hip  · Pain Addressed 1: Pre-medicate for activity,Reposition,Distraction,Cessation of Activity  · Pain Rating Post-Intervention 1: 4/10      Objective:     Communicated with SHALINI Mims prior to session.  Patient found up in chair with peripheral IV,telemetry,oxygen upon PT entry to room.     General Precautions: Standard, fall "   Orthopedic Precautions:LLE toe touch weight bearing,LLE posterior precautions   Braces: N/A  Respiratory Status: Nasal cannula, flow 3 L/min     Functional Mobility:  · Bed Mobility:     · Sit to Supine: stand by assistance and verbal and tactile cues to increase efficiency and still follow hip precautions; compensatory strategies  · Transfers:     · Sit to Stand:  stand by assistance and occasional verbal cues to improve left LE position/ensure TTWB followed with rolling walker  · Toilet Transfer: stand by assistance with  rolling walker  using  Step Transfer  · Gait: 100' with RW, step to pattern, TTWB L, improving fluidity of gait as trial progressed. Good adherence to TTWB restriction.      AM-PAC 6 CLICK MOBILITY  Turning over in bed (including adjusting bedclothes, sheets and blankets)?: 4  Sitting down on and standing up from a chair with arms (e.g., wheelchair, bedside commode, etc.): 4  Moving from lying on back to sitting on the side of the bed?: 3  Moving to and from a bed to a chair (including a wheelchair)?: 4  Need to walk in hospital room?: 3  Climbing 3-5 steps with a railing?: 1  Basic Mobility Total Score: 19       Therapeutic Activities and Exercises:   gait and t/f as above  Bilateral lower extremity exercise x 30 reps: ankle pumps, Quad sets, glut sets, heel slides, hip abduction/adduction and straight leg raises with active assist ROM, verbal cues and tactile cues    Patient left supine with all lines intact, call button in reach and RN Paulette Mims notified..    GOALS:   Multidisciplinary Problems     Physical Therapy Goals        Problem: Physical Therapy Goal    Goal Priority Disciplines Outcome Goal Variances Interventions   Physical Therapy Goal     PT, PT/OT Ongoing, Progressing     Description: Short Term Goals to be met by: 1/26/2022    Patient will increase functional independence and safety with mobility by performing    1. Supine to sit modified independent  2. Sit to stand  modified independent with Rolling walker; Toe-touch weight-bearing: left LE  3. Gait x 100' modified independent with Rolling walker; Toe-touch weight-bearing: left LE  4. Patient with complete illustrated home exercise program x 30 reps with assist as needed.  5. Patient will verbalize 3/3 hip precautions      Long Term Goal to be met by 3/12/2022    1. Patient will regain independent functional mobility with lowest level of assistive device to return to home situation and prior ADLs.                    Time Tracking:     PT Received On: 01/17/22  PT Start Time: 1624     PT Stop Time: 1651  PT Total Time (min): 27 min     Billable Minutes: Gait Training 10 minutes and Therapeutic Exercise 15 minutes    Treatment Type: Treatment  PT/PTA: PT     PTA Visit Number: 0     01/17/2022

## 2022-01-17 NOTE — NURSING
Spoke with Dr. James and pt will not be discharged today. Plans to monitor H/H and blood pressure overnight. Pt and son notified.

## 2022-01-17 NOTE — PLAN OF CARE
Problem: Infection  Goal: Absence of Infection Signs and Symptoms  Outcome: Ongoing, Progressing     Problem: Gas Exchange Impaired  Goal: Optimal Gas Exchange  Outcome: Ongoing, Progressing     Problem: Skin Injury Risk Increased  Goal: Skin Health and Integrity  Outcome: Ongoing, Progressing     Problem: Fluid Imbalance (Pneumonia)  Goal: Fluid Balance  Outcome: Ongoing, Progressing     Problem: Respiratory Compromise (Pneumonia)  Goal: Effective Oxygenation and Ventilation  Outcome: Ongoing, Progressing

## 2022-01-17 NOTE — ASSESSMENT & PLAN NOTE
- CXR on 01/14 showed some atelectasis vs PNA in the right middle lobe.  - Rocephin 1mg qd x 5 days (1/18)  - azithromycin 500 1x day  then 250 mg x4 days qd x 4 days (1/18)

## 2022-01-17 NOTE — SUBJECTIVE & OBJECTIVE
Interval History:   No acute events over night and was laying comfortably in bed. H&H at 7.2/22.5 this from from 7.3/22.6. Patient has been approved for [facility] but H&H is slowing dropping with no bleeding source. We will repeat UA which shows positive RBC in the last repeat.     Review of Systems   Constitutional: Negative for fatigue and fever.   HENT: Negative for tinnitus and trouble swallowing.    Eyes: Negative for visual disturbance.   Respiratory: Negative for cough, chest tightness and shortness of breath.    Cardiovascular: Negative for chest pain, palpitations and leg swelling.   Gastrointestinal: Negative for abdominal pain, diarrhea, nausea and vomiting.   Genitourinary: Negative for dysuria.   Musculoskeletal: Negative for neck stiffness.   Neurological: Negative for dizziness, tremors, syncope, speech difficulty, light-headedness and headaches.   Psychiatric/Behavioral: Negative for agitation, behavioral problems and confusion.     Objective:     Vital Signs (Most Recent):  Temp: 97.8 °F (36.6 °C) (01/16/22 1650)  Pulse: 88 (01/16/22 1650)  Resp: 18 (01/16/22 1650)  BP: 110/63 (01/16/22 1650)  SpO2: (!) 93 % (01/16/22 1650) Vital Signs (24h Range):  Temp:  [97.5 °F (36.4 °C)-98.9 °F (37.2 °C)] 97.8 °F (36.6 °C)  Pulse:  [72-89] 88  Resp:  [16-18] 18  SpO2:  [93 %-97 %] 93 %  BP: ()/(56-65) 110/63     Weight: 76.2 kg (168 lb)  Body mass index is 28.84 kg/m².  No intake or output data in the 24 hours ending 01/16/22 1836   Physical Exam  Constitutional:       General: She is not in acute distress.     Appearance: She is normal weight. She is not ill-appearing.   HENT:      Head: Normocephalic and atraumatic.      Right Ear: External ear normal.      Left Ear: External ear normal.      Nose: Nose normal. No congestion or rhinorrhea.      Mouth/Throat:      Mouth: Mucous membranes are moist.      Pharynx: Oropharynx is clear.   Eyes:      General: No scleral icterus.     Conjunctiva/sclera:  Conjunctivae normal.   Cardiovascular:      Rate and Rhythm: Normal rate and regular rhythm.      Pulses: Normal pulses.      Heart sounds: Normal heart sounds. No murmur heard.  No friction rub.   Pulmonary:      Effort: Pulmonary effort is normal. No respiratory distress.      Breath sounds: Normal breath sounds. No wheezing or rhonchi.   Abdominal:      General: Bowel sounds are normal.      Tenderness: There is no abdominal tenderness. There is no guarding or rebound.   Musculoskeletal:         General: No swelling.      Cervical back: Neck supple. No tenderness.      Right lower leg: No edema.      Left lower leg: No edema.   Lymphadenopathy:      Cervical: No cervical adenopathy.   Skin:     General: Skin is warm.      Coloration: Skin is not jaundiced.   Neurological:      General: No focal deficit present.      Mental Status: She is alert and oriented to person, place, and time. Mental status is at baseline.   Psychiatric:         Mood and Affect: Mood normal.         Behavior: Behavior normal.         Thought Content: Thought content normal.         Judgment: Judgment normal.         Significant Labs: All pertinent labs within the past 24 hours have been reviewed.    Significant Imaging: I have reviewed all pertinent imaging results/findings within the past 24 hours.     Scheduled Meds:   albuterol-ipratropium  3 mL Nebulization Q12H    aspirin  324 mg Oral Daily    atorvastatin  40 mg Oral Daily    azithromycin  250 mg Oral Daily    cefTRIAXone (ROCEPHIN) IVPB  1 g Intravenous Q24H    celecoxib  200 mg Oral BID    citalopram  20 mg Oral Daily    docusate sodium  100 mg Oral Q12H    loperamide  4 mg Oral Once    mupirocin  1 g Nasal BID    pantoprazole  40 mg Oral Daily    polyethylene glycol  17 g Oral Daily    pregabalin  75 mg Oral BID     Continuous Infusions:  PRN Meds:.acetaminophen, albuterol-ipratropium, bisacodyL, HYDROcodone-acetaminophen, HYDROmorphone, morphine, naloxone,  ondansetron, ondansetron, oxyCODONE, oxyCODONE, oxyCODONE-acetaminophen, prochlorperazine, sodium chloride 0.9%, zolpidem

## 2022-01-17 NOTE — ASSESSMENT & PLAN NOTE
- S/P ORIF by Dr. Adolfo Randall for fracture following a fall. Previous eft total hip replacement by Dr. Randall 12/13/21. *Post op day 3 of open reduction internal fixation with Dr. Adolfo Randall*  - Awaiting discharge to [facility] when medically ready, COVID-19 test negative 11/15/22  - pain medication PRN  - PT, OT and social service on board  - Continue  mg PO daily for propylaxis  - Approval for [facility] but need to continue workup for low H&H

## 2022-01-17 NOTE — PROGRESS NOTES
[facility] [place] Orthopedic  [facility] Medicine  Progress Note    Patient Name: Yany Silverio  MRN: [id number]  Patient Class: IP- Inpatient   Admission Date: 1/10/2022  Length of Stay: 7 days  Attending Physician: Alexa Lorenz,*  Primary Care Provider: Arelis Escobedo NP        Subjective:     Principal Problem:Closed nondisplaced subtrochanteric fracture of left femur        HPI:  Patient is a 61-year-old female with a history of essential hypertension, hyperlipidemia, superficial gastritis, O2 dependent COPD, and status post total L hip replacement on 12/13/2021 for end-stage OA who reportedly fell 2 days ago and developed left hip pain.  Patient was subsequently seen by her PCP today and diagnosed with closed nondisplaced subtrochanteric fracture of the left femur.  Dr. Adolfo Randall was contacted and surgery is planned for Wednesday.  Patient will be admitted.      Overview/Hospital Course:  01/13: CXR shows possible atelectasis vs pneumonia in the right middle lobe. Patient was started on rocephin and Azithromycin.    01/14: WBC 10.4 down from 12.2 yesterday and patient is afebrile with a Tmax of 100.5 in the last 24h. H&H dropped to 8.0/24.5 from 9.2/28.4 yesterday. Continue  mg PO daily for prophylaxis. Pending H&H check in PM.    01/15: Patient has been approved by [employer] and is waiting Covid testing and other paper work. Continue to monitor H&H.      Interval History:   No acute events over night and was laying comfortably in bed. H&H at 7.2/22.5 this from from 7.3/22.6. Patient has been approved for [employer] but H&H is slowing dropping with no bleeding source. We will repeat UA which shows positive RBC in the last repeat.     Review of Systems   Constitutional: Negative for fatigue and fever.   HENT: Negative for tinnitus and trouble swallowing.    Eyes: Negative for visual disturbance.   Respiratory: Negative for cough, chest tightness and shortness of breath.    Cardiovascular:  Negative for chest pain, palpitations and leg swelling.   Gastrointestinal: Negative for abdominal pain, diarrhea, nausea and vomiting.   Genitourinary: Negative for dysuria.   Musculoskeletal: Negative for neck stiffness.   Neurological: Negative for dizziness, tremors, syncope, speech difficulty, light-headedness and headaches.   Psychiatric/Behavioral: Negative for agitation, behavioral problems and confusion.     Objective:     Vital Signs (Most Recent):  Temp: 97.8 °F (36.6 °C) (01/16/22 1650)  Pulse: 88 (01/16/22 1650)  Resp: 18 (01/16/22 1650)  BP: 110/63 (01/16/22 1650)  SpO2: (!) 93 % (01/16/22 1650) Vital Signs (24h Range):  Temp:  [97.5 °F (36.4 °C)-98.9 °F (37.2 °C)] 97.8 °F (36.6 °C)  Pulse:  [72-89] 88  Resp:  [16-18] 18  SpO2:  [93 %-97 %] 93 %  BP: ()/(56-65) 110/63     Weight: 76.2 kg (168 lb)  Body mass index is 28.84 kg/m².  No intake or output data in the 24 hours ending 01/16/22 1836   Physical Exam  Constitutional:       General: She is not in acute distress.     Appearance: She is normal weight. She is not ill-appearing.   HENT:      Head: Normocephalic and atraumatic.      Right Ear: External ear normal.      Left Ear: External ear normal.      Nose: Nose normal. No congestion or rhinorrhea.      Mouth/Throat:      Mouth: Mucous membranes are moist.      Pharynx: Oropharynx is clear.   Eyes:      General: No scleral icterus.     Conjunctiva/sclera: Conjunctivae normal.   Cardiovascular:      Rate and Rhythm: Normal rate and regular rhythm.      Pulses: Normal pulses.      Heart sounds: Normal heart sounds. No murmur heard.  No friction rub.   Pulmonary:      Effort: Pulmonary effort is normal. No respiratory distress.      Breath sounds: Normal breath sounds. No wheezing or rhonchi.   Abdominal:      General: Bowel sounds are normal.      Tenderness: There is no abdominal tenderness. There is no guarding or rebound.   Musculoskeletal:         General: No swelling.      Cervical back:  Neck supple. No tenderness.      Right lower leg: No edema.      Left lower leg: No edema.   Lymphadenopathy:      Cervical: No cervical adenopathy.   Skin:     General: Skin is warm.      Coloration: Skin is not jaundiced.   Neurological:      General: No focal deficit present.      Mental Status: She is alert and oriented to person, place, and time. Mental status is at baseline.   Psychiatric:         Mood and Affect: Mood normal.         Behavior: Behavior normal.         Thought Content: Thought content normal.         Judgment: Judgment normal.         Significant Labs: All pertinent labs within the past 24 hours have been reviewed.    Significant Imaging: I have reviewed all pertinent imaging results/findings within the past 24 hours.     Scheduled Meds:   albuterol-ipratropium  3 mL Nebulization Q12H    aspirin  324 mg Oral Daily    atorvastatin  40 mg Oral Daily    azithromycin  250 mg Oral Daily    cefTRIAXone (ROCEPHIN) IVPB  1 g Intravenous Q24H    celecoxib  200 mg Oral BID    citalopram  20 mg Oral Daily    docusate sodium  100 mg Oral Q12H    loperamide  4 mg Oral Once    mupirocin  1 g Nasal BID    pantoprazole  40 mg Oral Daily    polyethylene glycol  17 g Oral Daily    pregabalin  75 mg Oral BID     Continuous Infusions:  PRN Meds:.acetaminophen, albuterol-ipratropium, bisacodyL, HYDROcodone-acetaminophen, HYDROmorphone, morphine, naloxone, ondansetron, ondansetron, oxyCODONE, oxyCODONE, oxyCODONE-acetaminophen, prochlorperazine, sodium chloride 0.9%, zolpidem      Assessment/Plan:      * Closed nondisplaced subtrochanteric fracture of left femur  - S/P ORIF by Dr. Adolfo Randall for fracture following a fall. Previous eft total hip replacement by Dr. Randall 12/13/21. *Post op day 3 of open reduction internal fixation with Dr. Adolfo Randall*  - Awaiting discharge to [facility] when medically ready, COVID-19 test negative 11/15/22  - pain medication PRN  - PT, OT and social service on board  -  Continue  mg PO daily for propylaxis  - Approval for [facility] but need to continue workup for low H&H            Pneumonia  - CXR on 01/14 showed some atelectasis vs PNA in the right middle lobe.  - Rocephin 1mg qd x 5 days (1/18)  - azithromycin 500 1x day  then 250 mg x4 days qd x 4 days (1/18)          Hypertension  - Blood pressure is controlled  - Holding home Lasix 20 mg daily        Anxiety and depression  - Continue home Citalopram 20 mg daily      Chronic obstructive pulmonary disease  - Hx of COPD on home oxygen NC 2L  - DuoNeb BID from q6h  - Budesonide 0.5 mg BID         Mixed hyperlipidemia  - Continue home atorvastatin 40 mg qd      NGOZI on CPAP  - Patient uses CPAP at home so continue CPAP at night      Anemia  H/H 7.2/22.5 this AM, slowly trending downward over the past few days. No obvious source of bleeding noted, however patient on daily ASA therapy following surgery. FOBT pending.         VTE Risk Mitigation (From admission, onward)         Ordered     IP VTE LOW RISK PATIENT  Once         01/13/22 0819     Place sequential compression device  Until discontinued         01/13/22 0819     Place sequential compression device  Until discontinued         01/10/22 2227                Discharge Planning   PARVEZ:      Code Status: Full Code   Is the patient medically ready for discharge?:     Reason for patient still in hospital (select all that apply): Treatment  Discharge Plan A: Rehab   Discharge Delays: None known at this time              Brian Foy MD  Department of Hospital Medicine   [facility] - Orthopedic

## 2022-01-17 NOTE — NURSING
Pt requested to be transported to [facility] by private vehicle with son. Medicaid transport has been canceled per pt request.

## 2022-01-17 NOTE — PT/OT/SLP PROGRESS
Occupational Therapy   Treatment    Name: Yany Silverio  MRN: [id number]  Admitting Diagnosis:  Closed nondisplaced subtrochanteric fracture of left femur  5 Days Post-Op    Recommendations:     Discharge Recommendations: nursing facility, skilled  Discharge Equipment Recommendations:  none  Barriers to discharge:  Decreased caregiver support (pt lives alone)    Assessment:     Yany Silverio is a 61 y.o. female with a medical diagnosis of Closed nondisplaced subtrochanteric fracture of left femur.  She presents with decreased strength, decreased functional mobility, and decline in ADLs. Performance deficits affecting function are weakness,impaired endurance,impaired self care skills,impaired functional mobilty,gait instability,impaired balance,orthopedic precautions.     Rehab Prognosis:  Good; patient would benefit from acute skilled OT services to address these deficits and reach maximum level of function.       Plan:     Patient to be seen 5 x/week to address the above listed problems via self-care/home management,therapeutic activities,therapeutic exercises  · Plan of Care Expires: 02/09/22  · Plan of Care Reviewed with: patient    Subjective     Pain/Comfort:  · Pain Rating 1: 6/10  · Location - Side 1: Left  · Location 1: hip  · Pain Addressed 1: Pre-medicate for activity    Objective:     Communicated with: SHALINI Caldwell prior to session.  Patient found up in chair with peripheral IV,telemetry upon OT entry to room.    General Precautions: Standard, fall   Orthopedic Precautions:LLE toe touch weight bearing,LLE posterior precautions   Braces:    Respiratory Status: Nasal cannula, flow 2 L/min     Occupational Performance:     Bed Mobility:    · Not performed     Functional Mobility/Transfers:  · Patient completed Sit <> Stand Transfer with contact guard assistance  with  rolling walker   · Patient completed Toilet Transfer Step Transfer technique with contact guard assistance with  rolling  walker  · Functional Mobility: pt performed functional mobility to [facility] with RW with CGA with good adherence to WB status    Activities of Daily Living:  · Toileting: supervision to perform all aspects of toileting at [facility] 6 Click ADL:      Treatment & Education:  Pt completed 2 sets x 15 reps each bicep curls 2#db, chest press 2#db, and rows red tband in order to improve BUE strength and endurance to perform ADL and ADL t/f with less assist.     Patient left up in chair with all lines intact and call button in reachEducation:      GOALS:   Multidisciplinary Problems     Occupational Therapy Goals        Problem: Occupational Therapy Goal    Goal Priority Disciplines Outcome Interventions   Occupational Therapy Goal     OT, PT/OT Ongoing, Progressing    Description: ST.Pt will perform bathing with Louie with setup at EOB  2.Pt will perform UE dressing with Louie  3.Pt will perform LE dressing with Susanna with adaptive equipment and maintaining hip precautions  4.Pt will transfer bed/chair/[facility] with CGA with RW maintaining weightbearing status  5.Pt will perform standing task x 4 min with CGA with RW  6.Tolerate 15 min of tx without fatigue.      LTG:   Restore to max I with selfcare and mobility.                       Time Tracking:     OT Date of Treatment: 22  OT Start Time: 1410  OT Stop Time: 142  OT Total Time (min): 19 min    Billable Minutes:Therapeutic Exercise 15 minutes    OT/EDA: OT          2022

## 2022-01-17 NOTE — PLAN OF CARE
rec'd call from RN, no dc orders as of yet, called and spoke with residents and pt is not ready for dc today, possibly tomorrow, updated lee at [facility], following

## 2022-01-17 NOTE — PLAN OF CARE
[facility] - Orthopedic  Discharge Final Note    Primary Care Provider: Arelis Escobedo NP    Expected Discharge Date:     Final Discharge Note (most recent)     Final Note - 01/17/22 0830        Final Note    Assessment Type Final Discharge Note     Anticipated Discharge Disposition Rehab Facility        Post-Acute Status    Post-Acute Authorization Placement     Post-Acute Placement Status Set-up Complete/Auth obtained     Patient choice form signed by patient/caregiver List with quality metrics by geographic area provided;List from CMS Compare     Discharge Delays None known at this time                 Important Message from Medicare             Contact Info     PREET Covarrubias   Specialty: Family Medicine, Emergency Medicine, Orthopedic Surgery    [address]  [facility] [address]   Phone: [phone]       Next Steps: Follow up in 2 week(s)    Instructions: For suture removal, For wound re-check      rec'd call from lee at [facility] pt accepted for today, must be at facility by 2pm, notified residents, updated patient, packet is in cubby, will fax dc clinicals once available

## 2022-01-17 NOTE — NURSING
Notified Medicaid transportation [phone] of need to transport pt to [facility].  Patient has to be there by 2 pm.  [facility] will pick patient up from [phone] today.

## 2022-01-18 VITALS
WEIGHT: 168 LBS | TEMPERATURE: 98 F | SYSTOLIC BLOOD PRESSURE: 107 MMHG | RESPIRATION RATE: 16 BRPM | HEART RATE: 84 BPM | HEIGHT: 64 IN | OXYGEN SATURATION: 97 % | BODY MASS INDEX: 28.68 KG/M2 | DIASTOLIC BLOOD PRESSURE: 70 MMHG

## 2022-01-18 LAB
ANION GAP SERPL CALCULATED.3IONS-SCNC: 12 MMOL/L (ref 7–16)
BASOPHILS # BLD AUTO: 0.03 K/UL (ref 0–0.2)
BASOPHILS NFR BLD AUTO: 0.3 % (ref 0–1)
BUN SERPL-MCNC: 9 MG/DL (ref 7–18)
BUN/CREAT SERPL: 20 (ref 6–20)
CALCIUM SERPL-MCNC: 8 MG/DL (ref 8.5–10.1)
CHLORIDE SERPL-SCNC: 107 MMOL/L (ref 98–107)
CO2 SERPL-SCNC: 28 MMOL/L (ref 21–32)
CREAT SERPL-MCNC: 0.44 MG/DL (ref 0.55–1.02)
DIFFERENTIAL METHOD BLD: ABNORMAL
EOSINOPHIL # BLD AUTO: 0.39 K/UL (ref 0–0.5)
EOSINOPHIL NFR BLD AUTO: 4.5 % (ref 1–4)
ERYTHROCYTE [DISTWIDTH] IN BLOOD BY AUTOMATED COUNT: 15.6 % (ref 11.5–14.5)
GLUCOSE SERPL-MCNC: 80 MG/DL (ref 74–106)
HCT VFR BLD AUTO: 23.5 % (ref 38–47)
HGB BLD-MCNC: 7.3 G/DL (ref 12–16)
IMM GRANULOCYTES # BLD AUTO: 0.04 K/UL (ref 0–0.04)
IMM GRANULOCYTES NFR BLD: 0.5 % (ref 0–0.4)
LYMPHOCYTES # BLD AUTO: 1.86 K/UL (ref 1–4.8)
LYMPHOCYTES NFR BLD AUTO: 21.7 % (ref 27–41)
MCH RBC QN AUTO: 29.7 PG (ref 27–31)
MCHC RBC AUTO-ENTMCNC: 31.1 G/DL (ref 32–36)
MCV RBC AUTO: 95.5 FL (ref 80–96)
MONOCYTES # BLD AUTO: 0.72 K/UL (ref 0–0.8)
MONOCYTES NFR BLD AUTO: 8.4 % (ref 2–6)
MPC BLD CALC-MCNC: 10.5 FL (ref 9.4–12.4)
NEUTROPHILS # BLD AUTO: 5.54 K/UL (ref 1.8–7.7)
NEUTROPHILS NFR BLD AUTO: 64.6 % (ref 53–65)
NRBC # BLD AUTO: 0 X10E3/UL
NRBC, AUTO (.00): 0 %
PLATELET # BLD AUTO: 229 K/UL (ref 150–400)
POTASSIUM SERPL-SCNC: 4.4 MMOL/L (ref 3.5–5.1)
RBC # BLD AUTO: 2.46 M/UL (ref 4.2–5.4)
SODIUM SERPL-SCNC: 143 MMOL/L (ref 136–145)
WBC # BLD AUTO: 8.58 K/UL (ref 4.5–11)

## 2022-01-18 PROCEDURE — 25000242 PHARM REV CODE 250 ALT 637 W/ HCPCS

## 2022-01-18 PROCEDURE — 25000003 PHARM REV CODE 250

## 2022-01-18 PROCEDURE — 99217 PR OBSERVATION CARE DISCHARGE: ICD-10-PCS | Mod: ,,, | Performed by: INTERNAL MEDICINE

## 2022-01-18 PROCEDURE — 80048 BASIC METABOLIC PNL TOTAL CA: CPT

## 2022-01-18 PROCEDURE — G0378 HOSPITAL OBSERVATION PER HR: HCPCS

## 2022-01-18 PROCEDURE — 97116 GAIT TRAINING THERAPY: CPT

## 2022-01-18 PROCEDURE — 99900035 HC TECH TIME PER 15 MIN (STAT)

## 2022-01-18 PROCEDURE — 99217 PR OBSERVATION CARE DISCHARGE: CPT | Mod: ,,, | Performed by: INTERNAL MEDICINE

## 2022-01-18 PROCEDURE — 94660 CPAP INITIATION&MGMT: CPT

## 2022-01-18 PROCEDURE — 25000003 PHARM REV CODE 250: Mod: GY | Performed by: ORTHOPAEDIC SURGERY

## 2022-01-18 PROCEDURE — 94640 AIRWAY INHALATION TREATMENT: CPT

## 2022-01-18 PROCEDURE — 36415 COLL VENOUS BLD VENIPUNCTURE: CPT

## 2022-01-18 PROCEDURE — 97110 THERAPEUTIC EXERCISES: CPT

## 2022-01-18 PROCEDURE — 94761 N-INVAS EAR/PLS OXIMETRY MLT: CPT | Mod: 59

## 2022-01-18 PROCEDURE — 27000221 HC OXYGEN, UP TO 24 HOURS

## 2022-01-18 PROCEDURE — 85025 COMPLETE CBC W/AUTO DIFF WBC: CPT

## 2022-01-18 RX ADMIN — OXYCODONE HYDROCHLORIDE AND ACETAMINOPHEN 1 TABLET: 10; 325 TABLET ORAL at 06:01

## 2022-01-18 RX ADMIN — PANTOPRAZOLE SODIUM 40 MG: 40 TABLET, DELAYED RELEASE ORAL at 09:01

## 2022-01-18 RX ADMIN — IPRATROPIUM BROMIDE AND ALBUTEROL SULFATE 3 ML: .5; 3 SOLUTION RESPIRATORY (INHALATION) at 07:01

## 2022-01-18 RX ADMIN — ATORVASTATIN CALCIUM 40 MG: 40 TABLET, FILM COATED ORAL at 09:01

## 2022-01-18 RX ADMIN — CITALOPRAM HYDROBROMIDE 20 MG: 20 TABLET ORAL at 09:01

## 2022-01-18 RX ADMIN — ASPIRIN 81 MG CHEWABLE TABLET 324 MG: 81 TABLET CHEWABLE at 09:01

## 2022-01-18 RX ADMIN — POLYETHYLENE GLYCOL 3350 17 G: 17 POWDER, FOR SOLUTION ORAL at 09:01

## 2022-01-18 RX ADMIN — DOCUSATE SODIUM 100 MG: 100 CAPSULE, LIQUID FILLED ORAL at 09:01

## 2022-01-18 RX ADMIN — CELECOXIB 200 MG: 100 CAPSULE ORAL at 09:01

## 2022-01-18 RX ADMIN — PREGABALIN 75 MG: 75 CAPSULE ORAL at 09:01

## 2022-01-18 NOTE — PLAN OF CARE
[facility] - Orthopedic  Discharge Final Note    Primary Care Provider: Arelis Escobedo NP    Expected Discharge Date: 1/18/2022    Final Discharge Note (most recent)     Final Note - 01/18/22 1443        Final Note    Assessment Type Final Discharge Note     Anticipated Discharge Disposition Rehab Facility   [facility]    What phone number can be called within the next 1-3 days to see how you are doing after discharge? [phone]        Post-Acute Status    Post-Acute Authorization Placement     Post-Acute Placement Status Set-up Complete/Auth obtained     Patient choice form signed by patient/caregiver List with quality metrics by geographic area provided;List from CMS Compare     Discharge Delays None known at this time                 Important Message from Medicare              Follow-up providers     PREET Covarrubias   Specialty: Family Medicine, Emergency Medicine, Orthopedic Surgery    [address] [place] [address]   Phone: [phone]       Next Steps: Follow up in 2 week(s)    Instructions: For suture removal, For wound re-check on Jan 26 at 0930    Arelis Escobedo NP   Specialty: Family Medicine   Relationship: PCP - General    [address] [place] [address] 15  [facility] [place] [address]   Phone: [phone]       Next Steps: Follow up in 1 week(s)    Instructions: please follow up on Jan.25 at 2:30          After-discharge care            Destination     [facility]   Service: Inpatient Rehabilitation    [address] [facility] [address]   Phone: [phone]                       Pt accepted for [facility] today per Claribel. Pkt in [facility] and pt will dc today. No other needs.

## 2022-01-18 NOTE — ASSESSMENT & PLAN NOTE
- Patient had Citalopram 20 mg daily  - Resume home Citalopram 40 mg  - Follow up with PCP in one week for continued medical care

## 2022-01-18 NOTE — ASSESSMENT & PLAN NOTE
- Continue  mg PO daily for 30 days (DVT  Propylaxis)  - Continue Percocet  q6h PRN  - Approval for [facility] but need to continue workup for low H&H

## 2022-01-18 NOTE — NURSING
Pt discharged to [facility]. Report called to SHALINI Heart. PIV removed, intact. Packet given to family. Pt d/c via POV. [facility].

## 2022-01-18 NOTE — PT/OT/SLP PROGRESS
"Physical Therapy Treatment    Patient Name:  Yany Silverio   MRN:  [id number]    Recommendations:     Discharge Recommendations:  rehabilitation facility,nursing facility, skilled   Discharge Equipment Recommendations: none   Barriers to discharge: None    Assessment:     Yany Silverio is a 61 y.o. female admitted with a medical diagnosis of Closed nondisplaced subtrochanteric fracture of left femur.  She presents with the following impairments/functional limitations:  weakness,impaired endurance,impaired self care skills,impaired functional mobilty,gait instability,impaired balance,decreased lower extremity function,pain,decreased ROM,orthopedic precautions .    Patient continues to improve with functional mobility using RW TTWB. No lightheadedness during mobility but mild SOB/DC with the exertion of gait trial. Patient recovers quickly with use of supplemental O2 PRN. Swing bed today planned.    Rehab Prognosis: Good; patient would benefit from acute skilled PT services to address these deficits and reach maximum level of function.    Recent Surgery: Procedure(s) (LRB):  ORIF, HIP (Left) 6 Days Post-Op    Plan:     During this hospitalization, patient to be seen BID (5x/week; daily 2x/week) to address the identified rehab impairments via gait training,therapeutic activities,therapeutic exercises and progress toward the following goals:    · Plan of Care Expires:  02/12/22    Subjective     Chief Complaint: L hip fracture  Patient/Family Comments/goals: "I still can't do all the exercises without help. I tried but I could not do it."  Pain/Comfort:  · Pain Rating 1: 5/10  · Location - Side 1: Left  · Location 1: hip  · Pain Addressed 1: Pre-medicate for activity,Reposition,Distraction,Cessation of Activity  · Pain Rating Post-Intervention 1: 5/10      Objective:     Communicated with SHALINI Obrien prior to session.  Patient found transfering to the [facility] with peripheral IV,telemetry,oxygen upon PT entry to room. "     General Precautions: Standard, fall   Orthopedic Precautions:LLE toe touch weight bearing,LLE posterior precautions   Braces:    Respiratory Status: Room air     Functional Mobility:  · Transfers:     · Sit to Stand:  modified independence with rolling walker  · Gait: 100' with RW SBA, TTWB L LE; pt working on foot flat postion in stance phase whilst maintaining TTWB restriction; mild SOB/DC but pt recovered quickly with seated rest break and use of supplemental O2 x 2-3 minutes. Pt reports only using O2 PRN.    · Toiletting- pt managed clothing and hygiene independently but demonstrated mild increased trunk sway/unsteadiness requiring pause/reset with UE support on walker prior to completing clothing mgmt.      AM-PAC 6 CLICK MOBILITY  Turning over in bed (including adjusting bedclothes, sheets and blankets)?: 4  Sitting down on and standing up from a chair with arms (e.g., wheelchair, bedside commode, etc.): 4  Moving from lying on back to sitting on the side of the bed?: 4  Moving to and from a bed to a chair (including a wheelchair)?: 4  Need to walk in hospital room?: 3  Climbing 3-5 steps with a railing?: 3  Basic Mobility Total Score: 22       Therapeutic Activities and Exercises:   gait and t/f as above  Bilateral lower extremity exercise x 30 reps: ankle pumps, Quad sets, glut sets, heel slides, hip abduction/adduction, straight leg raises and Long arc quads with active assist ROM, verbal cues and tactile cues    Patient left up in chair with all lines intact, call button in reach and RN Radha Obrien notified..    GOALS:   Multidisciplinary Problems     Physical Therapy Goals        Problem: Physical Therapy Goal    Goal Priority Disciplines Outcome Goal Variances Interventions   Physical Therapy Goal     PT, PT/OT Ongoing, Progressing     Description: Short Term Goals to be met by: 1/26/2022    Patient will increase functional independence and safety with mobility by performing    1. Supine to sit  modified independent  2. Sit to stand modified independent with Rolling walker; Toe-touch weight-bearing: left LE  3. Gait x 100' modified independent with Rolling walker; Toe-touch weight-bearing: left LE  4. Patient with complete illustrated home exercise program x 30 reps with assist as needed.  5. Patient will verbalize 3/3 hip precautions      Long Term Goal to be met by 3/12/2022    1. Patient will regain independent functional mobility with lowest level of assistive device to return to home situation and prior ADLs.                    Time Tracking:     PT Received On: 01/18/22  PT Start Time: 1040     PT Stop Time: 1104  PT Total Time (min): 24 min     Billable Minutes: Gait Training 8 minutes and Therapeutic Exercise 15 minutes    Treatment Type: Treatment  PT/PTA: PT     PTA Visit Number: 0     01/18/2022

## 2022-01-18 NOTE — ASSESSMENT & PLAN NOTE
- Blood pressure is controlled  - Holding home Lasix 20 mg daily  - Follow up with PCP for continued medical care

## 2022-01-18 NOTE — DISCHARGE SUMMARY
[facility] - Orthopedic  Hospital Medicine  Discharge Summary      Patient Name: Yany Silverio  MRN: [id number]  Patient Class: OP- Observation  Admission Date: 1/10/2022  Hospital Length of Stay: 7 days  Discharge Date and Time:  01/18/2022 11:53 AM  Attending Physician: Alexa Lorenz,*   Discharging Provider: Richard Fernández DO  Primary Care Provider: Arelis Escobedo NP      HPI:   Patient is a 61-year-old female with a history of essential hypertension, hyperlipidemia, superficial gastritis, O2 dependent COPD, and status post total L hip replacement on 12/13/2021 for end-stage OA who reportedly fell 2 days ago and developed left hip pain.  Patient was subsequently seen by her PCP today and diagnosed with closed nondisplaced subtrochanteric fracture of the left femur.  Dr. Adolfo Randall was contacted and surgery is planned for Wednesday.  Patient will be admitted.      Procedure(s) (LRB):  ORIF, HIP (Left)      Hospital Course:   01/13: CXR shows possible atelectasis vs pneumonia in the right middle lobe. Patient was started on rocephin and Azithromycin.    01/14: WBC 10.4 down from 12.2 yesterday and patient is afebrile with a Tmax of 100.5 in the last 24h. H&H dropped to 8.0/24.5 from 9.2/28.4 yesterday. Continue  mg PO daily for prophylaxis. Pending H&H check in PM.    01/15: Patient has been approved by [facility] and is waiting Covid testing and other paper work. Continue to monitor H&H.    Ms Silverio is a 60 yo female with past medical history of HTN, HLD, COPD on home oxygen and gastritis who was admitted to [facility] on 1/10/2022 for left hip fracture following a fall. Patient had a total left hip total replacement with Dr. Adolfo Randall and re-injured the same hip after the fall. She underwent as a successfully surgery with Dr. Randall. On day 1 post surgery, patient had a CXR which showed a possible pneumonia and she was treated with rocephin and azithromycin for 5 days. Patient  has been stable since after the surgery.    Today, it was determined that patient has gained the maximum benefit from this hospitalization and is stable to be discharged to [facility]. Patient will follow up with Dr. Randall in one week and her PCP in one week.           Goals of Care Treatment Preferences:  Code Status: Full Code      Consults:   Consults (From admission, onward)        Status Ordering Provider     IP consult case management/social work  Once        Provider:  (Not yet assigned)    Completed ADOLFO RANDALL     Inpatient consult to Social Work  Once        Provider:  (Not yet assigned)    Completed SUZANNA ALNGE     Inpatient consult to Orthopedics  Once        Provider:  Adolfo Randall MD    Completed SHINE IBARRA S          * Closed nondisplaced subtrochanteric fracture of left femur  - Continue  mg PO daily for 30 days (DVT  Propylaxis)  - Continue Percocet  q6h PRN  - Approval for [facility] but need to continue workup for low H&H            Anemia  - Follow up with PCP for continued medical care      Anxiety and depression  - Patient had Citalopram 20 mg daily  - Resume home Citalopram 40 mg  - Follow up with PCP in one week for continued medical care      Hypertension  - Blood pressure is controlled  - Holding home Lasix 20 mg daily  - Follow up with PCP for continued medical care        NGOZI on CPAP  - Follow up with PCP in one week for continued medical care      Chronic obstructive pulmonary disease  - Follow up with PCP for continued medical medical care       Mixed hyperlipidemia  - Resume home statin        Final Active Diagnoses:    Diagnosis Date Noted POA    PRINCIPAL PROBLEM:  Closed nondisplaced subtrochanteric fracture of left femur [S72.25XA] 01/10/2022 Yes    Anemia [D64.9] 01/16/2022 Yes    Hypertension [I10] 12/13/2021 Yes     Chronic    Anxiety and depression [F41.9, F32.A] 12/13/2021 Yes     Chronic    NGOZI on CPAP [G47.33, Z99.89] 11/29/2021 Not Applicable    Chronic  obstructive pulmonary disease [J44.9] 07/22/2021 Yes    Mixed hyperlipidemia [E78.2] 06/10/2021 Yes      Problems Resolved During this Admission:    Diagnosis Date Noted Date Resolved POA    Pneumonia [J18.9] 01/14/2022 01/17/2022 No       Discharged Condition: stable    Disposition: Home or Self Care    Follow Up:   Contact information for follow-up providers     PREET Covarrubias In 2 weeks.    Specialties: Family Medicine, Emergency Medicine, Orthopedic Surgery  Why: For suture removal, For wound re-check on Jan 26 at 0930  Contact information:  [address] [facility] [address]  [phone]             Arelis Escobedo NP In 1 week.    Specialty: Family Medicine  Contact information:  [address]  [facility] [address]  [phone]                   Contact information for after-discharge care     [facility].    Service: Inpatient Rehabilitation  Contact information:  [address] [address]  [phone]                           Patient Instructions:      Activity as tolerated       Significant Diagnostic Studies: Labs:   BMP:   Recent Labs   Lab 01/18/22  0435   GLU 80      K 4.4      CO2 28   BUN 9   CREATININE 0.44*   CALCIUM 8.0*       Pending Diagnostic Studies:     Procedure Component Value Units Date/Time    EKG 12-lead [[id number]] Collected: 01/10/22 2034    Order Status: Sent Lab Status: In process Updated: 01/11/22 0607    Narrative:      Test Reason : S72.009A,    Vent. Rate : 069 BPM     Atrial Rate : 069 BPM     P-R Int : 176 ms          QRS Dur : 086 ms      QT Int : 374 ms       P-R-T Axes : 063 034 046 degrees     QTc Int : 400 ms    Normal sinus rhythm  Normal ECG  When compared with ECG of 29-NOV-2021 12:08,  No significant change was found    Referred By: [email or web address]   SELF           Confirmed By:     EXTRA TUBES [[id number]] Collected: 01/16/22 0409    Order Status: Sent  Lab Status: In process Updated: 01/17/22 0902    Specimen: Blood, Venous     Narrative:      The following orders were created for panel order EXTRA TUBES.  Procedure                               Abnormality         Status                     ---------                               -----------         ------                     Light Green Top Hold[[id number]]                             In process                 Pink Top Hold[[id number]]                                                                 Please view results for these tests on the individual orders.    EXTRA TUBES [[id number]] Collected: 01/10/22 2026    Order Status: Sent Lab Status: In process Updated: 01/10/22 2032    Specimen: Blood, Venous     Narrative:      The following orders were created for panel order EXTRA TUBES.  Procedure                               Abnormality         Status                     ---------                               -----------         ------                     Light Green Top Hold[[id number]]                             In process                 Gold Top Hold[[id number]]                                    In process                   Please view results for these tests on the individual orders.         Medications:  Reconciled Home Medications:      Medication List      START taking these medications    aspirin 81 MG Chew  Take 4 tablets (324 mg total) by mouth once daily.  Replaces: aspirin 81 MG EC tablet        CONTINUE taking these medications    citalopram 20 MG tablet  Commonly known as: CeleXA  Take 2 tablets (40 mg total) by mouth once daily.     diclofenac sodium 1 % Gel  Commonly known as: VOLTAREN  Apply 2 g topically 3 (three) times daily.     furosemide 20 MG tablet  Commonly known as: LASIX  TAKE 1 TABLET BY MOUTH DAILY FOR 3 DAYS THEN WEIGH DAILY AND IF NO MORE THAN 3 POUND WEIGHT GAIN, TAKE LASIX     lovastatin 20 MG tablet  Commonly known as: MEVACOR  Take 1 tablet (20 mg total) by mouth once daily.      meloxicam 15 MG tablet  Commonly known as: MOBIC  TAKE ONE TABLET BY MOUTH ONE TIME DAILY     ondansetron 4 MG Tbdl  Commonly known as: ZOFRAN-ODT  Take 2 tablets (8 mg total) by mouth every 8 (eight) hours as needed (Nausea).     oxyCODONE-acetaminophen  mg per tablet  Commonly known as: PERCOCET  Take 1 tablet by mouth every 6 (six) hours as needed for Pain.     pantoprazole 40 MG tablet  Commonly known as: PROTONIX  Take 1 tablet by mouth once daily.     predniSONE 5 MG tablet  Commonly known as: DELTASONE  TAKE ONE Tablet BY MOUTH DAILY WITH FOOD     tiotropium 18 mcg inhalation capsule  Commonly known as: SPIRIVA  Inhale 1 capsule (18 mcg total) into the lungs once daily. Controller     * albuterol 2.5 mg /3 mL (0.083 %) nebulizer solution  Commonly known as: PROVENTIL  albuterol sulfate 2.5 mg/3 mL (0.083 %) solution for nebulization     * VENTOLIN HFA 90 mcg/actuation inhaler  Generic drug: albuterol  Inhale 1 puff into the lungs daily as needed.     zolpidem 5 MG Tab  Commonly known as: AMBIEN  TAKE ONE TABLET BY MOUTH AT BEDTIME AS NEEDED//DO NOT TAKE WITHIN 4 HOURS OF TAKING PAIN MEDS//         * This list has 2 medication(s) that are the same as other medications prescribed for you. Read the directions carefully, and ask your doctor or other care provider to review them with you.            STOP taking these medications    aspirin 81 MG EC tablet  Commonly known as: ECOTRIN  Replaced by: aspirin 81 MG Chew            Indwelling Lines/Drains at time of discharge:   Lines/Drains/Airways     None                 Time spent on the discharge of patient: 35 minutes         Richard Fernández DO  Department of Hospital Medicine  [facility] - Orthopedic

## 2022-02-14 DIAGNOSIS — S72.002A CLOSED FRACTURE OF LEFT HIP, INITIAL ENCOUNTER: Primary | ICD-10-CM

## 2022-02-15 ENCOUNTER — HOSPITAL ENCOUNTER (OUTPATIENT)
Dept: RADIOLOGY | Facility: HOSPITAL | Age: 62
Discharge: HOME | End: 2022-02-15
Attending: ORTHOPAEDIC SURGERY
Payer: MEDICAID

## 2022-02-15 ENCOUNTER — OFFICE VISIT (OUTPATIENT)
Dept: ORTHOPEDICS | Facility: CLINIC | Age: 62
End: 2022-02-15
Payer: MEDICAID

## 2022-02-15 DIAGNOSIS — S72.002A CLOSED FRACTURE OF LEFT HIP, INITIAL ENCOUNTER: ICD-10-CM

## 2022-02-15 DIAGNOSIS — Z96.642 STATUS POST TOTAL HIP REPLACEMENT, LEFT: ICD-10-CM

## 2022-02-15 DIAGNOSIS — S72.002A CLOSED FRACTURE OF LEFT HIP, INITIAL ENCOUNTER: Primary | ICD-10-CM

## 2022-02-15 PROCEDURE — 73552 X-RAY EXAM OF FEMUR 2/>: CPT | Mod: 26,LT,, | Performed by: ORTHOPAEDIC SURGERY

## 2022-02-15 PROCEDURE — 99024 POSTOP FOLLOW-UP VISIT: CPT | Mod: ,,, | Performed by: ORTHOPAEDIC SURGERY

## 2022-02-15 PROCEDURE — 72170 X-RAY EXAM OF PELVIS: CPT | Mod: TC

## 2022-02-15 PROCEDURE — 99024 PR POST-OP FOLLOW-UP VISIT: ICD-10-PCS | Mod: ,,, | Performed by: ORTHOPAEDIC SURGERY

## 2022-02-15 PROCEDURE — 72170 X-RAY EXAM OF PELVIS: CPT | Mod: 26,,, | Performed by: ORTHOPAEDIC SURGERY

## 2022-02-15 PROCEDURE — 73552 X-RAY EXAM OF FEMUR 2/>: CPT | Mod: TC,LT

## 2022-02-15 PROCEDURE — 72170 XR PELVIS ROUTINE AP: ICD-10-PCS | Mod: 26,,, | Performed by: ORTHOPAEDIC SURGERY

## 2022-02-15 PROCEDURE — 73552 XR FEMUR 2 VIEW LEFT: ICD-10-PCS | Mod: 26,LT,, | Performed by: ORTHOPAEDIC SURGERY

## 2022-02-15 NOTE — PROGRESS NOTES
HISTORY OF PRESENT ILLNESS:       Pt is here today for Second post-operative followup of her Orif, Hip - Left on 1/12/2022    she is doing well.  We have reviewed her findings and discussed plan of care and future treatment options, including the physical therapy plan.      She is doing much better, states she have very little pain, she is not in therapy at this time.                                                                               PHYSICAL EXAMINATION:     Incision sites healed well.   No evidence of any erythema, infection or induration  Range of motion of the hip is appropriate  Neurovascularly, the patient appears to have no deficits in the affected extremity      IMAGING:  Radiographs left hip demonstrate stable appearing prosthesis in the left proximal femur with cerclage wires which appeared to be appropriately position.  Reduction appears to be well maintained                                                                               ASSESSMENT:                                                                                                                                               1. S/P Orif, Hip - Left 1/12/2022                                                                                                                               PLAN:                                                                                                                                                     1. Continue with PT- 50% WB at this time  2. Emphasized the importance of hip abductor strengthening   3. I have discussed return to activity in detail.  4. she will see us back in 4 weeks.                                      5. All questions were answered and she should contact us if she  has any questions or concerns in the interim.              There are no Patient Instructions on file for this visit.

## 2022-02-28 ENCOUNTER — OFFICE VISIT (OUTPATIENT)
Dept: SLEEP MEDICINE | Facility: CLINIC | Age: 62
End: 2022-02-28
Attending: FAMILY MEDICINE
Payer: MEDICAID

## 2022-02-28 VITALS
HEART RATE: 81 BPM | SYSTOLIC BLOOD PRESSURE: 96 MMHG | OXYGEN SATURATION: 97 % | BODY MASS INDEX: 30.08 KG/M2 | WEIGHT: 176.19 LBS | HEIGHT: 64 IN | DIASTOLIC BLOOD PRESSURE: 62 MMHG

## 2022-02-28 DIAGNOSIS — J44.9 CHRONIC OBSTRUCTIVE PULMONARY DISEASE, UNSPECIFIED COPD TYPE: ICD-10-CM

## 2022-02-28 DIAGNOSIS — G47.33 OSA ON CPAP: Primary | ICD-10-CM

## 2022-02-28 PROCEDURE — 1159F PR MEDICATION LIST DOCUMENTED IN MEDICAL RECORD: ICD-10-PCS | Mod: CPTII,,, | Performed by: FAMILY MEDICINE

## 2022-02-28 PROCEDURE — 3078F PR MOST RECENT DIASTOLIC BLOOD PRESSURE < 80 MM HG: ICD-10-PCS | Mod: CPTII,,, | Performed by: FAMILY MEDICINE

## 2022-02-28 PROCEDURE — 3078F DIAST BP <80 MM HG: CPT | Mod: CPTII,,, | Performed by: FAMILY MEDICINE

## 2022-02-28 PROCEDURE — 1160F RVW MEDS BY RX/DR IN RCRD: CPT | Mod: CPTII,,, | Performed by: FAMILY MEDICINE

## 2022-02-28 PROCEDURE — 3008F BODY MASS INDEX DOCD: CPT | Mod: CPTII,,, | Performed by: FAMILY MEDICINE

## 2022-02-28 PROCEDURE — 99212 PR OFFICE/OUTPT VISIT, EST, LEVL II, 10-19 MIN: ICD-10-PCS | Mod: S$PBB,,, | Performed by: FAMILY MEDICINE

## 2022-02-28 PROCEDURE — 1159F MED LIST DOCD IN RCRD: CPT | Mod: CPTII,,, | Performed by: FAMILY MEDICINE

## 2022-02-28 PROCEDURE — 1160F PR REVIEW ALL MEDS BY PRESCRIBER/CLIN PHARMACIST DOCUMENTED: ICD-10-PCS | Mod: CPTII,,, | Performed by: FAMILY MEDICINE

## 2022-02-28 PROCEDURE — 3074F PR MOST RECENT SYSTOLIC BLOOD PRESSURE < 130 MM HG: ICD-10-PCS | Mod: CPTII,,, | Performed by: FAMILY MEDICINE

## 2022-02-28 PROCEDURE — 3074F SYST BP LT 130 MM HG: CPT | Mod: CPTII,,, | Performed by: FAMILY MEDICINE

## 2022-02-28 PROCEDURE — 99212 OFFICE O/P EST SF 10 MIN: CPT | Mod: S$PBB,,, | Performed by: FAMILY MEDICINE

## 2022-02-28 PROCEDURE — 3008F PR BODY MASS INDEX (BMI) DOCUMENTED: ICD-10-PCS | Mod: CPTII,,, | Performed by: FAMILY MEDICINE

## 2022-02-28 PROCEDURE — 99213 OFFICE O/P EST LOW 20 MIN: CPT | Mod: PBBFAC | Performed by: FAMILY MEDICINE

## 2022-02-28 NOTE — PROGRESS NOTES
Subjective:       Patient ID: Yany Silverio is a 61 y.o. female.    Chief Complaint: Sleep Apnea (CPAP management)    Patient follows up in sleep clinic to discuss overnight oximetry with CPAP and O2 and this seems to have her nocturnal hypoxia under control.  The patient's compliance is 73.3% with an average AHI of 3.0 at a pressure of 12 cm H2O.  Rock score is 6 and the patient is using and benefitting from CPAP.    Review of Systems   Constitutional: Negative for fatigue.        Negative EDS   Respiratory: Negative for apnea.    Psychiatric/Behavioral: Negative for sleep disturbance.         Objective:      Physical Exam  Cardiovascular:      Rate and Rhythm: Normal rate and regular rhythm.      Heart sounds: No murmur heard.  Pulmonary:      Breath sounds: Normal breath sounds.   Skin:     General: Skin is warm and dry.   Neurological:      Mental Status: She is alert and oriented to person, place, and time.         Assessment:       Problem List Items Addressed This Visit        Pulmonary    Chronic obstructive pulmonary disease       Other    NGOZI on CPAP - Primary          Plan:       1. Continue CPAP @ 12 cm H20 with 2 L of O2.  2. Caution while operating a motor vehicle  3. Prescription for new supplies  4. Follow up sleep clinic in 6 months.

## 2022-02-28 NOTE — PROCEDURES
Procedures Patient presents to clinic for CPAP management.  ESS is 6 .  Patient is on CPAP at 12.0 cmH20.  Patient's compliance is 73.3%.  Patient's machine is on recall and has been registered.  Patient has discussed the recall with Dr. Mantilla.  Results of latest overnight oximetry is scanned into .

## 2022-03-01 ENCOUNTER — OFFICE VISIT (OUTPATIENT)
Dept: FAMILY MEDICINE | Facility: CLINIC | Age: 62
End: 2022-03-01
Payer: MEDICAID

## 2022-03-01 VITALS
SYSTOLIC BLOOD PRESSURE: 140 MMHG | HEART RATE: 111 BPM | DIASTOLIC BLOOD PRESSURE: 80 MMHG | OXYGEN SATURATION: 98 % | RESPIRATION RATE: 18 BRPM | HEIGHT: 64 IN | BODY MASS INDEX: 26.38 KG/M2 | TEMPERATURE: 99 F | WEIGHT: 154.5 LBS

## 2022-03-01 DIAGNOSIS — Z96.642 STATUS POST TOTAL HIP REPLACEMENT, LEFT: Primary | ICD-10-CM

## 2022-03-01 PROCEDURE — 3008F BODY MASS INDEX DOCD: CPT | Mod: CPTII,,, | Performed by: NURSE PRACTITIONER

## 2022-03-01 PROCEDURE — 99214 OFFICE O/P EST MOD 30 MIN: CPT | Mod: ,,, | Performed by: NURSE PRACTITIONER

## 2022-03-01 PROCEDURE — 99214 PR OFFICE/OUTPT VISIT, EST, LEVL IV, 30-39 MIN: ICD-10-PCS | Mod: ,,, | Performed by: NURSE PRACTITIONER

## 2022-03-01 PROCEDURE — 3077F SYST BP >= 140 MM HG: CPT | Mod: CPTII,,, | Performed by: NURSE PRACTITIONER

## 2022-03-01 PROCEDURE — 3079F DIAST BP 80-89 MM HG: CPT | Mod: CPTII,,, | Performed by: NURSE PRACTITIONER

## 2022-03-01 PROCEDURE — 1159F PR MEDICATION LIST DOCUMENTED IN MEDICAL RECORD: ICD-10-PCS | Mod: CPTII,,, | Performed by: NURSE PRACTITIONER

## 2022-03-01 PROCEDURE — 3008F PR BODY MASS INDEX (BMI) DOCUMENTED: ICD-10-PCS | Mod: CPTII,,, | Performed by: NURSE PRACTITIONER

## 2022-03-01 PROCEDURE — 3079F PR MOST RECENT DIASTOLIC BLOOD PRESSURE 80-89 MM HG: ICD-10-PCS | Mod: CPTII,,, | Performed by: NURSE PRACTITIONER

## 2022-03-01 PROCEDURE — 1159F MED LIST DOCD IN RCRD: CPT | Mod: CPTII,,, | Performed by: NURSE PRACTITIONER

## 2022-03-01 PROCEDURE — 3077F PR MOST RECENT SYSTOLIC BLOOD PRESSURE >= 140 MM HG: ICD-10-PCS | Mod: CPTII,,, | Performed by: NURSE PRACTITIONER

## 2022-03-01 NOTE — PROGRESS NOTES
Arelis Escobedo NP   [facility]  [address]  [place]     PATIENT NAME: Yany Silverio  : 1960  DATE: 3/1/22  MRN: [id number]      Billing Provider: Arelis Escobedo NP  Level of Service:   Patient PCP Information     Provider PCP Type    Arelis Escobedo NP General          Reason for Visit / Chief Complaint: Follow-up (Hospital follow up from swing bed  )       Update PCP  Update Chief Complaint         History of Present Illness / Problem Focused Workflow     Yany Silverio presents to the clinic here for eval of swing bed , had 2nd hip surgery, left      Review of Systems     Review of Systems   Constitutional: Negative for chills, fatigue and fever.   HENT: Negative for nasal congestion, ear pain, facial swelling, hearing loss, mouth dryness, mouth sores, postnasal drip, rhinorrhea, sinus pressure/congestion and goiter.    Eyes: Negative for discharge and itching.   Respiratory: Negative for cough, shortness of breath and wheezing.    Cardiovascular: Negative for chest pain and leg swelling.   Gastrointestinal: Negative for abdominal pain, change in bowel habit and change in bowel habit.   Genitourinary: Negative for difficulty urinating, dysuria, enuresis, frequency, hematuria and urgency.   Neurological: Negative for dizziness, vertigo, syncope, weakness and headaches.   Psychiatric/Behavioral: Negative for decreased concentration.        Medical / Social / Family History     Past Medical History:   Diagnosis Date    Acute superficial gastritis without hemorrhage 2021    Acute superficial gastritis without hemorrhage 2021    Anxiety     Arthritis     newly dx'd RA: sees MD in [place]    Bronchitis 2021    Bursitis of left shoulder 6/10/2021    Cancer     hx of cervical cancer    COPD (chronic obstructive pulmonary disease)     Depression     Emphysema of lung     Fungal esophagitis 2021    HH (hiatus hernia) 2021    Hyperlipidemia      Hypertension     Insomnia 04/16/2020    Low back pain 04/28/2021    Pain in joint of left hip 04/28/2021    Pain in joint of left knee 04/28/2021       Past Surgical History:   Procedure Laterality Date    OPEN REDUCTION AND INTERNAL FIXATION (ORIF) OF INJURY OF HIP Left 1/12/2022    Procedure: ORIF, HIP;  Surgeon: Adolfo Randall MD;  Location: [facility] OR;  Service: Orthopedics;  Laterality: Left;    ROBOTIC ARTHROPLASTY, HIP Left 12/13/2021    Procedure: ROBOTIC ARTHROPLASTY,HIP;  Surgeon: Adolfo Randall MD;  Location: [facility]TH[facility] OR;  Service: Orthopedics;  Laterality: Left;    SHOULDER SURGERY Right 2017, 2019       Social History  Ms.  reports that she quit smoking about 5 years ago. Her smoking use included cigarettes. She has a 40.00 pack-year smoking history. She has never used smokeless tobacco. She reports current drug use. Drug: Oxycodone. She reports that she does not drink alcohol.    Family History  Ms.'s family history includes Cancer in her mother; Hypertension in her maternal aunt, maternal uncle, and sister.    Medications and Allergies     Medications  Outpatient Medications Marked as Taking for the 3/1/22 encounter (Office Visit) with Arelis Escobedo NP   Medication Sig Dispense Refill    albuterol (PROVENTIL) 2.5 mg /3 mL (0.083 %) nebulizer solution albuterol sulfate 2.5 mg/3 mL (0.083 %) solution for nebulization      aspirin 81 MG Chew Take 4 tablets (324 mg total) by mouth once daily. 120 tablet 0    citalopram (CELEXA) 20 MG tablet Take 2 tablets (40 mg total) by mouth once daily. 60 tablet 0    diclofenac sodium (VOLTAREN) 1 % Gel Apply 2 g topically 3 (three) times daily.       furosemide (LASIX) 20 MG tablet TAKE 1 TABLET BY MOUTH DAILY FOR 3 DAYS THEN WEIGH DAILY AND IF NO MORE THAN 3 POUND WEIGHT GAIN, TAKE LASIX 30 tablet 0    lovastatin (MEVACOR) 20 MG tablet Take 1 tablet (20 mg total) by mouth once daily. 30 tablet 2    meloxicam (MOBIC) 15 MG tablet TAKE ONE  "TABLET BY MOUTH ONE TIME DAILY 30 tablet 1    ondansetron (ZOFRAN-ODT) 4 MG TbDL Take 2 tablets (8 mg total) by mouth every 8 (eight) hours as needed (Nausea). 30 tablet 0    oxyCODONE-acetaminophen (PERCOCET)  mg per tablet Take 1 tablet by mouth every 6 (six) hours as needed for Pain. 30 tablet 0    pantoprazole (PROTONIX) 40 MG tablet TAKE ONE TABLET BY MOUTH ONE TIME DAILY 90 tablet 1    predniSONE (DELTASONE) 5 MG tablet TAKE ONE Tablet BY MOUTH DAILY WITH FOOD      tiotropium (SPIRIVA) 18 mcg inhalation capsule Inhale 1 capsule (18 mcg total) into the lungs once daily. Controller 30 capsule 6    VENTOLIN HFA 90 mcg/actuation inhaler Inhale 1 puff into the lungs daily as needed.      zolpidem (AMBIEN) 5 MG Tab TAKE ONE TABLET BY MOUTH AT BEDTIME AS NEEDED//DO NOT TAKE WITHIN 4 HOURS OF TAKING PAIN MEDS// 30 tablet 0       Allergies  Review of patient's allergies indicates:  No Known Allergies    Physical Examination     Vitals:    03/01/22 1238   BP: (!) 140/80   BP Location: Right arm   Patient Position: Sitting   BP Method: Medium (Manual)   Pulse: (!) 111   Resp: 18   Temp: 98.8 °F (37.1 °C)   TempSrc: Oral   SpO2: 98%   Weight: 70.1 kg (154 lb 8 oz)   Height: 5' 4" (1.626 m)      Physical Exam  Constitutional:       Appearance: Normal appearance.   HENT:      Head: Normocephalic.      Right Ear: Tympanic membrane, ear canal and external ear normal.      Left Ear: Tympanic membrane, ear canal and external ear normal.      Nose: Nose normal.      Mouth/Throat:      Mouth: Mucous membranes are moist.      Pharynx: Oropharynx is clear.   Eyes:      Extraocular Movements: Extraocular movements intact.      Conjunctiva/sclera: Conjunctivae normal.      Pupils: Pupils are equal, round, and reactive to light.   Cardiovascular:      Rate and Rhythm: Normal rate and regular rhythm.      Pulses: Normal pulses.      Heart sounds: Normal heart sounds.   Pulmonary:      Effort: Pulmonary effort is normal.    "   Breath sounds: Normal breath sounds.   Abdominal:      General: Bowel sounds are normal.      Palpations: Abdomen is soft.   Musculoskeletal:         General: Normal range of motion.   Skin:     General: Skin is warm and dry.      Capillary Refill: Capillary refill takes less than 2 seconds.      Comments: Surgical incision left hip healing without siggns of infections, pt able to walk without crutches but has limp, uses crutches for assistance   Neurological:      General: No focal deficit present.      Mental Status: She is alert and oriented to person, place, and time.   Psychiatric:         Mood and Affect: Mood normal.         Behavior: Behavior normal.          Assessment and Plan (including Health Maintenance)      Problem List  Smart Sets  Document Outside HM   :    Plan: cont meds as ordered, return to clinic in 1 month and as needed    Status post total hip replacement, left            Health Maintenance Due   Topic Date Due    Hepatitis C Screening  Never done    Influenza Vaccine (1) Never done    COVID-19 Vaccine (3 - Booster for Moderna series) 10/07/2021       Problem List Items Addressed This Visit        Orthopedic    Status post total hip replacement, left - Primary            Health Maintenance Topics with due status: Not Due       Topic Last Completion Date    Lipid Panel 06/10/2021    Colorectal Cancer Screening 01/16/2022       Procedures     Future Appointments   Date Time Provider Department Center   3/15/2022 10:15 AM Adolfo Randall MD OB [facility]THO [place] [facility]   4/5/2022  1:45 PM Arelis Escobedo NP [facility] [place]   6/13/2022  1:40 PM Riley Saunders MD OB  PULM [place] [facility]   8/29/2022  9:45 AM Juaquin Mantilla DO [facility]        Follow up in about 1 month (around 4/1/2022).       Signature:  Arelis Escobedo NP    Date of encounter: 3/1/22

## 2022-03-07 DIAGNOSIS — M54.6 PAIN IN THORACIC SPINE: ICD-10-CM

## 2022-03-07 RX ORDER — MELOXICAM 15 MG/1
15 TABLET ORAL DAILY
Qty: 30 TABLET | Refills: 1 | Status: SHIPPED | OUTPATIENT
Start: 2022-03-07 | End: 2022-06-06

## 2022-03-09 DIAGNOSIS — S72.002D CLOSED FRACTURE OF LEFT HIP WITH ROUTINE HEALING, SUBSEQUENT ENCOUNTER: Primary | ICD-10-CM

## 2022-03-15 ENCOUNTER — HOSPITAL ENCOUNTER (OUTPATIENT)
Dept: RADIOLOGY | Facility: HOSPITAL | Age: 62
Discharge: HOME | End: 2022-03-15
Attending: ORTHOPAEDIC SURGERY
Payer: MEDICAID

## 2022-03-15 ENCOUNTER — OFFICE VISIT (OUTPATIENT)
Dept: ORTHOPEDICS | Facility: CLINIC | Age: 62
End: 2022-03-15
Payer: MEDICAID

## 2022-03-15 DIAGNOSIS — S72.002D CLOSED FRACTURE OF LEFT HIP WITH ROUTINE HEALING, SUBSEQUENT ENCOUNTER: Primary | ICD-10-CM

## 2022-03-15 DIAGNOSIS — Z96.642 STATUS POST TOTAL HIP REPLACEMENT, LEFT: ICD-10-CM

## 2022-03-15 DIAGNOSIS — S72.002D CLOSED FRACTURE OF LEFT HIP WITH ROUTINE HEALING, SUBSEQUENT ENCOUNTER: ICD-10-CM

## 2022-03-15 PROCEDURE — 99024 POSTOP FOLLOW-UP VISIT: CPT | Mod: ,,, | Performed by: ORTHOPAEDIC SURGERY

## 2022-03-15 PROCEDURE — 73502 X-RAY EXAM HIP UNI 2-3 VIEWS: CPT | Mod: 26,LT,, | Performed by: ORTHOPAEDIC SURGERY

## 2022-03-15 PROCEDURE — 99024 PR POST-OP FOLLOW-UP VISIT: ICD-10-PCS | Mod: ,,, | Performed by: ORTHOPAEDIC SURGERY

## 2022-03-15 PROCEDURE — 73502 X-RAY EXAM HIP UNI 2-3 VIEWS: CPT | Mod: TC,LT

## 2022-03-15 PROCEDURE — 73502 XR HIP WITH PELVIS WHEN PERFORMED, 2 OR 3 VIEWS LEFT: ICD-10-PCS | Mod: 26,LT,, | Performed by: ORTHOPAEDIC SURGERY

## 2022-03-15 NOTE — PROGRESS NOTES
HISTORY OF PRESENT ILLNESS:       Pt is here today for Second post-operative followup of her Orif, Hip - Left on 1/12/2022    she is doing well.  We have reviewed her findings and discussed plan of care and future treatment options, including the physical therapy plan.      She is doing great on today, very little pain at this time.                                                                               PHYSICAL EXAMINATION:     Incision sites healed well.   No evidence of any erythema, infection or induration  Range of motion of the hip is appropriate  Neurovascularly, the patient appears to have no deficits in the affected extremity    X-Ray Hip 2 or 3 views Left (with Pelvis when performed)    Result Date: 3/15/2022  See Procedure Notes for results. IMPRESSION: Please see Ortho procedure notes for report.  This procedure was auto-finalized by: Virtual Radiologist    Healing proximal femur fracture in excellent alignment.  Slight stem subsidence with 1 cm leg length discrepancy.                                                                                ASSESSMENT:                                                                                                                                               1. S/P Orif, Hip - Left 1/12/2022                                                                                                                               PLAN:                                                                                                                                                     1. Continue with PT- cannot get home health.  Performance PT is where she wants to go. Gentle ROM and hip abductor strengthening.   2. Emphasized the importance of hip abductor strengthening   3. I have discussed return to activity in detail.  4. she will see us back in 6 weeks.                                      5. All questions were answered and she should contact us if she  has any questions  or concerns in the interim.              Patient Instructions   She has a leg length discrepancy of 1 cm on xray and on clinical exam-- shoe lifts written for today  RTC in 6 weeks with xrays  Ok for 100% WB at this time  Recommend crutch use when ambulating in the community.

## 2022-03-15 NOTE — PATIENT INSTRUCTIONS
She has a leg length discrepancy of 1 cm on xray and on clinical exam-- shoe lifts written for today  RTC in 6 weeks with xrays  Ok for 100% WB at this time  Recommend crutch use when ambulating in the community.

## 2022-03-29 RX ORDER — CITALOPRAM 20 MG/1
40 TABLET, FILM COATED ORAL DAILY
Qty: 60 TABLET | Refills: 5 | Status: SHIPPED | OUTPATIENT
Start: 2022-03-29 | End: 2022-10-12 | Stop reason: SDUPTHER

## 2022-04-27 DIAGNOSIS — S72.002D CLOSED FRACTURE OF LEFT HIP WITH ROUTINE HEALING, SUBSEQUENT ENCOUNTER: Primary | ICD-10-CM

## 2022-04-28 ENCOUNTER — OFFICE VISIT (OUTPATIENT)
Dept: ORTHOPEDICS | Facility: CLINIC | Age: 62
End: 2022-04-28
Payer: MEDICAID

## 2022-04-28 ENCOUNTER — HOSPITAL ENCOUNTER (OUTPATIENT)
Dept: RADIOLOGY | Facility: HOSPITAL | Age: 62
Discharge: HOME | End: 2022-04-28
Attending: ORTHOPAEDIC SURGERY
Payer: MEDICAID

## 2022-04-28 DIAGNOSIS — S72.002D CLOSED FRACTURE OF LEFT HIP WITH ROUTINE HEALING, SUBSEQUENT ENCOUNTER: ICD-10-CM

## 2022-04-28 DIAGNOSIS — S72.002D CLOSED FRACTURE OF LEFT HIP WITH ROUTINE HEALING, SUBSEQUENT ENCOUNTER: Primary | ICD-10-CM

## 2022-04-28 PROCEDURE — 73502 X-RAY EXAM HIP UNI 2-3 VIEWS: CPT | Mod: TC,LT

## 2022-04-28 PROCEDURE — 99212 PR OFFICE/OUTPT VISIT, EST, LEVL II, 10-19 MIN: ICD-10-PCS | Mod: ,,, | Performed by: ORTHOPAEDIC SURGERY

## 2022-04-28 PROCEDURE — 99212 OFFICE O/P EST SF 10 MIN: CPT | Mod: ,,, | Performed by: ORTHOPAEDIC SURGERY

## 2022-04-28 PROCEDURE — 73502 X-RAY EXAM HIP UNI 2-3 VIEWS: CPT | Mod: 26,LT,, | Performed by: ORTHOPAEDIC SURGERY

## 2022-04-28 PROCEDURE — 73502 XR HIP WITH PELVIS WHEN PERFORMED, 2 OR 3 VIEWS LEFT: ICD-10-PCS | Mod: 26,LT,, | Performed by: ORTHOPAEDIC SURGERY

## 2022-04-28 NOTE — PROGRESS NOTES
61 y.o. Female returns to clinic for a follow up visit regarding     ICD-10-CM ICD-9-CM   1. Closed fracture of left hip with routine healing, subsequent encounter  S72.002D V54.13        Pt is 3 1/2 mos ORIF lt hip, states pain is slightly better, she is using her cane today, but she does not use it around the house.   pt states therapy is really hurting her.       Past Medical History:   Diagnosis Date    Acute superficial gastritis without hemorrhage 6/23/2021    Acute superficial gastritis without hemorrhage 6/23/2021    Anxiety     Arthritis     newly dx'd RA: sees MD in [place]    Bronchitis 7/22/2021    Bursitis of left shoulder 6/10/2021    Cancer     hx of cervical cancer    COPD (chronic obstructive pulmonary disease)     Depression     Emphysema of lung     Fungal esophagitis 6/23/2021    HH (hiatus hernia) 6/23/2021    Hyperlipidemia     Hypertension     Insomnia 04/16/2020    Low back pain 04/28/2021    Pain in joint of left hip 04/28/2021    Pain in joint of left knee 04/28/2021     Past Surgical History:   Procedure Laterality Date    OPEN REDUCTION AND INTERNAL FIXATION (ORIF) OF INJURY OF HIP Left 1/12/2022    Procedure: ORIF, HIP;  Surgeon: Adolfo Randall MD;  Location: [facility] OR;  Service: Orthopedics;  Laterality: Left;    ROBOTIC ARTHROPLASTY, HIP Left 12/13/2021    Procedure: ROBOTIC ARTHROPLASTY,HIP;  Surgeon: Adolfo Randall MD;  Location: [facility] OR;  Service: Orthopedics;  Laterality: Left;    SHOULDER SURGERY Right 2017, 2019         REVIEW OF SYSTEMS:   Constitution: Negative. Negative for chills, fever and night sweats.    Hematologic/Lymphatic: Negative for bleeding problem. Does not bruise/bleed easily.   Skin: Negative for dry skin, itching and rash.   Musculoskeletal: Negative for falls. Positive for knee pain and muscle weakness.   All other review of symptoms were reviewed and found to be noncontributory.     PHYSICAL  EXAMINATION:  There were no vitals taken for this visit.  Ortho/SPM Exam  Trendelenburg gait pattern is once again demonstrated today.  She does have slight leg length inequality present.  Surgical incisions well healed at this point.    IMAGING:  X-Ray Hip 2 or 3 views Left (with Pelvis when performed)    Result Date: 4/28/2022  See Procedure Notes for results. IMPRESSION: Please see Ortho procedure notes for report.  This procedure was auto-finalized by: Virtual Radiologist     Radiographs left hip demonstrate a stable appearing total hip prosthesis with cerclage wire fixation.  Fracture appears to be well healed with mild interval callus formation seen.  ASSESSMENT:      ICD-10-CM ICD-9-CM   1. Closed fracture of left hip with routine healing, subsequent encounter  S72.002D V54.13       PLAN:     -Findings and treatment options were discussed with the patient  -All questions answered      Her fracture is healing well.  She is progressing slowly with physical therapy.  Recommend continue course of physical therapy for the next 4-6 weeks.  I will see her back in 3 months time with repeat x-rays.    There are no Patient Instructions on file for this visit.      Orders Placed This Encounter   Procedures    Ambulatory referral/consult to Physical/Occupational Therapy         Procedures

## 2022-05-19 ENCOUNTER — OFFICE VISIT (OUTPATIENT)
Dept: FAMILY MEDICINE | Facility: CLINIC | Age: 62
End: 2022-05-19
Payer: MEDICAID

## 2022-05-19 VITALS
HEIGHT: 64 IN | DIASTOLIC BLOOD PRESSURE: 78 MMHG | OXYGEN SATURATION: 97 % | WEIGHT: 159.25 LBS | TEMPERATURE: 99 F | HEART RATE: 77 BPM | SYSTOLIC BLOOD PRESSURE: 140 MMHG | BODY MASS INDEX: 27.19 KG/M2 | RESPIRATION RATE: 18 BRPM

## 2022-05-19 DIAGNOSIS — R52 PAIN: ICD-10-CM

## 2022-05-19 DIAGNOSIS — F32.A ANXIETY AND DEPRESSION: ICD-10-CM

## 2022-05-19 DIAGNOSIS — Z96.642 STATUS POST TOTAL HIP REPLACEMENT, LEFT: ICD-10-CM

## 2022-05-19 DIAGNOSIS — R53.83 FATIGUE, UNSPECIFIED TYPE: Primary | ICD-10-CM

## 2022-05-19 DIAGNOSIS — E78.2 MIXED HYPERLIPIDEMIA: ICD-10-CM

## 2022-05-19 DIAGNOSIS — I10 PRIMARY HYPERTENSION: Chronic | ICD-10-CM

## 2022-05-19 DIAGNOSIS — F41.9 ANXIETY AND DEPRESSION: ICD-10-CM

## 2022-05-19 LAB
ANISOCYTOSIS BLD QL SMEAR: NORMAL
BASOPHILS # BLD AUTO: 0.06 K/UL (ref 0–0.2)
BASOPHILS NFR BLD AUTO: 0.7 % (ref 0–1)
DIFFERENTIAL METHOD BLD: ABNORMAL
EOSINOPHIL # BLD AUTO: 0.29 K/UL (ref 0–0.5)
EOSINOPHIL NFR BLD AUTO: 3.3 % (ref 1–4)
ERYTHROCYTE [DISTWIDTH] IN BLOOD BY AUTOMATED COUNT: 22.9 % (ref 11.5–14.5)
FOLATE SERPL-MCNC: 16.1 NG/ML (ref 3.1–17.5)
HCT VFR BLD AUTO: 34.9 % (ref 38–47)
HGB BLD-MCNC: 10.6 G/DL (ref 12–16)
HYPOCHROMIA BLD QL SMEAR: NORMAL
IMM GRANULOCYTES # BLD AUTO: 0.02 K/UL (ref 0–0.04)
IMM GRANULOCYTES NFR BLD: 0.2 % (ref 0–0.4)
LYMPHOCYTES # BLD AUTO: 2.14 K/UL (ref 1–4.8)
LYMPHOCYTES NFR BLD AUTO: 24.1 % (ref 27–41)
MCH RBC QN AUTO: 24.9 PG (ref 27–31)
MCHC RBC AUTO-ENTMCNC: 30.4 G/DL (ref 32–36)
MCV RBC AUTO: 81.9 FL (ref 80–96)
MONOCYTES # BLD AUTO: 0.83 K/UL (ref 0–0.8)
MONOCYTES NFR BLD AUTO: 9.3 % (ref 2–6)
MPC BLD CALC-MCNC: 10.6 FL (ref 9.4–12.4)
NEUTROPHILS # BLD AUTO: 5.54 K/UL (ref 1.8–7.7)
NEUTROPHILS NFR BLD AUTO: 62.4 % (ref 53–65)
NRBC # BLD AUTO: 0 X10E3/UL
NRBC, AUTO (.00): 0 %
OVALOCYTES BLD QL SMEAR: NORMAL
PLATELET # BLD AUTO: 218 K/UL (ref 150–400)
PLATELET MORPHOLOGY: NORMAL
POLYCHROMASIA BLD QL SMEAR: NORMAL
RBC # BLD AUTO: 4.26 M/UL (ref 4.2–5.4)
TARGETS BLD QL SMEAR: NORMAL
TSH SERPL DL<=0.005 MIU/L-ACNC: 0.52 UIU/ML (ref 0.36–3.74)
VIT B12 SERPL-MCNC: 417 PG/ML (ref 193–986)
WBC # BLD AUTO: 8.88 K/UL (ref 4.5–11)

## 2022-05-19 PROCEDURE — 84443 ASSAY THYROID STIM HORMONE: CPT | Mod: ,,, | Performed by: CLINICAL MEDICAL LABORATORY

## 2022-05-19 PROCEDURE — 3078F DIAST BP <80 MM HG: CPT | Mod: CPTII,,, | Performed by: NURSE PRACTITIONER

## 2022-05-19 PROCEDURE — 3008F BODY MASS INDEX DOCD: CPT | Mod: CPTII,,, | Performed by: NURSE PRACTITIONER

## 2022-05-19 PROCEDURE — 82607 VITAMIN B12/FOLATE, SERUM PANEL: ICD-10-PCS | Mod: ,,, | Performed by: CLINICAL MEDICAL LABORATORY

## 2022-05-19 PROCEDURE — 1159F PR MEDICATION LIST DOCUMENTED IN MEDICAL RECORD: ICD-10-PCS | Mod: CPTII,,, | Performed by: NURSE PRACTITIONER

## 2022-05-19 PROCEDURE — 99214 PR OFFICE/OUTPT VISIT, EST, LEVL IV, 30-39 MIN: ICD-10-PCS | Mod: 25,,, | Performed by: NURSE PRACTITIONER

## 2022-05-19 PROCEDURE — 82746 VITAMIN B12/FOLATE, SERUM PANEL: ICD-10-PCS | Mod: ,,, | Performed by: CLINICAL MEDICAL LABORATORY

## 2022-05-19 PROCEDURE — 99214 OFFICE O/P EST MOD 30 MIN: CPT | Mod: 25,,, | Performed by: NURSE PRACTITIONER

## 2022-05-19 PROCEDURE — 3077F PR MOST RECENT SYSTOLIC BLOOD PRESSURE >= 140 MM HG: ICD-10-PCS | Mod: CPTII,,, | Performed by: NURSE PRACTITIONER

## 2022-05-19 PROCEDURE — 82746 ASSAY OF FOLIC ACID SERUM: CPT | Mod: ,,, | Performed by: CLINICAL MEDICAL LABORATORY

## 2022-05-19 PROCEDURE — 96372 THER/PROPH/DIAG INJ SC/IM: CPT | Mod: ,,, | Performed by: NURSE PRACTITIONER

## 2022-05-19 PROCEDURE — 1159F MED LIST DOCD IN RCRD: CPT | Mod: CPTII,,, | Performed by: NURSE PRACTITIONER

## 2022-05-19 PROCEDURE — 84443 TSH: ICD-10-PCS | Mod: ,,, | Performed by: CLINICAL MEDICAL LABORATORY

## 2022-05-19 PROCEDURE — 3077F SYST BP >= 140 MM HG: CPT | Mod: CPTII,,, | Performed by: NURSE PRACTITIONER

## 2022-05-19 PROCEDURE — 85025 CBC WITH DIFFERENTIAL: ICD-10-PCS | Mod: ,,, | Performed by: CLINICAL MEDICAL LABORATORY

## 2022-05-19 PROCEDURE — 82607 VITAMIN B-12: CPT | Mod: ,,, | Performed by: CLINICAL MEDICAL LABORATORY

## 2022-05-19 PROCEDURE — 3008F PR BODY MASS INDEX (BMI) DOCUMENTED: ICD-10-PCS | Mod: CPTII,,, | Performed by: NURSE PRACTITIONER

## 2022-05-19 PROCEDURE — 96372 PR INJECTION,THERAP/PROPH/DIAG2ST, IM OR SUBCUT: ICD-10-PCS | Mod: ,,, | Performed by: NURSE PRACTITIONER

## 2022-05-19 PROCEDURE — 3078F PR MOST RECENT DIASTOLIC BLOOD PRESSURE < 80 MM HG: ICD-10-PCS | Mod: CPTII,,, | Performed by: NURSE PRACTITIONER

## 2022-05-19 PROCEDURE — 85025 COMPLETE CBC W/AUTO DIFF WBC: CPT | Mod: ,,, | Performed by: CLINICAL MEDICAL LABORATORY

## 2022-05-19 RX ORDER — KETOROLAC TROMETHAMINE 30 MG/ML
60 INJECTION, SOLUTION INTRAMUSCULAR; INTRAVENOUS
Status: COMPLETED | OUTPATIENT
Start: 2022-05-19 | End: 2022-05-19

## 2022-05-19 RX ORDER — BUSPIRONE HYDROCHLORIDE 5 MG/1
5 TABLET ORAL 2 TIMES DAILY
Qty: 60 TABLET | Refills: 5 | Status: SHIPPED | OUTPATIENT
Start: 2022-05-19 | End: 2022-11-16 | Stop reason: SDUPTHER

## 2022-05-19 RX ADMIN — KETOROLAC TROMETHAMINE 60 MG: 30 INJECTION, SOLUTION INTRAMUSCULAR; INTRAVENOUS at 10:05

## 2022-05-19 NOTE — PROGRESS NOTES
"   Arelis Escobedo NP   [facility]  [address]  [place]     PATIENT NAME: Yany Silverio  : 1960  DATE: 22  MRN: [id number]      Billing Provider: Arelis Escobedo NP  Level of Service:   Patient PCP Information     Provider PCP Type    Arelis Escobedo NP General          Reason for Visit / Chief Complaint: Hypertension, COPD, Arthritis (Pt would like to discuss getting referred to a rheumatologist that is closer to her home.), Hyperlipidemia, Anxiety (C/o "having a short temper", "gets aggravated easily".), and Fatigue (C/o being tired, having no energy)       Update PCP  Update Chief Complaint         History of Present Illness / Problem Focused Workflow     Yany Silverio presents to the clinic   Htn, copd, arthritis, hyperlipidemia, anxiety, having a short temper, gets aggravated easily and fatigue    Review of Systems     Review of Systems   Constitutional: Positive for fatigue.   Musculoskeletal:        Left shoulder and leg pain, groin pain   Psychiatric/Behavioral: The patient is nervous/anxious.         Depressed, gets angry easily        Medical / Social / Family History     Past Medical History:   Diagnosis Date    Acute superficial gastritis without hemorrhage 2021    Acute superficial gastritis without hemorrhage 2021    Anxiety     Arthritis     newly dx'd RA: sees MD in [place]    Bronchitis 2021    Bursitis of left shoulder 6/10/2021    Cancer     hx of cervical cancer    COPD (chronic obstructive pulmonary disease)     Depression     Emphysema of lung     Fungal esophagitis 2021    HH (hiatus hernia) 2021    Hyperlipidemia     Hypertension     Insomnia 2020    Low back pain 2021    Pain in joint of left hip 2021    Pain in joint of left knee 2021       Past Surgical History:   Procedure Laterality Date    OPEN REDUCTION AND INTERNAL FIXATION (ORIF) OF INJURY OF HIP Left 2022    Procedure: ORIF, HIP;  " Surgeon: Adolfo Randall MD;  [facility]: [facility];  Service: Orthopedics;  Laterality: Left;    ROBOTIC ARTHROPLASTY, HIP Left 12/13/2021    Procedure: ROBOTIC ARTHROPLASTY,HIP;  Surgeon: Adolfo Randall MD;  Location: [facility];  Service: Orthopedics;  Laterality: Left;    SHOULDER SURGERY Right 2017, 2019       Social History  Ms.  reports that she quit smoking about 5 years ago. Her smoking use included cigarettes. She has a 40.00 pack-year smoking history. She has never used smokeless tobacco. She reports current drug use. Drug: Oxycodone. She reports that she does not drink alcohol.    Family History  Ms.'s family history includes Cancer in her mother; Hypertension in her maternal aunt, maternal uncle, and sister.    Medications and Allergies     Medications  Outpatient Medications Marked as Taking for the 5/19/22 encounter (Office Visit) with Arelis Escobedo NP   Medication Sig Dispense Refill    albuterol (PROVENTIL) 2.5 mg /3 mL (0.083 %) nebulizer solution albuterol sulfate 2.5 mg/3 mL (0.083 %) solution for nebulization      aspirin 81 MG Chew Take 4 tablets (324 mg total) by mouth once daily. 120 tablet 0    citalopram (CELEXA) 20 MG tablet Take 2 tablets (40 mg total) by mouth once daily. 60 tablet 5    furosemide (LASIX) 20 MG tablet TAKE 1 TABLET BY MOUTH DAILY FOR 3 DAYS THEN WEIGH DAILY AND IF NO MORE THAN 3 POUND WEIGHT GAIN, TAKE LASIX 30 tablet 0    lovastatin (MEVACOR) 20 MG tablet Take 1 tablet (20 mg total) by mouth once daily. 30 tablet 2    meloxicam (MOBIC) 15 MG tablet Take 1 tablet (15 mg total) by mouth once daily. 30 tablet 1    pantoprazole (PROTONIX) 40 MG tablet TAKE ONE TABLET BY MOUTH ONE TIME DAILY 90 tablet 1    VENTOLIN HFA 90 mcg/actuation inhaler Inhale 1 puff into the lungs daily as needed.       Current Facility-Administered Medications for the 5/19/22 encounter (Office Visit) with Arelis Escobedo NP   Medication Dose Route Frequency Provider Last Rate  "Last Admin    [COMPLETED] ketorolac injection 60 mg  60 mg Intramuscular 1 time in Clinic/HOD Arelis Escobedo NP   60 mg at 05/19/22 1048       Allergies  Review of patient's allergies indicates:  No Known Allergies    Physical Examination     Vitals:    05/19/22 0954   BP: (!) 140/78   BP Location: Left arm   Patient Position: Sitting   BP Method: Medium (Manual)   Pulse: 77   Resp: 18   Temp: 99 °F (37.2 °C)   TempSrc: Oral   SpO2: 97%   Weight: 72.2 kg (159 lb 4 oz)   Height: 5' 4" (1.626 m)      Physical Exam  Constitutional:       Appearance: Normal appearance.   HENT:      Head: Normocephalic.      Right Ear: Tympanic membrane, ear canal and external ear normal.      Left Ear: Tympanic membrane, ear canal and external ear normal.      Nose: Nose normal.      Mouth/Throat:      Mouth: Mucous membranes are moist.      Pharynx: Oropharynx is clear.   Eyes:      Extraocular Movements: Extraocular movements intact.      Conjunctiva/sclera: Conjunctivae normal.      Pupils: Pupils are equal, round, and reactive to light.   Cardiovascular:      Rate and Rhythm: Normal rate and regular rhythm.      Pulses: Normal pulses.      Heart sounds: Normal heart sounds.   Pulmonary:      Effort: Pulmonary effort is normal.      Breath sounds: Normal breath sounds.   Abdominal:      General: Bowel sounds are normal.      Palpations: Abdomen is soft.   Musculoskeletal:         General: Normal range of motion.      Cervical back: Normal range of motion and neck supple.   Skin:     General: Skin is warm and dry.      Capillary Refill: Capillary refill takes less than 2 seconds.   Neurological:      General: No focal deficit present.      Mental Status: She is alert and oriented to person, place, and time.      Gait: Gait abnormal (using cane for assistance).   Psychiatric:         Mood and Affect: Mood normal.         Behavior: Behavior normal.          Assessment and Plan (including Health Maintenance)      Problem List  Smart " Sets  Document Outside HM   :    Plan: will check tsh, vit d, b12 and cbc, add buspar, cont all meds return to clinic in 3 months will chcek on pt in 1 week, since she has multiple drs and pt appointments    Pain  -     Ambulatory referral/consult to Rheumatology; Future; Expected date: 05/26/2022  -     TSH; Future; Expected date: 05/19/2022  -     Vitamin B12 & Folate; Future; Expected date: 05/19/2022  -     ketorolac injection 60 mg    Anxiety and depression  -     busPIRone (BUSPAR) 5 MG Tab; Take 1 tablet (5 mg total) by mouth 2 (two) times daily.  Dispense: 60 tablet; Refill: 5    Fatigue, unspecified type  -     CBC Auto Differential; Future; Expected date: 05/19/2022  -     TSH; Future; Expected date: 05/19/2022  -     Vitamin B12 & Folate; Future; Expected date: 05/19/2022            Health Maintenance Due   Topic Date Due    Hepatitis C Screening  Never done    COVID-19 Vaccine (3 - Booster for Moderna series) 10/07/2021       Problem List Items Addressed This Visit        Psychiatric    Anxiety and depression (Chronic)    Relevant Medications    busPIRone (BUSPAR) 5 MG Tab      Other Visit Diagnoses     Pain    -  Primary    Relevant Medications    ketorolac injection 60 mg (Completed) (Start on 5/19/2022 11:00 AM)    Other Relevant Orders    Ambulatory referral/consult to Rheumatology    TSH    Vitamin B12 & Folate    Fatigue, unspecified type        Relevant Orders    CBC Auto Differential    TSH    Vitamin B12 & Folate            Health Maintenance Topics with due status: Not Due       Topic Last Completion Date    Colorectal Cancer Screening 10/01/2020    Lipid Panel 06/10/2021    Influenza Vaccine Not Due       Procedures     Future Appointments   Date Time Provider Department Center   6/13/2022  1:40 PM Riley Saunders MD [facility]   7/28/2022  9:15 AM Adolfo Randall MD [facility]   8/24/2022  9:15 AM Arelis Escobedo NP [facility] [place]   8/29/2022  9:45 AM Juaquin  ELAINE Mantilla DO [place] [facility]        Follow up in about 3 months (around 8/19/2022).       Signature:  Arelis Escobedo NP    Date of encounter: 5/19/22

## 2022-05-27 DIAGNOSIS — Z12.31 ENCOUNTER FOR SCREENING MAMMOGRAM FOR BREAST CANCER: Primary | ICD-10-CM

## 2022-06-06 ENCOUNTER — OFFICE VISIT (OUTPATIENT)
Dept: FAMILY MEDICINE | Facility: CLINIC | Age: 62
End: 2022-06-06
Payer: MEDICAID

## 2022-06-06 VITALS
OXYGEN SATURATION: 97 % | TEMPERATURE: 98 F | DIASTOLIC BLOOD PRESSURE: 78 MMHG | RESPIRATION RATE: 18 BRPM | HEIGHT: 64 IN | SYSTOLIC BLOOD PRESSURE: 118 MMHG | HEART RATE: 96 BPM | WEIGHT: 164.38 LBS | BODY MASS INDEX: 28.06 KG/M2

## 2022-06-06 DIAGNOSIS — J01.00 ACUTE NON-RECURRENT MAXILLARY SINUSITIS: Primary | ICD-10-CM

## 2022-06-06 DIAGNOSIS — E78.2 MIXED HYPERLIPIDEMIA: Primary | ICD-10-CM

## 2022-06-06 DIAGNOSIS — M06.9 RHEUMATOID ARTHRITIS, INVOLVING UNSPECIFIED SITE, UNSPECIFIED WHETHER RHEUMATOID FACTOR PRESENT: ICD-10-CM

## 2022-06-06 PROCEDURE — 99214 OFFICE O/P EST MOD 30 MIN: CPT | Mod: 25,,, | Performed by: NURSE PRACTITIONER

## 2022-06-06 PROCEDURE — 3074F SYST BP LT 130 MM HG: CPT | Mod: CPTII,,, | Performed by: NURSE PRACTITIONER

## 2022-06-06 PROCEDURE — 3074F PR MOST RECENT SYSTOLIC BLOOD PRESSURE < 130 MM HG: ICD-10-PCS | Mod: CPTII,,, | Performed by: NURSE PRACTITIONER

## 2022-06-06 PROCEDURE — 1159F PR MEDICATION LIST DOCUMENTED IN MEDICAL RECORD: ICD-10-PCS | Mod: CPTII,,, | Performed by: NURSE PRACTITIONER

## 2022-06-06 PROCEDURE — 1159F MED LIST DOCD IN RCRD: CPT | Mod: CPTII,,, | Performed by: NURSE PRACTITIONER

## 2022-06-06 PROCEDURE — 96372 PR INJECTION,THERAP/PROPH/DIAG2ST, IM OR SUBCUT: ICD-10-PCS | Mod: ,,, | Performed by: NURSE PRACTITIONER

## 2022-06-06 PROCEDURE — 3078F PR MOST RECENT DIASTOLIC BLOOD PRESSURE < 80 MM HG: ICD-10-PCS | Mod: CPTII,,, | Performed by: NURSE PRACTITIONER

## 2022-06-06 PROCEDURE — 99214 PR OFFICE/OUTPT VISIT, EST, LEVL IV, 30-39 MIN: ICD-10-PCS | Mod: 25,,, | Performed by: NURSE PRACTITIONER

## 2022-06-06 PROCEDURE — 3078F DIAST BP <80 MM HG: CPT | Mod: CPTII,,, | Performed by: NURSE PRACTITIONER

## 2022-06-06 PROCEDURE — 3008F PR BODY MASS INDEX (BMI) DOCUMENTED: ICD-10-PCS | Mod: CPTII,,, | Performed by: NURSE PRACTITIONER

## 2022-06-06 PROCEDURE — 3008F BODY MASS INDEX DOCD: CPT | Mod: CPTII,,, | Performed by: NURSE PRACTITIONER

## 2022-06-06 PROCEDURE — 96372 THER/PROPH/DIAG INJ SC/IM: CPT | Mod: ,,, | Performed by: NURSE PRACTITIONER

## 2022-06-06 RX ORDER — AMOXICILLIN 500 MG/1
500 CAPSULE ORAL EVERY 8 HOURS
Qty: 30 CAPSULE | Refills: 0 | Status: SHIPPED | OUTPATIENT
Start: 2022-06-06 | End: 2022-08-23 | Stop reason: ALTCHOICE

## 2022-06-06 RX ORDER — FLUTICASONE PROPIONATE 50 MCG
1 SPRAY, SUSPENSION (ML) NASAL DAILY
Qty: 15.8 ML | Refills: 0 | Status: SHIPPED | OUTPATIENT
Start: 2022-06-06 | End: 2022-12-07 | Stop reason: SDUPTHER

## 2022-06-06 RX ORDER — CEFTRIAXONE 1 G/1
1 INJECTION, POWDER, FOR SOLUTION INTRAMUSCULAR; INTRAVENOUS
Status: COMPLETED | OUTPATIENT
Start: 2022-06-06 | End: 2022-06-06

## 2022-06-06 RX ORDER — HYDROXYCHLOROQUINE SULFATE 200 MG/1
200 TABLET, FILM COATED ORAL DAILY
Qty: 90 TABLET | Refills: 0 | Status: SHIPPED | OUTPATIENT
Start: 2022-06-06 | End: 2022-10-31

## 2022-06-06 RX ORDER — DEXAMETHASONE SODIUM PHOSPHATE 4 MG/ML
4 INJECTION, SOLUTION INTRA-ARTICULAR; INTRALESIONAL; INTRAMUSCULAR; INTRAVENOUS; SOFT TISSUE
Status: COMPLETED | OUTPATIENT
Start: 2022-06-06 | End: 2022-06-06

## 2022-06-06 RX ORDER — LOVASTATIN 20 MG/1
20 TABLET ORAL DAILY
Qty: 90 TABLET | Refills: 1 | Status: SHIPPED | OUTPATIENT
Start: 2022-06-06 | End: 2022-06-08 | Stop reason: SDUPTHER

## 2022-06-06 RX ADMIN — CEFTRIAXONE 1 G: 1 INJECTION, POWDER, FOR SOLUTION INTRAMUSCULAR; INTRAVENOUS at 02:06

## 2022-06-06 RX ADMIN — DEXAMETHASONE SODIUM PHOSPHATE 4 MG: 4 INJECTION, SOLUTION INTRA-ARTICULAR; INTRALESIONAL; INTRAMUSCULAR; INTRAVENOUS; SOFT TISSUE at 02:06

## 2022-06-06 NOTE — TELEPHONE ENCOUNTER
----- Message from Vida Mendoza sent at 6/6/2022  4:27 PM CDT -----  States she was needing her cholesterol and Anaflux? refilled

## 2022-06-06 NOTE — PATIENT INSTRUCTIONS
Do not take meloxacam with plaquenil    Avoid irritants, meds as ordered, return to clinic as needed and as scheduled

## 2022-06-06 NOTE — PROGRESS NOTES
Arelis Escobedo NP   [facility]  [address]  [facility]     PATIENT NAME: Yany Silverio  : 1960  DATE: 22  MRN: [id number]      Billing Provider: Arelis Escobedo NP  Level of Service:   Patient PCP Information     Provider PCP Type    Arelis Escobedo NP General          Reason for Visit / Chief Complaint: Nasal Congestion, Chest Pain (Mild chest pain that comes and goes for one week. ), and Sinus Problem       Update PCP  Update Chief Complaint         History of Present Illness / Problem Focused Workflow     Yany Silverio presents to the clinic c/o nasal congestion, chest pain and sinus problem for several days      Review of Systems     Review of Systems   Constitutional: Negative for chills, fatigue and fever.   HENT: Positive for nasal congestion and sinus pressure/congestion. Negative for ear pain, facial swelling, hearing loss, mouth dryness, mouth sores, postnasal drip, rhinorrhea and goiter.    Eyes: Negative for discharge and itching.   Respiratory: Negative for cough, shortness of breath and wheezing.    Cardiovascular: Negative for chest pain and leg swelling.   Gastrointestinal: Negative for abdominal pain, change in bowel habit and change in bowel habit.   Genitourinary: Negative for difficulty urinating, dysuria, enuresis, frequency, hematuria and urgency.   Neurological: Negative for dizziness, vertigo, syncope, weakness and headaches.   Psychiatric/Behavioral: Negative for decreased concentration.   All other systems reviewed and are negative.       Medical / Social / Family History     Past Medical History:   Diagnosis Date    Acute superficial gastritis without hemorrhage 2021    Acute superficial gastritis without hemorrhage 2021    Anxiety     Arthritis     newly dx'd RA: sees MD in [place]    Bronchitis 2021    Bursitis of left shoulder 6/10/2021    Cancer     hx of cervical cancer    COPD (chronic obstructive pulmonary disease)      Depression     Emphysema of lung     Fungal esophagitis 6/23/2021    HH (hiatus hernia) 6/23/2021    Hyperlipidemia     Hypertension     Insomnia 04/16/2020    Low back pain 04/28/2021    Pain in joint of left hip 04/28/2021    Pain in joint of left knee 04/28/2021       Past Surgical History:   Procedure Laterality Date    OPEN REDUCTION AND INTERNAL FIXATION (ORIF) OF INJURY OF HIP Left 1/12/2022    Procedure: ORIF, HIP;  Surgeon: Adolfo Randall MD;  Location: [facility] ORTHO OR;  Service: Orthopedics;  Laterality: Left;    ROBOTIC ARTHROPLASTY, HIP Left 12/13/2021    Procedure: ROBOTIC ARTHROPLASTY,HIP;  Surgeon: Adolfo Randall MD;  Location: [facility] ORTHO OR;  Service: Orthopedics;  Laterality: Left;    SHOULDER SURGERY Right 2017, 2019       Social History  Ms.  reports that she quit smoking about 5 years ago. Her smoking use included cigarettes. She has a 40.00 pack-year smoking history. She has never used smokeless tobacco. She reports current drug use. Drug: Oxycodone. She reports that she does not drink alcohol.    Family History  Ms.'s family history includes Cancer in her mother; Hypertension in her maternal aunt, maternal uncle, and sister.    Medications and Allergies     Medications  Outpatient Medications Marked as Taking for the 6/6/22 encounter (Office Visit) with Arelis Escobedo NP   Medication Sig Dispense Refill    albuterol (PROVENTIL) 2.5 mg /3 mL (0.083 %) nebulizer solution albuterol sulfate 2.5 mg/3 mL (0.083 %) solution for nebulization      aspirin 81 MG Chew Take 4 tablets (324 mg total) by mouth once daily. 120 tablet 0    busPIRone (BUSPAR) 5 MG Tab Take 1 tablet (5 mg total) by mouth 2 (two) times daily. 60 tablet 5    citalopram (CELEXA) 20 MG tablet Take 2 tablets (40 mg total) by mouth once daily. 60 tablet 5    diclofenac sodium (VOLTAREN) 1 % Gel Apply 2 g topically 3 (three) times daily.       furosemide (LASIX) 20 MG tablet TAKE 1 TABLET BY MOUTH DAILY FOR 3 DAYS  "THEN WEIGH DAILY AND IF NO MORE THAN 3 POUND WEIGHT GAIN, TAKE LASIX 30 tablet 0    lovastatin (MEVACOR) 20 MG tablet Take 1 tablet (20 mg total) by mouth once daily. 30 tablet 2    oxyCODONE-acetaminophen (PERCOCET)  mg per tablet Take 1 tablet by mouth every 6 (six) hours as needed for Pain. 30 tablet 0    pantoprazole (PROTONIX) 40 MG tablet TAKE ONE TABLET BY MOUTH ONE TIME DAILY 90 tablet 1    VENTOLIN HFA 90 mcg/actuation inhaler Inhale 1 puff into the lungs daily as needed.      [DISCONTINUED] meloxicam (MOBIC) 15 MG tablet Take 1 tablet (15 mg total) by mouth once daily. 30 tablet 1    [DISCONTINUED] predniSONE (DELTASONE) 5 MG tablet TAKE ONE Tablet BY MOUTH DAILY WITH FOOD       Current Facility-Administered Medications for the 6/6/22 encounter (Office Visit) with Arelis Escobedo NP   Medication Dose Route Frequency Provider Last Rate Last Admin    cefTRIAXone injection 1 g  1 g Intramuscular 1 time in Clinic/HOD Arelis Escobedo NP        dexamethasone injection 4 mg  4 mg Intramuscular 1 time in Clinic/HOD Arelis Escobedo NP           Allergies  Review of patient's allergies indicates:  No Known Allergies    Physical Examination     Vitals:    06/06/22 1304   BP: 118/78   BP Location: Left arm   Patient Position: Sitting   BP Method: Medium (Manual)   Pulse: 96   Resp: 18   Temp: 98.2 °F (36.8 °C)   TempSrc: Oral   SpO2: 97%   Weight: 74.6 kg (164 lb 6 oz)   Height: 5' 4" (1.626 m)      Physical Exam  Constitutional:       Appearance: Normal appearance.   HENT:      Head: Normocephalic.      Right Ear: Tympanic membrane, ear canal and external ear normal.      Left Ear: Tympanic membrane, ear canal and external ear normal.      Nose: Congestion present.      Comments: Mod amt thick yellow nasal secretion, pressure over max region     Mouth/Throat:      Mouth: Mucous membranes are moist.   Eyes:      Extraocular Movements: Extraocular movements intact.      Conjunctiva/sclera: Conjunctivae " normal.      Pupils: Pupils are equal, round, and reactive to light.   Neck:      Comments: Aniceto ant cervical nodes  Cardiovascular:      Rate and Rhythm: Normal rate and regular rhythm.      Pulses: Normal pulses.      Heart sounds: Normal heart sounds.   Pulmonary:      Effort: Pulmonary effort is normal.      Breath sounds: Normal breath sounds.   Musculoskeletal:         General: Normal range of motion.      Cervical back: Normal range of motion.   Lymphadenopathy:      Cervical: Cervical adenopathy present.   Skin:     General: Skin is warm and dry.   Neurological:      General: No focal deficit present.      Mental Status: She is alert and oriented to person, place, and time.   Psychiatric:         Behavior: Behavior normal.          Assessment and Plan (including Health Maintenance)      Problem List  Smart Sets  Document Outside HM   :    Plan: avoic irritants, medsa s ordered, return to clinic as needed and as scheduled    Acute non-recurrent maxillary sinusitis  -     cefTRIAXone injection 1 g  -     dexamethasone injection 4 mg  -     amoxicillin (AMOXIL) 500 MG capsule; Take 1 capsule (500 mg total) by mouth every 8 (eight) hours.  Dispense: 30 capsule; Refill: 0  -     fluticasone propionate (FLONASE) 50 mcg/actuation nasal spray; 1 spray (50 mcg total) by Each Nostril route once daily.  Dispense: 15.8 mL; Refill: 0  -     triprolidine-pseudoephedrine (APRODINE) 2.5-60 mg Tab; Take 1 tablet by mouth every 6 (six) hours as needed (nasal congestion).  Dispense: 32 tablet; Refill: 0    Rheumatoid arthritis, involving unspecified site, unspecified whether rheumatoid factor present  -     hydrOXYchloroQUINE (PLAQUENIL) 200 mg tablet; Take 1 tablet (200 mg total) by mouth once daily.  Dispense: 90 tablet; Refill: 0            Health Maintenance Due   Topic Date Due    Hepatitis C Screening  Never done    Mammogram  Never done    LDCT Lung Screen  Never done    COVID-19 Vaccine (3 - Booster for Moderna  series) 10/07/2021       Problem List Items Addressed This Visit    None     Visit Diagnoses     Acute non-recurrent maxillary sinusitis    -  Primary    Relevant Medications    cefTRIAXone injection 1 g    dexamethasone injection 4 mg    amoxicillin (AMOXIL) 500 MG capsule    fluticasone propionate (FLONASE) 50 mcg/actuation nasal spray    triprolidine-pseudoephedrine (APRODINE) 2.5-60 mg Tab    Rheumatoid arthritis, involving unspecified site, unspecified whether rheumatoid factor present        Relevant Medications    hydrOXYchloroQUINE (PLAQUENIL) 200 mg tablet            Health Maintenance Topics with due status: Not Due       Topic Last Completion Date    Colorectal Cancer Screening 10/01/2020    Lipid Panel 06/10/2021    Influenza Vaccine Not Due       Procedures     Future Appointments   Date Time Provider Department Center   6/13/2022  1:40 PM Riley Saunders MD OB  [facility]   7/28/2022  9:15 AM Adolfo [facility] MD [facility]   8/24/2022  9:15 AM Arelis Escobedo NP [facility] FAMMED [place]   8/29/2022  9:45 AM Juaquin Mantilla DO [facility] [place] [facility]   8/30/2022  9:30 AM [facility]        Follow up for as scheduled.       Signature:  Arelis Escobedo NP    Date of encounter: 6/6/22

## 2022-06-08 DIAGNOSIS — R13.19 ESOPHAGEAL DYSPHAGIA: Primary | ICD-10-CM

## 2022-06-08 DIAGNOSIS — E78.2 MIXED HYPERLIPIDEMIA: ICD-10-CM

## 2022-06-08 RX ORDER — LOVASTATIN 20 MG/1
20 TABLET ORAL DAILY
Qty: 90 TABLET | Refills: 1 | Status: SHIPPED | OUTPATIENT
Start: 2022-06-08 | End: 2022-11-22 | Stop reason: SDUPTHER

## 2022-06-08 RX ORDER — PANTOPRAZOLE SODIUM 40 MG/1
40 TABLET, DELAYED RELEASE ORAL DAILY
Qty: 90 TABLET | Refills: 1 | Status: SHIPPED | OUTPATIENT
Start: 2022-06-08 | End: 2022-11-22 | Stop reason: SDUPTHER

## 2022-07-26 DIAGNOSIS — S72.002D CLOSED FRACTURE OF LEFT HIP WITH ROUTINE HEALING, SUBSEQUENT ENCOUNTER: Primary | ICD-10-CM

## 2022-07-28 ENCOUNTER — HOSPITAL ENCOUNTER (OUTPATIENT)
Dept: RADIOLOGY | Facility: HOSPITAL | Age: 62
Discharge: HOME | End: 2022-07-28
Attending: ORTHOPAEDIC SURGERY
Payer: MEDICAID

## 2022-07-28 ENCOUNTER — OFFICE VISIT (OUTPATIENT)
Dept: ORTHOPEDICS | Facility: CLINIC | Age: 62
End: 2022-07-28
Payer: MEDICAID

## 2022-07-28 DIAGNOSIS — S72.002D CLOSED FRACTURE OF LEFT HIP WITH ROUTINE HEALING, SUBSEQUENT ENCOUNTER: ICD-10-CM

## 2022-07-28 DIAGNOSIS — S72.002D CLOSED FRACTURE OF LEFT HIP WITH ROUTINE HEALING, SUBSEQUENT ENCOUNTER: Primary | ICD-10-CM

## 2022-07-28 DIAGNOSIS — M75.42 IMPINGEMENT SYNDROME OF LEFT SHOULDER: ICD-10-CM

## 2022-07-28 PROCEDURE — 1160F RVW MEDS BY RX/DR IN RCRD: CPT | Mod: CPTII,,, | Performed by: ORTHOPAEDIC SURGERY

## 2022-07-28 PROCEDURE — 1160F PR REVIEW ALL MEDS BY PRESCRIBER/CLIN PHARMACIST DOCUMENTED: ICD-10-PCS | Mod: CPTII,,, | Performed by: ORTHOPAEDIC SURGERY

## 2022-07-28 PROCEDURE — 99214 OFFICE O/P EST MOD 30 MIN: CPT | Mod: 25,,, | Performed by: ORTHOPAEDIC SURGERY

## 2022-07-28 PROCEDURE — 20610 DRAIN/INJ JOINT/BURSA W/O US: CPT | Mod: LT,,, | Performed by: ORTHOPAEDIC SURGERY

## 2022-07-28 PROCEDURE — 1159F MED LIST DOCD IN RCRD: CPT | Mod: CPTII,,, | Performed by: ORTHOPAEDIC SURGERY

## 2022-07-28 PROCEDURE — 20610 LARGE JOINT ASPIRATION/INJECTION: L SUBACROMIAL BURSA: ICD-10-PCS | Mod: LT,,, | Performed by: ORTHOPAEDIC SURGERY

## 2022-07-28 PROCEDURE — 99214 PR OFFICE/OUTPT VISIT, EST, LEVL IV, 30-39 MIN: ICD-10-PCS | Mod: 25,,, | Performed by: ORTHOPAEDIC SURGERY

## 2022-07-28 PROCEDURE — 73502 XR HIP WITH PELVIS WHEN PERFORMED, 2 OR 3 VIEWS LEFT: ICD-10-PCS | Mod: 26,LT,, | Performed by: ORTHOPAEDIC SURGERY

## 2022-07-28 PROCEDURE — 73502 X-RAY EXAM HIP UNI 2-3 VIEWS: CPT | Mod: 26,LT,, | Performed by: ORTHOPAEDIC SURGERY

## 2022-07-28 PROCEDURE — 73502 X-RAY EXAM HIP UNI 2-3 VIEWS: CPT | Mod: TC,LT

## 2022-07-28 PROCEDURE — 1159F PR MEDICATION LIST DOCUMENTED IN MEDICAL RECORD: ICD-10-PCS | Mod: CPTII,,, | Performed by: ORTHOPAEDIC SURGERY

## 2022-07-28 RX ADMIN — TRIAMCINOLONE ACETONIDE 80 MG: 40 INJECTION, SUSPENSION INTRA-ARTICULAR; INTRAMUSCULAR at 09:07

## 2022-07-28 NOTE — PROGRESS NOTES
CC:  Hip pain  62 y.o. Female returns to clinic for a follow up visit regarding     ICD-10-CM ICD-9-CM   1. Closed fracture of left hip with routine healing, subsequent encounter  S72.002D V54.13   2. Impingement syndrome of left shoulder  M75.42 726.2       Patient is doing ok. She has finished PT and is able to walk without assistance.   Still still has a little pain when she crosses her legs.   She is complaining of shoulder impingement as well.  He complains of pain with overhead activity.  Complains of pain at night when laying on the affected left upper extremity.  Complains of a pinching sensation when lifting above 90°.       REVIEW OF SYSTEMS:   Constitution: Negative. Negative for chills, fever and night sweats.    Hematologic/Lymphatic: Negative for bleeding problem. Does not bruise/bleed easily.   Skin: Negative for dry skin, itching and rash.   Musculoskeletal: Negative for falls. Positive for hip pain and muscle weakness.   All other review of symptoms were reviewed and found to be noncontributory.     PAST MEDICAL HISTORY:   Past Medical History:   Diagnosis Date    Acute superficial gastritis without hemorrhage 6/23/2021    Acute superficial gastritis without hemorrhage 6/23/2021    Anxiety     Arthritis     newly dx'd RA: sees MD in [place]    Bronchitis 7/22/2021    Bursitis of left shoulder 6/10/2021    Cancer     hx of cervical cancer    COPD (chronic obstructive pulmonary disease)     Depression     Emphysema of lung     Fungal esophagitis 6/23/2021    HH (hiatus hernia) 6/23/2021    Hyperlipidemia     Hypertension     Insomnia 04/16/2020    Low back pain 04/28/2021    Pain in joint of left hip 04/28/2021    Pain in joint of left knee 04/28/2021       PAST SURGICAL HISTORY:   Past Surgical History:   Procedure Laterality Date    OPEN REDUCTION AND INTERNAL FIXATION (ORIF) OF INJURY OF HIP Left 1/12/2022    Procedure: ORIF, HIP;  Surgeon: Adolfo Randall MD;  Location:  [facility] ORTHO OR;  Service: Orthopedics;  Laterality: Left;    ROBOTIC ARTHROPLASTY, HIP Left 12/13/2021    Procedure: ROBOTIC ARTHROPLASTY,HIP;  Surgeon: Adolfo Randall MD;  Location: [facility] ORTHO OR;  Service: Orthopedics;  Laterality: Left;    SHOULDER SURGERY Right 2017, 2019         PHYSICAL EXAMINATION:  General    Nursing note and vitals reviewed.  Constitutional: She is oriented to person, place, and time. She appears well-developed and well-nourished.   HENT:   Head: Normocephalic and atraumatic.   Nose: Nose normal.   Eyes: EOM are normal. Pupils are equal, round, and reactive to light.   Neck: Neck supple.   Cardiovascular: Normal rate, regular rhythm and intact distal pulses.    Pulmonary/Chest: Effort normal. No respiratory distress. She exhibits no tenderness.   Abdominal: Soft. She exhibits no distension. There is no abdominal tenderness.   Neurological: She is alert and oriented to person, place, and time. She has normal reflexes. She displays normal reflexes. She exhibits normal muscle tone. Coordination normal.   Psychiatric: She has a normal mood and affect. Her behavior is normal. Judgment and thought content normal.             Right Hip Exam   Right hip exam is normal.   Left Hip Exam     Tenderness   The patient tender to palpation of the trochanteric bursa.    Range of Motion   Extension: normal   Flexion: normal   External rotation: normal   Internal rotation: normal     Tests   Pain w/ forced internal rotation (ANTONIO): present  Pain w/ forced external rotation (FADIR): present  Circumduction test: positive  Log Roll: positive    Other   Sensation: normal      Right Shoulder Exam   Right shoulder exam is normal.    Left Shoulder Exam     Inspection/Observation   Swelling: present  Scapular Dyskinesia: positive    Tenderness   The patient is tender to palpation of the acromioclavicular joint and biceps tendon.    Crepitus   The patient has crepitus of the AC joint and greater  tuberosity    Range of Motion   Active abduction: abnormal   Passive abduction: abnormal   Forward Flexion: abnormal   Forward Elevation: abnormal    Tests & Signs   Cross arm: positive  Drop arm: negative  Ureña test: positive  Impingement: positive  Sulcus: absent  Rotator Cuff Painful Arc/Range: mild  Anterosuperior Escape: negative  Lag Sign 0 degrees: negative  Lag Sign 90 degrees: negative  Lift Off Sign: positive  Belly Press: positive  Active Compression Test ([place]'s Sign): positive  Anterior Drawer Test: 0  Posterior Drawer Test: 0  Bear Hug: positive  Jerk Test: negative    Comments:  + Dynamic Labral Shear test  + Whipple Test that does not correct with scapular stabilization      Muscle Strength   Left Upper Extremity  Shoulder Internal Rotation: 4/5   Shoulder External Rotation: 4/5   Supraspinatus: 4/5   Subscapularis: 4/5   Biceps: 4/5     Vascular Exam       Left Pulses  Dorsalis Pedis:      2+  Posterior Tibial:      2+              IMAGING:  X-Ray Hip 2 or 3 views Left (with Pelvis when performed)    Result Date: 7/28/2022  See Procedure Notes for results. IMPRESSION: Please see Ortho procedure notes for report.  This procedure was auto-finalized by: Virtual Radiologist     Radiographs left hip demonstrate a stable appearing total hip prosthesis with cerclage wire fixation.  The hip appears to be roughly 2 cm shorter than the contralateral side.    ASSESSMENT:      ICD-10-CM ICD-9-CM   1. Closed fracture of left hip with routine healing, subsequent encounter  S72.002D V54.13   2. Impingement syndrome of left shoulder  M75.42 726.2       PLAN:     -Findings and treatment options were discussed with the patient  -All questions answered  Natural history and expected course discussed. Questions answered.  Educational materials distributed.  Home exercises discussed.      she is complaining of impingement and bursitis in her left shoulder.  Subacromial injection was given today.  In regards to her  leg-length discrepancy recommend use of a heel lift of roughly 2 cm to help address this.  She has had some shortening secondary to her fracture.  She understands this.  I will see her back in 6 months for repeat x-rays of her hip.  Her shoulder is no better we can investigate this further.     There are no Patient Instructions on file for this visit.    Orders Placed This Encounter   Procedures    Large Joint Aspiration/Injection: L subacromial bursa       Large Joint Aspiration/Injection: L subacromial bursa    Date/Time: 7/28/2022 9:15 AM  Performed by: Adolfo Randall MD  Authorized by: Adolfo [facility] MD     Consent Done?:  Yes (Verbal)  Indications:  Pain  Site marked: the procedure site was marked    Local anesthetic:  Bupivacaine 0.25% without epinephrine (4 cc's of 0.25% bupivicaine)    Details:  Needle Size:  22 G  Approach:  Posterior  Location:  Shoulder  Site:  L subacromial bursa  Medications:  80 mg triamcinolone acetonide 40 mg/mL  Patient tolerance:  Patient tolerated the procedure well with no immediate complications

## 2022-07-29 RX ORDER — TRIAMCINOLONE ACETONIDE 40 MG/ML
80 INJECTION, SUSPENSION INTRA-ARTICULAR; INTRAMUSCULAR
Status: DISCONTINUED | OUTPATIENT
Start: 2022-07-28 | End: 2022-07-29 | Stop reason: HOSPADM

## 2022-08-23 ENCOUNTER — OFFICE VISIT (OUTPATIENT)
Dept: FAMILY MEDICINE | Facility: CLINIC | Age: 62
End: 2022-08-23
Payer: MEDICAID

## 2022-08-23 VITALS
WEIGHT: 164 LBS | BODY MASS INDEX: 28 KG/M2 | RESPIRATION RATE: 22 BRPM | SYSTOLIC BLOOD PRESSURE: 128 MMHG | OXYGEN SATURATION: 99 % | HEART RATE: 83 BPM | DIASTOLIC BLOOD PRESSURE: 72 MMHG | HEIGHT: 64 IN | TEMPERATURE: 98 F

## 2022-08-23 DIAGNOSIS — J18.9 PNEUMONIA OF BOTH LOWER LOBES DUE TO INFECTIOUS ORGANISM: Primary | ICD-10-CM

## 2022-08-23 DIAGNOSIS — J44.9 CHRONIC OBSTRUCTIVE PULMONARY DISEASE, UNSPECIFIED COPD TYPE: ICD-10-CM

## 2022-08-23 PROCEDURE — 99214 PR OFFICE/OUTPT VISIT, EST, LEVL IV, 30-39 MIN: ICD-10-PCS | Mod: ,,, | Performed by: NURSE PRACTITIONER

## 2022-08-23 PROCEDURE — 3078F DIAST BP <80 MM HG: CPT | Mod: ,,, | Performed by: NURSE PRACTITIONER

## 2022-08-23 PROCEDURE — 3008F PR BODY MASS INDEX (BMI) DOCUMENTED: ICD-10-PCS | Mod: ,,, | Performed by: NURSE PRACTITIONER

## 2022-08-23 PROCEDURE — 3074F PR MOST RECENT SYSTOLIC BLOOD PRESSURE < 130 MM HG: ICD-10-PCS | Mod: ,,, | Performed by: NURSE PRACTITIONER

## 2022-08-23 PROCEDURE — 3074F SYST BP LT 130 MM HG: CPT | Mod: ,,, | Performed by: NURSE PRACTITIONER

## 2022-08-23 PROCEDURE — 3078F PR MOST RECENT DIASTOLIC BLOOD PRESSURE < 80 MM HG: ICD-10-PCS | Mod: ,,, | Performed by: NURSE PRACTITIONER

## 2022-08-23 PROCEDURE — 3008F BODY MASS INDEX DOCD: CPT | Mod: ,,, | Performed by: NURSE PRACTITIONER

## 2022-08-23 PROCEDURE — 99214 OFFICE O/P EST MOD 30 MIN: CPT | Mod: ,,, | Performed by: NURSE PRACTITIONER

## 2022-08-23 RX ORDER — PREDNISONE 50 MG/1
50 TABLET ORAL DAILY
COMMUNITY
Start: 2022-08-19 | End: 2022-08-24

## 2022-08-23 RX ORDER — DOXYCYCLINE 50 MG/1
50 CAPSULE ORAL
COMMUNITY
Start: 2022-08-19 | End: 2022-08-29

## 2022-08-23 RX ORDER — VARENICLINE TARTRATE 0.5 (11)-1
KIT ORAL
Qty: 1 TABLET | Refills: 0 | Status: SHIPPED | OUTPATIENT
Start: 2022-08-23 | End: 2022-09-23 | Stop reason: SDUPTHER

## 2022-08-23 NOTE — PROGRESS NOTES
Arelis Escobedo NP   [facility]  [address]  [facility]     PATIENT NAME: Yany Silverio  : 1960  DATE: 22  MRN: [id number]      Billing Provider: Arelis Escobedo NP  Level of Service:   Patient PCP Information     Provider PCP Type    Arelis Escobedo NP General          Reason for Visit / Chief Complaint: Follow-up (R/t ER visit in [place]. COPD exacerbation/ Pneumonia)       Update PCP  Update Chief Complaint         History of Present Illness / Problem Focused Workflow     Yany Silverio presents to the clinic here for eval of ER visit in [place], copd exacerbation, pneumonia  o2 sats 92 after ambulating to ay room, pt does not qualify for portable 02 at this time. Stated that darryl been using her o2 cont since being diagnosed with pneumonia      Review of Systems     Review of Systems   Constitutional: Negative for chills, fatigue and fever.   HENT: Negative for nasal congestion, ear pain, facial swelling, hearing loss, mouth dryness, mouth sores, postnasal drip, rhinorrhea, sinus pressure/congestion and goiter.    Eyes: Negative for discharge and itching.   Respiratory: Negative for cough, shortness of breath and wheezing.    Cardiovascular: Negative for chest pain and leg swelling.   Gastrointestinal: Negative for abdominal pain, change in bowel habit and change in bowel habit.   Genitourinary: Negative for difficulty urinating, dysuria, enuresis, frequency, hematuria and urgency.   Neurological: Negative for dizziness, vertigo, syncope, weakness and headaches.   Psychiatric/Behavioral: Negative for decreased concentration.        Medical / Social / Family History     Past Medical History:   Diagnosis Date    Acute superficial gastritis without hemorrhage 2021    Acute superficial gastritis without hemorrhage 2021    Anxiety     Arthritis     newly dx'd RA: sees MD in [place]    Bronchitis 2021    Bursitis of left shoulder 6/10/2021    Cancer     hx  of cervical cancer    COPD (chronic obstructive pulmonary disease)     Depression     Emphysema of lung     Fungal esophagitis 6/23/2021    HH (hiatus hernia) 6/23/2021    Hyperlipidemia     Hypertension     Insomnia 04/16/2020    Low back pain 04/28/2021    Pain in joint of left hip 04/28/2021    Pain in joint of left knee 04/28/2021       Past Surgical History:   Procedure Laterality Date    OPEN REDUCTION AND INTERNAL FIXATION (ORIF) OF INJURY OF HIP Left 1/12/2022    Procedure: ORIF, HIP;  Surgeon: Adolfo Randall MD;  Location: [facility] ORTHO OR;  Service: Orthopedics;  Laterality: Left;    ROBOTIC ARTHROPLASTY, HIP Left 12/13/2021    Procedure: ROBOTIC ARTHROPLASTY,HIP;  Surgeon: Adolfo Randall MD;  Location: [facility] ORTHO OR;  Service: Orthopedics;  Laterality: Left;    SHOULDER SURGERY Right 2017, 2019       Social History  Ms.  reports that she quit smoking about 5 years ago. Her smoking use included cigarettes. She has a 40.00 pack-year smoking history. She has never used smokeless tobacco. She reports current drug use. Drug: Oxycodone. She reports that she does not drink alcohol.    Family History  Ms.'s family history includes Cancer in her mother; Hypertension in her maternal aunt, maternal uncle, and sister.    Medications and Allergies     Medications  Outpatient Medications Marked as Taking for the 8/23/22 encounter (Office Visit) with Arelis Escobedo NP   Medication Sig Dispense Refill    albuterol (PROVENTIL) 2.5 mg /3 mL (0.083 %) nebulizer solution albuterol sulfate 2.5 mg/3 mL (0.083 %) solution for nebulization      aspirin 81 MG Chew Take 4 tablets (324 mg total) by mouth once daily. 120 tablet 0    busPIRone (BUSPAR) 5 MG Tab Take 1 tablet (5 mg total) by mouth 2 (two) times daily. 60 tablet 5    citalopram (CELEXA) 20 MG tablet Take 2 tablets (40 mg total) by mouth once daily. 60 tablet 5    diclofenac sodium (VOLTAREN) 1 % Gel Apply 2 g topically 3 (three) times daily.     "   fluticasone propionate (FLONASE) 50 mcg/actuation nasal spray 1 spray (50 mcg total) by Each Nostril route once daily. 15.8 mL 0    furosemide (LASIX) 20 MG tablet TAKE 1 TABLET BY MOUTH DAILY FOR 3 DAYS THEN WEIGH DAILY AND IF NO MORE THAN 3 POUND WEIGHT GAIN, TAKE LASIX 30 tablet 0    hydrOXYchloroQUINE (PLAQUENIL) 200 mg tablet Take 1 tablet (200 mg total) by mouth once daily. 90 tablet 0    lovastatin (MEVACOR) 20 MG tablet Take 1 tablet (20 mg total) by mouth once daily. 90 tablet 1    oxyCODONE-acetaminophen (PERCOCET)  mg per tablet Take 1 tablet by mouth every 6 (six) hours as needed for Pain. 30 tablet 0    pantoprazole (PROTONIX) 40 MG tablet Take 1 tablet (40 mg total) by mouth once daily. 90 tablet 1       Allergies  Review of patient's allergies indicates:  No Known Allergies    Physical Examination     Vitals:    08/23/22 1332   BP: 128/72   BP Location: Right arm   Patient Position: Sitting   Pulse: 83   Resp: (!) 22   Temp: 98.3 °F (36.8 °C)   TempSrc: Oral   SpO2: 99%   Weight: 74.4 kg (164 lb)   Height: 5' 4" (1.626 m)      Physical Exam  Vitals and nursing note reviewed.   Constitutional:       Appearance: Normal appearance.   HENT:      Head: Normocephalic.      Right Ear: Tympanic membrane, ear canal and external ear normal.      Left Ear: Tympanic membrane, ear canal and external ear normal.      Nose: Nose normal.      Mouth/Throat:      Mouth: Mucous membranes are moist.      Pharynx: Oropharynx is clear.   Eyes:      Extraocular Movements: Extraocular movements intact.      Conjunctiva/sclera: Conjunctivae normal.      Pupils: Pupils are equal, round, and reactive to light.   Cardiovascular:      Rate and Rhythm: Normal rate and regular rhythm.      Pulses: Normal pulses.      Heart sounds: Normal heart sounds.   Pulmonary:      Effort: Pulmonary effort is normal.      Breath sounds: Normal breath sounds.   Abdominal:      General: Bowel sounds are normal.      Palpations: " Abdomen is soft.   Musculoskeletal:         General: Normal range of motion.   Skin:     General: Skin is warm and dry.      Capillary Refill: Capillary refill takes less than 2 seconds.   Neurological:      General: No focal deficit present.      Mental Status: She is alert and oriented to person, place, and time.   Psychiatric:         Mood and Affect: Mood normal.         Behavior: Behavior normal.          Assessment and Plan (including Health Maintenance)      Problem List  Smart Sets  Document Outside HM   :    Plan: use o2 as needed at home, meds as ordered, return to clnic as needed    There are no diagnoses linked to this encounter.        Health Maintenance Due   Topic Date Due    Hepatitis C Screening  Never done    Mammogram  Never done    LDCT Lung Screen  Never done    COVID-19 Vaccine (3 - Booster for Moderna series) 10/07/2021       Problem List Items Addressed This Visit    None           Health Maintenance Topics with due status: Not Due       Topic Last Completion Date    Colorectal Cancer Screening 10/01/2020    Lipid Panel 06/10/2021    Influenza Vaccine Not Due       Procedures     Future Appointments   Date Time Provider Department Center   8/26/2022  9:15 AM Arelis Escobedo NP [facility] FAM[facility]   8/29/2022  9:45 AM Juaquin Mantilla DO [facility] SLEEP [place] [facility]   8/30/2022  9:30 AM [facility] MAMMO [facility]   1/31/2023  8:15 AM Adolfo Randall MD OB ORTHO [facility]        No follow-ups on file.       Signature:  Arelis Escobedo NP    Date of encounter: 8/23/22

## 2022-08-29 ENCOUNTER — OFFICE VISIT (OUTPATIENT)
Dept: SLEEP MEDICINE | Facility: CLINIC | Age: 62
End: 2022-08-29
Attending: FAMILY MEDICINE
Payer: MEDICARE

## 2022-08-29 VITALS
HEART RATE: 75 BPM | BODY MASS INDEX: 28.62 KG/M2 | SYSTOLIC BLOOD PRESSURE: 131 MMHG | OXYGEN SATURATION: 96 % | WEIGHT: 167.63 LBS | DIASTOLIC BLOOD PRESSURE: 74 MMHG | HEIGHT: 64 IN

## 2022-08-29 DIAGNOSIS — J44.9 CHRONIC OBSTRUCTIVE PULMONARY DISEASE, UNSPECIFIED COPD TYPE: ICD-10-CM

## 2022-08-29 DIAGNOSIS — G47.33 OSA ON CPAP: Primary | ICD-10-CM

## 2022-08-29 PROCEDURE — 3075F SYST BP GE 130 - 139MM HG: CPT | Mod: CPTII | Performed by: FAMILY MEDICINE

## 2022-08-29 PROCEDURE — 3008F BODY MASS INDEX DOCD: CPT | Mod: CPTII | Performed by: FAMILY MEDICINE

## 2022-08-29 PROCEDURE — 99213 OFFICE O/P EST LOW 20 MIN: CPT | Mod: PBBFAC | Performed by: FAMILY MEDICINE

## 2022-08-29 PROCEDURE — 1160F RVW MEDS BY RX/DR IN RCRD: CPT | Mod: CPTII | Performed by: FAMILY MEDICINE

## 2022-08-29 PROCEDURE — 99999 PR STA SHADOW: ICD-10-PCS | Mod: PBBFAC,,, | Performed by: FAMILY MEDICINE

## 2022-08-29 PROCEDURE — 99999 PR STA SHADOW: CPT | Mod: PBBFAC,,, | Performed by: FAMILY MEDICINE

## 2022-08-29 PROCEDURE — 3078F DIAST BP <80 MM HG: CPT | Mod: CPTII | Performed by: FAMILY MEDICINE

## 2022-08-29 PROCEDURE — 99213 OFFICE O/P EST LOW 20 MIN: CPT | Mod: S$PBB | Performed by: FAMILY MEDICINE

## 2022-08-29 PROCEDURE — 1159F MED LIST DOCD IN RCRD: CPT | Mod: CPTII | Performed by: FAMILY MEDICINE

## 2022-08-29 NOTE — PROGRESS NOTES
Subjective:       Patient ID: Yany Silverio is a 62 y.o. female.    Chief Complaint: Sleep Apnea (CPAP management)    Patient follows up in sleep clinic for CPAP management and continues to use a recalled CPAP.  The recall has already been reviewed with the patient.  Compliance is 83.3% with an average AHI of 4.4 at a pressure of 12 cm H2O.  The patient is using and benefitting from CPAP.  The patient is O2 dependent due to COPD and her [place] score is 13.  Initial AHI was only 5.7.  Patient wears a fullface mask.  The patient is on 3 L of O2 with CPAP and an overnight oximetry was ordered but not completed therefore I will reorder this today.    Review of Systems   Constitutional:  Positive for fatigue.        Negative EDS   Respiratory:  Negative for apnea.    Psychiatric/Behavioral:  Negative for sleep disturbance.        Objective:      Physical Exam  Cardiovascular:      Rate and Rhythm: Normal rate and regular rhythm.      Heart sounds: No murmur heard.  Pulmonary:      Breath sounds: Normal breath sounds.   Skin:     General: Skin is warm and dry.   Neurological:      Mental Status: She is alert and oriented to person, place, and time.       Assessment:       Problem List Items Addressed This Visit          Pulmonary    Chronic obstructive pulmonary disease       Other    NGOZI on CPAP - Primary         Plan:       Continue CPAP @ 12 cm H20 with 3 L of O2.  Overnight oximetry while on CPAP with 3 L of O2.  Caution while operating a motor vehicle  Prescription for new supplies  Follow up sleep clinic in 3 months.

## 2022-09-15 ENCOUNTER — OFFICE VISIT (OUTPATIENT)
Dept: PULMONOLOGY | Facility: CLINIC | Age: 62
End: 2022-09-15
Payer: MEDICARE

## 2022-09-15 VITALS
OXYGEN SATURATION: 98 % | SYSTOLIC BLOOD PRESSURE: 168 MMHG | RESPIRATION RATE: 16 BRPM | DIASTOLIC BLOOD PRESSURE: 108 MMHG | HEART RATE: 76 BPM | BODY MASS INDEX: 28.51 KG/M2 | HEIGHT: 64 IN | WEIGHT: 167 LBS

## 2022-09-15 DIAGNOSIS — J44.9 CHRONIC OBSTRUCTIVE PULMONARY DISEASE, UNSPECIFIED COPD TYPE: ICD-10-CM

## 2022-09-15 PROCEDURE — 1159F MED LIST DOCD IN RCRD: CPT | Mod: CPTII,,, | Performed by: INTERNAL MEDICINE

## 2022-09-15 PROCEDURE — 3008F BODY MASS INDEX DOCD: CPT | Mod: CPTII,,, | Performed by: INTERNAL MEDICINE

## 2022-09-15 PROCEDURE — 1159F PR MEDICATION LIST DOCUMENTED IN MEDICAL RECORD: ICD-10-PCS | Mod: CPTII,,, | Performed by: INTERNAL MEDICINE

## 2022-09-15 PROCEDURE — 3077F PR MOST RECENT SYSTOLIC BLOOD PRESSURE >= 140 MM HG: ICD-10-PCS | Mod: CPTII,,, | Performed by: INTERNAL MEDICINE

## 2022-09-15 PROCEDURE — 3008F PR BODY MASS INDEX (BMI) DOCUMENTED: ICD-10-PCS | Mod: CPTII,,, | Performed by: INTERNAL MEDICINE

## 2022-09-15 PROCEDURE — 99213 OFFICE O/P EST LOW 20 MIN: CPT | Mod: S$PBB,,, | Performed by: INTERNAL MEDICINE

## 2022-09-15 PROCEDURE — 99215 OFFICE O/P EST HI 40 MIN: CPT | Mod: PBBFAC | Performed by: INTERNAL MEDICINE

## 2022-09-15 PROCEDURE — 99213 PR OFFICE/OUTPT VISIT, EST, LEVL III, 20-29 MIN: ICD-10-PCS | Mod: S$PBB,,, | Performed by: INTERNAL MEDICINE

## 2022-09-15 PROCEDURE — 3077F SYST BP >= 140 MM HG: CPT | Mod: CPTII,,, | Performed by: INTERNAL MEDICINE

## 2022-09-15 PROCEDURE — 3080F PR MOST RECENT DIASTOLIC BLOOD PRESSURE >= 90 MM HG: ICD-10-PCS | Mod: CPTII,,, | Performed by: INTERNAL MEDICINE

## 2022-09-15 PROCEDURE — 3080F DIAST BP >= 90 MM HG: CPT | Mod: CPTII,,, | Performed by: INTERNAL MEDICINE

## 2022-09-15 NOTE — ASSESSMENT & PLAN NOTE
Patient currently without complaints seems to be doing reasonably well continues to smoke with really talked about the need to quit smoking you on the current medical regimen will see her back in 3 4 months he has lose weight increase activity giving treat infections as needed stay up-to-date on vaccination

## 2022-09-15 NOTE — PROGRESS NOTES
Subjective:       Patient ID: Yany Silverio is a 62 y.o. female.    Chief Complaint: COPD and Shortness of Breath (Chronic; sometimes with exertion )    COPD  This is a chronic problem. The current episode started more than 1 month ago. The problem occurs rarely. The problem has been gradually worsening. Pertinent negatives include no abdominal pain, arthralgias, chest pain, chills, congestion, headaches or rash.   Shortness of Breath  Pertinent negatives include no abdominal pain, chest pain, ear pain, headaches or rash. Her past medical history is significant for COPD.   Past Medical History:   Diagnosis Date    Acute superficial gastritis without hemorrhage 6/23/2021    Acute superficial gastritis without hemorrhage 6/23/2021    Anxiety     Arthritis     newly dx'd RA: sees MD in [place]    Bronchitis 7/22/2021    Bursitis of left shoulder 6/10/2021    Cancer     hx of cervical cancer    COPD (chronic obstructive pulmonary disease)     Depression     Emphysema of lung     Fungal esophagitis 6/23/2021    HH (hiatus hernia) 6/23/2021    Hyperlipidemia     Hypertension     Insomnia 04/16/2020    Low back pain 04/28/2021    Pain in joint of left hip 04/28/2021    Pain in joint of left knee 04/28/2021    Pneumonia of both lower lobes due to infectious organism 1/14/2022     Past Surgical History:   Procedure Laterality Date    OPEN REDUCTION AND INTERNAL FIXATION (ORIF) OF INJURY OF HIP Left 1/12/2022    Procedure: ORIF, HIP;  Surgeon: Adolfo [facility] MD;  Location: [facility] OR;  Service: Orthopedics;  Laterality: Left;    ROBOTIC ARTHROPLASTY, HIP Left 12/13/2021    Procedure: ROBOTIC ARTHROPLASTY,HIP;  Surgeon: Adolfo [facility] MD;  Location: [facility] OR;  Service: Orthopedics;  Laterality: Left;    SHOULDER SURGERY Right 2017, 2019     Family History   Problem Relation Age of Onset    Cancer Mother     Hypertension Sister     Hypertension Maternal Aunt     Hypertension Maternal Uncle      Review of  patient's allergies indicates:  No Active Allergies   Social History     Tobacco Use    Smoking status: Former     Packs/day: 2.00     Years: 20.00     Pack years: 40.00     Types: Cigarettes     Quit date:      Years since quittin.7    Smokeless tobacco: Never   Substance Use Topics    Alcohol use: Never    Drug use: Yes     Types: Oxycodone     Comment: rx for hip pain: surg planned      Review of Systems   Constitutional:  Negative for chills, activity change and night sweats.   HENT:  Negative for congestion and ear pain.    Eyes:  Negative for redness and itching.   Respiratory:  Positive for shortness of breath.    Cardiovascular:  Negative for chest pain and palpitations.   Musculoskeletal:  Negative for arthralgias and back pain.   Skin:  Negative for rash.   Gastrointestinal:  Negative for abdominal pain and abdominal distention.   Neurological:  Negative for dizziness and headaches.   Hematological:  Negative for adenopathy. Does not bruise/bleed easily.   Psychiatric/Behavioral:  Negative for confusion. The patient is not nervous/anxious.      Objective:      Physical Exam   Constitutional: She is oriented to person, place, and time. She appears well-developed and well-nourished.   HENT:   Head: Normocephalic.   Nose: Nose normal.   Mouth/Throat: Oropharynx is clear and moist.   Neck: No JVD present. No thyromegaly present.   Cardiovascular: Normal rate, regular rhythm, normal heart sounds and intact distal pulses.   Pulmonary/Chest: Normal expansion, hyperinflation, symmetric chest wall expansion, effort normal and breath sounds normal.   Abdominal: Soft. Bowel sounds are normal.   Musculoskeletal:         General: Normal range of motion.      Cervical back: Normal range of motion and neck supple.   Lymphadenopathy: No supraclavicular adenopathy is present.     She has no cervical adenopathy.   Neurological: She is alert and oriented to person, place, and time. She has normal reflexes.   Skin:  Skin is warm and dry.   Psychiatric: She has a normal mood and affect. Her behavior is normal.   Personal Diagnostic Review      No flowsheet data found.      Assessment:       1. Chronic obstructive pulmonary disease, unspecified COPD type          Outpatient Encounter Medications as of 9/15/2022   Medication Sig Dispense Refill    albuterol (PROVENTIL) 2.5 mg /3 mL (0.083 %) nebulizer solution albuterol sulfate 2.5 mg/3 mL (0.083 %) solution for nebulization      aspirin 81 MG Chew Take 4 tablets (324 mg total) by mouth once daily. 120 tablet 0    busPIRone (BUSPAR) 5 MG Tab Take 1 tablet (5 mg total) by mouth 2 (two) times daily. 60 tablet 5    citalopram (CELEXA) 20 MG tablet Take 2 tablets (40 mg total) by mouth once daily. 60 tablet 5    diclofenac sodium (VOLTAREN) 1 % Gel Apply 2 g topically 3 (three) times daily.       fluticasone propionate (FLONASE) 50 mcg/actuation nasal spray 1 spray (50 mcg total) by Each Nostril route once daily. 15.8 mL 0    furosemide (LASIX) 20 MG tablet TAKE 1 TABLET BY MOUTH DAILY FOR 3 DAYS THEN WEIGH DAILY AND IF NO MORE THAN 3 POUND WEIGHT GAIN, TAKE LASIX 30 tablet 0    hydrOXYchloroQUINE (PLAQUENIL) 200 mg tablet Take 1 tablet (200 mg total) by mouth once daily. 90 tablet 0    lovastatin (MEVACOR) 20 MG tablet Take 1 tablet (20 mg total) by mouth once daily. 90 tablet 1    oxyCODONE-acetaminophen (PERCOCET)  mg per tablet Take 1 tablet by mouth every 6 (six) hours as needed for Pain. 30 tablet 0    pantoprazole (PROTONIX) 40 MG tablet Take 1 tablet (40 mg total) by mouth once daily. 90 tablet 1    triprolidine-pseudoephedrine (APRODINE) 2.5-60 mg Tab Take 1 tablet by mouth every 6 (six) hours as needed.      varenicline (CHANTIX STARTING MONTH BOX) 0.5 mg (11)- 1 mg (42) tablet Take one 0.5mg tab by mouth once daily X3 days,then increase to one 0.5mg tab twice daily X4 days,then increase to one 1mg tab twice daily 1 tablet 0    VENTOLIN HFA 90 mcg/actuation inhaler  Inhale 2 puffs into the lungs every 4 (four) hours as needed for Wheezing (q 4-6 hours).      tiotropium (SPIRIVA) 18 mcg inhalation capsule Inhale 1 capsule (18 mcg total) into the lungs once daily. Controller 30 capsule 6     No facility-administered encounter medications on file as of 9/15/2022.     No orders of the defined types were placed in this encounter.      Plan:       Problem List Items Addressed This Visit          Pulmonary    Chronic obstructive pulmonary disease     Patient currently without complaints seems to be doing reasonably well continues to smoke with really talked about the need to quit smoking you on the current medical regimen will see her back in 3 4 months he has lose weight increase activity giving treat infections as needed stay up-to-date on vaccination

## 2022-09-20 RX ORDER — CETIRIZINE HYDROCHLORIDE 10 MG/1
10 TABLET ORAL DAILY
Qty: 90 TABLET | Refills: 1 | Status: SHIPPED | OUTPATIENT
Start: 2022-09-20 | End: 2022-10-31

## 2022-09-22 ENCOUNTER — TELEPHONE (OUTPATIENT)
Dept: FAMILY MEDICINE | Facility: CLINIC | Age: 62
End: 2022-09-22
Payer: MEDICARE

## 2022-09-22 DIAGNOSIS — Z71.6 ENCOUNTER FOR SMOKING CESSATION COUNSELING: Primary | ICD-10-CM

## 2022-09-22 NOTE — TELEPHONE ENCOUNTER
----- Message from Karrie Hirsch sent at 9/22/2022  1:33 PM CDT -----  Pt called wondering if there was a fax sent from [facility] regarding to her medicine to help her stop smoking callback number is [phone]

## 2022-09-23 DIAGNOSIS — Z72.0 TOBACCO ABUSE: Primary | ICD-10-CM

## 2022-09-23 RX ORDER — VARENICLINE TARTRATE 0.5 (11)-1
KIT ORAL
Qty: 1 TABLET | Refills: 0 | Status: SHIPPED | OUTPATIENT
Start: 2022-09-23 | End: 2022-09-26 | Stop reason: SDUPTHER

## 2022-09-26 DIAGNOSIS — Z72.0 TOBACCO ABUSE: ICD-10-CM

## 2022-09-26 RX ORDER — VARENICLINE TARTRATE 0.5 (11)-1
KIT ORAL
Qty: 1 TABLET | Refills: 0 | Status: SHIPPED | OUTPATIENT
Start: 2022-09-26 | End: 2022-12-07

## 2022-09-26 NOTE — TELEPHONE ENCOUNTER
Medication was sent to wrong pharmacy, has been cancelled and would like for medication to be sent to [facility] [address].

## 2022-09-27 ENCOUNTER — DOCUMENTATION ONLY (OUTPATIENT)
Dept: FAMILY MEDICINE | Facility: CLINIC | Age: 62
End: 2022-09-27
Payer: MEDICARE

## 2022-10-17 RX ORDER — CITALOPRAM 20 MG/1
40 TABLET, FILM COATED ORAL DAILY
Qty: 180 TABLET | Refills: 1 | Status: SHIPPED | OUTPATIENT
Start: 2022-10-17 | End: 2022-11-22 | Stop reason: SDUPTHER

## 2022-10-31 ENCOUNTER — OFFICE VISIT (OUTPATIENT)
Dept: SLEEP MEDICINE | Facility: CLINIC | Age: 62
End: 2022-10-31
Attending: FAMILY MEDICINE
Payer: MEDICARE

## 2022-10-31 VITALS
DIASTOLIC BLOOD PRESSURE: 80 MMHG | SYSTOLIC BLOOD PRESSURE: 135 MMHG | HEIGHT: 64 IN | BODY MASS INDEX: 27.77 KG/M2 | HEART RATE: 87 BPM | OXYGEN SATURATION: 97 % | WEIGHT: 162.63 LBS

## 2022-10-31 DIAGNOSIS — I10 PRIMARY HYPERTENSION: Chronic | ICD-10-CM

## 2022-10-31 DIAGNOSIS — J44.9 CHRONIC OBSTRUCTIVE PULMONARY DISEASE, UNSPECIFIED COPD TYPE: ICD-10-CM

## 2022-10-31 DIAGNOSIS — G47.33 OSA ON CPAP: Primary | ICD-10-CM

## 2022-10-31 PROCEDURE — 3075F SYST BP GE 130 - 139MM HG: CPT | Mod: CPTII,,, | Performed by: FAMILY MEDICINE

## 2022-10-31 PROCEDURE — 1160F PR REVIEW ALL MEDS BY PRESCRIBER/CLIN PHARMACIST DOCUMENTED: ICD-10-PCS | Mod: CPTII,,, | Performed by: FAMILY MEDICINE

## 2022-10-31 PROCEDURE — 99214 OFFICE O/P EST MOD 30 MIN: CPT | Mod: PBBFAC | Performed by: FAMILY MEDICINE

## 2022-10-31 PROCEDURE — 1159F MED LIST DOCD IN RCRD: CPT | Mod: CPTII,,, | Performed by: FAMILY MEDICINE

## 2022-10-31 PROCEDURE — 99999 PR STA SHADOW: ICD-10-PCS | Mod: PBBFAC,,, | Performed by: FAMILY MEDICINE

## 2022-10-31 PROCEDURE — 3079F PR MOST RECENT DIASTOLIC BLOOD PRESSURE 80-89 MM HG: ICD-10-PCS | Mod: CPTII,,, | Performed by: FAMILY MEDICINE

## 2022-10-31 PROCEDURE — 1159F PR MEDICATION LIST DOCUMENTED IN MEDICAL RECORD: ICD-10-PCS | Mod: CPTII,,, | Performed by: FAMILY MEDICINE

## 2022-10-31 PROCEDURE — 99999 PR STA SHADOW: CPT | Mod: PBBFAC,,, | Performed by: FAMILY MEDICINE

## 2022-10-31 PROCEDURE — 99212 OFFICE O/P EST SF 10 MIN: CPT | Mod: S$PBB | Performed by: FAMILY MEDICINE

## 2022-10-31 PROCEDURE — 3079F DIAST BP 80-89 MM HG: CPT | Mod: CPTII,,, | Performed by: FAMILY MEDICINE

## 2022-10-31 PROCEDURE — 3075F PR MOST RECENT SYSTOLIC BLOOD PRESS GE 130-139MM HG: ICD-10-PCS | Mod: CPTII,,, | Performed by: FAMILY MEDICINE

## 2022-10-31 PROCEDURE — 1160F RVW MEDS BY RX/DR IN RCRD: CPT | Mod: CPTII,,, | Performed by: FAMILY MEDICINE

## 2022-10-31 NOTE — PROGRESS NOTES
Subjective:       Patient ID: Yany Silverio is a 62 y.o. female.    Chief Complaint: Sleep Apnea (CPAP management)    Patient seen in sleep clinic for CPAP management and has had no problems with her new CPAP or mask other than getting her supplies from her current [employer] company.  Compliance is 100% with an average AHI of 4.4 at a pressure of 12 cm H2O.  [place] score is 3 and the patient is using and benefitting from CPAP.  The patient uses 3 L of O2 with her CPAP and we have ordered overnight oximetry twice on this patient and have not gotten a report as of today's visit therefore will try to get this accomplished as soon as possible.    Review of Systems   Constitutional:  Negative for fatigue.        Negative EDS   Respiratory:  Negative for apnea.    Psychiatric/Behavioral:  Negative for sleep disturbance.        Objective:      Physical Exam  Cardiovascular:      Rate and Rhythm: Normal rate and regular rhythm.      Heart sounds: No murmur heard.  Pulmonary:      Breath sounds: Normal breath sounds.   Skin:     General: Skin is warm and dry.   Neurological:      Mental Status: She is alert and oriented to person, place, and time.       Assessment:       Problem List Items Addressed This Visit          Pulmonary    Chronic obstructive pulmonary disease       Cardiac/Vascular    Hypertension (Chronic)       Other    NGOZI on CPAP - Primary         Plan:       Continue CPAP @ 12 cm H20 with 3 L of O2  Overnight oximetry while on CPAP and O2.  Caution while operating a motor vehicle  Prescription for new supplies  Follow up sleep clinic in 6 months.

## 2022-10-31 NOTE — PROCEDURES
Procedures  Patient presents to clinic for CPAP management.  Patient's ESS is 3.  Patient wears a FFM with non-heated tubing.  Patient gets supplies through [facility].

## 2022-11-16 DIAGNOSIS — F41.9 ANXIETY AND DEPRESSION: ICD-10-CM

## 2022-11-16 DIAGNOSIS — F32.A ANXIETY AND DEPRESSION: ICD-10-CM

## 2022-11-21 RX ORDER — BUSPIRONE HYDROCHLORIDE 5 MG/1
5 TABLET ORAL 2 TIMES DAILY
Qty: 60 TABLET | Refills: 0 | Status: SHIPPED | OUTPATIENT
Start: 2022-11-21 | End: 2022-11-22 | Stop reason: SDUPTHER

## 2022-11-22 ENCOUNTER — OFFICE VISIT (OUTPATIENT)
Dept: FAMILY MEDICINE | Facility: CLINIC | Age: 62
End: 2022-11-22
Payer: MEDICARE

## 2022-11-22 ENCOUNTER — APPOINTMENT (OUTPATIENT)
Dept: RADIOLOGY | Facility: CLINIC | Age: 62
End: 2022-11-22
Attending: NURSE PRACTITIONER
Payer: MEDICARE

## 2022-11-22 VITALS
DIASTOLIC BLOOD PRESSURE: 80 MMHG | TEMPERATURE: 98 F | HEIGHT: 64 IN | RESPIRATION RATE: 20 BRPM | SYSTOLIC BLOOD PRESSURE: 122 MMHG | BODY MASS INDEX: 27.91 KG/M2 | HEART RATE: 68 BPM | OXYGEN SATURATION: 98 % | WEIGHT: 163.5 LBS

## 2022-11-22 DIAGNOSIS — I10 PRIMARY HYPERTENSION: ICD-10-CM

## 2022-11-22 DIAGNOSIS — F32.A ANXIETY AND DEPRESSION: ICD-10-CM

## 2022-11-22 DIAGNOSIS — M25.519 SHOULDER PAIN, UNSPECIFIED CHRONICITY, UNSPECIFIED LATERALITY: Primary | ICD-10-CM

## 2022-11-22 DIAGNOSIS — R13.19 ESOPHAGEAL DYSPHAGIA: ICD-10-CM

## 2022-11-22 DIAGNOSIS — J44.9 CHRONIC OBSTRUCTIVE PULMONARY DISEASE, UNSPECIFIED COPD TYPE: ICD-10-CM

## 2022-11-22 DIAGNOSIS — E78.2 MIXED HYPERLIPIDEMIA: ICD-10-CM

## 2022-11-22 DIAGNOSIS — F41.9 ANXIETY AND DEPRESSION: ICD-10-CM

## 2022-11-22 DIAGNOSIS — M25.519 SHOULDER PAIN, UNSPECIFIED CHRONICITY, UNSPECIFIED LATERALITY: ICD-10-CM

## 2022-11-22 PROBLEM — R53.83 FATIGUE: Status: RESOLVED | Noted: 2022-05-19 | Resolved: 2022-11-22

## 2022-11-22 LAB
ALBUMIN SERPL BCP-MCNC: 3.5 G/DL (ref 3.5–5)
ALBUMIN/GLOB SERPL: 1 {RATIO}
ALP SERPL-CCNC: 114 U/L (ref 50–130)
ALT SERPL W P-5'-P-CCNC: 15 U/L (ref 13–56)
ANION GAP SERPL CALCULATED.3IONS-SCNC: 8 MMOL/L (ref 7–16)
AST SERPL W P-5'-P-CCNC: 17 U/L (ref 15–37)
BASOPHILS # BLD AUTO: 0.06 K/UL (ref 0–0.2)
BASOPHILS NFR BLD AUTO: 1.1 % (ref 0–1)
BILIRUB SERPL-MCNC: 0.2 MG/DL (ref ?–1.2)
BUN SERPL-MCNC: 5 MG/DL (ref 7–18)
BUN/CREAT SERPL: 11 (ref 6–20)
CALCIUM SERPL-MCNC: 8.3 MG/DL (ref 8.5–10.1)
CHLORIDE SERPL-SCNC: 109 MMOL/L (ref 98–107)
CHOLEST SERPL-MCNC: 129 MG/DL (ref 0–200)
CHOLEST/HDLC SERPL: 2 {RATIO}
CO2 SERPL-SCNC: 27 MMOL/L (ref 21–32)
CREAT SERPL-MCNC: 0.47 MG/DL (ref 0.55–1.02)
CREAT UR-MCNC: 56 MG/DL (ref 28–219)
DIFFERENTIAL METHOD BLD: ABNORMAL
EGFR (NO RACE VARIABLE) (RUSH/TITUS): 107 ML/MIN/1.73M²
EOSINOPHIL # BLD AUTO: 0.16 K/UL (ref 0–0.5)
EOSINOPHIL NFR BLD AUTO: 2.9 % (ref 1–4)
ERYTHROCYTE [DISTWIDTH] IN BLOOD BY AUTOMATED COUNT: 20.5 % (ref 11.5–14.5)
GLOBULIN SER-MCNC: 3.5 G/DL (ref 2–4)
GLUCOSE SERPL-MCNC: 82 MG/DL (ref 74–106)
HCT VFR BLD AUTO: 36.3 % (ref 38–47)
HDLC SERPL-MCNC: 66 MG/DL (ref 40–60)
HGB BLD-MCNC: 11.5 G/DL (ref 12–16)
IMM GRANULOCYTES # BLD AUTO: 0.01 K/UL (ref 0–0.04)
IMM GRANULOCYTES NFR BLD: 0.2 % (ref 0–0.4)
LDLC SERPL CALC-MCNC: 45 MG/DL
LYMPHOCYTES # BLD AUTO: 2.32 K/UL (ref 1–4.8)
LYMPHOCYTES NFR BLD AUTO: 42.7 % (ref 27–41)
MCH RBC QN AUTO: 26.8 PG (ref 27–31)
MCHC RBC AUTO-ENTMCNC: 31.7 G/DL (ref 32–36)
MCV RBC AUTO: 84.6 FL (ref 80–96)
MICROALBUMIN UR-MCNC: 3 MG/DL (ref 0–2.8)
MICROALBUMIN/CREAT RATIO PNL UR: 53.6 MG/G (ref 0–30)
MONOCYTES # BLD AUTO: 0.59 K/UL (ref 0–0.8)
MONOCYTES NFR BLD AUTO: 10.9 % (ref 2–6)
MPC BLD CALC-MCNC: 10.7 FL (ref 9.4–12.4)
NEUTROPHILS # BLD AUTO: 2.29 K/UL (ref 1.8–7.7)
NEUTROPHILS NFR BLD AUTO: 42.2 % (ref 53–65)
NONHDLC SERPL-MCNC: 63 MG/DL
NRBC # BLD AUTO: 0 X10E3/UL
NRBC, AUTO (.00): 0 %
PLATELET # BLD AUTO: 292 K/UL (ref 150–400)
POTASSIUM SERPL-SCNC: 3.3 MMOL/L (ref 3.5–5.1)
PROT SERPL-MCNC: 7 G/DL (ref 6.4–8.2)
RBC # BLD AUTO: 4.29 M/UL (ref 4.2–5.4)
SODIUM SERPL-SCNC: 141 MMOL/L (ref 136–145)
TRIGL SERPL-MCNC: 90 MG/DL (ref 35–150)
VLDLC SERPL-MCNC: 18 MG/DL
WBC # BLD AUTO: 5.43 K/UL (ref 4.5–11)

## 2022-11-22 PROCEDURE — 3079F DIAST BP 80-89 MM HG: CPT | Mod: ,,, | Performed by: NURSE PRACTITIONER

## 2022-11-22 PROCEDURE — 3008F PR BODY MASS INDEX (BMI) DOCUMENTED: ICD-10-PCS | Mod: ,,, | Performed by: NURSE PRACTITIONER

## 2022-11-22 PROCEDURE — 80061 LIPID PANEL: ICD-10-PCS | Mod: ,,, | Performed by: CLINICAL MEDICAL LABORATORY

## 2022-11-22 PROCEDURE — 3008F BODY MASS INDEX DOCD: CPT | Mod: ,,, | Performed by: NURSE PRACTITIONER

## 2022-11-22 PROCEDURE — 80053 COMPREHEN METABOLIC PANEL: CPT | Mod: ,,, | Performed by: CLINICAL MEDICAL LABORATORY

## 2022-11-22 PROCEDURE — 82570 ASSAY OF URINE CREATININE: CPT | Mod: ,,, | Performed by: CLINICAL MEDICAL LABORATORY

## 2022-11-22 PROCEDURE — 99214 OFFICE O/P EST MOD 30 MIN: CPT | Mod: 25,,, | Performed by: NURSE PRACTITIONER

## 2022-11-22 PROCEDURE — 96372 PR INJECTION,THERAP/PROPH/DIAG2ST, IM OR SUBCUT: ICD-10-PCS | Mod: ,,, | Performed by: NURSE PRACTITIONER

## 2022-11-22 PROCEDURE — 80053 COMPREHENSIVE METABOLIC PANEL: ICD-10-PCS | Mod: ,,, | Performed by: CLINICAL MEDICAL LABORATORY

## 2022-11-22 PROCEDURE — 85025 CBC WITH DIFFERENTIAL: ICD-10-PCS | Mod: ,,, | Performed by: CLINICAL MEDICAL LABORATORY

## 2022-11-22 PROCEDURE — 82043 UR ALBUMIN QUANTITATIVE: CPT | Mod: ,,, | Performed by: CLINICAL MEDICAL LABORATORY

## 2022-11-22 PROCEDURE — 85025 COMPLETE CBC W/AUTO DIFF WBC: CPT | Mod: ,,, | Performed by: CLINICAL MEDICAL LABORATORY

## 2022-11-22 PROCEDURE — 99214 PR OFFICE/OUTPT VISIT, EST, LEVL IV, 30-39 MIN: ICD-10-PCS | Mod: 25,,, | Performed by: NURSE PRACTITIONER

## 2022-11-22 PROCEDURE — 1159F PR MEDICATION LIST DOCUMENTED IN MEDICAL RECORD: ICD-10-PCS | Mod: ,,, | Performed by: NURSE PRACTITIONER

## 2022-11-22 PROCEDURE — 3079F PR MOST RECENT DIASTOLIC BLOOD PRESSURE 80-89 MM HG: ICD-10-PCS | Mod: ,,, | Performed by: NURSE PRACTITIONER

## 2022-11-22 PROCEDURE — 1159F MED LIST DOCD IN RCRD: CPT | Mod: ,,, | Performed by: NURSE PRACTITIONER

## 2022-11-22 PROCEDURE — 73030 X-RAY EXAM OF SHOULDER: CPT | Mod: TC,RHCUB,RT | Performed by: NURSE PRACTITIONER

## 2022-11-22 PROCEDURE — 73030 X-RAY EXAM OF SHOULDER: CPT | Mod: TC,RT,, | Performed by: NURSE PRACTITIONER

## 2022-11-22 PROCEDURE — 96372 THER/PROPH/DIAG INJ SC/IM: CPT | Mod: ,,, | Performed by: NURSE PRACTITIONER

## 2022-11-22 PROCEDURE — 73030 PR  X-RAY SHOULDER 2+ VW: ICD-10-PCS | Mod: TC,RT,, | Performed by: NURSE PRACTITIONER

## 2022-11-22 PROCEDURE — 3074F PR MOST RECENT SYSTOLIC BLOOD PRESSURE < 130 MM HG: ICD-10-PCS | Mod: ,,, | Performed by: NURSE PRACTITIONER

## 2022-11-22 PROCEDURE — 3074F SYST BP LT 130 MM HG: CPT | Mod: ,,, | Performed by: NURSE PRACTITIONER

## 2022-11-22 PROCEDURE — 73030 XR SHOULDER COMPLETE 2 OR MORE VIEWS RIGHT: ICD-10-PCS | Mod: 26,RT,, | Performed by: RADIOLOGY

## 2022-11-22 PROCEDURE — 82043 MICROALBUMIN / CREATININE RATIO URINE: ICD-10-PCS | Mod: ,,, | Performed by: CLINICAL MEDICAL LABORATORY

## 2022-11-22 PROCEDURE — 80061 LIPID PANEL: CPT | Mod: ,,, | Performed by: CLINICAL MEDICAL LABORATORY

## 2022-11-22 PROCEDURE — 82570 MICROALBUMIN / CREATININE RATIO URINE: ICD-10-PCS | Mod: ,,, | Performed by: CLINICAL MEDICAL LABORATORY

## 2022-11-22 PROCEDURE — 73030 X-RAY EXAM OF SHOULDER: CPT | Mod: 26,RT,, | Performed by: RADIOLOGY

## 2022-11-22 RX ORDER — ALBUTEROL SULFATE 90 UG/1
2 AEROSOL, METERED RESPIRATORY (INHALATION) EVERY 4 HOURS PRN
Qty: 18 G | Refills: 1 | Status: SHIPPED | OUTPATIENT
Start: 2022-11-22 | End: 2022-12-07 | Stop reason: SDUPTHER

## 2022-11-22 RX ORDER — FUROSEMIDE 20 MG/1
TABLET ORAL
Qty: 30 TABLET | Refills: 5 | Status: SHIPPED | OUTPATIENT
Start: 2022-11-22 | End: 2022-12-07 | Stop reason: SDUPTHER

## 2022-11-22 RX ORDER — KETOROLAC TROMETHAMINE 30 MG/ML
60 INJECTION, SOLUTION INTRAMUSCULAR; INTRAVENOUS
Status: COMPLETED | OUTPATIENT
Start: 2022-11-22 | End: 2022-11-22

## 2022-11-22 RX ORDER — TIOTROPIUM BROMIDE 18 UG/1
18 CAPSULE ORAL; RESPIRATORY (INHALATION) DAILY
Qty: 30 CAPSULE | Refills: 6 | Status: SHIPPED | OUTPATIENT
Start: 2022-11-22 | End: 2022-12-07 | Stop reason: SDUPTHER

## 2022-11-22 RX ORDER — DEXAMETHASONE SODIUM PHOSPHATE 4 MG/ML
4 INJECTION, SOLUTION INTRA-ARTICULAR; INTRALESIONAL; INTRAMUSCULAR; INTRAVENOUS; SOFT TISSUE
Status: COMPLETED | OUTPATIENT
Start: 2022-11-22 | End: 2022-11-22

## 2022-11-22 RX ORDER — KETOROLAC TROMETHAMINE 10 MG/1
10 TABLET, FILM COATED ORAL EVERY 6 HOURS
Qty: 20 TABLET | Refills: 0 | Status: SHIPPED | OUTPATIENT
Start: 2022-11-22 | End: 2022-11-27

## 2022-11-22 RX ORDER — METHYLPREDNISOLONE ACETATE 40 MG/ML
40 INJECTION, SUSPENSION INTRA-ARTICULAR; INTRALESIONAL; INTRAMUSCULAR; SOFT TISSUE
Status: COMPLETED | OUTPATIENT
Start: 2022-11-22 | End: 2022-11-22

## 2022-11-22 RX ORDER — CITALOPRAM 20 MG/1
40 TABLET, FILM COATED ORAL DAILY
Qty: 180 TABLET | Refills: 1 | Status: SHIPPED | OUTPATIENT
Start: 2022-11-22 | End: 2022-12-07 | Stop reason: SDUPTHER

## 2022-11-22 RX ORDER — PANTOPRAZOLE SODIUM 40 MG/1
40 TABLET, DELAYED RELEASE ORAL DAILY
Qty: 90 TABLET | Refills: 1 | Status: SHIPPED | OUTPATIENT
Start: 2022-11-22 | End: 2022-12-07 | Stop reason: SDUPTHER

## 2022-11-22 RX ORDER — BUSPIRONE HYDROCHLORIDE 5 MG/1
5 TABLET ORAL 2 TIMES DAILY
Qty: 180 TABLET | Refills: 1 | Status: SHIPPED | OUTPATIENT
Start: 2022-11-22 | End: 2022-12-07 | Stop reason: SDUPTHER

## 2022-11-22 RX ORDER — LOVASTATIN 20 MG/1
20 TABLET ORAL DAILY
Qty: 90 TABLET | Refills: 1 | Status: SHIPPED | OUTPATIENT
Start: 2022-11-22 | End: 2022-12-07 | Stop reason: SDUPTHER

## 2022-11-22 RX ADMIN — METHYLPREDNISOLONE ACETATE 40 MG: 40 INJECTION, SUSPENSION INTRA-ARTICULAR; INTRALESIONAL; INTRAMUSCULAR; SOFT TISSUE at 11:11

## 2022-11-22 RX ADMIN — KETOROLAC TROMETHAMINE 60 MG: 30 INJECTION, SOLUTION INTRAMUSCULAR; INTRAVENOUS at 11:11

## 2022-11-22 RX ADMIN — DEXAMETHASONE SODIUM PHOSPHATE 4 MG: 4 INJECTION, SOLUTION INTRA-ARTICULAR; INTRALESIONAL; INTRAMUSCULAR; INTRAVENOUS; SOFT TISSUE at 11:11

## 2022-11-22 NOTE — PROGRESS NOTES
Arelis Escobedo NP   [facility]  [address]  [place]     PATIENT NAME: Yany Silverio  : 1960  DATE: 22  MRN: [id number]      Billing Provider: Arelis Escobedo NP  Level of Service:   Patient PCP Information       Provider PCP Type    Arelis Escobedo NP General            Reason for Visit / Chief Complaint: Follow-up (6 month f/u  need refills and fasting labs), Hypertension, Hyperlipidemia, and Shoulder Injury (Pt states she fell yesterday at her home and hurt her right shoulder, c/o pain of right shoulder and multiples joints.  )       Update PCP  Update Chief Complaint         History of Present Illness / Problem Focused Workflow     Yany Silverio presents to the clinic here for 6 month eval of htn, hyperlipidemia, and right shoulder pain due to fall yesterday. Pt is under a lot of stress with family      Review of Systems     Review of Systems   Constitutional:  Negative for chills, fatigue and fever.   HENT:  Negative for nasal congestion, ear pain, facial swelling, hearing loss, mouth dryness, mouth sores, postnasal drip, rhinorrhea, sinus pressure/congestion and goiter.    Eyes:  Negative for discharge and itching.   Respiratory:  Negative for cough, shortness of breath and wheezing.    Cardiovascular:  Negative for chest pain and leg swelling.   Gastrointestinal:  Negative for abdominal pain, change in bowel habit and change in bowel habit.   Genitourinary:  Negative for difficulty urinating, dysuria, enuresis, frequency, hematuria and urgency.   Musculoskeletal:  Positive for arthralgias.        Right shoulder pain, and chronic left hip pain, rates pain 10   Neurological:  Negative for dizziness, vertigo, syncope, weakness and headaches.   Psychiatric/Behavioral:  Negative for decreased concentration.    All other systems reviewed and are negative.     Medical / Social / Family History     Past Medical History:   Diagnosis Date    Acute superficial gastritis without  hemorrhage 6/23/2021    Acute superficial gastritis without hemorrhage 6/23/2021    Anxiety     Arthritis     newly dx'd RA: sees MD in [place]    Bronchitis 7/22/2021    Bursitis of left shoulder 6/10/2021    Cancer     hx of cervical cancer    COPD (chronic obstructive pulmonary disease)     Depression     Emphysema of lung     Fungal esophagitis 6/23/2021    HH (hiatus hernia) 6/23/2021    Hyperlipidemia     Hypertension     Insomnia 04/16/2020    Low back pain 04/28/2021    Pain in joint of left hip 04/28/2021    Pain in joint of left knee 04/28/2021    Pneumonia of both lower lobes due to infectious organism 1/14/2022       Past Surgical History:   Procedure Laterality Date    OPEN REDUCTION AND INTERNAL FIXATION (ORIF) OF INJURY OF HIP Left 1/12/2022    Procedure: ORIF, HIP;  Surgeon: Adolfo Randall MD;  Location: [facility] OR;  Service: Orthopedics;  Laterality: Left;    ROBOTIC ARTHROPLASTY, HIP Left 12/13/2021    Procedure: ROBOTIC ARTHROPLASTY,HIP;  Surgeon: Adolfo Randall MD;  Location: [facility] ORTHO OR;  Service: Orthopedics;  Laterality: Left;    SHOULDER SURGERY Right 2017, 2019       Social History  Ms.  reports that she quit smoking about 5 years ago. Her smoking use included cigarettes. She has a 40.00 pack-year smoking history. She has never used smokeless tobacco. She reports current drug use. Drug: Oxycodone. She reports that she does not drink alcohol.    Family History  Ms.'s family history includes Cancer in her mother; Hypertension in her maternal aunt, maternal uncle, and sister.    Medications and Allergies     Medications  Outpatient Medications Marked as Taking for the 11/22/22 encounter (Office Visit) with Arelis sEcobedo NP   Medication Sig Dispense Refill    albuterol (PROVENTIL) 2.5 mg /3 mL (0.083 %) nebulizer solution albuterol sulfate 2.5 mg/3 mL (0.083 %) solution for nebulization      [DISCONTINUED] busPIRone (BUSPAR) 5 MG Tab Take 1 tablet (5 mg total) by mouth 2 (two)  "times daily. 60 tablet 0    [DISCONTINUED] citalopram (CELEXA) 20 MG tablet Take 2 tablets (40 mg total) by mouth once daily. 180 tablet 1    [DISCONTINUED] furosemide (LASIX) 20 MG tablet TAKE 1 TABLET BY MOUTH DAILY FOR 3 DAYS THEN WEIGH DAILY AND IF NO MORE THAN 3 POUND WEIGHT GAIN, TAKE LASIX 30 tablet 0    [DISCONTINUED] lovastatin (MEVACOR) 20 MG tablet Take 1 tablet (20 mg total) by mouth once daily. 90 tablet 1    [DISCONTINUED] pantoprazole (PROTONIX) 40 MG tablet Take 1 tablet (40 mg total) by mouth once daily. 90 tablet 1    [DISCONTINUED] tiotropium (SPIRIVA) 18 mcg inhalation capsule Inhale 1 capsule (18 mcg total) into the lungs once daily. Controller 30 capsule 6    [DISCONTINUED] VENTOLIN HFA 90 mcg/actuation inhaler Inhale 2 puffs into the lungs every 4 (four) hours as needed for Wheezing (q 4-6 hours).         Allergies  Review of patient's allergies indicates:  No Known Allergies    Physical Examination     Vitals:    11/22/22 1026   BP: 122/80   BP Location: Right arm   Patient Position: Sitting   BP Method: Medium (Manual)   Pulse: 68   Resp: 20   Temp: 98.3 °F (36.8 °C)   TempSrc: Oral   SpO2: 98%   Weight: 74.2 kg (163 lb 8 oz)   Height: 5' 4.02" (1.626 m)      Physical Exam  Vitals and nursing note reviewed.   Constitutional:       Appearance: Normal appearance.   HENT:      Head: Normocephalic.      Right Ear: Tympanic membrane, ear canal and external ear normal.      Left Ear: Tympanic membrane, ear canal and external ear normal.      Nose: Nose normal.      Mouth/Throat:      Mouth: Mucous membranes are moist.      Pharynx: Oropharynx is clear.   Eyes:      Extraocular Movements: Extraocular movements intact.      Conjunctiva/sclera: Conjunctivae normal.      Pupils: Pupils are equal, round, and reactive to light.   Cardiovascular:      Rate and Rhythm: Normal rate and regular rhythm.      Pulses: Normal pulses.      Heart sounds: Normal heart sounds.   Pulmonary:      Effort: Pulmonary " effort is normal.      Breath sounds: Normal breath sounds.   Abdominal:      General: Bowel sounds are normal.      Palpations: Abdomen is soft.   Musculoskeletal:         General: Tenderness (right shoulder with mod amt tenderenss, full rom. no fx noted on xray.) present. Normal range of motion.      Comments: Chronic pain of left hip   Skin:     General: Skin is warm and dry.      Capillary Refill: Capillary refill takes less than 2 seconds.   Neurological:      General: No focal deficit present.      Mental Status: She is alert and oriented to person, place, and time.   Psychiatric:         Mood and Affect: Mood normal.         Behavior: Behavior normal.        Assessment and Plan (including Health Maintenance)      Problem List  Smart Sets  Document Outside HM   :  Meds as ordered, return to clinic as needed  Plan:     Shoulder pain, unspecified chronicity, unspecified laterality  -     X-ray Shoulder 2 or More Views Right; Future; Expected date: 11/22/2022    Mixed hyperlipidemia  -     Lipid Panel; Future; Expected date: 11/22/2022  -     lovastatin (MEVACOR) 20 MG tablet; Take 1 tablet (20 mg total) by mouth once daily.  Dispense: 90 tablet; Refill: 1    Primary hypertension  -     CBC Auto Differential; Future; Expected date: 11/22/2022  -     Comprehensive Metabolic Panel; Future; Expected date: 11/22/2022  -     Microalbumin/Creatinine Ratio, Urine; Future; Expected date: 11/22/2022    Anxiety and depression  -     busPIRone (BUSPAR) 5 MG Tab; Take 1 tablet (5 mg total) by mouth 2 (two) times daily.  Dispense: 180 tablet; Refill: 1    Esophageal dysphagia  -     pantoprazole (PROTONIX) 40 MG tablet; Take 1 tablet (40 mg total) by mouth once daily.  Dispense: 90 tablet; Refill: 1    Chronic obstructive pulmonary disease, unspecified COPD type  -     tiotropium (SPIRIVA) 18 mcg inhalation capsule; Inhale 1 capsule (18 mcg total) into the lungs once daily. Controller  Dispense: 30 capsule; Refill: 6    Other  orders  -     citalopram (CELEXA) 20 MG tablet; Take 2 tablets (40 mg total) by mouth once daily.  Dispense: 180 tablet; Refill: 1  -     furosemide (LASIX) 20 MG tablet; TAKE 1 TABLET BY MOUTH DAILY FOR 3 DAYS THEN WEIGH DAILY AND IF NO MORE THAN 3 POUND WEIGHT GAIN, TAKE LASIX  Dispense: 30 tablet; Refill: 5  -     VENTOLIN HFA 90 mcg/actuation inhaler; Inhale 2 puffs into the lungs every 4 (four) hours as needed for Wheezing (q 4-6 hours).  Dispense: 18 g; Refill: 1            Health Maintenance Due   Topic Date Due    Hepatitis C Screening  Never done    Mammogram  Never done    LDCT Lung Screen  Never done    COVID-19 Vaccine (3 - Booster for Moderna series) 07/02/2021    Influenza Vaccine (1) Never done       Problem List Items Addressed This Visit          Psychiatric    Anxiety and depression (Chronic)    Relevant Medications    busPIRone (BUSPAR) 5 MG Tab       Pulmonary    Chronic obstructive pulmonary disease    Overview     Patient with COPD currently stable.  She has had a recent pneumonia which last chest x-ray was clear she is being cleared for possible surgery she has spirometry which is shows mild obstructive ventilatory impairment.  We have ordered arterial blood gases we see those back we can consider clearing her for her surgery which I believe is a replacement of the joint         Relevant Medications    tiotropium (SPIRIVA) 18 mcg inhalation capsule       Cardiac/Vascular    Hypertension (Chronic)    Relevant Orders    CBC Auto Differential    Comprehensive Metabolic Panel    Microalbumin/Creatinine Ratio, Urine    Mixed hyperlipidemia    Relevant Medications    lovastatin (MEVACOR) 20 MG tablet    Other Relevant Orders    Lipid Panel     Other Visit Diagnoses       Shoulder pain, unspecified chronicity, unspecified laterality    -  Primary    Relevant Orders    X-ray Shoulder 2 or More Views Right    Esophageal dysphagia        Relevant Medications    pantoprazole (PROTONIX) 40 MG tablet               Health Maintenance Topics with due status: Not Due       Topic Last Completion Date    Colorectal Cancer Screening 10/01/2020    Lipid Panel 06/10/2021       Procedures     Future Appointments   Date Time Provider Department Center   12/5/2022 10:45 AM Juaquin Mantilla DO [facility] SLEEP [facility]   12/14/2022  9:30 AM [facility] [place]   1/18/2023  1:20 PM Riley Saunders MD OB  [facility]   1/31/2023  8:15 AM Adolfo [facility] MD [facility]   4/24/2023  9:00 AM Juaquin Mantilla DO [facility] SLEEP [facility]        No follow-ups on file.       Signature:  Arelis Escobedo NP    Date of encounter: 11/22/22

## 2022-11-28 NOTE — PROGRESS NOTES
Notify that K+ is sl low, increase potassium in diet, , sl anemic, increase iron in diet, all oterh lab ok, microablumin/cr is elevated, needs to increase fluids, recheck all in 6 months

## 2022-12-07 ENCOUNTER — OFFICE VISIT (OUTPATIENT)
Dept: FAMILY MEDICINE | Facility: CLINIC | Age: 62
End: 2022-12-07
Payer: MEDICARE

## 2022-12-07 VITALS
OXYGEN SATURATION: 98 % | TEMPERATURE: 98 F | HEART RATE: 90 BPM | RESPIRATION RATE: 20 BRPM | SYSTOLIC BLOOD PRESSURE: 148 MMHG | DIASTOLIC BLOOD PRESSURE: 80 MMHG | BODY MASS INDEX: 27.83 KG/M2 | HEIGHT: 64 IN | WEIGHT: 163 LBS

## 2022-12-07 DIAGNOSIS — F17.200 SMOKER: ICD-10-CM

## 2022-12-07 DIAGNOSIS — R13.19 ESOPHAGEAL DYSPHAGIA: ICD-10-CM

## 2022-12-07 DIAGNOSIS — Z23 FLU VACCINE NEED: ICD-10-CM

## 2022-12-07 DIAGNOSIS — F32.A ANXIETY AND DEPRESSION: ICD-10-CM

## 2022-12-07 DIAGNOSIS — F41.9 ANXIETY AND DEPRESSION: ICD-10-CM

## 2022-12-07 DIAGNOSIS — E78.2 MIXED HYPERLIPIDEMIA: ICD-10-CM

## 2022-12-07 DIAGNOSIS — R21 RASH: ICD-10-CM

## 2022-12-07 DIAGNOSIS — Z72.0 TOBACCO ABUSE: ICD-10-CM

## 2022-12-07 DIAGNOSIS — J44.9 CHRONIC OBSTRUCTIVE PULMONARY DISEASE, UNSPECIFIED COPD TYPE: ICD-10-CM

## 2022-12-07 DIAGNOSIS — M81.0 AGE RELATED OSTEOPOROSIS, UNSPECIFIED PATHOLOGICAL FRACTURE PRESENCE: ICD-10-CM

## 2022-12-07 DIAGNOSIS — J01.00 ACUTE NON-RECURRENT MAXILLARY SINUSITIS: Primary | ICD-10-CM

## 2022-12-07 PROBLEM — E78.5 DYSLIPIDEMIA: Status: ACTIVE | Noted: 2022-12-07

## 2022-12-07 PROBLEM — F10.20 ALCOHOLISM: Status: ACTIVE | Noted: 2017-05-17

## 2022-12-07 PROCEDURE — 99214 OFFICE O/P EST MOD 30 MIN: CPT | Mod: 25,,, | Performed by: FAMILY MEDICINE

## 2022-12-07 PROCEDURE — 3008F BODY MASS INDEX DOCD: CPT | Mod: ,,, | Performed by: FAMILY MEDICINE

## 2022-12-07 PROCEDURE — 3077F SYST BP >= 140 MM HG: CPT | Mod: ,,, | Performed by: FAMILY MEDICINE

## 2022-12-07 PROCEDURE — 3008F PR BODY MASS INDEX (BMI) DOCUMENTED: ICD-10-PCS | Mod: ,,, | Performed by: FAMILY MEDICINE

## 2022-12-07 PROCEDURE — 99214 PR OFFICE/OUTPT VISIT, EST, LEVL IV, 30-39 MIN: ICD-10-PCS | Mod: 25,,, | Performed by: FAMILY MEDICINE

## 2022-12-07 PROCEDURE — 3060F PR POS MICROALBUMINURIA RESULT DOCUMENTED/REVIEW: ICD-10-PCS | Mod: ,,, | Performed by: FAMILY MEDICINE

## 2022-12-07 PROCEDURE — 1160F RVW MEDS BY RX/DR IN RCRD: CPT | Mod: ,,, | Performed by: FAMILY MEDICINE

## 2022-12-07 PROCEDURE — 96372 THER/PROPH/DIAG INJ SC/IM: CPT | Mod: ,,, | Performed by: FAMILY MEDICINE

## 2022-12-07 PROCEDURE — 3066F NEPHROPATHY DOC TX: CPT | Mod: ,,, | Performed by: FAMILY MEDICINE

## 2022-12-07 PROCEDURE — 1160F PR REVIEW ALL MEDS BY PRESCRIBER/CLIN PHARMACIST DOCUMENTED: ICD-10-PCS | Mod: ,,, | Performed by: FAMILY MEDICINE

## 2022-12-07 PROCEDURE — 1159F PR MEDICATION LIST DOCUMENTED IN MEDICAL RECORD: ICD-10-PCS | Mod: ,,, | Performed by: FAMILY MEDICINE

## 2022-12-07 PROCEDURE — 3079F PR MOST RECENT DIASTOLIC BLOOD PRESSURE 80-89 MM HG: ICD-10-PCS | Mod: ,,, | Performed by: FAMILY MEDICINE

## 2022-12-07 PROCEDURE — 3077F PR MOST RECENT SYSTOLIC BLOOD PRESSURE >= 140 MM HG: ICD-10-PCS | Mod: ,,, | Performed by: FAMILY MEDICINE

## 2022-12-07 PROCEDURE — 99406 PR TOBACCO USE CESSATION INTERMEDIATE 3-10 MINUTES: ICD-10-PCS | Mod: ,,, | Performed by: FAMILY MEDICINE

## 2022-12-07 PROCEDURE — 96372 PR INJECTION,THERAP/PROPH/DIAG2ST, IM OR SUBCUT: ICD-10-PCS | Mod: ,,, | Performed by: FAMILY MEDICINE

## 2022-12-07 PROCEDURE — 3060F POS MICROALBUMINURIA REV: CPT | Mod: ,,, | Performed by: FAMILY MEDICINE

## 2022-12-07 PROCEDURE — 99406 BEHAV CHNG SMOKING 3-10 MIN: CPT | Mod: ,,, | Performed by: FAMILY MEDICINE

## 2022-12-07 PROCEDURE — 3066F PR DOCUMENTATION OF TREATMENT FOR NEPHROPATHY: ICD-10-PCS | Mod: ,,, | Performed by: FAMILY MEDICINE

## 2022-12-07 PROCEDURE — 3079F DIAST BP 80-89 MM HG: CPT | Mod: ,,, | Performed by: FAMILY MEDICINE

## 2022-12-07 PROCEDURE — 1159F MED LIST DOCD IN RCRD: CPT | Mod: ,,, | Performed by: FAMILY MEDICINE

## 2022-12-07 RX ORDER — TIOTROPIUM BROMIDE 18 UG/1
18 CAPSULE ORAL; RESPIRATORY (INHALATION) DAILY
Qty: 30 CAPSULE | Refills: 6 | Status: SHIPPED | OUTPATIENT
Start: 2022-12-07 | End: 2023-10-30

## 2022-12-07 RX ORDER — PANTOPRAZOLE SODIUM 40 MG/1
40 TABLET, DELAYED RELEASE ORAL DAILY
Qty: 90 TABLET | Refills: 1 | Status: SHIPPED | OUTPATIENT
Start: 2022-12-07 | End: 2023-01-09

## 2022-12-07 RX ORDER — BUSPIRONE HYDROCHLORIDE 10 MG/1
10 TABLET ORAL NIGHTLY
Qty: 90 TABLET | Refills: 1 | Status: SHIPPED | OUTPATIENT
Start: 2022-12-07 | End: 2023-12-07

## 2022-12-07 RX ORDER — FLUTICASONE PROPIONATE 50 MCG
1 SPRAY, SUSPENSION (ML) NASAL DAILY
Qty: 15.8 ML | Refills: 0 | Status: SHIPPED | OUTPATIENT
Start: 2022-12-07

## 2022-12-07 RX ORDER — ALBUTEROL SULFATE 90 UG/1
2 AEROSOL, METERED RESPIRATORY (INHALATION) EVERY 4 HOURS PRN
Qty: 18 G | Refills: 1 | Status: SHIPPED | OUTPATIENT
Start: 2022-12-07

## 2022-12-07 RX ORDER — CETIRIZINE HYDROCHLORIDE 10 MG/1
10 TABLET, CHEWABLE ORAL DAILY
COMMUNITY
End: 2022-12-07 | Stop reason: SDUPTHER

## 2022-12-07 RX ORDER — CETIRIZINE HYDROCHLORIDE 10 MG/1
10 TABLET, CHEWABLE ORAL DAILY
Qty: 90 TABLET | Refills: 1 | Status: SHIPPED | OUTPATIENT
Start: 2022-12-07 | End: 2023-06-05

## 2022-12-07 RX ORDER — AZITHROMYCIN 250 MG/1
TABLET, FILM COATED ORAL
Qty: 6 TABLET | Refills: 0 | Status: SHIPPED | OUTPATIENT
Start: 2022-12-07 | End: 2022-12-12

## 2022-12-07 RX ORDER — CITALOPRAM 40 MG/1
40 TABLET, FILM COATED ORAL DAILY
Qty: 90 TABLET | Refills: 1 | Status: SHIPPED | OUTPATIENT
Start: 2022-12-07 | End: 2023-04-19

## 2022-12-07 RX ORDER — CEFTRIAXONE 1 G/1
1 INJECTION, POWDER, FOR SOLUTION INTRAMUSCULAR; INTRAVENOUS
Status: COMPLETED | OUTPATIENT
Start: 2022-12-07 | End: 2022-12-07

## 2022-12-07 RX ORDER — FUROSEMIDE 20 MG/1
TABLET ORAL
Qty: 30 TABLET | Refills: 5 | Status: SHIPPED | OUTPATIENT
Start: 2022-12-07

## 2022-12-07 RX ORDER — ERGOCALCIFEROL 1.25 MG/1
50000 CAPSULE ORAL
Qty: 12 CAPSULE | Refills: 1 | Status: SHIPPED | OUTPATIENT
Start: 2022-12-07 | End: 2023-06-05

## 2022-12-07 RX ORDER — KETOCONAZOLE 20 MG/ML
SHAMPOO, SUSPENSION TOPICAL
Qty: 120 ML | Refills: 3 | Status: SHIPPED | OUTPATIENT
Start: 2022-12-08

## 2022-12-07 RX ORDER — MUPIROCIN 20 MG/G
OINTMENT TOPICAL DAILY
Qty: 30 G | Refills: 3 | Status: SHIPPED | OUTPATIENT
Start: 2022-12-07 | End: 2023-01-09

## 2022-12-07 RX ORDER — PREDNISONE 20 MG/1
20 TABLET ORAL DAILY
COMMUNITY
End: 2023-01-09 | Stop reason: SDUPTHER

## 2022-12-07 RX ORDER — LOVASTATIN 20 MG/1
20 TABLET ORAL DAILY
Qty: 90 TABLET | Refills: 1 | Status: SHIPPED | OUTPATIENT
Start: 2022-12-07

## 2022-12-07 RX ORDER — VARENICLINE TARTRATE 0.5 (11)-1
KIT ORAL
Qty: 1 TABLET | Refills: 0 | Status: SHIPPED | OUTPATIENT
Start: 2022-12-07 | End: 2023-01-09

## 2022-12-07 RX ADMIN — CEFTRIAXONE 1 G: 1 INJECTION, POWDER, FOR SOLUTION INTRAMUSCULAR; INTRAVENOUS at 04:12

## 2022-12-07 NOTE — PROGRESS NOTES
Francisca Noble MD        PATIENT NAME: Yany Silverio  : 1960  DATE: 22  MRN: [id number]      Billing Provider: Francisca Noble MD  Level of Service:   Patient PCP Information       Provider PCP Type    Arelis Escobedo NP General            Reason for Visit / Chief Complaint: Follow-up (ER visit) and Establish Care       History of Present Illness      Yany Silverio presents to the clinic with Follow-up (ER visit) and Establish Care     Follow-up  This is a chronic problem. Associated symptoms include fatigue and headaches. Pertinent negatives include no abdominal pain, anorexia, arthralgias, change in bowel habit, chest pain, chills, congestion, myalgias, vertigo, visual change, vomiting or weakness.     Review of Systems     Review of Systems   Constitutional:  Positive for fatigue. Negative for chills.   HENT:  Negative for congestion.    Respiratory:  Positive for wheezing. Negative for chest tightness and shortness of breath.    Cardiovascular:  Negative for chest pain.   Gastrointestinal:  Negative for abdominal pain, anorexia, change in bowel habit and vomiting.   Musculoskeletal:  Negative for arthralgias and myalgias.   Neurological:  Positive for headaches. Negative for vertigo and weakness.     Medical / Social / Family History     Past Medical History:   Diagnosis Date    Acute superficial gastritis without hemorrhage 2021    Acute superficial gastritis without hemorrhage 2021    Anxiety     Arthritis     newly dx'd RA: sees MD in [place]    Bronchitis 2021    Bursitis of left shoulder 6/10/2021    Cancer     hx of cervical cancer    COPD (chronic obstructive pulmonary disease)     Depression     Emphysema of lung     Fungal esophagitis 2021    HH (hiatus hernia) 2021    Hyperlipidemia     Hypertension     Insomnia 2020    Low back pain 2021    Pain in joint of left hip 2021    Pain in joint of left knee 2021    Pneumonia of both lower  lobes due to infectious organism 1/14/2022       Past Surgical History:   Procedure Laterality Date    OPEN REDUCTION AND INTERNAL FIXATION (ORIF) OF INJURY OF HIP Left 1/12/2022    Procedure: ORIF, HIP;  Surgeon: Adolfo Randall MD;  Location: [facility] ORTHO OR;  Service: Orthopedics;  Laterality: Left;    ROBOTIC ARTHROPLASTY, HIP Left 12/13/2021    Procedure: ROBOTIC ARTHROPLASTY,HIP;  Surgeon: Adolfo Randall MD;  Location: [facility] ORTHO OR;  Service: Orthopedics;  Laterality: Left;    SHOULDER SURGERY Right 2017, 2019       Social History  Ms.  reports that she quit smoking about 5 years ago. Her smoking use included cigarettes. She has a 40.00 pack-year smoking history. She has never used smokeless tobacco. She reports current drug use. Drug: Oxycodone. She reports that she does not drink alcohol.    Family History  Ms.'s family history includes Cancer in her mother; Hypertension in her maternal aunt, maternal uncle, and sister.    Medications and Allergies     Medications  Outpatient Medications Marked as Taking for the 12/7/22 encounter (Office Visit) with Francisca Noble MD   Medication Sig Dispense Refill    albuterol (PROVENTIL) 2.5 mg /3 mL (0.083 %) nebulizer solution albuterol sulfate 2.5 mg/3 mL (0.083 %) solution for nebulization      predniSONE (DELTASONE) 20 MG tablet Take 20 mg by mouth once daily.      [DISCONTINUED] busPIRone (BUSPAR) 5 MG Tab Take 1 tablet (5 mg total) by mouth 2 (two) times daily. 180 tablet 1    [DISCONTINUED] cetirizine 10 mg chewable tablet Take 10 mg by mouth once daily.      [DISCONTINUED] citalopram (CELEXA) 20 MG tablet Take 2 tablets (40 mg total) by mouth once daily. 180 tablet 1    [DISCONTINUED] fluticasone propionate (FLONASE) 50 mcg/actuation nasal spray 1 spray (50 mcg total) by Each Nostril route once daily. 15.8 mL 0    [DISCONTINUED] furosemide (LASIX) 20 MG tablet TAKE 1 TABLET BY MOUTH DAILY FOR 3 DAYS THEN WEIGH DAILY AND IF NO MORE THAN 3 POUND WEIGHT GAIN,  "TAKE LASIX 30 tablet 5    [DISCONTINUED] lovastatin (MEVACOR) 20 MG tablet Take 1 tablet (20 mg total) by mouth once daily. 90 tablet 1    [DISCONTINUED] pantoprazole (PROTONIX) 40 MG tablet Take 1 tablet (40 mg total) by mouth once daily. 90 tablet 1    [DISCONTINUED] tiotropium (SPIRIVA) 18 mcg inhalation capsule Inhale 1 capsule (18 mcg total) into the lungs once daily. Controller 30 capsule 6    [DISCONTINUED] VENTOLIN HFA 90 mcg/actuation inhaler Inhale 2 puffs into the lungs every 4 (four) hours as needed for Wheezing (q 4-6 hours). 18 g 1     Current Facility-Administered Medications for the 12/7/22 encounter (Office Visit) with Francisca Noble MD   Medication Dose Route Frequency Provider Last Rate Last Admin    [COMPLETED] cefTRIAXone injection 1 g  1 g Intramuscular 1 time in Clinic/HOD Francisca Nbole MD   1 g at 12/07/22 1645       Allergies  Review of patient's allergies indicates:  No Known Allergies    Physical Examination   BP (!) 148/80 (BP Location: Right arm, Patient Position: Sitting)   Pulse 90   Temp 98.2 °F (36.8 °C) (Oral)   Resp 20   Ht 5' 4" (1.626 m)   Wt 73.9 kg (163 lb)   SpO2 98%   BMI 27.98 kg/m²     Physical Exam  Constitutional:       Appearance: Normal appearance. She is normal weight.   HENT:      Nose:      Right Sinus: No maxillary sinus tenderness or frontal sinus tenderness.      Left Sinus: Maxillary sinus tenderness and frontal sinus tenderness present.   Cardiovascular:      Rate and Rhythm: Normal rate and regular rhythm.      Pulses: Normal pulses.      Heart sounds: Normal heart sounds.   Musculoskeletal:         General: Normal range of motion.   Skin:     General: Skin is warm.   Neurological:      General: No focal deficit present.      Mental Status: She is alert.   Psychiatric:         Mood and Affect: Mood normal.         Behavior: Behavior normal.         Judgment: Judgment normal.         Assessment and Plan (including Health Maintenance)     Plan: "         Problem List Items Addressed This Visit          Psychiatric    Anxiety and depression (Chronic)    Relevant Medications    busPIRone (BUSPAR) 10 MG tablet       Pulmonary    Chronic obstructive pulmonary disease    Overview     Patient with COPD currently stable.  She has had a recent pneumonia which last chest x-ray was clear she is being cleared for possible surgery she has spirometry which is shows mild obstructive ventilatory impairment.  We have ordered arterial blood gases we see those back we can consider clearing her for her surgery which I believe is a replacement of the joint         Relevant Medications    tiotropium (SPIRIVA) 18 mcg inhalation capsule    VENTOLIN HFA 90 mcg/actuation inhaler       Cardiac/Vascular    Mixed hyperlipidemia    Relevant Medications    lovastatin (MEVACOR) 20 MG tablet       GI    Esophageal dysphagia    Relevant Medications    pantoprazole (PROTONIX) 40 MG tablet     Other Visit Diagnoses       Acute non-recurrent maxillary sinusitis    -  Primary    Relevant Medications    fluticasone propionate (FLONASE) 50 mcg/actuation nasal spray    cefTRIAXone injection 1 g (Completed)    azithromycin (Z-NINO) 250 MG tablet    Flu vaccine need        Relevant Orders    Influenza - Quadrivalent *Preferred* (6 months+) (PF)    Smoker        Age related osteoporosis, unspecified pathological fracture presence        Relevant Medications    ergocalciferol (ERGOCALCIFEROL) 50,000 unit Cap    Tobacco abuse        Relevant Medications    varenicline (CHANTIX STARTING MONTH BOX) 0.5 mg (11)- 1 mg (42) tablet    Rash        Relevant Medications    mupirocin (BACTROBAN) 2 % ointment    ketoconazole (NIZORAL) 2 % shampoo (Start on 12/8/2022)          - discussed quitting for 5 minutes  - will begin chantix    Follow up in about 1 month (around 1/7/2023).        Signature:  Francisca Noble MD      Date of encounter: 12/7/22

## 2022-12-09 DIAGNOSIS — Z71.89 COMPLEX CARE COORDINATION: ICD-10-CM

## 2023-01-09 ENCOUNTER — OFFICE VISIT (OUTPATIENT)
Dept: FAMILY MEDICINE | Facility: CLINIC | Age: 63
End: 2023-01-09
Payer: MEDICARE

## 2023-01-09 VITALS
HEIGHT: 64 IN | HEART RATE: 78 BPM | BODY MASS INDEX: 29.19 KG/M2 | DIASTOLIC BLOOD PRESSURE: 78 MMHG | SYSTOLIC BLOOD PRESSURE: 140 MMHG | OXYGEN SATURATION: 97 % | RESPIRATION RATE: 18 BRPM | TEMPERATURE: 98 F | WEIGHT: 171 LBS

## 2023-01-09 DIAGNOSIS — G89.29 OTHER CHRONIC PAIN: Primary | ICD-10-CM

## 2023-01-09 DIAGNOSIS — Z23 NEED FOR INFLUENZA VACCINATION: ICD-10-CM

## 2023-01-09 DIAGNOSIS — Z20.822 EXPOSURE TO COVID-19 VIRUS: ICD-10-CM

## 2023-01-09 DIAGNOSIS — F17.200 SMOKER: ICD-10-CM

## 2023-01-09 DIAGNOSIS — Z23 FLU VACCINE NEED: ICD-10-CM

## 2023-01-09 LAB
CTP QC/QA: YES
SARS-COV-2 AG RESP QL IA.RAPID: NEGATIVE

## 2023-01-09 PROCEDURE — 90686 IIV4 VACC NO PRSV 0.5 ML IM: CPT | Mod: ,,, | Performed by: FAMILY MEDICINE

## 2023-01-09 PROCEDURE — 3008F BODY MASS INDEX DOCD: CPT | Mod: ,,, | Performed by: FAMILY MEDICINE

## 2023-01-09 PROCEDURE — 90686 FLU VACCINE (QUAD) GREATER THAN OR EQUAL TO 3YO PRESERVATIVE FREE IM: ICD-10-PCS | Mod: ,,, | Performed by: FAMILY MEDICINE

## 2023-01-09 PROCEDURE — 99213 OFFICE O/P EST LOW 20 MIN: CPT | Mod: 25,,, | Performed by: FAMILY MEDICINE

## 2023-01-09 PROCEDURE — 96372 THER/PROPH/DIAG INJ SC/IM: CPT | Mod: ,,, | Performed by: FAMILY MEDICINE

## 2023-01-09 PROCEDURE — 96372 PR INJECTION,THERAP/PROPH/DIAG2ST, IM OR SUBCUT: ICD-10-PCS | Mod: ,,, | Performed by: FAMILY MEDICINE

## 2023-01-09 PROCEDURE — 3008F PR BODY MASS INDEX (BMI) DOCUMENTED: ICD-10-PCS | Mod: ,,, | Performed by: FAMILY MEDICINE

## 2023-01-09 PROCEDURE — 3077F PR MOST RECENT SYSTOLIC BLOOD PRESSURE >= 140 MM HG: ICD-10-PCS | Mod: ,,, | Performed by: FAMILY MEDICINE

## 2023-01-09 PROCEDURE — 99213 PR OFFICE/OUTPT VISIT, EST, LEVL III, 20-29 MIN: ICD-10-PCS | Mod: 25,,, | Performed by: FAMILY MEDICINE

## 2023-01-09 PROCEDURE — 1159F MED LIST DOCD IN RCRD: CPT | Mod: ,,, | Performed by: FAMILY MEDICINE

## 2023-01-09 PROCEDURE — 3078F PR MOST RECENT DIASTOLIC BLOOD PRESSURE < 80 MM HG: ICD-10-PCS | Mod: ,,, | Performed by: FAMILY MEDICINE

## 2023-01-09 PROCEDURE — 1160F RVW MEDS BY RX/DR IN RCRD: CPT | Mod: ,,, | Performed by: FAMILY MEDICINE

## 2023-01-09 PROCEDURE — 1160F PR REVIEW ALL MEDS BY PRESCRIBER/CLIN PHARMACIST DOCUMENTED: ICD-10-PCS | Mod: ,,, | Performed by: FAMILY MEDICINE

## 2023-01-09 PROCEDURE — 3078F DIAST BP <80 MM HG: CPT | Mod: ,,, | Performed by: FAMILY MEDICINE

## 2023-01-09 PROCEDURE — 87426 SARSCOV CORONAVIRUS AG IA: CPT | Mod: QW,,, | Performed by: FAMILY MEDICINE

## 2023-01-09 PROCEDURE — G0008 FLU VACCINE (QUAD) GREATER THAN OR EQUAL TO 3YO PRESERVATIVE FREE IM: ICD-10-PCS | Mod: 59,,, | Performed by: FAMILY MEDICINE

## 2023-01-09 PROCEDURE — 87426 SARS CORONAVIRUS 2 ANTIGEN POCT: ICD-10-PCS | Mod: QW,,, | Performed by: FAMILY MEDICINE

## 2023-01-09 PROCEDURE — 3077F SYST BP >= 140 MM HG: CPT | Mod: ,,, | Performed by: FAMILY MEDICINE

## 2023-01-09 PROCEDURE — 1159F PR MEDICATION LIST DOCUMENTED IN MEDICAL RECORD: ICD-10-PCS | Mod: ,,, | Performed by: FAMILY MEDICINE

## 2023-01-09 PROCEDURE — G0008 ADMIN INFLUENZA VIRUS VAC: HCPCS | Mod: 59,,, | Performed by: FAMILY MEDICINE

## 2023-01-09 RX ORDER — GABAPENTIN 100 MG/1
CAPSULE ORAL
COMMUNITY
Start: 2022-12-15 | End: 2023-04-19

## 2023-01-09 RX ORDER — PREDNISONE 20 MG/1
20 TABLET ORAL DAILY
Qty: 7 TABLET | Refills: 0 | Status: SHIPPED | OUTPATIENT
Start: 2023-01-09 | End: 2023-01-16

## 2023-01-09 RX ORDER — KETOROLAC TROMETHAMINE 30 MG/ML
60 INJECTION, SOLUTION INTRAMUSCULAR; INTRAVENOUS
Status: COMPLETED | OUTPATIENT
Start: 2023-01-09 | End: 2023-01-09

## 2023-01-09 RX ORDER — VARENICLINE TARTRATE 1 MG/1
1 TABLET, FILM COATED ORAL 2 TIMES DAILY
Qty: 180 TABLET | Refills: 1 | Status: SHIPPED | OUTPATIENT
Start: 2023-01-09 | End: 2023-07-08

## 2023-01-09 RX ADMIN — KETOROLAC TROMETHAMINE 60 MG: 30 INJECTION, SOLUTION INTRAMUSCULAR; INTRAVENOUS at 10:01

## 2023-01-09 NOTE — PROGRESS NOTES
Francisca Noble MD        PATIENT NAME: Yany Silverio  : 1960  DATE: 23  MRN: [id number]      Billing Provider: Francisca Noble MD  Level of Service:   Patient PCP Information       Provider PCP Type    Francisca Noble MD General            Reason for Visit / Chief Complaint: Rash (One month f/u rash back of head), Nasal Congestion (Still having some nasal congestion and occ cough. Daughter had covid at [place]), and Nicotine Dependence (Trying to quit smoking. On Chantix. Smoked one cigarette this am)       History of Present Illness      Yany Silverio presents to the clinic with Rash (One month f/u rash back of head), Nasal Congestion (Still having some nasal congestion and occ cough. Daughter had covid at [place]), and Nicotine Dependence (Trying to quit smoking. On Chantix. Smoked one cigarette this am)     Her rash is doing better  She smoked one cigarette today, but has been doing much better  She feels ok,  has some congestion, no fevers, no chills, mild cough, productive clear sputum    Chronic pain, she used to go to pain management, she had to stop because she had a person in her home that was addicted to this medications and she did not want them in the home, she now has a locked box to put them in and needs to go back, her pain is 10/10, lower back and BL shoulders due to OA    Rash  Pertinent negatives include no fatigue, fever or shortness of breath.   Nicotine Dependence  Symptoms are negative for fatigue. Her urge triggers include company of smokers.     Review of Systems     Review of Systems   Constitutional:  Negative for activity change, appetite change, fatigue and fever.   Respiratory:  Negative for shortness of breath.    Musculoskeletal:  Positive for arthralgias, back pain, gait problem and myalgias.   Skin:  Positive for rash.   Allergic/Immunologic: Positive for environmental allergies.   Psychiatric/Behavioral:  Negative for agitation, behavioral problems and suicidal  ideas.      Medical / Social / Family History     Past Medical History:   Diagnosis Date    Acute superficial gastritis without hemorrhage 6/23/2021    Acute superficial gastritis without hemorrhage 6/23/2021    Anxiety     Arthritis     newly dx'd RA: seelea NAVARRO in [place]    Bronchitis 7/22/2021    Bursitis of left shoulder 6/10/2021    Cancer     hx of cervical cancer    COPD (chronic obstructive pulmonary disease)     Depression     Emphysema of lung     Fungal esophagitis 6/23/2021    HH (hiatus hernia) 6/23/2021    Hyperlipidemia     Hypertension     Insomnia 04/16/2020    Low back pain 04/28/2021    Pain in joint of left hip 04/28/2021    Pain in joint of left knee 04/28/2021    Pneumonia of both lower lobes due to infectious organism 1/14/2022       Past Surgical History:   Procedure Laterality Date    OPEN REDUCTION AND INTERNAL FIXATION (ORIF) OF INJURY OF HIP Left 1/12/2022    Procedure: ORIF, HIP;  Surgeon: Adolfo Randall MD;  Location: [facility] OR;  Service: Orthopedics;  Laterality: Left;    ROBOTIC ARTHROPLASTY, HIP Left 12/13/2021    Procedure: ROBOTIC ARTHROPLASTY,HIP;  Surgeon: Adolfo Randall MD;  Location: [facility] OR;  Service: Orthopedics;  Laterality: Left;    SHOULDER SURGERY Right 2017, 2019       Social History  Ms.  reports that she has been smoking cigarettes. She has a 40.00 pack-year smoking history. She has been exposed to tobacco smoke. She has never used smokeless tobacco. She reports current drug use. Drug: Oxycodone. She reports that she does not drink alcohol.    Family History  Ms.'s family history includes Cancer in her mother; Hypertension in her maternal aunt, maternal uncle, and sister.    Medications and Allergies     Medications  Outpatient Medications Marked as Taking for the 1/9/23 encounter (Office Visit) with Francisca Noble MD   Medication Sig Dispense Refill    albuterol (PROVENTIL) 2.5 mg /3 mL (0.083 %) nebulizer solution albuterol sulfate 2.5 mg/3 mL (0.083  %) solution for nebulization      busPIRone (BUSPAR) 10 MG tablet Take 1 tablet (10 mg total) by mouth every evening. 90 tablet 1    cetirizine 10 mg chewable tablet Take 10 mg by mouth once daily. 90 tablet 1    citalopram (CELEXA) 40 MG tablet Take 1 tablet (40 mg total) by mouth once daily. 90 tablet 1    ergocalciferol (ERGOCALCIFEROL) 50,000 unit Cap Take 1 capsule (50,000 Units total) by mouth every 7 days. 12 capsule 1    fluticasone propionate (FLONASE) 50 mcg/actuation nasal spray 1 spray (50 mcg total) by Each Nostril route once daily. 15.8 mL 0    furosemide (LASIX) 20 MG tablet TAKE 1 TABLET BY MOUTH DAILY FOR 3 DAYS THEN WEIGH DAILY AND IF NO MORE THAN 3 POUND WEIGHT GAIN, TAKE LASIX 30 tablet 5    gabapentin (NEURONTIN) 100 MG capsule Take by mouth.      ketoconazole (NIZORAL) 2 % shampoo Apply topically twice a week. 120 mL 3    lovastatin (MEVACOR) 20 MG tablet Take 1 tablet (20 mg total) by mouth once daily. 90 tablet 1    tiotropium (SPIRIVA) 18 mcg inhalation capsule Inhale 1 capsule (18 mcg total) into the lungs once daily. Controller 30 capsule 6    VENTOLIN HFA 90 mcg/actuation inhaler Inhale 2 puffs into the lungs every 4 (four) hours as needed for Wheezing (q 4-6 hours). 18 g 1    [DISCONTINUED] predniSONE (DELTASONE) 20 MG tablet Take 20 mg by mouth once daily.      [DISCONTINUED] varenicline (CHANTIX STARTING MONTH BOX) 0.5 mg (11)- 1 mg (42) tablet Take one 0.5mg tab by mouth once daily X3 days,then increase to one 0.5mg tab twice daily X4 days,then increase to one 1mg tab twice daily 1 tablet 0     Current Facility-Administered Medications for the 1/9/23 encounter (Office Visit) with Francisca Noble MD   Medication Dose Route Frequency Provider Last Rate Last Admin    ketorolac injection 60 mg  60 mg Intramuscular 1 time in Clinic/HOD Francisca Noble MD           Allergies  Review of patient's allergies indicates:  No Known Allergies    Physical Examination   BP (!) 140/78 (BP Location:  "Right arm, Patient Position: Sitting)   Pulse 78   Temp 98.1 °F (36.7 °C) (Oral)   Resp 18   Ht 5' 4" (1.626 m)   Wt 77.6 kg (171 lb)   SpO2 97%   BMI 29.35 kg/m²     Physical Exam  Constitutional:       Appearance: Normal appearance. She is normal weight.   Cardiovascular:      Rate and Rhythm: Normal rate and regular rhythm.      Pulses: Normal pulses.      Heart sounds: Normal heart sounds.   Musculoskeletal:      Left shoulder: Decreased range of motion.      Lumbar back: Spasms present. Decreased range of motion.   Skin:     General: Skin is warm.   Neurological:      General: No focal deficit present.      Mental Status: She is alert.   Psychiatric:         Mood and Affect: Mood normal.         Behavior: Behavior normal.         Judgment: Judgment normal.       Recent Results (from the past 24 hour(s))   SARS Coronavirus 2 Antigen, POCT    Collection Time: 01/09/23 10:09 AM   Result Value Ref Range    SARS Coronavirus 2 Antigen Negative Negative     Acceptable Yes         Assessment and Plan (including Health Maintenance)     Plan:         Problem List Items Addressed This Visit    None  Visit Diagnoses       Other chronic pain    -  Primary    Relevant Medications    ketorolac injection 60 mg    predniSONE (DELTASONE) 20 MG tablet    Other Relevant Orders    Ambulatory referral/consult to Pain Clinic    Exposure to COVID-19 virus        Relevant Orders    SARS Coronavirus 2 Antigen, POCT (Completed)    Smoker        Relevant Medications    varenicline (CHANTIX) 1 mg Tab    Need for influenza vaccination        Relevant Orders    Influenza (FLUAD) - Quadrivalent (Adjuvanted) *Preferred* (65+) (PF)          She is doing well with chantix we discussed this for about 5 minutes, she would like to continues with it, will send refill for this    Will send to pain management  - covid test negative today she has been around it, gave her home test to take        Follow up in about 6 months (around " 7/9/2023).        Signature:  Francisca Noble MD      Date of encounter: 1/9/23

## 2023-01-17 ENCOUNTER — TELEPHONE (OUTPATIENT)
Dept: FAMILY MEDICINE | Facility: CLINIC | Age: 63
End: 2023-01-17
Payer: MEDICARE

## 2023-01-17 NOTE — TELEPHONE ENCOUNTER
Called for refill on Protonix. She said she is taking this so she thinks it is misunderstanding that it was discontinued.

## 2023-01-18 RX ORDER — PANTOPRAZOLE SODIUM 40 MG/1
40 TABLET, DELAYED RELEASE ORAL DAILY
Qty: 90 TABLET | Refills: 3 | Status: SHIPPED | OUTPATIENT
Start: 2023-01-18 | End: 2024-01-18

## 2023-01-30 DIAGNOSIS — S72.002D CLOSED FRACTURE OF LEFT HIP WITH ROUTINE HEALING, SUBSEQUENT ENCOUNTER: Primary | ICD-10-CM

## 2023-01-31 ENCOUNTER — OFFICE VISIT (OUTPATIENT)
Dept: ORTHOPEDICS | Facility: CLINIC | Age: 63
End: 2023-01-31
Payer: MEDICARE

## 2023-01-31 ENCOUNTER — HOSPITAL ENCOUNTER (OUTPATIENT)
Dept: RADIOLOGY | Facility: HOSPITAL | Age: 63
Discharge: HOME | End: 2023-01-31
Attending: ORTHOPAEDIC SURGERY
Payer: MEDICARE

## 2023-01-31 DIAGNOSIS — S72.002D CLOSED FRACTURE OF LEFT HIP WITH ROUTINE HEALING, SUBSEQUENT ENCOUNTER: Primary | ICD-10-CM

## 2023-01-31 DIAGNOSIS — S72.002D CLOSED FRACTURE OF LEFT HIP WITH ROUTINE HEALING, SUBSEQUENT ENCOUNTER: ICD-10-CM

## 2023-01-31 PROCEDURE — 1160F PR REVIEW ALL MEDS BY PRESCRIBER/CLIN PHARMACIST DOCUMENTED: ICD-10-PCS | Mod: CPTII,,, | Performed by: ORTHOPAEDIC SURGERY

## 2023-01-31 PROCEDURE — 1159F PR MEDICATION LIST DOCUMENTED IN MEDICAL RECORD: ICD-10-PCS | Mod: CPTII,,, | Performed by: ORTHOPAEDIC SURGERY

## 2023-01-31 PROCEDURE — 1160F RVW MEDS BY RX/DR IN RCRD: CPT | Mod: CPTII,,, | Performed by: ORTHOPAEDIC SURGERY

## 2023-01-31 PROCEDURE — 73502 X-RAY EXAM HIP UNI 2-3 VIEWS: CPT | Mod: TC,LT

## 2023-01-31 PROCEDURE — 73502 XR HIP WITH PELVIS WHEN PERFORMED, 2 OR 3 VIEWS LEFT: ICD-10-PCS | Mod: 26,LT,, | Performed by: ORTHOPAEDIC SURGERY

## 2023-01-31 PROCEDURE — 99212 PR OFFICE/OUTPT VISIT, EST, LEVL II, 10-19 MIN: ICD-10-PCS | Mod: S$PBB,,, | Performed by: ORTHOPAEDIC SURGERY

## 2023-01-31 PROCEDURE — 1159F MED LIST DOCD IN RCRD: CPT | Mod: CPTII,,, | Performed by: ORTHOPAEDIC SURGERY

## 2023-01-31 PROCEDURE — 99212 OFFICE O/P EST SF 10 MIN: CPT | Mod: S$PBB,,, | Performed by: ORTHOPAEDIC SURGERY

## 2023-01-31 PROCEDURE — 73502 X-RAY EXAM HIP UNI 2-3 VIEWS: CPT | Mod: 26,LT,, | Performed by: ORTHOPAEDIC SURGERY

## 2023-01-31 PROCEDURE — 99212 OFFICE O/P EST SF 10 MIN: CPT | Mod: PBBFAC | Performed by: ORTHOPAEDIC SURGERY

## 2023-01-31 RX ORDER — OXYCODONE AND ACETAMINOPHEN 7.5; 325 MG/1; MG/1
1 TABLET ORAL 2 TIMES DAILY PRN
Status: ON HOLD | COMMUNITY
Start: 2023-01-27 | End: 2023-11-01 | Stop reason: HOSPADM

## 2023-01-31 NOTE — PROGRESS NOTES
CC:  Hip pain  62 y.o. Female returns to clinic for a follow up visit regarding     ICD-10-CM ICD-9-CM   1. Closed fracture of left hip with routine healing, subsequent encounter  S72.002D V54.13       Patient reports that her hip is better. She states she has had some falls recently, but that has not caused her any hip problems.   She is seeing Dr. Mathews and is scheduled to begin a series of injections for her back and well as begin physical therapy for her shoulders.      PAST MEDICAL HISTORY:   Past Medical History:   Diagnosis Date    Acute superficial gastritis without hemorrhage 6/23/2021    Acute superficial gastritis without hemorrhage 6/23/2021    Anxiety     Arthritis     newly dx'd RA: sees MD in [place]    Bronchitis 7/22/2021    Bursitis of left shoulder 6/10/2021    Cancer     hx of cervical cancer    COPD (chronic obstructive pulmonary disease)     Depression     Emphysema of lung     Fungal esophagitis 6/23/2021    HH (hiatus hernia) 6/23/2021    Hyperlipidemia     Hypertension     Insomnia 04/16/2020    Low back pain 04/28/2021    Pain in joint of left hip 04/28/2021    Pain in joint of left knee 04/28/2021    Pneumonia of both lower lobes due to infectious organism 1/14/2022       PAST SURGICAL HISTORY:   Past Surgical History:   Procedure Laterality Date    OPEN REDUCTION AND INTERNAL FIXATION (ORIF) OF INJURY OF HIP Left 1/12/2022    Procedure: ORIF, HIP;  Surgeon: Adolfo Randall MD;  Location: [facility] OR;  Service: Orthopedics;  Laterality: Left;    ROBOTIC ARTHROPLASTY, HIP Left 12/13/2021    Procedure: ROBOTIC ARTHROPLASTY,HIP;  Surgeon: Adolfo Randall MD;  Location: [facility] OR;  Service: Orthopedics;  Laterality: Left;    SHOULDER SURGERY Right 2017, 2019         PHYSICAL EXAMINATION:  Ortho/SPM Exam  There is a slight Trendelenburg gait pattern seen her left hip well-healed surgical incision.  No discernible leg length discrepancy exa    IMAGING:  X-Ray Hip 2 or 3 views Left  (with Pelvis when performed)    Result Date: 1/31/2023  See Procedure Notes for results. IMPRESSION: Please see Ortho procedure notes for report.  This procedure was auto-finalized by: Virtual Radiologist     Radiographs left hip demonstrate the presence of prior cerclage wire fixation of a proximal femur fracture.  Total hip arthroplasty components appear to be appropriately positioned.  No adverse interval change.  Less than 1 cm leg length discrepancy compared to the contralateral side    ASSESSMENT:      ICD-10-CM ICD-9-CM   1. Closed fracture of left hip with routine healing, subsequent encounter  S72.002D V54.13       PLAN:     -Findings and treatment options were discussed with the patient  -All questions answered  Natural history and expected course discussed. Questions answered.  Educational materials distributed.  Overall doing well.  Has no hip pain.  Is complaining of neck and back pain and seeing pain management this time.  Continue to follow closely with pain management will see him back in 1 year with x-rays    There are no Patient Instructions on file for this visit.    No orders of the defined types were placed in this encounter.      Procedures

## 2023-02-02 ENCOUNTER — TELEPHONE (OUTPATIENT)
Dept: FAMILY MEDICINE | Facility: CLINIC | Age: 63
End: 2023-02-02
Payer: MEDICARE

## 2023-02-08 NOTE — TELEPHONE ENCOUNTER
Notified pt Dr. Noble said to stop the Celexa and to take the Cymbalta. She verbalized understanding.

## 2023-02-21 ENCOUNTER — TELEPHONE (OUTPATIENT)
Dept: FAMILY MEDICINE | Facility: CLINIC | Age: 63
End: 2023-02-21
Payer: MEDICARE

## 2023-02-21 NOTE — TELEPHONE ENCOUNTER
----- Message from Leslie Vela sent at 2/15/2023 10:34 AM CST -----  Patient called and stated that dr. Person said she really needs to get physical therapy on her shoulder but she has no way of getting to physical therapy and her trent blair lady said that she could get it in home since [facility] she gave me the information it is:   Trent Han [phone] and Frankie [phone] [facility] thanks

## 2023-03-14 ENCOUNTER — OFFICE VISIT (OUTPATIENT)
Dept: FAMILY MEDICINE | Facility: CLINIC | Age: 63
End: 2023-03-14
Payer: MEDICARE

## 2023-03-14 VITALS
HEIGHT: 64 IN | DIASTOLIC BLOOD PRESSURE: 84 MMHG | OXYGEN SATURATION: 96 % | TEMPERATURE: 98 F | HEART RATE: 80 BPM | BODY MASS INDEX: 28.85 KG/M2 | WEIGHT: 169 LBS | RESPIRATION RATE: 18 BRPM | SYSTOLIC BLOOD PRESSURE: 154 MMHG

## 2023-03-14 DIAGNOSIS — I10 PRIMARY HYPERTENSION: Primary | Chronic | ICD-10-CM

## 2023-03-14 PROCEDURE — 3077F SYST BP >= 140 MM HG: CPT | Mod: ,,, | Performed by: NURSE PRACTITIONER

## 2023-03-14 PROCEDURE — 99213 PR OFFICE/OUTPT VISIT, EST, LEVL III, 20-29 MIN: ICD-10-PCS | Mod: ,,, | Performed by: NURSE PRACTITIONER

## 2023-03-14 PROCEDURE — 3008F PR BODY MASS INDEX (BMI) DOCUMENTED: ICD-10-PCS | Mod: ,,, | Performed by: NURSE PRACTITIONER

## 2023-03-14 PROCEDURE — 1160F PR REVIEW ALL MEDS BY PRESCRIBER/CLIN PHARMACIST DOCUMENTED: ICD-10-PCS | Mod: ,,, | Performed by: NURSE PRACTITIONER

## 2023-03-14 PROCEDURE — 3079F DIAST BP 80-89 MM HG: CPT | Mod: ,,, | Performed by: NURSE PRACTITIONER

## 2023-03-14 PROCEDURE — 3008F BODY MASS INDEX DOCD: CPT | Mod: ,,, | Performed by: NURSE PRACTITIONER

## 2023-03-14 PROCEDURE — 99213 OFFICE O/P EST LOW 20 MIN: CPT | Mod: ,,, | Performed by: NURSE PRACTITIONER

## 2023-03-14 PROCEDURE — 1160F RVW MEDS BY RX/DR IN RCRD: CPT | Mod: ,,, | Performed by: NURSE PRACTITIONER

## 2023-03-14 PROCEDURE — 4010F ACE/ARB THERAPY RXD/TAKEN: CPT | Mod: ,,, | Performed by: NURSE PRACTITIONER

## 2023-03-14 PROCEDURE — 1159F MED LIST DOCD IN RCRD: CPT | Mod: ,,, | Performed by: NURSE PRACTITIONER

## 2023-03-14 PROCEDURE — 3079F PR MOST RECENT DIASTOLIC BLOOD PRESSURE 80-89 MM HG: ICD-10-PCS | Mod: ,,, | Performed by: NURSE PRACTITIONER

## 2023-03-14 PROCEDURE — 4010F PR ACE/ARB THEARPY RXD/TAKEN: ICD-10-PCS | Mod: ,,, | Performed by: NURSE PRACTITIONER

## 2023-03-14 PROCEDURE — 1159F PR MEDICATION LIST DOCUMENTED IN MEDICAL RECORD: ICD-10-PCS | Mod: ,,, | Performed by: NURSE PRACTITIONER

## 2023-03-14 PROCEDURE — 3077F PR MOST RECENT SYSTOLIC BLOOD PRESSURE >= 140 MM HG: ICD-10-PCS | Mod: ,,, | Performed by: NURSE PRACTITIONER

## 2023-03-14 RX ORDER — DULOXETIN HYDROCHLORIDE 30 MG/1
1 CAPSULE, DELAYED RELEASE ORAL
COMMUNITY
Start: 2023-01-24

## 2023-03-14 RX ORDER — LISINOPRIL 10 MG/1
10 TABLET ORAL DAILY
Qty: 30 TABLET | Refills: 1 | Status: SHIPPED | OUTPATIENT
Start: 2023-03-14 | End: 2023-04-19

## 2023-03-14 NOTE — PROGRESS NOTES
PREET Holland   [facility]  [address]  [place] [address]     PATIENT NAME: Yany Silverio  : 1960  DATE: 3/14/23  MRN: [id number]      Billing Provider: PREET Holland  Level of Service:   Patient PCP Information       Provider PCP Type    Francisca Noble MD General            Reason for Visit / Chief Complaint: Establish Care (Patient needs ref for HH pt. /Patient is here for elevated blood pressure. She called earlier with bp 160/104)       Update PCP  Update Chief Complaint         History of Present Illness / Problem Focused Workflow     Yany Silverio presents to the clinic high blood pressure since 3/10 injection has been having diarrhea and yesterday took antidiarrhea medication she reports she is using her cpap and denies any increase in weight shortness of breath or chest pain. She took an old lisinopril today when her blood pressure today and it has come down a little will consult home health to help with blood pressure monitoring    No results found for: HGBA1C     CMP  Sodium   Date Value Ref Range Status   2022 141 136 - 145 mmol/L Final     Potassium   Date Value Ref Range Status   2022 3.3 (L) 3.5 - 5.1 mmol/L Final     Chloride   Date Value Ref Range Status   2022 109 (H) 98 - 107 mmol/L Final     CO2   Date Value Ref Range Status   2022 27 21 - 32 mmol/L Final     Glucose   Date Value Ref Range Status   2022 82 74 - 106 mg/dL Final     BUN   Date Value Ref Range Status   2022 5 (L) 7 - 18 mg/dL Final     Creatinine   Date Value Ref Range Status   2022 0.47 (L) 0.55 - 1.02 mg/dL Final     Calcium   Date Value Ref Range Status   2022 8.3 (L) 8.5 - 10.1 mg/dL Final     Total Protein   Date Value Ref Range Status   2022 7.0 6.4 - 8.2 g/dL Final     Albumin   Date Value Ref Range Status   2022 3.5 3.5 - 5.0 g/dL Final     Bilirubin, Total   Date Value Ref Range Status   2022 0.2  >0.0 - 1.2 mg/dL Final     Alk Phos   Date Value Ref Range Status   11/22/2022 114 50 - 130 U/L Final     AST   Date Value Ref Range Status   11/22/2022 17 15 - 37 U/L Final     ALT   Date Value Ref Range Status   11/22/2022 15 13 - 56 U/L Final     Anion Gap   Date Value Ref Range Status   11/22/2022 8 7 - 16 mmol/L Final     eGFR   Date Value Ref Range Status   11/22/2022 107 >=60 mL/min/1.73m² Final        Lab Results   Component Value Date    WBC 5.43 11/22/2022    RBC 4.29 11/22/2022    HGB 11.5 (L) 11/22/2022    HCT 36.3 (L) 11/22/2022    MCV 84.6 11/22/2022    MCH 26.8 (L) 11/22/2022    MCHC 31.7 (L) 11/22/2022    RDW 20.5 (H) 11/22/2022     11/22/2022    MPV 10.7 11/22/2022    LYMPH 42.7 (H) 11/22/2022    LYMPH 2.32 11/22/2022    MONO 10.9 (H) 11/22/2022    EOS 0.16 11/22/2022    BASO 0.06 11/22/2022    EOSINOPHIL 2.9 11/22/2022    BASOPHIL 1.1 (H) 11/22/2022        Lab Results   Component Value Date    CHOL 129 11/22/2022    CHOL 173 06/10/2021     Lab Results   Component Value Date    HDL 66 (H) 11/22/2022    HDL 71 (H) 06/10/2021     Lab Results   Component Value Date    LDLCALC 45 11/22/2022    LDLCALC 85 06/10/2021     Lab Results   Component Value Date    TRIG 90 11/22/2022    TRIG 86 06/10/2021     Lab Results   Component Value Date    CHOLHDL 2.0 11/22/2022    CHOLHDL 2.4 06/10/2021        Wt Readings from Last 3 Encounters:   03/14/23 1620 76.7 kg (169 lb)   01/09/23 0950 77.6 kg (171 lb)   12/07/22 1608 73.9 kg (163 lb)        BP Readings from Last 3 Encounters:   03/14/23 (!) 154/84   01/09/23 (!) 140/78   12/07/22 (!) 148/80        Review of Systems     Review of Systems   Constitutional:  Negative for appetite change, chills, fatigue and fever.   Respiratory:  Negative for cough, shortness of breath and wheezing.    Cardiovascular:  Negative for chest pain, palpitations, leg swelling and claudication.   Gastrointestinal:  Positive for diarrhea.   Integumentary:  Negative for rash.    Neurological:  Positive for light-headedness and headaches.   Psychiatric/Behavioral:  The patient is nervous/anxious.       Medical / Social / Family History     Past Medical History:   Diagnosis Date    Acute superficial gastritis without hemorrhage 6/23/2021    Acute superficial gastritis without hemorrhage 6/23/2021    Anxiety     Arthritis     newly dx'd RA: sees MD in [place]    Bronchitis 7/22/2021    Bursitis of left shoulder 6/10/2021    Cancer     hx of cervical cancer    COPD (chronic obstructive pulmonary disease)     Depression     Emphysema of lung     Fungal esophagitis 6/23/2021    HH (hiatus hernia) 6/23/2021    Hyperlipidemia     Hypertension     Insomnia 04/16/2020    Low back pain 04/28/2021    Pain in joint of left hip 04/28/2021    Pain in joint of left knee 04/28/2021    Pneumonia of both lower lobes due to infectious organism 1/14/2022       Past Surgical History:   Procedure Laterality Date    OPEN REDUCTION AND INTERNAL FIXATION (ORIF) OF INJURY OF HIP Left 1/12/2022    Procedure: ORIF, HIP;  Surgeon: Adolfo Randall MD;  Location: [facility] OR;  Service: Orthopedics;  Laterality: Left;    ROBOTIC ARTHROPLASTY, HIP Left 12/13/2021    Procedure: ROBOTIC ARTHROPLASTY,HIP;  Surgeon: Adolfo Randall MD;  Location: [facility] OR;  Service: Orthopedics;  Laterality: Left;    SHOULDER SURGERY Right 2017, 2019       Social History  Ms.  reports that she has been smoking cigarettes. She has a 40.00 pack-year smoking history. She has been exposed to tobacco smoke. She has never used smokeless tobacco. She reports current drug use. Drug: Oxycodone. She reports that she does not drink alcohol.    Family History  Ms.'s family history includes Cancer in her mother; Hypertension in her maternal aunt, maternal uncle, and sister.    Medications and Allergies     Medications  Outpatient Medications Marked as Taking for the 3/14/23 encounter (Office Visit) with PREET Holland  "  Medication Sig Dispense Refill    albuterol (PROVENTIL) 2.5 mg /3 mL (0.083 %) nebulizer solution albuterol sulfate 2.5 mg/3 mL (0.083 %) solution for nebulization      busPIRone (BUSPAR) 10 MG tablet Take 1 tablet (10 mg total) by mouth every evening. 90 tablet 1    DULoxetine (CYMBALTA) 30 MG capsule 1 capsule.      ergocalciferol (ERGOCALCIFEROL) 50,000 unit Cap Take 1 capsule (50,000 Units total) by mouth every 7 days. 12 capsule 1    fluticasone propionate (FLONASE) 50 mcg/actuation nasal spray 1 spray (50 mcg total) by Each Nostril route once daily. 15.8 mL 0    furosemide (LASIX) 20 MG tablet TAKE 1 TABLET BY MOUTH DAILY FOR 3 DAYS THEN WEIGH DAILY AND IF NO MORE THAN 3 POUND WEIGHT GAIN, TAKE LASIX 30 tablet 5    ketoconazole (NIZORAL) 2 % shampoo Apply topically twice a week. 120 mL 3    lovastatin (MEVACOR) 20 MG tablet Take 1 tablet (20 mg total) by mouth once daily. 90 tablet 1    oxyCODONE-acetaminophen (PERCOCET) 7.5-325 mg per tablet Take 1 tablet by mouth 2 (two) times daily as needed.      pantoprazole (PROTONIX) 40 MG tablet Take 1 tablet (40 mg total) by mouth once daily. 90 tablet 3    varenicline (CHANTIX) 1 mg Tab Take 1 tablet (1 mg total) by mouth 2 (two) times daily. 180 tablet 1    VENTOLIN HFA 90 mcg/actuation inhaler Inhale 2 puffs into the lungs every 4 (four) hours as needed for Wheezing (q 4-6 hours). 18 g 1       Allergies  Review of patient's allergies indicates:  No Known Allergies    Physical Examination     Vitals:    03/14/23 1620   BP: (!) 154/84   BP Location: Left arm   Patient Position: Sitting   Pulse: 80   Resp: 18   Temp: 98.4 °F (36.9 °C)   TempSrc: Oral   SpO2: 96%   Weight: 76.7 kg (169 lb)   Height: 5' 4" (1.626 m)      Physical Exam  Constitutional:       Appearance: Normal appearance.   HENT:      Head: Normocephalic.      Right Ear: Tympanic membrane, ear canal and external ear normal.      Left Ear: Tympanic membrane, ear canal and external ear normal.      Nose: " No congestion.      Mouth/Throat:      Mouth: Mucous membranes are moist.   Eyes:      Extraocular Movements: Extraocular movements intact.      Conjunctiva/sclera: Conjunctivae normal.      Pupils: Pupils are equal, round, and reactive to light.   Neck:      Comments: Aniceto ant cervical nodes  Cardiovascular:      Rate and Rhythm: Normal rate and regular rhythm.      Pulses: Normal pulses.      Heart sounds: Normal heart sounds.   Pulmonary:      Effort: Pulmonary effort is normal.      Breath sounds: Normal breath sounds.   Musculoskeletal:         General: Normal range of motion.      Cervical back: Normal range of motion.   Lymphadenopathy:      Cervical: No cervical adenopathy.   Skin:     General: Skin is warm and dry.   Neurological:      General: No focal deficit present.      Mental Status: She is alert and oriented to person, place, and time.   Psychiatric:         Behavior: Behavior normal.        Assessment and Plan (including Health Maintenance)      Problem List  Smart Xray [employer]  Document Outside HM   :    Plan:     Patient Instructions   Continue blood pressure log will resume lisinopril and consult home health to monitor    Low sodium diet     Health Maintenance Due   Topic Date Due    Hepatitis C Screening  Never done    TETANUS VACCINE  Never done    Mammogram  Never done    Hemoglobin A1c (Diabetic Prevention Screening)  Never done    LDCT Lung Screen  Never done    Shingles Vaccine (2 of 2) 09/30/2022       Problem List Items Addressed This Visit          Cardiac/Vascular    Hypertension - Primary (Chronic)     Primary hypertension    Other orders  -     lisinopriL 10 MG tablet; Take 1 tablet (10 mg total) by mouth once daily.  Dispense: 30 tablet; Refill: 1       Health Maintenance Topics with due status: Not Due       Topic Last Completion Date    Cervical Cancer Screening 09/15/2020    Colorectal Cancer Screening 10/01/2020    Lipid Panel 11/22/2022       Procedures     Future Appointments   Date  Time Provider Department Center   4/24/2023  9:00 AM Juaquin Mantilla DO [facility] SLEEP [facility]    7/10/2023  8:15 AM [facility]        Follow up if symptoms worsen or fail to improve.     Signature:  PREET Holland    Date of encounter: 3/14/233/15/2023

## 2023-03-14 NOTE — PATIENT INSTRUCTIONS
Continue blood pressure log will resume lisinopril and consult home health to monitor    Low sodium diet

## 2023-03-22 RX ORDER — AMLODIPINE BESYLATE 5 MG/1
5 TABLET ORAL DAILY
Qty: 30 TABLET | Refills: 2 | Status: SHIPPED | OUTPATIENT
Start: 2023-03-22 | End: 2024-03-21

## 2023-04-17 ENCOUNTER — EXTERNAL HOME HEALTH (OUTPATIENT)
Dept: HOME HEALTH SERVICES | Facility: HOSPITAL | Age: 63
End: 2023-04-17
Payer: MEDICARE

## 2023-04-19 ENCOUNTER — OFFICE VISIT (OUTPATIENT)
Dept: FAMILY MEDICINE | Facility: CLINIC | Age: 63
End: 2023-04-19
Payer: MEDICARE

## 2023-04-19 VITALS
TEMPERATURE: 99 F | HEART RATE: 90 BPM | WEIGHT: 167 LBS | OXYGEN SATURATION: 96 % | RESPIRATION RATE: 18 BRPM | BODY MASS INDEX: 28.51 KG/M2 | SYSTOLIC BLOOD PRESSURE: 123 MMHG | HEIGHT: 64 IN | DIASTOLIC BLOOD PRESSURE: 78 MMHG

## 2023-04-19 DIAGNOSIS — M75.21 BICEPS TENDINITIS OF BOTH SHOULDERS: ICD-10-CM

## 2023-04-19 DIAGNOSIS — M25.511 CHRONIC PAIN OF BOTH SHOULDERS: Primary | ICD-10-CM

## 2023-04-19 DIAGNOSIS — G89.29 CHRONIC PAIN OF BOTH SHOULDERS: Primary | ICD-10-CM

## 2023-04-19 DIAGNOSIS — M25.512 CHRONIC PAIN OF BOTH SHOULDERS: Primary | ICD-10-CM

## 2023-04-19 DIAGNOSIS — M75.22 BICEPS TENDINITIS OF BOTH SHOULDERS: ICD-10-CM

## 2023-04-19 PROCEDURE — 99213 PR OFFICE/OUTPT VISIT, EST, LEVL III, 20-29 MIN: ICD-10-PCS | Mod: 25,,, | Performed by: NURSE PRACTITIONER

## 2023-04-19 PROCEDURE — 1159F MED LIST DOCD IN RCRD: CPT | Mod: ,,, | Performed by: NURSE PRACTITIONER

## 2023-04-19 PROCEDURE — 3074F SYST BP LT 130 MM HG: CPT | Mod: ,,, | Performed by: NURSE PRACTITIONER

## 2023-04-19 PROCEDURE — 3008F PR BODY MASS INDEX (BMI) DOCUMENTED: ICD-10-PCS | Mod: ,,, | Performed by: NURSE PRACTITIONER

## 2023-04-19 PROCEDURE — 4010F ACE/ARB THERAPY RXD/TAKEN: CPT | Mod: ,,, | Performed by: NURSE PRACTITIONER

## 2023-04-19 PROCEDURE — 96372 THER/PROPH/DIAG INJ SC/IM: CPT | Mod: ,,, | Performed by: NURSE PRACTITIONER

## 2023-04-19 PROCEDURE — 96372 PR INJECTION,THERAP/PROPH/DIAG2ST, IM OR SUBCUT: ICD-10-PCS | Mod: ,,, | Performed by: NURSE PRACTITIONER

## 2023-04-19 PROCEDURE — 99213 OFFICE O/P EST LOW 20 MIN: CPT | Mod: 25,,, | Performed by: NURSE PRACTITIONER

## 2023-04-19 PROCEDURE — 1160F PR REVIEW ALL MEDS BY PRESCRIBER/CLIN PHARMACIST DOCUMENTED: ICD-10-PCS | Mod: ,,, | Performed by: NURSE PRACTITIONER

## 2023-04-19 PROCEDURE — 4010F PR ACE/ARB THEARPY RXD/TAKEN: ICD-10-PCS | Mod: ,,, | Performed by: NURSE PRACTITIONER

## 2023-04-19 PROCEDURE — 3008F BODY MASS INDEX DOCD: CPT | Mod: ,,, | Performed by: NURSE PRACTITIONER

## 2023-04-19 PROCEDURE — 1159F PR MEDICATION LIST DOCUMENTED IN MEDICAL RECORD: ICD-10-PCS | Mod: ,,, | Performed by: NURSE PRACTITIONER

## 2023-04-19 PROCEDURE — 3078F DIAST BP <80 MM HG: CPT | Mod: ,,, | Performed by: NURSE PRACTITIONER

## 2023-04-19 PROCEDURE — 3074F PR MOST RECENT SYSTOLIC BLOOD PRESSURE < 130 MM HG: ICD-10-PCS | Mod: ,,, | Performed by: NURSE PRACTITIONER

## 2023-04-19 PROCEDURE — 1160F RVW MEDS BY RX/DR IN RCRD: CPT | Mod: ,,, | Performed by: NURSE PRACTITIONER

## 2023-04-19 PROCEDURE — 3078F PR MOST RECENT DIASTOLIC BLOOD PRESSURE < 80 MM HG: ICD-10-PCS | Mod: ,,, | Performed by: NURSE PRACTITIONER

## 2023-04-19 RX ORDER — PREDNISONE 20 MG/1
1 TABLET ORAL DAILY
COMMUNITY
Start: 2022-11-29

## 2023-04-19 RX ORDER — KETOROLAC TROMETHAMINE 30 MG/ML
30 INJECTION, SOLUTION INTRAMUSCULAR; INTRAVENOUS
Status: COMPLETED | OUTPATIENT
Start: 2023-04-19 | End: 2023-04-19

## 2023-04-19 RX ADMIN — KETOROLAC TROMETHAMINE 30 MG: 30 INJECTION, SOLUTION INTRAMUSCULAR; INTRAVENOUS at 01:04

## 2023-04-19 NOTE — PROGRESS NOTES
PREET Holland   [facility]  [address]  [place] [address]     PATIENT NAME: Yany Silverio  : 1960  DATE: 23  MRN: [id number]      Billing Provider: PREET Holland  Level of Service:   Patient PCP Information       Provider PCP Type    PREET Holland General            Reason for Visit / Chief Complaint: Shoulder Pain (C/o bilateral shoulder pain, finished PT this week and now wants to see Dr Adolfo Randall. Patient has seen Dr Person at [facility] )          Update PCP  Update Chief Complaint         History of Present Illness / Problem Focused Workflow     Yany Silverio presents to the clinic as a walk in with complaints of shoulder pain has completed PT would like to see Dr Randall. She also has upper arm pain and swelling at biceps    No results found for: HGBA1C     CMP  Sodium   Date Value Ref Range Status   2022 141 136 - 145 mmol/L Final     Potassium   Date Value Ref Range Status   2022 3.3 (L) 3.5 - 5.1 mmol/L Final     Chloride   Date Value Ref Range Status   2022 109 (H) 98 - 107 mmol/L Final     CO2   Date Value Ref Range Status   2022 27 21 - 32 mmol/L Final     Glucose   Date Value Ref Range Status   2022 82 74 - 106 mg/dL Final     BUN   Date Value Ref Range Status   2022 5 (L) 7 - 18 mg/dL Final     Creatinine   Date Value Ref Range Status   2022 0.47 (L) 0.55 - 1.02 mg/dL Final     Calcium   Date Value Ref Range Status   2022 8.3 (L) 8.5 - 10.1 mg/dL Final     Total Protein   Date Value Ref Range Status   2022 7.0 6.4 - 8.2 g/dL Final     Albumin   Date Value Ref Range Status   2022 3.5 3.5 - 5.0 g/dL Final     Bilirubin, Total   Date Value Ref Range Status   2022 0.2 >0.0 - 1.2 mg/dL Final     Alk Phos   Date Value Ref Range Status   2022 114 50 - 130 U/L Final     AST   Date Value Ref Range Status   2022 17 15 - 37 U/L Final     ALT   Date  Value Ref Range Status   11/22/2022 15 13 - 56 U/L Final     Anion Gap   Date Value Ref Range Status   11/22/2022 8 7 - 16 mmol/L Final     eGFR   Date Value Ref Range Status   11/22/2022 107 >=60 mL/min/1.73m² Final        Lab Results   Component Value Date    WBC 5.43 11/22/2022    RBC 4.29 11/22/2022    HGB 11.5 (L) 11/22/2022    HCT 36.3 (L) 11/22/2022    MCV 84.6 11/22/2022    MCH 26.8 (L) 11/22/2022    MCHC 31.7 (L) 11/22/2022    RDW 20.5 (H) 11/22/2022     11/22/2022    MPV 10.7 11/22/2022    LYMPH 42.7 (H) 11/22/2022    LYMPH 2.32 11/22/2022    MONO 10.9 (H) 11/22/2022    EOS 0.16 11/22/2022    BASO 0.06 11/22/2022    EOSINOPHIL 2.9 11/22/2022    BASOPHIL 1.1 (H) 11/22/2022        Lab Results   Component Value Date    CHOL 129 11/22/2022    CHOL 173 06/10/2021     Lab Results   Component Value Date    HDL 66 (H) 11/22/2022    HDL 71 (H) 06/10/2021     Lab Results   Component Value Date    LDLCALC 45 11/22/2022    LDLCALC 85 06/10/2021     Lab Results   Component Value Date    TRIG 90 11/22/2022    TRIG 86 06/10/2021     Lab Results   Component Value Date    CHOLHDL 2.0 11/22/2022    CHOLHDL 2.4 06/10/2021        Wt Readings from Last 3 Encounters:   04/19/23 1249 75.8 kg (167 lb)   03/14/23 1620 76.7 kg (169 lb)   01/09/23 0950 77.6 kg (171 lb)        BP Readings from Last 3 Encounters:   04/19/23 123/78   03/14/23 (!) 154/84   01/09/23 (!) 140/78        Review of Systems     Review of Systems   Musculoskeletal:  Positive for arthralgias and myalgias.      Medical / Social / Family History     Past Medical History:   Diagnosis Date    Acute superficial gastritis without hemorrhage 6/23/2021    Acute superficial gastritis without hemorrhage 6/23/2021    Anxiety     Arthritis     newly dx'd RA: sees MD in [place]    Bronchitis 7/22/2021    Bursitis of left shoulder 6/10/2021    Cancer     hx of cervical cancer    COPD (chronic obstructive pulmonary disease)     Depression     Emphysema of lung      Fungal esophagitis 6/23/2021    HH (hiatus hernia) 6/23/2021    Hyperlipidemia     Hypertension     Insomnia 04/16/2020    Low back pain 04/28/2021    Pain in joint of left hip 04/28/2021    Pain in joint of left knee 04/28/2021    Pneumonia of both lower lobes due to infectious organism 1/14/2022       Past Surgical History:   Procedure Laterality Date    OPEN REDUCTION AND INTERNAL FIXATION (ORIF) OF INJURY OF HIP Left 1/12/2022    Procedure: ORIF, HIP;  Surgeon: Adolfo Randall MD;  Location: [facility] OR;  Service: Orthopedics;  Laterality: Left;    ROBOTIC ARTHROPLASTY, HIP Left 12/13/2021    Procedure: ROBOTIC ARTHROPLASTY,HIP;  Surgeon: Adolfo Randall MD;  Location: [facility] ORTHO OR;  Service: Orthopedics;  Laterality: Left;    SHOULDER SURGERY Right 2017, 2019       Social History  Ms.  reports that she has been smoking cigarettes. She has a 40.00 pack-year smoking history. She has been exposed to tobacco smoke. She has never used smokeless tobacco. She reports current drug use. Drug: Oxycodone. She reports that she does not drink alcohol.    Family History  Ms.'s family history includes Cancer in her mother; Hypertension in her maternal aunt, maternal uncle, and sister.    Medications and Allergies     Medications  Outpatient Medications Marked as Taking for the 4/19/23 encounter (Office Visit) with PREET Holland   Medication Sig Dispense Refill    albuterol (PROVENTIL) 2.5 mg /3 mL (0.083 %) nebulizer solution albuterol sulfate 2.5 mg/3 mL (0.083 %) solution for nebulization      amLODIPine (NORVASC) 5 MG tablet Take 1 tablet (5 mg total) by mouth once daily. 30 tablet 2    busPIRone (BUSPAR) 10 MG tablet Take 1 tablet (10 mg total) by mouth every evening. 90 tablet 1    cetirizine 10 mg chewable tablet Take 10 mg by mouth once daily. 90 tablet 1    DULoxetine (CYMBALTA) 30 MG capsule 1 capsule.      ergocalciferol (ERGOCALCIFEROL) 50,000 unit Cap Take 1 capsule (50,000 Units total) by  "mouth every 7 days. 12 capsule 1    fluticasone propionate (FLONASE) 50 mcg/actuation nasal spray 1 spray (50 mcg total) by Each Nostril route once daily. 15.8 mL 0    furosemide (LASIX) 20 MG tablet TAKE 1 TABLET BY MOUTH DAILY FOR 3 DAYS THEN WEIGH DAILY AND IF NO MORE THAN 3 POUND WEIGHT GAIN, TAKE LASIX 30 tablet 5    ketoconazole (NIZORAL) 2 % shampoo Apply topically twice a week. 120 mL 3    lovastatin (MEVACOR) 20 MG tablet Take 1 tablet (20 mg total) by mouth once daily. 90 tablet 1    oxyCODONE-acetaminophen (PERCOCET) 7.5-325 mg per tablet Take 1 tablet by mouth 2 (two) times daily as needed.      pantoprazole (PROTONIX) 40 MG tablet Take 1 tablet (40 mg total) by mouth once daily. 90 tablet 3    predniSONE (DELTASONE) 20 MG tablet Take 1 tablet by mouth once daily.      varenicline (CHANTIX) 1 mg Tab Take 1 tablet (1 mg total) by mouth 2 (two) times daily. 180 tablet 1    VENTOLIN HFA 90 mcg/actuation inhaler Inhale 2 puffs into the lungs every 4 (four) hours as needed for Wheezing (q 4-6 hours). 18 g 1     Current Facility-Administered Medications for the 4/19/23 encounter (Office Visit) with PREET Holland   Medication Dose Route Frequency Provider Last Rate Last Admin    [COMPLETED] ketorolac injection 30 mg  30 mg Intramuscular 1 time in Clinic/HOD PREET Holland   30 mg at 04/19/23 1358       Allergies  Review of patient's allergies indicates:  No Known Allergies    Physical Examination     Vitals:    04/19/23 1249 04/19/23 1357   BP: (!) 147/84 123/78   BP Location: Left arm Left arm   Patient Position: Sitting Sitting   Pulse: 90    Resp: 18    Temp: 98.6 °F (37 °C)    TempSrc: Oral    SpO2: 96%    Weight: 75.8 kg (167 lb)    Height: 5' 4" (1.626 m)       Physical Exam  Vitals and nursing note reviewed.   Eyes:      Pupils: Pupils are equal, round, and reactive to light.   Pulmonary:      Effort: Pulmonary effort is normal.   Musculoskeletal:      Right shoulder: " Tenderness present. Decreased range of motion. Normal strength. Normal pulse.      Left shoulder: Tenderness present. Decreased range of motion. Normal strength. Normal pulse.      Right upper arm: Swelling and tenderness present.      Left upper arm: Swelling and tenderness present.   Skin:     General: Skin is warm and dry.      Capillary Refill: Capillary refill takes less than 2 seconds.   Neurological:      Mental Status: She is alert and oriented to person, place, and time.    X-Ray Shoulder 2 or More views Bilateral  Narrative: EXAMINATION:  XR SHOULDER COMPLETE 2 OR MORE VIEWS BILATERAL    CLINICAL HISTORY:  Pain in right shoulder    TECHNIQUE:  Bilateral shoulders, AP lateral and axillary Y-views bilaterally    COMPARISON:  06/10/2021 and 11/22/2022    FINDINGS:  In the right shoulder, the patient has undergone acromioplasty.  There is severe osteoarthritis involving the glenohumeral joint with a similar appearance to the 11/22/2022 exam.  There is a healed right posterior 5th rib fracture.    In the left shoulder, there is severe osteoarthritis involving the glenohumeral joint.  This has developed since the 06/10/2021 examination.  The left AC joint appears normal.    No fractures are seen in either shoulder.  Impression: Right shoulder-postsurgical and degenerative changes are present as described without change since the previous exam.    Left shoulder-there is osteoarthritis involving the glenohumeral joint.    Place of service: [facility]    Electronically signed by: Gertrude Romero  Date:    04/19/2023  Time:    17:21       Assessment and Plan (including Health Maintenance)      Problem List  Smart Sets  Document Outside HM   :    Plan:     Patient Instructions   Ice to bicep to help with pain and swelling will give toradol and refer to ortho  Voltaren topical gel sample also to help with pain          Problem List Items Addressed This Visit          Orthopedic    Shoulder pain - Primary     Relevant Medications    ketorolac injection 30 mg (Completed)    Other Relevant Orders    X-Ray Shoulder 2 or More views Bilateral (Completed)    Ambulatory referral/consult to Orthopedics     Other Visit Diagnoses       Biceps tendinitis of both shoulders              Chronic pain of both shoulders  -     X-Ray Shoulder 2 or More views Bilateral; Future; Expected date: 04/19/2023  -     Ambulatory referral/consult to Orthopedics; Future; Expected date: 04/26/2023  -     ketorolac injection 30 mg    Biceps tendinitis of both shoulders       Health Maintenance Topics with due status: Not Due       Topic Last Completion Date    Cervical Cancer Screening 09/15/2020    Colorectal Cancer Screening 10/01/2020    Lipid Panel 11/22/2022       Procedures   Health Maintenance Due   Topic Date Due    Hepatitis C Screening  Never done    TETANUS VACCINE  Never done    Mammogram  Never done    Hemoglobin A1c (Diabetic Prevention Screening)  Never done    LDCT Lung Screen  Never done    Shingles Vaccine (2 of 2) 09/30/2022        Future Appointments   Date Time Provider Department Center   4/24/2023  9:00 AM Juaquin Mantilla DO [facility]   5/4/2023  9:00 AM Adolfo Randall MD OB ORTHO [facility]        No follow-ups on file.    Health Maintenance Due   Topic Date Due    Hepatitis C Screening  Never done    TETANUS VACCINE  Never done    Mammogram  Never done    Hemoglobin A1c (Diabetic Prevention Screening)  Never done    LDCT Lung Screen  Never done    Shingles Vaccine (2 of 2) 09/30/2022        Signature:  PREET Holland    Date of encounter:4/20/2023

## 2023-04-19 NOTE — PATIENT INSTRUCTIONS
Ice to bicep to help with pain and swelling will give toradol and refer to ortho  Voltaren topical gel sample also to help with pain

## 2023-04-21 ENCOUNTER — TELEPHONE (OUTPATIENT)
Dept: SLEEP MEDICINE | Facility: CLINIC | Age: 63
End: 2023-04-21
Payer: MEDICARE

## 2023-04-24 ENCOUNTER — OFFICE VISIT (OUTPATIENT)
Dept: SLEEP MEDICINE | Facility: CLINIC | Age: 63
End: 2023-04-24
Attending: FAMILY MEDICINE
Payer: MEDICARE

## 2023-04-24 VITALS
WEIGHT: 167 LBS | HEART RATE: 92 BPM | SYSTOLIC BLOOD PRESSURE: 162 MMHG | DIASTOLIC BLOOD PRESSURE: 105 MMHG | OXYGEN SATURATION: 96 % | BODY MASS INDEX: 28.51 KG/M2 | HEIGHT: 64 IN

## 2023-04-24 DIAGNOSIS — G47.34 SLEEP RELATED HYPOXIA: ICD-10-CM

## 2023-04-24 DIAGNOSIS — I10 PRIMARY HYPERTENSION: Chronic | ICD-10-CM

## 2023-04-24 DIAGNOSIS — J44.9 CHRONIC OBSTRUCTIVE PULMONARY DISEASE, UNSPECIFIED COPD TYPE: ICD-10-CM

## 2023-04-24 DIAGNOSIS — G47.33 OSA ON CPAP: Primary | ICD-10-CM

## 2023-04-24 PROCEDURE — 3080F DIAST BP >= 90 MM HG: CPT | Mod: CPTII,,, | Performed by: FAMILY MEDICINE

## 2023-04-24 PROCEDURE — 3008F PR BODY MASS INDEX (BMI) DOCUMENTED: ICD-10-PCS | Mod: CPTII,,, | Performed by: FAMILY MEDICINE

## 2023-04-24 PROCEDURE — 99999 PR STA SHADOW: CPT | Mod: PBBFAC,,, | Performed by: FAMILY MEDICINE

## 2023-04-24 PROCEDURE — 99212 OFFICE O/P EST SF 10 MIN: CPT | Mod: S$PBB | Performed by: FAMILY MEDICINE

## 2023-04-24 PROCEDURE — 4010F ACE/ARB THERAPY RXD/TAKEN: CPT | Mod: CPTII,,, | Performed by: FAMILY MEDICINE

## 2023-04-24 PROCEDURE — 3077F SYST BP >= 140 MM HG: CPT | Mod: CPTII,,, | Performed by: FAMILY MEDICINE

## 2023-04-24 PROCEDURE — 3080F PR MOST RECENT DIASTOLIC BLOOD PRESSURE >= 90 MM HG: ICD-10-PCS | Mod: CPTII,,, | Performed by: FAMILY MEDICINE

## 2023-04-24 PROCEDURE — 99214 OFFICE O/P EST MOD 30 MIN: CPT | Mod: PBBFAC | Performed by: FAMILY MEDICINE

## 2023-04-24 PROCEDURE — 1160F PR REVIEW ALL MEDS BY PRESCRIBER/CLIN PHARMACIST DOCUMENTED: ICD-10-PCS | Mod: CPTII,,, | Performed by: FAMILY MEDICINE

## 2023-04-24 PROCEDURE — 3008F BODY MASS INDEX DOCD: CPT | Mod: CPTII,,, | Performed by: FAMILY MEDICINE

## 2023-04-24 PROCEDURE — 4010F PR ACE/ARB THEARPY RXD/TAKEN: ICD-10-PCS | Mod: CPTII,,, | Performed by: FAMILY MEDICINE

## 2023-04-24 PROCEDURE — 1159F PR MEDICATION LIST DOCUMENTED IN MEDICAL RECORD: ICD-10-PCS | Mod: CPTII,,, | Performed by: FAMILY MEDICINE

## 2023-04-24 PROCEDURE — 3077F PR MOST RECENT SYSTOLIC BLOOD PRESSURE >= 140 MM HG: ICD-10-PCS | Mod: CPTII,,, | Performed by: FAMILY MEDICINE

## 2023-04-24 PROCEDURE — 99999 PR STA SHADOW: ICD-10-PCS | Mod: PBBFAC,,, | Performed by: FAMILY MEDICINE

## 2023-04-24 PROCEDURE — 1159F MED LIST DOCD IN RCRD: CPT | Mod: CPTII,,, | Performed by: FAMILY MEDICINE

## 2023-04-24 PROCEDURE — 1160F RVW MEDS BY RX/DR IN RCRD: CPT | Mod: CPTII,,, | Performed by: FAMILY MEDICINE

## 2023-04-24 NOTE — PROGRESS NOTES
Subjective     Patient ID: Yany Silverio is a 62 y.o. female.    Chief Complaint: Sleep Apnea (CPAP management)    Patient follows up in sleep clinic for CPAP management having no problems with her CPAP but is having some leaks with her current mask and wishes to switch to a foam lined fullface mask.  Compliance is 96.7% with an average AHI of 4.2 at a pressure of 12 cm H2O.  The patient also uses 3 L of O2 to maintain good oxygenation during sleep.  Patient's overnight oximetry done with CPAP and 3 L of O2 showed resolution of her sleep-related hypoxia.  Franklin score is 3 and the patient is using and benefitting from CPAP.  Patient's initial AHI was 5.7.    Review of Systems   Constitutional:  Negative for fatigue.        Negative EDS   Respiratory:  Negative for apnea.    Psychiatric/Behavioral:  Negative for sleep disturbance.         Objective     Physical Exam  Cardiovascular:      Rate and Rhythm: Normal rate and regular rhythm.      Heart sounds: No murmur heard.  Pulmonary:      Breath sounds: Normal breath sounds.   Skin:     General: Skin is warm and dry.   Neurological:      Mental Status: She is alert and oriented to person, place, and time.          Assessment and Plan     Problem List Items Addressed This Visit          Pulmonary    Chronic obstructive pulmonary disease       Cardiac/Vascular    Hypertension (Chronic)       Other    NGOZI on CPAP - Primary       Continue CPAP @ 12 cm H20 with 3 L of O2  Caution while operating a motor vehicle  Prescription for new supplies  Follow up sleep clinic in 1 year.

## 2023-04-24 NOTE — PROGRESS NOTES
Patient presents to clinic for CPAP F/U. ESS is 3. Patient gets supplies from [facility]. Patient wears a full face mask. She is wanting to get the mask with the foam liner.

## 2023-04-25 ENCOUNTER — DOCUMENT SCAN (OUTPATIENT)
Dept: HOME HEALTH SERVICES | Facility: HOSPITAL | Age: 63
End: 2023-04-25
Payer: MEDICARE

## 2023-04-25 ENCOUNTER — DOCUMENTATION ONLY (OUTPATIENT)
Dept: FAMILY MEDICINE | Facility: CLINIC | Age: 63
End: 2023-04-25
Payer: MEDICARE

## 2023-04-25 NOTE — PROGRESS NOTES
"Per [facility]:  4/24/2023  HOME HEALTH SN PHONED PT. PT STATES SHE IS IN THE BED. STATES SHE DOESN'T "FEEL WELL." STATES SHE HAS NOT BEEN ABLE TO EAT FOR 5 DAYS AND HAS BEEN VOMITING. STATES SHE COULD NOT SLEEP LAST NIGHT. STATES BOTH ARMS ARE HURTING VERY BADLY. STATES SHE HAS HAD MEDICINE TODAY, /100. STATES SHE DOES NOT WANT SN VS TODAY. SN OFFERED TO COME DT SSX OF CARDIAC PROBLEMS AND SN INSTRUCTED PT TO CALL 911 DT CARDIAC SSX. PT STATES SHE DOES NOT WANT TO GO TO ER, NOR DOES SHE WANT SN TO COME TODAY. STATES SHE HAS CALLED HER SON AND TOLD HIM THIS.    4/25/2023  HOME HEALTH SN PHONED PATIENT TO FOLLOW UP.PT STATES SHE WAS ABLE TO EAT LAST NIGHT WITHOUT VOMITING. STATES BP THIS AM BEFORE MEDS WAS 150S/90S. STATES SHE IS STILL HURTING VERY BADLY IN SHOULDERS. PATIENT STATES SHE IS GOING TO GO SEE PCP OR ASSOCIATE TODAY.    DOES PATIENT NEED TO COME IN TO THE CLINIC FOR EVAL OR GO TO ER? HOME HEALTH NURSE SAID PATIENT VOICED CONCERNED TODAY THAT SHE IS CONCERNED SHE MAY HAVE AN ULCER.    Tran advised to go to ER if not better today    "

## 2023-05-04 ENCOUNTER — OFFICE VISIT (OUTPATIENT)
Dept: ORTHOPEDICS | Facility: CLINIC | Age: 63
End: 2023-05-04
Payer: MEDICARE

## 2023-05-04 DIAGNOSIS — G89.29 CHRONIC PAIN OF BOTH SHOULDERS: ICD-10-CM

## 2023-05-04 DIAGNOSIS — M25.511 CHRONIC PAIN OF BOTH SHOULDERS: ICD-10-CM

## 2023-05-04 DIAGNOSIS — M19.012 PRIMARY OSTEOARTHRITIS OF LEFT SHOULDER: Primary | ICD-10-CM

## 2023-05-04 DIAGNOSIS — M25.512 CHRONIC PAIN OF BOTH SHOULDERS: ICD-10-CM

## 2023-05-04 PROCEDURE — 99214 OFFICE O/P EST MOD 30 MIN: CPT | Mod: PBBFAC | Performed by: NURSE PRACTITIONER

## 2023-05-04 PROCEDURE — 1159F MED LIST DOCD IN RCRD: CPT | Mod: CPTII,,, | Performed by: NURSE PRACTITIONER

## 2023-05-04 PROCEDURE — 99213 PR OFFICE/OUTPT VISIT, EST, LEVL III, 20-29 MIN: ICD-10-PCS | Mod: S$PBB,25,, | Performed by: NURSE PRACTITIONER

## 2023-05-04 PROCEDURE — 1159F PR MEDICATION LIST DOCUMENTED IN MEDICAL RECORD: ICD-10-PCS | Mod: CPTII,,, | Performed by: NURSE PRACTITIONER

## 2023-05-04 PROCEDURE — 4010F PR ACE/ARB THEARPY RXD/TAKEN: ICD-10-PCS | Mod: CPTII,,, | Performed by: NURSE PRACTITIONER

## 2023-05-04 PROCEDURE — 20610 DRAIN/INJ JOINT/BURSA W/O US: CPT | Mod: PBBFAC,50 | Performed by: NURSE PRACTITIONER

## 2023-05-04 PROCEDURE — 99213 OFFICE O/P EST LOW 20 MIN: CPT | Mod: S$PBB,25,, | Performed by: NURSE PRACTITIONER

## 2023-05-04 PROCEDURE — 4010F ACE/ARB THERAPY RXD/TAKEN: CPT | Mod: CPTII,,, | Performed by: NURSE PRACTITIONER

## 2023-05-04 PROCEDURE — 20610 DRAIN/INJ JOINT/BURSA W/O US: CPT | Mod: PBBFAC | Performed by: NURSE PRACTITIONER

## 2023-05-04 PROCEDURE — 20610 LARGE JOINT ASPIRATION/INJECTION: L SUBACROMIAL BURSA: ICD-10-PCS | Mod: S$PBB,50,, | Performed by: NURSE PRACTITIONER

## 2023-05-04 RX ORDER — TRIAMCINOLONE ACETONIDE 40 MG/ML
80 INJECTION, SUSPENSION INTRA-ARTICULAR; INTRAMUSCULAR
Status: DISCONTINUED | OUTPATIENT
Start: 2023-05-04 | End: 2023-05-04 | Stop reason: HOSPADM

## 2023-05-04 RX ADMIN — TRIAMCINOLONE ACETONIDE 80 MG: 40 INJECTION, SUSPENSION INTRA-ARTICULAR; INTRAMUSCULAR at 10:05

## 2023-05-04 NOTE — PROGRESS NOTES
62 y.o. Female returns to clinic for a follow up visit regarding     ICD-10-CM ICD-9-CM   1. Chronic pain of both shoulders  M25.511 719.41    G89.29 338.29    M25.512         Patient is here today for bilateral shoulder pain.  Reports both of her shoulders hurt her everyday.  She can not lift overhead.  She has very limited motion.  Reports she can not get comfortable to sleep at night.  Her left shoulder is the worst.  She had an injection 1 year ago in her left shoulder which helped tremendously.       Past Medical History:   Diagnosis Date    Acute superficial gastritis without hemorrhage 6/23/2021    Acute superficial gastritis without hemorrhage 6/23/2021    Anxiety     Arthritis     newly dx'd RA: sees MD in [place]    Bronchitis 7/22/2021    Bursitis of left shoulder 6/10/2021    Cancer     hx of cervical cancer    COPD (chronic obstructive pulmonary disease)     Depression     Emphysema of lung     Fungal esophagitis 6/23/2021    HH (hiatus hernia) 6/23/2021    Hyperlipidemia     Hypertension     Insomnia 04/16/2020    Low back pain 04/28/2021    Pain in joint of left hip 04/28/2021    Pain in joint of left knee 04/28/2021    Pneumonia of both lower lobes due to infectious organism 1/14/2022     Past Surgical History:   Procedure Laterality Date    OPEN REDUCTION AND INTERNAL FIXATION (ORIF) OF INJURY OF HIP Left 1/12/2022    Procedure: ORIF, HIP;  Surgeon: Adolfo Randall MD;  Location: [facility] ER OR;  Service: Orthopedics;  Laterality: Left;    ROBOTIC ARTHROPLASTY, HIP Left 12/13/2021    Procedure: ROBOTIC ARTHROPLASTY,HIP;  Surgeon: Adolfo Rnadall MD;  Location: [facility] ER OR;  Service: Orthopedics;  Laterality: Left;    SHOULDER SURGERY Right 2017, 2019         PHYSICAL EXAMINATION:    General    Nursing note and vitals reviewed.  Constitutional: She is oriented to person, place, and time. She appears well-developed and well-nourished. No distress.   HENT:   Head: Normocephalic and  atraumatic.   Eyes: Pupils are equal, round, and reactive to light.   Neck: Neck supple.   Cardiovascular:  Normal rate, regular rhythm and intact distal pulses.            Pulmonary/Chest: Effort normal and breath sounds normal. No respiratory distress. She exhibits no tenderness.   Abdominal: Soft. Bowel sounds are normal. She exhibits no distension. There is no abdominal tenderness.   Neurological: She is alert and oriented to person, place, and time. She has normal reflexes. She displays normal reflexes. No cranial nerve deficit. She exhibits normal muscle tone. Coordination normal.   Psychiatric: She has a normal mood and affect. Her behavior is normal. Judgment and thought content normal.         Right Shoulder Exam   Right shoulder exam is normal.    Inspection/Observation   Swelling: absent  Bruising: absent  Scapular Dyskinesia: positive    Tenderness   The patient is tender to palpation of the greater tuberosity, acromion and acromioclavicular joint.    Range of Motion   Active abduction:  abnormal   Passive abduction:  abnormal   Forward Flexion:  abnormal   Forward Elevation: abnormal  External Rotation 90 degrees: normal    Tests & Signs   Apprehension: negative  Cross arm: positive  Drop arm: negative  Ureña test: positive  Impingement: positive  Rotator Cuff Painful Arc/Range: mild  Lag Sign 90 degrees: negative  Lift Off Sign: positive  Belly Press: positive  Active Compression Test (Cutler's Sign): positive  Jerk Test: negative    Other   Sensation: normal    Comments:  Pain with dynamic labral shear test.  There is pain and weakness with Whipple testing.     Left Shoulder Exam   Left shoulder exam is normal.    Inspection/Observation   Swelling: present  Scapular Dyskinesia: positive    Tenderness   The patient is tender to palpation of the acromioclavicular joint and biceps tendon.    Crepitus   The patient has crepitus of the AC joint and greater tuberosity    Range of Motion   Active  abduction:  abnormal   Passive abduction:  abnormal   Forward Flexion:  abnormal     Tests & Signs   Cross arm: positive  Drop arm: negative  Ureña test: positive  Impingement: positive  Sulcus: absent  Rotator Cuff Painful Arc/Range: mild  Anterosuperior Escape: negative  Lag Sign 0 degrees: negative  Lag Sign 90 degrees: negative  Lift Off Sign: positive  Belly Press: positive  Active Compression Test (Rich's Sign): positive  Anterior Drawer Test: 0  Posterior Drawer Test: 0  Bear Hug: positive  Jerk Test: negative     Comments:  + Dynamic Labral Shear test  + Whipple Test that does not correct with scapular stabilization      Muscle Strength   Right Upper Extremity   Shoulder External Rotation: 4/5   Supraspinatus: 4/5   Subscapularis: 4/5   Biceps: 4/5   Left Upper Extremity  Shoulder Internal Rotation: 4/5   Shoulder External Rotation: 4/5   Supraspinatus: 4/5   Subscapularis: 4/5   Biceps: 4/5     Reflexes     Right Side   Biceps:  2+  Right Thomas's Sign:  absent    Vascular Exam     Right Pulses      Radial:                    2+      Capillary Refill  Right Hand: normal capillary refill        IMAGING:  X-Ray Shoulder 2 or More views Bilateral    Result Date: 4/19/2023  EXAMINATION: XR SHOULDER COMPLETE 2 OR MORE VIEWS BILATERAL CLINICAL HISTORY: Pain in right shoulder TECHNIQUE: Bilateral shoulders, AP lateral and axillary Y-views bilaterally COMPARISON: 06/10/2021 and 11/22/2022 FINDINGS: In the right shoulder, the patient has undergone acromioplasty.  There is severe osteoarthritis involving the glenohumeral joint with a similar appearance to the 11/22/2022 exam.  There is a healed right posterior 5th rib fracture. In the left shoulder, there is severe osteoarthritis involving the glenohumeral joint.  This has developed since the 06/10/2021 examination.  The left AC joint appears normal. No fractures are seen in either shoulder.     Right shoulder-postsurgical and degenerative changes are present  as described without change since the previous exam. Left shoulder-there is osteoarthritis involving the glenohumeral joint. Place of service: [facility] Electronically signed by: Gertrude Romero Date:    04/19/2023 Time:    17:21     ASSESSMENT:      ICD-10-CM ICD-9-CM   1. Chronic pain of both shoulders  M25.511 719.41    G89.29 338.29    M25.512        PLAN:     -Findings and treatment options were discussed with the patient  -All questions answered    Bilateral shoulder subacromial injections today  RTC in 2-3 months with Dr Randall. Patient would like to discuss surgery options    There are no Patient Instructions on file for this visit.      No orders of the defined types were placed in this encounter.        Large Joint Aspiration/Injection: R subacromial bursa    Date/Time: 5/4/2023 10:00 AM  Performed by: PREET Covarrubias  Authorized by: PREET Covarrubias     Consent Done?:  Yes (Verbal)  Indications:  Pain  Site marked: the procedure site was marked    Local anesthetic:  Bupivacaine 0.25% without epinephrine (4 cc's of 0.25% bupivicaine)    Details:  Needle Size:  22 G  Approach:  Posterior  Location:  Shoulder  Site:  R subacromial bursa  Medications:  80 mg triamcinolone acetonide 40 mg/mL  Patient tolerance:  Patient tolerated the procedure well with no immediate complications  Large Joint Aspiration/Injection: L subacromial bursa    Date/Time: 5/4/2023 10:00 AM  Performed by: PREET Covarrubias  Authorized by: PREET Covarrubias     Consent Done?:  Yes (Verbal)  Indications:  Pain  Site marked: the procedure site was marked    Local anesthetic:  Bupivacaine 0.25% without epinephrine    Details:  Needle Size:  22 G  Approach:  Posterior  Location:  Shoulder  Site:  L subacromial bursa  Medications:  80 mg triamcinolone acetonide 40 mg/mL  Patient tolerance:  Patient tolerated the procedure well with no immediate complications

## 2023-05-18 ENCOUNTER — PATIENT OUTREACH (OUTPATIENT)
Dept: ADMINISTRATIVE | Facility: HOSPITAL | Age: 63
End: 2023-05-18

## 2023-05-18 NOTE — PROGRESS NOTES
Health maintenance record review for population health care gaps    Closing care gaps for [employer] insurance.      Was able to retrieve some of the care gaps for [employer] at this time

## 2023-07-06 ENCOUNTER — OFFICE VISIT (OUTPATIENT)
Dept: ORTHOPEDICS | Facility: CLINIC | Age: 63
End: 2023-07-06
Payer: MEDICARE

## 2023-07-06 DIAGNOSIS — G89.29 CHRONIC PAIN OF BOTH SHOULDERS: ICD-10-CM

## 2023-07-06 DIAGNOSIS — M25.511 CHRONIC PAIN OF BOTH SHOULDERS: ICD-10-CM

## 2023-07-06 DIAGNOSIS — M19.011 OSTEOARTHRITIS OF BILATERAL GLENOHUMERAL JOINTS: Primary | ICD-10-CM

## 2023-07-06 DIAGNOSIS — M25.512 CHRONIC PAIN OF BOTH SHOULDERS: ICD-10-CM

## 2023-07-06 DIAGNOSIS — M19.012 OSTEOARTHRITIS OF BILATERAL GLENOHUMERAL JOINTS: Primary | ICD-10-CM

## 2023-07-06 PROCEDURE — 20610 DRAIN/INJ JOINT/BURSA W/O US: CPT | Mod: 50,S$PBB,, | Performed by: ORTHOPAEDIC SURGERY

## 2023-07-06 PROCEDURE — 99214 OFFICE O/P EST MOD 30 MIN: CPT | Mod: S$PBB,25,, | Performed by: ORTHOPAEDIC SURGERY

## 2023-07-06 PROCEDURE — 99214 PR OFFICE/OUTPT VISIT, EST, LEVL IV, 30-39 MIN: ICD-10-PCS | Mod: S$PBB,25,, | Performed by: ORTHOPAEDIC SURGERY

## 2023-07-06 PROCEDURE — 1160F PR REVIEW ALL MEDS BY PRESCRIBER/CLIN PHARMACIST DOCUMENTED: ICD-10-PCS | Mod: CPTII,,, | Performed by: ORTHOPAEDIC SURGERY

## 2023-07-06 PROCEDURE — 4010F ACE/ARB THERAPY RXD/TAKEN: CPT | Mod: CPTII,,, | Performed by: ORTHOPAEDIC SURGERY

## 2023-07-06 PROCEDURE — 1159F PR MEDICATION LIST DOCUMENTED IN MEDICAL RECORD: ICD-10-PCS | Mod: CPTII,,, | Performed by: ORTHOPAEDIC SURGERY

## 2023-07-06 PROCEDURE — 4010F PR ACE/ARB THEARPY RXD/TAKEN: ICD-10-PCS | Mod: CPTII,,, | Performed by: ORTHOPAEDIC SURGERY

## 2023-07-06 PROCEDURE — 1159F MED LIST DOCD IN RCRD: CPT | Mod: CPTII,,, | Performed by: ORTHOPAEDIC SURGERY

## 2023-07-06 PROCEDURE — 1160F RVW MEDS BY RX/DR IN RCRD: CPT | Mod: CPTII,,, | Performed by: ORTHOPAEDIC SURGERY

## 2023-07-06 PROCEDURE — 99212 OFFICE O/P EST SF 10 MIN: CPT | Mod: PBBFAC,25 | Performed by: ORTHOPAEDIC SURGERY

## 2023-07-06 PROCEDURE — 20610 LARGE JOINT ASPIRATION/INJECTION: BILATERAL GLENOHUMERAL: ICD-10-PCS | Mod: 50,S$PBB,, | Performed by: ORTHOPAEDIC SURGERY

## 2023-07-06 RX ORDER — TRIAMCINOLONE ACETONIDE 40 MG/ML
40 INJECTION, SUSPENSION INTRA-ARTICULAR; INTRAMUSCULAR
Status: DISCONTINUED | OUTPATIENT
Start: 2023-07-06 | End: 2023-07-06 | Stop reason: HOSPADM

## 2023-07-06 RX ADMIN — TRIAMCINOLONE ACETONIDE 40 MG: 400 INJECTION, SUSPENSION INTRA-ARTICULAR; INTRAMUSCULAR at 08:07

## 2023-07-06 NOTE — PROCEDURES
Large Joint Aspiration/Injection: bilateral glenohumeral    Date/Time: 7/6/2023 8:30 AM  Performed by: Adolfo Randall MD  Authorized by: Adolfo Randall MD     Consent Done?:  Yes (Verbal)  Indications:  Pain  Timeout: prior to procedure the correct patient, procedure, and site was verified      Local anesthesia used?: Yes    Local anesthetic:  Bupivacaine 0.25% without epinephrine    Details:  Needle Size:  22 G  Approach:  Anterior  Location:  Shoulder  Laterality:  Bilateral  Site:  Bilateral glenohumeral  Medications (Right):  40 mg triamcinolone acetonide 40 mg/mL  Medications (Left):  40 mg triamcinolone acetonide 40 mg/mL  Patient tolerance:  Patient tolerated the procedure well with no immediate complications

## 2023-07-06 NOTE — PROGRESS NOTES
Yany Silverio returns to clinic for a follow up visit regarding     ICD-10-CM ICD-9-CM   1. Osteoarthritis of bilateral glenohumeral joints  M19.011 715.91    M19.012    2. Chronic pain of both shoulders  M25.511 719.41    G89.29 338.29    M25.512        Patient received bilateral shoulder injections in May and reports that the injections helped ease her pain, but that the relief was not long lasting.   She is having pain again.         PAST MEDICAL HISTORY:   Past Medical History:   Diagnosis Date    Acute superficial gastritis without hemorrhage 6/23/2021    Acute superficial gastritis without hemorrhage 6/23/2021    Anxiety     Arthritis     newly dx'd RA: sees MD in [place]    Bronchitis 7/22/2021    Bursitis of left shoulder 6/10/2021    Cancer     hx of cervical cancer    COPD (chronic obstructive pulmonary disease)     Depression     Emphysema of lung     Fungal esophagitis 6/23/2021    HH (hiatus hernia) 6/23/2021    Hyperlipidemia     Hypertension     Insomnia 04/16/2020    Low back pain 04/28/2021    Pain in joint of left hip 04/28/2021    Pain in joint of left knee 04/28/2021    Pneumonia of both lower lobes due to infectious organism 1/14/2022     PAST SURGICAL HISTORY:   Past Surgical History:   Procedure Laterality Date    OPEN REDUCTION AND INTERNAL FIXATION (ORIF) OF INJURY OF HIP Left 1/12/2022    Procedure: ORIF, HIP;  Surgeon: Adolfo Randall MD;  Location: [facility];  Service: Orthopedics;  Laterality: Left;    ROBOTIC ARTHROPLASTY, HIP Left 12/13/2021    Procedure: ROBOTIC ARTHROPLASTY,HIP;  Surgeon: Adolfo Randall MD;  Location: [facility];  Service: Orthopedics;  Laterality: Left;    SHOULDER SURGERY Right 2017, 2019         PHYSICAL EXAMINATION:  General    Nursing note and vitals reviewed.  Constitutional: She is oriented to person, place, and time. She appears well-developed and well-nourished. No distress.   HENT:   Head: Normocephalic and atraumatic.   Eyes: Pupils  are equal, round, and reactive to light.   Neck: Neck supple.   Cardiovascular:  Normal rate, regular rhythm and intact distal pulses.            Pulmonary/Chest: Effort normal. No respiratory distress. She exhibits no tenderness.   Abdominal: Soft. Bowel sounds are normal. She exhibits no distension. There is no abdominal tenderness.   Neurological: She is alert and oriented to person, place, and time. She has normal reflexes. She displays normal reflexes. No cranial nerve deficit. She exhibits normal muscle tone. Coordination normal.   Psychiatric: She has a normal mood and affect. Her behavior is normal. Judgment and thought content normal.         Right Shoulder Exam     Inspection/Observation   Swelling: present  Scapular Dyskinesia: positive    Tenderness   The patient is tender to palpation of the acromioclavicular joint, supraspinatus, greater tuberosity and biceps tendon.    Crepitus   The patient has crepitus of the AC joint and greater tuberosity.    Range of Motion   Active abduction:  abnormal   Extension:  abnormal   Forward Flexion:  abnormal   Forward Elevation: abnormal  Adduction: abnormal    Tests & Signs   Cross arm: positive  Drop arm: positive  Ureña test: positive  Impingement: positive  Sulcus: absent  Rotator Cuff Painful Arc/Range: moderate  Anterosuperior Escape: negative  Lag Sign 90 degrees: positive  Lift Off Sign: positive  Belly Press: positive  Bear Hug: positive  Jerk Test: negative    Other   Sensation: normal    Comments:  Patient demonstrates evidence of pseudoparalysis with limited active forward elevation    Left Shoulder Exam   Left shoulder exam is normal.    Tenderness   The patient is tender to palpation of the acromioclavicular joint and supraspinatus.    Tests & Signs   Drop arm: positive  Lag Sign 0 degrees: positive     Comments:  Patient demonstrates evidence of pseudoparalysis with limited active forward elevation      Muscle Strength   Right Upper Extremity    Supraspinatus: 3/5   Subscapularis: 3/5   Biceps: 4/5   Left Upper Extremity  Shoulder Internal Rotation: 3/5   Shoulder External Rotation: 3/5   Supraspinatus: 3/5   Subscapularis: 3/5     Reflexes     Right Side   Right Thomas's Sign:  absent    Vascular Exam     Right Pulses      Radial:                    2+        IMAGING:  No results found.   I reviewed her previous radiographs of her right left shoulder demonstrating severe glenohumeral degenerative changes in the right shoulder with a previous subacromial decompression distal clavicle excision left shoulder demonstrating severe glenohumeral osteoarthritis as well    ASSESSMENT:      ICD-10-CM ICD-9-CM   1. Osteoarthritis of bilateral glenohumeral joints  M19.011 715.91    M19.012    2. Chronic pain of both shoulders  M25.511 719.41    G89.29 338.29    M25.512        PLAN:     -Findings and treatment options were discussed with the patient  -All questions answered  Natural history and expected course discussed. Questions answered.  Educational material distributed.    She still states she is having episodes in which she is falling.  She had a previous total hip arthroplasty ahead to get revised secondary to a fracture.  I am a bit hesitant to proceed with surgical intervention in her case I would like to try glenohumeral injections to see if these would be beneficial.  On exercises were discussed and she voiced understanding with the treatment plan.  See the attached procedure note.  Briefly discussed need for total shoulder arthroplasty versus reverse shoulder arthroplasty and she voiced her understanding.  Will follow back up if no better in 4-6 weeks  There are no Patient Instructions on file for this visit.    Orders Placed This Encounter   Procedures    Large Joint Aspiration/Injection: bilateral glenohumeral       Procedures

## 2023-07-09 DIAGNOSIS — Z71.89 COMPLEX CARE COORDINATION: ICD-10-CM

## 2023-08-17 ENCOUNTER — OFFICE VISIT (OUTPATIENT)
Dept: ORTHOPEDICS | Facility: CLINIC | Age: 63
End: 2023-08-17
Payer: MEDICARE

## 2023-08-17 DIAGNOSIS — G89.29 CHRONIC LEFT SHOULDER PAIN: ICD-10-CM

## 2023-08-17 DIAGNOSIS — M25.512 CHRONIC LEFT SHOULDER PAIN: ICD-10-CM

## 2023-08-17 DIAGNOSIS — Z01.818 PRE-PROCEDURAL EXAMINATION: ICD-10-CM

## 2023-08-17 DIAGNOSIS — M19.012 PRIMARY OSTEOARTHRITIS OF LEFT SHOULDER: Primary | ICD-10-CM

## 2023-08-17 PROCEDURE — 4010F ACE/ARB THERAPY RXD/TAKEN: CPT | Mod: CPTII,,, | Performed by: ORTHOPAEDIC SURGERY

## 2023-08-17 PROCEDURE — 4010F PR ACE/ARB THEARPY RXD/TAKEN: ICD-10-PCS | Mod: CPTII,,, | Performed by: ORTHOPAEDIC SURGERY

## 2023-08-17 PROCEDURE — 99214 OFFICE O/P EST MOD 30 MIN: CPT | Mod: S$PBB,,, | Performed by: ORTHOPAEDIC SURGERY

## 2023-08-17 PROCEDURE — 99214 PR OFFICE/OUTPT VISIT, EST, LEVL IV, 30-39 MIN: ICD-10-PCS | Mod: S$PBB,,, | Performed by: ORTHOPAEDIC SURGERY

## 2023-08-17 PROCEDURE — 99213 OFFICE O/P EST LOW 20 MIN: CPT | Mod: PBBFAC | Performed by: ORTHOPAEDIC SURGERY

## 2023-08-17 RX ORDER — MUPIROCIN 20 MG/G
OINTMENT TOPICAL
Status: CANCELLED | OUTPATIENT
Start: 2023-08-17

## 2023-08-17 RX ORDER — SODIUM CHLORIDE 9 MG/ML
INJECTION, SOLUTION INTRAVENOUS CONTINUOUS
Status: CANCELLED | OUTPATIENT
Start: 2023-08-17

## 2023-08-17 NOTE — PROGRESS NOTES
Yany Silverio returns to clinic for a follow up visit regarding     ICD-10-CM ICD-9-CM   1. Primary osteoarthritis of left shoulder  M19.012 715.11       Patient received an injection on 7/6 and reports the injection did not relieve her symptoms.            PAST MEDICAL HISTORY:   Past Medical History:   Diagnosis Date    Acute superficial gastritis without hemorrhage 6/23/2021    Acute superficial gastritis without hemorrhage 6/23/2021    Anxiety     Arthritis     newly dx'd RA: sees MD in [place]    Bronchitis 7/22/2021    Bursitis of left shoulder 6/10/2021    Cancer     hx of cervical cancer    COPD (chronic obstructive pulmonary disease)     Depression     Emphysema of lung     Fungal esophagitis 6/23/2021    HH (hiatus hernia) 6/23/2021    Hyperlipidemia     Hypertension     Insomnia 04/16/2020    Low back pain 04/28/2021    Pain in joint of left hip 04/28/2021    Pain in joint of left knee 04/28/2021    Pneumonia of both lower lobes due to infectious organism 1/14/2022     PAST SURGICAL HISTORY:   Past Surgical History:   Procedure Laterality Date    OPEN REDUCTION AND INTERNAL FIXATION (ORIF) OF INJURY OF HIP Left 1/12/2022    Procedure: ORIF, HIP;  Surgeon: Adolfo Randall MD;  Location: [facility];  Service: Orthopedics;  Laterality: Left;    ROBOTIC ARTHROPLASTY, HIP Left 12/13/2021    Procedure: ROBOTIC ARTHROPLASTY,HIP;  Surgeon: Adolfo Randall MD;  Location: [facility] OR;  Service: Orthopedics;  Laterality: Left;    SHOULDER SURGERY Right 2017, 2019         PHYSICAL EXAMINATION:  General    Constitutional: She is oriented to person, place, and time. She appears well-developed and well-nourished. No distress.   HENT:   Head: Normocephalic and atraumatic.   Eyes: Pupils are equal, round, and reactive to light.   Cardiovascular:  Normal rate, regular rhythm and intact distal pulses.            Pulmonary/Chest: Effort normal. No respiratory distress. She exhibits no tenderness.    Abdominal: Soft. Bowel sounds are normal. She exhibits no distension. There is no abdominal tenderness.   Neurological: She is alert and oriented to person, place, and time. She has normal reflexes. She displays normal reflexes. She exhibits normal muscle tone. Coordination normal.   Psychiatric: She has a normal mood and affect. Her behavior is normal. Judgment and thought content normal.             Left Shoulder Exam     Tenderness   The patient is tender to palpation of the acromioclavicular joint and supraspinatus.    Tests & Signs   Drop arm: positive  Lag Sign 0 degrees: positive     Comments:  Patient demonstrates evidence of pseudoparalysis with limited active forward elevation      Muscle Strength   Left Upper Extremity  Shoulder Internal Rotation: 3/5   Shoulder External Rotation: 3/5   Supraspinatus: 3/5   Subscapularis: 3/5         IMAGING:  No results found.   Xrays reviewed-- severe left shoulder glenohumeral joint OA    ASSESSMENT:      ICD-10-CM ICD-9-CM   1. Primary osteoarthritis of left shoulder  M19.012 715.11       PLAN:     -Findings and treatment options were discussed with the patient  -All questions answered  Natural history and expected course discussed. Questions answered.  Educational material distributed.    MRI findings were reviewed with the patient.  The patient has a symptomatic left shoulder glenohumeral osteoarthritis with evidence of superior humeral head migration perhaps indicative of rotator cuff arthropathy.  Would benefit from reverse shoulder arthroplasty versus total shoulder arthroplasty.  Given the glenoid deformity but I suspect I would suspect the reverse shoulder arthroplasty will be the optimal form of arthroplasty for her left shoulder.    The patient understands the likely convalescence after surgery, in particular the expected postop rehab and recovery course. Alternatives both operative and non-operative with associated risks and benefits discussed. The  outlined risks and potential complications of the proposed procedure include but are not limited to: infection, poor wound healing, scarring, stiffness, swelling, continued or recurrent pain, instability, hardware or prosthetic failure, symptomatic hardware requiring removal, weakness, neurovascular injury, numbness, chronic regional pain disorder, tissue nonhealing/irreparability/retear, contralateral ligament injury, chondral injury, arthritis, fracture, blood clot formation, inability to return to previous level of activity, anesthetic or regional block complication up to death, need for additional procedure as indicated, and potential need for further surgery.     she was also informed and understands the risks of surgery are greater for patients with a current condition or history of heart disease, obesity, clotting disorders, recurrent infections, steroid use, current or past smoking, and factors such as sedentary lifestyle and noncompliance with medications, therapy or follow-up. The degree of the increased risk is hard to estimate with any degree of precision.    All questions were answered. The patient has verbalized understanding of these issues and wishes to proceed as discussed. The patient understands that recovery time can be up to 6-12 months.      Due to the severe nature of her arthritis and deformity would like to get a CT scan prior to surgery as well to better appreciate the significant defect of the posterior glenoid.  Will plan for left reverse shoulder arthroplasty will need clearance from primary care provider Dr. Valderrama who is an excellent Dr.  We need to wait at least 3 months prior to her shoulder arthroplasty as she had an injection on 07/06    We have also discussed smoking cessation she has cut back drastically smoking as well she understands that smoking can limit some of the soft tissue healing.    Time spent:  3-10 minutes (12270)     We discussed the importance, over both the short  and long-term, of smoking cessation; reviewed the patient's willingness to quit; overall history and current use of tobacco smoking products; that there are a variety of methods to help with smoking diminution and cessation (stopping alone, using nicotine substitution medications/gums, Chantix, other medications, etcetera, which the patient will also discuss with his or her primary care physician); that the patient should set a date for smoking cessation; and the patient will follow up with his or her primary care physician for further support and oversight.     We also discussed that for the patient to have surgery while using nicotine, wound healing may be compromised relative to those who do not smoke; that recovery from anesthesia may sometimes be more difficult; and that quitting may decrease the heart, vascular, pulmonary effects in the short term as well as over the long term.  Smoking cessation may be useful in important for any patient will have an orthopaedic surgeon(bone fusions, wound closures, etc.)  since surgical results with respect to wound healing, susceptibility to recovery from infection, bone fusion, and tissue and blood vessel health may be impaired or less optimal smokers then nonsmokers.  We talked about the elevated risk of surgical bone fusion failure in the setting of smoking and the potential need for a higher-risk revision surgery should fusion not occur.     I reviewed this with the patient in detail and all questions were answered.  We discussed nature of the patient's condition; real alternatives and realistic options; pros and cons, risks and benefits of all the options, in detail.  I believe the patient understands the above and wishes to proceed as outlined.    There are no Patient Instructions on file for this visit.    No orders of the defined types were placed in this encounter.      Procedures

## 2023-08-17 NOTE — PATIENT INSTRUCTIONS
Your surgery is scheduled for Monday, September 11 at [facility] in [place].    Labwork (1st floor clinic) ___x___    Clearance: Dr Simon - Tuesday, August 29 at 9:45    Our office will contact you the day before surgery with your arrival time.  Do not eat or drink anything after midnight the night before surgery (this includes gum, candy, chewing tobacco, etc).  Bring all medication in their original bottles.  Bathe with Hibiclens the night or morning before your surgery.  The morning of your surgery ONLY take blood pressure, heart, acid reflux, or thyroid (if you take a morning dose) medication.  Take these medications with a sip of water.   Be sure to have stopped your blood thinner medication at the appropriate time, as instructed.  Bring your C-Pap machine if you have one.  All jewelry, piercings, or false eyelashes MUST be removed prior to surgery.

## 2023-08-31 ENCOUNTER — PATIENT OUTREACH (OUTPATIENT)
Dept: ADMINISTRATIVE | Facility: HOSPITAL | Age: 63
End: 2023-08-31

## 2023-08-31 NOTE — PROGRESS NOTES
Health maintenance record review for population health care gaps    Closing care gaps for [employer] insurance.      Health Maintenance Due   Topic Date Due    Hepatitis C Screening  Never done    TETANUS VACCINE  Never done    Mammogram  Never done    Hemoglobin A1c (Diabetic Prevention Screening)  Never done    LDCT Lung Screen  Never done    Shingles Vaccine (2 of 2) 09/30/2022    Cervical Cancer Screening  09/15/2023

## 2023-10-02 ENCOUNTER — HOSPITAL ENCOUNTER (OUTPATIENT)
Dept: RADIOLOGY | Facility: HOSPITAL | Age: 63
Discharge: HOME | End: 2023-10-02
Attending: ORTHOPAEDIC SURGERY
Payer: MEDICARE

## 2023-10-02 DIAGNOSIS — G89.29 CHRONIC LEFT SHOULDER PAIN: ICD-10-CM

## 2023-10-02 DIAGNOSIS — M25.512 CHRONIC LEFT SHOULDER PAIN: ICD-10-CM

## 2023-10-02 PROCEDURE — 73200 CT SHOULDER WITHOUT CONTRAST LEFT: ICD-10-PCS | Mod: 26,LT,, | Performed by: RADIOLOGY

## 2023-10-02 PROCEDURE — 73200 CT UPPER EXTREMITY W/O DYE: CPT | Mod: 26,LT,, | Performed by: RADIOLOGY

## 2023-10-02 PROCEDURE — 73200 CT UPPER EXTREMITY W/O DYE: CPT | Mod: TC,LT

## 2023-10-17 ENCOUNTER — OUTSIDE PLACE OF SERVICE (OUTPATIENT)
Dept: CARDIOLOGY | Facility: HOSPITAL | Age: 63
End: 2023-10-17
Payer: MEDICARE

## 2023-10-17 PROCEDURE — 93227 PR EXT ECG RECORD CONTIN 48 HR, PHYS REVIEW&INTERP: ICD-10-PCS | Mod: ,,, | Performed by: INTERNAL MEDICINE

## 2023-10-17 PROCEDURE — 93227 XTRNL ECG REC<48 HR R&I: CPT | Mod: ,,, | Performed by: INTERNAL MEDICINE

## 2023-10-19 ENCOUNTER — TELEPHONE (OUTPATIENT)
Dept: ORTHOPEDICS | Facility: CLINIC | Age: 63
End: 2023-10-19
Payer: MEDICARE

## 2023-10-19 NOTE — TELEPHONE ENCOUNTER
Masha spoke to the patient to let here know we will need a copy of her clearance and we can get her rescheduled for surgery.     ----- Message from Lucia Oliveira sent at 10/19/2023  1:12 PM CDT -----  Approved for surgery and wants to have it rescheduled. Please call @ [phone]

## 2023-10-23 ENCOUNTER — TELEPHONE (OUTPATIENT)
Dept: ORTHOPEDICS | Facility: CLINIC | Age: 63
End: 2023-10-23
Payer: MEDICARE

## 2023-10-23 DIAGNOSIS — M19.012 PRIMARY OSTEOARTHRITIS, LEFT SHOULDER: ICD-10-CM

## 2023-10-23 DIAGNOSIS — M19.012 PRIMARY OSTEOARTHRITIS OF LEFT SHOULDER: Primary | ICD-10-CM

## 2023-10-23 RX ORDER — MUPIROCIN 20 MG/G
OINTMENT TOPICAL
Status: CANCELLED | OUTPATIENT
Start: 2023-10-23

## 2023-10-23 RX ORDER — SODIUM CHLORIDE 9 MG/ML
INJECTION, SOLUTION INTRAVENOUS CONTINUOUS
Status: CANCELLED | OUTPATIENT
Start: 2023-10-23

## 2023-10-23 NOTE — TELEPHONE ENCOUNTER
----- Message from Lucia Oliveira sent at 10/23/2023 10:07 AM CDT -----  Needs to reschedule her surgery. Call back # [phone]

## 2023-10-27 ENCOUNTER — TELEPHONE (OUTPATIENT)
Dept: ORTHOPEDICS | Facility: CLINIC | Age: 63
End: 2023-10-27
Payer: MEDICARE

## 2023-10-27 NOTE — TELEPHONE ENCOUNTER
----- Message from Alana Bateman sent at 10/27/2023 11:44 AM CDT -----   #calling about surgery time

## 2023-10-30 ENCOUNTER — ANESTHESIA (OUTPATIENT)
Dept: SURGERY | Facility: HOSPITAL | Age: 63
End: 2023-10-30
Payer: MEDICARE

## 2023-10-30 ENCOUNTER — ANESTHESIA EVENT (OUTPATIENT)
Dept: SURGERY | Facility: HOSPITAL | Age: 63
End: 2023-10-30
Payer: MEDICARE

## 2023-10-30 ENCOUNTER — HOSPITAL ENCOUNTER (OUTPATIENT)
Facility: HOSPITAL | Age: 63
Discharge: NURSING, REHAB OR LONG-TERM CARE | End: 2023-11-01
Attending: ORTHOPAEDIC SURGERY
Payer: MEDICARE

## 2023-10-30 DIAGNOSIS — M19.012 PRIMARY OSTEOARTHRITIS, LEFT SHOULDER: Primary | ICD-10-CM

## 2023-10-30 DIAGNOSIS — M19.012 PRIMARY OSTEOARTHRITIS OF LEFT SHOULDER: ICD-10-CM

## 2023-10-30 LAB
INDIRECT COOMBS: NORMAL
RH BLD: NORMAL
SPECIMEN OUTDATE: NORMAL

## 2023-10-30 PROCEDURE — D9220A PRA ANESTHESIA: ICD-10-PCS | Mod: CRNA,,, | Performed by: NURSE ANESTHETIST, CERTIFIED REGISTERED

## 2023-10-30 PROCEDURE — 99900035 HC TECH TIME PER 15 MIN (STAT)

## 2023-10-30 PROCEDURE — 37000009 HC ANESTHESIA EA ADD 15 MINS: Performed by: ORTHOPAEDIC SURGERY

## 2023-10-30 PROCEDURE — D9220A PRA ANESTHESIA: Mod: ANES,,, | Performed by: ANESTHESIOLOGY

## 2023-10-30 PROCEDURE — 99214 OFFICE O/P EST MOD 30 MIN: CPT | Mod: ,,, | Performed by: INTERNAL MEDICINE

## 2023-10-30 PROCEDURE — 23430 REPAIR BICEPS TENDON: CPT | Mod: 59,51,LT, | Performed by: ORTHOPAEDIC SURGERY

## 2023-10-30 PROCEDURE — 36000711: Performed by: ORTHOPAEDIC SURGERY

## 2023-10-30 PROCEDURE — 63600175 PHARM REV CODE 636 W HCPCS: Performed by: NURSE ANESTHETIST, CERTIFIED REGISTERED

## 2023-10-30 PROCEDURE — C1776 JOINT DEVICE (IMPLANTABLE): HCPCS | Performed by: ORTHOPAEDIC SURGERY

## 2023-10-30 PROCEDURE — 94761 N-INVAS EAR/PLS OXIMETRY MLT: CPT

## 2023-10-30 PROCEDURE — 25000003 PHARM REV CODE 250: Performed by: ORTHOPAEDIC SURGERY

## 2023-10-30 PROCEDURE — 94640 AIRWAY INHALATION TREATMENT: CPT | Mod: XB

## 2023-10-30 PROCEDURE — 63600175 PHARM REV CODE 636 W HCPCS: Performed by: ANESTHESIOLOGY

## 2023-10-30 PROCEDURE — 36000710: Performed by: ORTHOPAEDIC SURGERY

## 2023-10-30 PROCEDURE — 27201423 OPTIME MED/SURG SUP & DEVICES STERILE SUPPLY: Performed by: ORTHOPAEDIC SURGERY

## 2023-10-30 PROCEDURE — D9220A PRA ANESTHESIA: Mod: CRNA,,, | Performed by: NURSE ANESTHETIST, CERTIFIED REGISTERED

## 2023-10-30 PROCEDURE — 71000039 HC RECOVERY, EACH ADD'L HOUR: Performed by: ORTHOPAEDIC SURGERY

## 2023-10-30 PROCEDURE — 25000242 PHARM REV CODE 250 ALT 637 W/ HCPCS: Performed by: ANESTHESIOLOGY

## 2023-10-30 PROCEDURE — 63600175 PHARM REV CODE 636 W HCPCS: Performed by: ORTHOPAEDIC SURGERY

## 2023-10-30 PROCEDURE — 64415 NJX AA&/STRD BRCH PLXS IMG: CPT | Mod: 59,LT | Performed by: ANESTHESIOLOGY

## 2023-10-30 PROCEDURE — 25000242 PHARM REV CODE 250 ALT 637 W/ HCPCS: Performed by: ORTHOPAEDIC SURGERY

## 2023-10-30 PROCEDURE — 27000221 HC OXYGEN, UP TO 24 HOURS

## 2023-10-30 PROCEDURE — 71000033 HC RECOVERY, INTIAL HOUR: Performed by: ORTHOPAEDIC SURGERY

## 2023-10-30 PROCEDURE — 25000003 PHARM REV CODE 250: Performed by: NURSE ANESTHETIST, CERTIFIED REGISTERED

## 2023-10-30 PROCEDURE — 64415 PERIPHERAL BLOCK: ICD-10-PCS | Mod: 59,LT,, | Performed by: ANESTHESIOLOGY

## 2023-10-30 PROCEDURE — 86901 BLOOD TYPING SEROLOGIC RH(D): CPT | Performed by: ORTHOPAEDIC SURGERY

## 2023-10-30 PROCEDURE — C1713 ANCHOR/SCREW BN/BN,TIS/BN: HCPCS | Performed by: ORTHOPAEDIC SURGERY

## 2023-10-30 PROCEDURE — 37000008 HC ANESTHESIA 1ST 15 MINUTES: Performed by: ORTHOPAEDIC SURGERY

## 2023-10-30 PROCEDURE — D9220A PRA ANESTHESIA: ICD-10-PCS | Mod: ANES,,, | Performed by: ANESTHESIOLOGY

## 2023-10-30 PROCEDURE — 99214 PR OFFICE/OUTPT VISIT, EST, LEVL IV, 30-39 MIN: ICD-10-PCS | Mod: ,,, | Performed by: INTERNAL MEDICINE

## 2023-10-30 PROCEDURE — 23430 PR REPAIR BICEPS LONG TENDON: ICD-10-PCS | Mod: 59,51,LT, | Performed by: ORTHOPAEDIC SURGERY

## 2023-10-30 PROCEDURE — 99900031 HC PATIENT EDUCATION (STAT)

## 2023-10-30 PROCEDURE — 23472 RECONSTRUCT SHOULDER JOINT: CPT | Mod: LT,,, | Performed by: ORTHOPAEDIC SURGERY

## 2023-10-30 PROCEDURE — 23472 PR RECONSTR TOTAL SHOULDER IMPLANT: ICD-10-PCS | Mod: LT,,, | Performed by: ORTHOPAEDIC SURGERY

## 2023-10-30 PROCEDURE — C9290 INJ, BUPIVACAINE LIPOSOME: HCPCS | Performed by: ORTHOPAEDIC SURGERY

## 2023-10-30 PROCEDURE — C1769 GUIDE WIRE: HCPCS | Performed by: ORTHOPAEDIC SURGERY

## 2023-10-30 DEVICE — HUMERAL CUP
Type: IMPLANTABLE DEVICE | Site: SHOULDER | Status: FUNCTIONAL
Brand: REUNION

## 2023-10-30 DEVICE — 4.5MM PERIPHERAL SCREW
Type: IMPLANTABLE DEVICE | Site: SHOULDER | Status: FUNCTIONAL
Brand: REUNION

## 2023-10-30 DEVICE — GLENOSPHERE , CONCENTRIC
Type: IMPLANTABLE DEVICE | Site: SHOULDER | Status: FUNCTIONAL
Brand: REUNION

## 2023-10-30 DEVICE — 6.5MM CENTER SCREW
Type: IMPLANTABLE DEVICE | Site: SHOULDER | Status: FUNCTIONAL
Brand: REUNION

## 2023-10-30 DEVICE — PRESS-FIT HUMERAL STEM
Type: IMPLANTABLE DEVICE | Site: SHOULDER | Status: FUNCTIONAL
Brand: REUNION

## 2023-10-30 DEVICE — X3 HUMERAL INSERT
Type: IMPLANTABLE DEVICE | Site: SHOULDER | Status: FUNCTIONAL
Brand: REUNION

## 2023-10-30 DEVICE — GLENOID BASEPLATE
Type: IMPLANTABLE DEVICE | Site: SHOULDER | Status: FUNCTIONAL
Brand: REUNION

## 2023-10-30 RX ORDER — IPRATROPIUM BROMIDE AND ALBUTEROL SULFATE 2.5; .5 MG/3ML; MG/3ML
3 SOLUTION RESPIRATORY (INHALATION) ONCE
Status: COMPLETED | OUTPATIENT
Start: 2023-10-30 | End: 2023-10-30

## 2023-10-30 RX ORDER — FENTANYL CITRATE 50 UG/ML
INJECTION, SOLUTION INTRAMUSCULAR; INTRAVENOUS
Status: DISCONTINUED | OUTPATIENT
Start: 2023-10-30 | End: 2023-10-30

## 2023-10-30 RX ORDER — DOCUSATE SODIUM 100 MG/1
100 CAPSULE, LIQUID FILLED ORAL 2 TIMES DAILY
Status: DISCONTINUED | OUTPATIENT
Start: 2023-10-30 | End: 2023-11-01 | Stop reason: HOSPADM

## 2023-10-30 RX ORDER — ONDANSETRON 2 MG/ML
4 INJECTION INTRAMUSCULAR; INTRAVENOUS DAILY PRN
Status: DISCONTINUED | OUTPATIENT
Start: 2023-10-30 | End: 2023-10-30 | Stop reason: HOSPADM

## 2023-10-30 RX ORDER — ALBUTEROL SULFATE 0.83 MG/ML
2.5 SOLUTION RESPIRATORY (INHALATION) EVERY 4 HOURS
Status: DISCONTINUED | OUTPATIENT
Start: 2023-10-30 | End: 2023-10-30

## 2023-10-30 RX ORDER — OXYCODONE HYDROCHLORIDE 5 MG/1
10 TABLET ORAL EVERY 4 HOURS PRN
Status: DISCONTINUED | OUTPATIENT
Start: 2023-10-30 | End: 2023-11-01 | Stop reason: HOSPADM

## 2023-10-30 RX ORDER — TRANEXAMIC ACID 10 MG/ML
1000 INJECTION, SOLUTION INTRAVENOUS
Status: DISCONTINUED | OUTPATIENT
Start: 2023-10-30 | End: 2023-10-30

## 2023-10-30 RX ORDER — FLUTICASONE PROPIONATE 50 MCG
1 SPRAY, SUSPENSION (ML) NASAL DAILY
Status: DISCONTINUED | OUTPATIENT
Start: 2023-10-31 | End: 2023-11-01 | Stop reason: HOSPADM

## 2023-10-30 RX ORDER — HYDROMORPHONE HYDROCHLORIDE 2 MG/ML
0.5 INJECTION, SOLUTION INTRAMUSCULAR; INTRAVENOUS; SUBCUTANEOUS EVERY 5 MIN PRN
Status: DISCONTINUED | OUTPATIENT
Start: 2023-10-30 | End: 2023-10-30 | Stop reason: HOSPADM

## 2023-10-30 RX ORDER — TRANEXAMIC ACID 100 MG/ML
INJECTION, SOLUTION INTRAVENOUS
Status: DISCONTINUED | OUTPATIENT
Start: 2023-10-30 | End: 2023-10-30

## 2023-10-30 RX ORDER — ONDANSETRON 4 MG/1
8 TABLET, ORALLY DISINTEGRATING ORAL EVERY 8 HOURS PRN
Status: DISCONTINUED | OUTPATIENT
Start: 2023-10-30 | End: 2023-11-01 | Stop reason: HOSPADM

## 2023-10-30 RX ORDER — BUSPIRONE HYDROCHLORIDE 5 MG/1
10 TABLET ORAL NIGHTLY
Status: DISCONTINUED | OUTPATIENT
Start: 2023-10-30 | End: 2023-11-01 | Stop reason: HOSPADM

## 2023-10-30 RX ORDER — PHENYLEPHRINE HYDROCHLORIDE 10 MG/ML
INJECTION INTRAVENOUS
Status: DISCONTINUED | OUTPATIENT
Start: 2023-10-30 | End: 2023-10-30

## 2023-10-30 RX ORDER — MEPERIDINE HYDROCHLORIDE 25 MG/ML
25 INJECTION INTRAMUSCULAR; INTRAVENOUS; SUBCUTANEOUS ONCE AS NEEDED
Status: DISCONTINUED | OUTPATIENT
Start: 2023-10-30 | End: 2023-10-30 | Stop reason: HOSPADM

## 2023-10-30 RX ORDER — ACETAMINOPHEN 500 MG
1000 TABLET ORAL EVERY 8 HOURS
Status: COMPLETED | OUTPATIENT
Start: 2023-10-30 | End: 2023-10-31

## 2023-10-30 RX ORDER — DEXAMETHASONE SODIUM PHOSPHATE 4 MG/ML
INJECTION, SOLUTION INTRA-ARTICULAR; INTRALESIONAL; INTRAMUSCULAR; INTRAVENOUS; SOFT TISSUE
Status: DISCONTINUED | OUTPATIENT
Start: 2023-10-30 | End: 2023-10-30

## 2023-10-30 RX ORDER — MIDAZOLAM HYDROCHLORIDE 1 MG/ML
INJECTION INTRAMUSCULAR; INTRAVENOUS
Status: DISCONTINUED | OUTPATIENT
Start: 2023-10-30 | End: 2023-10-30

## 2023-10-30 RX ORDER — ALBUTEROL SULFATE 0.83 MG/ML
2.5 SOLUTION RESPIRATORY (INHALATION) EVERY 6 HOURS
Status: DISCONTINUED | OUTPATIENT
Start: 2023-10-30 | End: 2023-11-01 | Stop reason: HOSPADM

## 2023-10-30 RX ORDER — ONDANSETRON 2 MG/ML
INJECTION INTRAMUSCULAR; INTRAVENOUS
Status: DISCONTINUED | OUTPATIENT
Start: 2023-10-30 | End: 2023-10-30

## 2023-10-30 RX ORDER — MUPIROCIN 20 MG/G
OINTMENT TOPICAL
Status: DISCONTINUED | OUTPATIENT
Start: 2023-10-30 | End: 2023-10-30

## 2023-10-30 RX ORDER — ACETAMINOPHEN 10 MG/ML
1000 INJECTION, SOLUTION INTRAVENOUS ONCE
Status: COMPLETED | OUTPATIENT
Start: 2023-10-30 | End: 2023-10-30

## 2023-10-30 RX ORDER — ALBUTEROL SULFATE 90 UG/1
2 AEROSOL, METERED RESPIRATORY (INHALATION) EVERY 4 HOURS PRN
Status: DISCONTINUED | OUTPATIENT
Start: 2023-10-30 | End: 2023-10-30

## 2023-10-30 RX ORDER — PROPOFOL 10 MG/ML
VIAL (ML) INTRAVENOUS
Status: DISCONTINUED | OUTPATIENT
Start: 2023-10-30 | End: 2023-10-30

## 2023-10-30 RX ORDER — PREGABALIN 75 MG/1
75 CAPSULE ORAL 2 TIMES DAILY
Status: DISCONTINUED | OUTPATIENT
Start: 2023-10-30 | End: 2023-11-01 | Stop reason: HOSPADM

## 2023-10-30 RX ORDER — DIPHENHYDRAMINE HYDROCHLORIDE 50 MG/ML
25 INJECTION INTRAMUSCULAR; INTRAVENOUS EVERY 6 HOURS PRN
Status: DISCONTINUED | OUTPATIENT
Start: 2023-10-30 | End: 2023-10-30 | Stop reason: HOSPADM

## 2023-10-30 RX ORDER — SODIUM CHLORIDE 9 MG/ML
INJECTION, SOLUTION INTRAVENOUS CONTINUOUS
Status: DISCONTINUED | OUTPATIENT
Start: 2023-10-30 | End: 2023-10-30

## 2023-10-30 RX ORDER — BUPIVACAINE HYDROCHLORIDE 5 MG/ML
INJECTION, SOLUTION EPIDURAL; INTRACAUDAL
Status: COMPLETED | OUTPATIENT
Start: 2023-10-30 | End: 2023-10-30

## 2023-10-30 RX ORDER — IPRATROPIUM BROMIDE 0.5 MG/2.5ML
0.5 SOLUTION RESPIRATORY (INHALATION) EVERY 6 HOURS
Status: DISCONTINUED | OUTPATIENT
Start: 2023-10-30 | End: 2023-11-01 | Stop reason: HOSPADM

## 2023-10-30 RX ORDER — MORPHINE SULFATE 10 MG/ML
4 INJECTION INTRAMUSCULAR; INTRAVENOUS; SUBCUTANEOUS EVERY 5 MIN PRN
Status: DISCONTINUED | OUTPATIENT
Start: 2023-10-30 | End: 2023-10-30 | Stop reason: HOSPADM

## 2023-10-30 RX ORDER — SODIUM CHLORIDE 0.9 % (FLUSH) 0.9 %
5 SYRINGE (ML) INJECTION
Status: DISCONTINUED | OUTPATIENT
Start: 2023-10-30 | End: 2023-11-01 | Stop reason: HOSPADM

## 2023-10-30 RX ORDER — OXYCODONE HYDROCHLORIDE 5 MG/1
5 TABLET ORAL EVERY 4 HOURS PRN
Status: DISCONTINUED | OUTPATIENT
Start: 2023-10-30 | End: 2023-11-01 | Stop reason: HOSPADM

## 2023-10-30 RX ORDER — FUROSEMIDE 10 MG/ML
20 INJECTION INTRAMUSCULAR; INTRAVENOUS ONCE
Status: COMPLETED | OUTPATIENT
Start: 2023-10-30 | End: 2023-10-30

## 2023-10-30 RX ORDER — LIDOCAINE HYDROCHLORIDE 20 MG/ML
INJECTION, SOLUTION EPIDURAL; INFILTRATION; INTRACAUDAL; PERINEURAL
Status: DISCONTINUED | OUTPATIENT
Start: 2023-10-30 | End: 2023-10-30

## 2023-10-30 RX ORDER — PROMETHAZINE HYDROCHLORIDE 25 MG/1
25 TABLET ORAL EVERY 6 HOURS PRN
Status: DISCONTINUED | OUTPATIENT
Start: 2023-10-30 | End: 2023-11-01 | Stop reason: HOSPADM

## 2023-10-30 RX ORDER — AMLODIPINE BESYLATE 5 MG/1
5 TABLET ORAL DAILY
Status: DISCONTINUED | OUTPATIENT
Start: 2023-10-31 | End: 2023-11-01 | Stop reason: HOSPADM

## 2023-10-30 RX ORDER — PREDNISONE 20 MG/1
20 TABLET ORAL DAILY
Status: DISCONTINUED | OUTPATIENT
Start: 2023-10-31 | End: 2023-11-01 | Stop reason: HOSPADM

## 2023-10-30 RX ORDER — DULOXETIN HYDROCHLORIDE 30 MG/1
30 CAPSULE, DELAYED RELEASE ORAL DAILY
Status: DISCONTINUED | OUTPATIENT
Start: 2023-10-31 | End: 2023-11-01 | Stop reason: HOSPADM

## 2023-10-30 RX ORDER — PANTOPRAZOLE SODIUM 40 MG/1
40 TABLET, DELAYED RELEASE ORAL DAILY
Status: DISCONTINUED | OUTPATIENT
Start: 2023-10-31 | End: 2023-11-01 | Stop reason: HOSPADM

## 2023-10-30 RX ORDER — CELECOXIB 100 MG/1
200 CAPSULE ORAL 2 TIMES DAILY
Status: DISCONTINUED | OUTPATIENT
Start: 2023-10-30 | End: 2023-11-01 | Stop reason: HOSPADM

## 2023-10-30 RX ORDER — FUROSEMIDE 20 MG/1
20 TABLET ORAL DAILY
Status: DISCONTINUED | OUTPATIENT
Start: 2023-10-31 | End: 2023-11-01 | Stop reason: HOSPADM

## 2023-10-30 RX ORDER — ALBUTEROL SULFATE 0.83 MG/ML
2.5 SOLUTION RESPIRATORY (INHALATION) EVERY 4 HOURS PRN
Status: DISCONTINUED | OUTPATIENT
Start: 2023-10-30 | End: 2023-11-01 | Stop reason: HOSPADM

## 2023-10-30 RX ORDER — PRAVASTATIN SODIUM 10 MG/1
20 TABLET ORAL DAILY
Status: DISCONTINUED | OUTPATIENT
Start: 2023-10-31 | End: 2023-11-01 | Stop reason: HOSPADM

## 2023-10-30 RX ORDER — ROCURONIUM BROMIDE 10 MG/ML
INJECTION, SOLUTION INTRAVENOUS
Status: DISCONTINUED | OUTPATIENT
Start: 2023-10-30 | End: 2023-10-30

## 2023-10-30 RX ADMIN — CEFAZOLIN 2 G: 2 INJECTION, POWDER, FOR SOLUTION INTRAMUSCULAR; INTRAVENOUS at 09:10

## 2023-10-30 RX ADMIN — TRANEXAMIC ACID 500 MG: 100 INJECTION, SOLUTION INTRAVENOUS at 01:10

## 2023-10-30 RX ADMIN — IPRATROPIUM BROMIDE 0.5 MG: 0.5 SOLUTION RESPIRATORY (INHALATION) at 07:10

## 2023-10-30 RX ADMIN — VANCOMYCIN HYDROCHLORIDE 1000 MG: 1 INJECTION, POWDER, LYOPHILIZED, FOR SOLUTION INTRAVENOUS at 01:10

## 2023-10-30 RX ADMIN — PREGABALIN 75 MG: 75 CAPSULE ORAL at 09:10

## 2023-10-30 RX ADMIN — DEXAMETHASONE SODIUM PHOSPHATE 4 MG: 4 INJECTION, SOLUTION INTRA-ARTICULAR; INTRALESIONAL; INTRAMUSCULAR; INTRAVENOUS; SOFT TISSUE at 01:10

## 2023-10-30 RX ADMIN — IPRATROPIUM BROMIDE AND ALBUTEROL SULFATE 3 ML: .5; 3 SOLUTION RESPIRATORY (INHALATION) at 04:10

## 2023-10-30 RX ADMIN — ACETAMINOPHEN 1000 MG: 10 INJECTION, SOLUTION INTRAVENOUS at 04:10

## 2023-10-30 RX ADMIN — BUPIVACAINE HYDROCHLORIDE 10 ML: 5 INJECTION, SOLUTION EPIDURAL; INTRACAUDAL; PERINEURAL at 01:10

## 2023-10-30 RX ADMIN — SUGAMMADEX 200 MG: 100 INJECTION, SOLUTION INTRAVENOUS at 03:10

## 2023-10-30 RX ADMIN — BUPIVACAINE 10 ML: 13.3 INJECTION, SUSPENSION, LIPOSOMAL INFILTRATION at 01:10

## 2023-10-30 RX ADMIN — ALBUTEROL SULFATE 2.5 MG: 2.5 SOLUTION RESPIRATORY (INHALATION) at 07:10

## 2023-10-30 RX ADMIN — ACETAMINOPHEN 1000 MG: 500 TABLET ORAL at 09:10

## 2023-10-30 RX ADMIN — LIDOCAINE HYDROCHLORIDE 80 MG: 20 INJECTION, SOLUTION INTRAVENOUS at 01:10

## 2023-10-30 RX ADMIN — FUROSEMIDE 20 MG: 10 INJECTION, SOLUTION INTRAMUSCULAR; INTRAVENOUS at 04:10

## 2023-10-30 RX ADMIN — SODIUM CHLORIDE: 9 INJECTION, SOLUTION INTRAVENOUS at 11:10

## 2023-10-30 RX ADMIN — ROCURONIUM BROMIDE 40 MG: 10 INJECTION, SOLUTION INTRAVENOUS at 01:10

## 2023-10-30 RX ADMIN — ONDANSETRON 4 MG: 2 INJECTION INTRAMUSCULAR; INTRAVENOUS at 01:10

## 2023-10-30 RX ADMIN — MIDAZOLAM 2 MG: 1 INJECTION INTRAMUSCULAR; INTRAVENOUS at 01:10

## 2023-10-30 RX ADMIN — DOCUSATE SODIUM 100 MG: 100 CAPSULE, LIQUID FILLED ORAL at 09:10

## 2023-10-30 RX ADMIN — PHENYLEPHRINE HYDROCHLORIDE 200 MCG: 10 INJECTION INTRAVENOUS at 02:10

## 2023-10-30 RX ADMIN — CELECOXIB 200 MG: 100 CAPSULE ORAL at 09:10

## 2023-10-30 RX ADMIN — PROPOFOL 150 MG: 10 INJECTION, EMULSION INTRAVENOUS at 01:10

## 2023-10-30 RX ADMIN — CEFAZOLIN SODIUM 2 G: 1 POWDER, FOR SOLUTION INTRAMUSCULAR; INTRAVENOUS at 01:10

## 2023-10-30 RX ADMIN — FENTANYL CITRATE 100 MCG: 50 INJECTION INTRAMUSCULAR; INTRAVENOUS at 01:10

## 2023-10-30 RX ADMIN — BUSPIRONE HYDROCHLORIDE 10 MG: 5 TABLET ORAL at 09:10

## 2023-10-30 NOTE — TRANSFER OF CARE
"Anesthesia Transfer of Care Note    Patient: Yany Silverio    Procedure(s) Performed: Procedure(s) (LRB):  ARTHROPLASTY, SHOULDER, TOTAL, REVERSE (Left)    Patient location: PACU    Anesthesia Type: general    Transport from OR: Transported from OR on room air with adequate spontaneous ventilation    Post pain: adequate analgesia    Post assessment: no apparent anesthetic complications and tolerated procedure well    Post vital signs: stable    Level of consciousness: responds to stimulation    Nausea/Vomiting: no nausea/vomiting    Complications: none    Transfer of care protocol was followedComments: Report Given to PACU rn VSS      Last vitals: Visit Vitals  /70   Pulse 80   Temp 36.6 °C (97.9 °F)   Resp 17   Ht 5' 4" (1.626 m)   Wt 72.6 kg (160 lb)   SpO2 (!) 91%   Breastfeeding No   BMI 27.46 kg/m²     "

## 2023-10-30 NOTE — OP NOTE
[facility]  OPERATIVE REPORT   ORTHOPAEDIC SURGERY   PROVIDER: DR. ADOLFO RANDALL MD    PATIENT INFORMATION   Yany Silverio 63 y.o. female 1960   MRN: [id number]   LOCATION: [facility]    DATE OF PROCEDURE: 10/30/2023     PREOPERATIVE DIAGNOSES:   Primary osteoarthritis of left shoulder [M19.012]    POSTOPERATIVE DIAGNOSES:   Primary osteoarthritis of left shoulder [M19.012]    PROCEDURES PERFORMED:   Left reverse shoulder arthroplasty (CPT 39729)  Left shoulder open biceps tenodesis (CPT 61427)    Surgeon(s) and Role:     * Adolfo Randall MD - Primary    ANESTHESIA: Choice + interscalene block    ESTIMATED BLOOD LOSS: less than 50 mL    IMPLANTS:   Implant Name Type Inv. Item Serial No.  Lot No. LRB No. Used Action   PIN 3.2MM 4 YULISA SQUARE - [id number]  PIN 3.2MM 4 YULISA SQUARE  [facility]  Left 1 Implanted and Explanted   SCREW BONE GLENOID 4.5X24MM - [id number]  SCREW BONE GLENOID 4.5X24MM  [employer]. [id number] Left 1 Implanted   SCREW BONE TSA 6.5X36MM - [id number]  SCREW BONE TSA 6.5X36MM  [employer]. [id number] Left 1 Implanted   BASEPLATE REUNION MEME 71V62PF - [id number]  BASEPLATE REUNION MEME [id number]  [employer].  Left 1 Implanted   SCREW BONE TSA 4.5X28MM - [id number]  SCREW BONE TSA 4.5X28MM  [employer]. [id number] Left 1 Implanted   SCREW PERIPH REUNION 4.5X16MM - [id number]  SCREW PERIPH REUNION 4.5X16MM  [employer]. [id number] Left 1 Implanted   COMPONENT GLENOSPHERE 32X2MM - [id number]  COMPONENT GLENOSPHERE 32X2MM  [employer]. [id number] Left 1 Implanted   CUP HUMERAL REUN RSA 32X4MM - [id number]  CUP HUMERAL REUN RSA 32X4MM  [employer]. [id number] Left 1 Implanted   X3 HUMERAL INSERT STANDARD HUMERAL INSERT    ANGÉLICA [id number] Left 1 Implanted   STEM REUNION HUMERAL 31E22NY - [id number]  STEM REUNION HUMERAL [id number]  [employer]. [id number] Left 1 Implanted        FINDINGS: Massive chronic rotator  cuff tear with degenerative changes.    SPECIMENS:   Specimen (24h ago, onward)      None            COMPLICATIONS: None.     INTRAOPERATIVE COUNTS: Correct.     PROPHYLACTIC IV ANTIBIOTICS: Given per [facility] Protocol.    INDICATIONS FOR OPERATION: Yany Silverio 63 y.o. female has been seen and evaluated in the office and found to havelifestyle-limiting pain secondary to advanced shoulder arthropathy. The patient has both pain and weakness and inability to forward flex the shoulder.  After a lengthy discussion with the patient , the patient elected to proceed with surgical intervention. The patient was extensively counseled and  fully informed of risks and benefits.    DESCRIPTION OF PROCEDURE:     The patient was taken to the operating room and placed in the beach chair position.  Anesthesia was administered and pre-operative antibiotics were given.  A timeout was performed.  Patient was positioned appropriately and prepped and draped in the usual sterile fashion with the operative arm suspended in a Spider Arm shelton. A standard deltopectoral incision was then undertaken to the operative extremity. The skin was cut, and then bovie electrocautery was used on the skin edges to control bleeding and reduce the bacterial burden of p. Acnes.  The cephalic vein was identified and mobilized laterally.  The deltopectoral interval was thus exposed.  A Kolbel self-retaining retractor was placed.  The clavipectoral fascia was identified and released.  The leading edge of the pectoralis major tendon was partially released, thus exposing the biceps tendon.  The biceps was grasped with an Allis clamp, and a No. 5 ethibond suture was used to perform a tenodesis of the biceps tendon to the adjacent pec major tendon.  The biceps was then cut at this point following a secure tenodesis.  Attention was then turned towards releasing the adhesions present in the subacromial space and subdeltoid space.  With proper releases performed,  the biceps was tracked proximally to identify the biceps groove and adjacent subscapularis.  The anterior humeral circumflex vessels were identified and ligated. A subscapularis peel was performed and the subscapularis was tagged with a No 2 suture.  A curved Shraddha was placed on the medial border of the humerus to facilitate a release of the capsule.  This helped to dislocate the humeral head and afford exposure of the arthritic head.       Rotator cuff tearing of the supraspinatus was not clearly evident.     A Darrach retractor was placed posterior to the humeral head.  Humeral head osteophytes were removed with a rangeur.  An extramedullary guide was used to make the head cut in between 20-30 degrees of retroversion, utilizing the supraspinatus tendon as the superior endpoint of the cut.  The cuff was protected as the cut was made.  The head cut was then sized on the back table.  The canal was then prepared in the standard fashion with a canal finder, reamer, and broach until appropriate stability had been achieved with the trial component.  The trial was left in place and attention was turned towards glenoid preparation. The subscapularis was inspected and adhesions were released anterior and posterior to this.  The axillary nerve was palpated as well.  With the nerve protected, a curved bardales scissors was used to release the middle glenohumeral ligament and superior glenohumeral ligament.  A lighted retractor was placed on the anterior edge of the glenoid.  A radial cut was made in the labrum and a circumferential labral takedown was performed.  Retractors were placed posteriorly and anterosuperiorly to improve exposure.      The glenoid wear pattern was A1     The glenoid was then prepared for the standard baseplate which was placed and secured with the center screw and 3 peripheral locking screws.  The  glenosphere was then impacted onto the baseplate.    Trialing was performed off the humeral side and a  final stem was placed. The final humeral socket was impacted onto the stem. The joint was reduced, showed excellent stability and motion.      Dilute betadine wash followed by normal saline was used to irrigate the wound. V. Tranexamic acid was given intravenously before incision to limit blood loss.      The wounds were copiously irrigated and closed in the standard fashion.  Sterile dressing was applied with a Polar Care and shoulder abduction pillow sling.  Patient was  taken to recovery room.    POSTOPERATIVE PLAN: The patient will be admitted for postoperative care per protocol.  24 hours of antibiotic prophylaxis.  DVT prophylaxis given.  Follow a RTSA rehab protocol.

## 2023-10-30 NOTE — ASSESSMENT & PLAN NOTE
Patient's COPD is controlled currently.  Patient is currently off COPD Pathway. Continue scheduled inhalers Steroids, Antibiotics and Supplemental oxygen and monitor respiratory status closely.     Continue home steroid and bronchodilators

## 2023-10-30 NOTE — OR NURSING
1552 Rec'd pt to PACU awake and alert, respirations shallow. O2 85%, 10L simple face mask applied. All other VSS. Left shoulder dressing C/D/I with immobilizer in place. Left radial pulse 2+, cap refill less than 3 seconds, able to wiggle fingers on command. No needs at this time. Will continue to monitor.     1602 Respirations shallow, O2 88-92% 10L simple face mask. Rec'd orders to give duoneb, respiratory called.     1608 Respiratory at bedside.     1617 Pt c/o pain. O2 91-92%. Rec'd orders per Dr. Georges to give IV ofirmiv 1g. See MAR for admin.     1640 Dr. Georges at bedside. Rec'd orders to give additional duoneb tx. Respiratory called.     1645 Respiratory at bedside.     1650 Pt states she takes lasix daily but did not take today. Rec'd orders per Dr. Georges to give 20mg lasix IV. See MAR for admin.     1700 Dr. Georges states ok to go to room if O2 92% or greater, orders for pt to remain on continuous O2 sat monitoring.     1709 Out of PACU. VSS, O2  94% 4L NC. No signs of bleeding/distress noted. Family notified of transfer to room 462 via messaging system.     1720 Pt to room 462 awake and alert with no distress noted, respirations even and unlabored. Family at bedside with pt belongings. Bedside report given to MARIA A Cobb RN. Moved pt to regular bed with safety precautions in place. Left shoulder dressing C/D/I with immobilizer in place. Left radial pulse 2+, cap refill less than 3 seconds, able to wiggle fingers on command. States pain improving, denies other needs. /58, P 89, R 18, O2 95% 4L NC, T 97.8 oral.

## 2023-10-30 NOTE — PLAN OF CARE
NAME:_________________________________    DATE: _________________________________    PHYSICIAN:____________________________                             DR. ABBI TREVINO        Reverse Total Shoulder Arthroplasty                                                                                        Post-operative Protocol    *Regular sling for 2 weeks  **No Pendulums      Phase 1 - (QUIET)  No Internal Rotation   Week 1-2        Phase 2 - (ACTIVE-ASSIST) Active-Assist Range of Motion with Passive Stretch to prescribed limits  Week 2-6   Supine-Seated External Rotation - Gradually increase to full   Supine-Seated Forward Elevation - Progress to Seated   Internal Rotation - Gradually increase to full       Phase 3 - (RESISTED)  NO Pendulums, continue with phase 2.  Week 7     Scapular mobilization        External and Internal Rotation       Standing forward punch      Seated rows       Shoulder Shrugs       Bicep Curls      Bear Hugs       Concentrate on Deltoid strengthening        Weight Training  Keep hands within eyesight, keep elbows bent  Week 12    Minimize overhead activities      (No  press, pull-down behind head, or wide  bench)      Return to Activities    Computer  4 weeks  Golf   3 months  Tennis   4 months

## 2023-10-30 NOTE — ANESTHESIA PROCEDURE NOTES
Peripheral Block    Patient location during procedure: pre-op   Block not for primary anesthetic.  Reason for block: at surgeon's request and post-op pain management   Post-op Pain Location: left shoulder   Start time: 10/30/2023 1:24 PM  Timeout: 10/30/2023 1:22 PM   End time: 10/30/2023 1:29 PM    Staffing  Authorizing Provider: Brandon Encarnacion DO  Performing Provider: Brandon Encarnacion DO    Staffing  Performed by: Brandon Encarnacion DO  Authorized by: Brandon Encarnacion DO    Preanesthetic Checklist  Completed: patient identified, IV checked, site marked, risks and benefits discussed, surgical consent, monitors and equipment checked, pre-op evaluation and timeout performed  Peripheral Block  Patient position: sitting  Prep: ChloraPrep  Patient monitoring: heart rate, cardiac monitor, continuous pulse ox, continuous capnometry and frequent blood pressure checks  Block type: interscalene  Laterality: left  Injection technique: single shot  Needle  Needle type: Stimuplex   Needle gauge: 22 G  Needle length: 2 in  Needle localization: anatomical landmarks and ultrasound guidance   -ultrasound image captured on disc.  Assessment  Injection assessment: negative aspiration, negative parasthesia and local visualized surrounding nerve  Paresthesia pain: none  Heart rate change: no  Slow fractionated injection: yes    Medications:    Medications: BUPivacaine (pf) (MARCAINE) injection 0.5% - Perineural   10 mL - 10/30/2023 1:29:00 PM    Additional Notes  VSS.  [facility] RN monitoring vitals throughout procedure.  Patient tolerated procedure well.

## 2023-10-30 NOTE — ASSESSMENT & PLAN NOTE
Blood pressure current controlled  Continue home medications  Monitor blood pressure and adjust medications as indicated

## 2023-10-30 NOTE — ANESTHESIA PREPROCEDURE EVALUATION
10/30/2023  Yany Silverio is a 63 y.o., female.    Pre-op Assessment    I have reviewed the Patient Summary Reports.    I have reviewed the Nursing Notes. I have reviewed the NPO Status.   I have reviewed the Medications.     Review of Systems  Anesthesia Hx:  No problems with previous Anesthesia  Denies Family Hx of Anesthesia complications.   Denies Personal Hx of Anesthesia complications.   Social:  Smoker, Alcohol Use    Cardiovascular:   Hypertension hyperlipidemia ECG has been reviewed. Seen by her PCP, Aurora NAVARRO, for preoperative evaluation - testing included EKG, PFTs, and routine labs - results reviewed      Pulmonary:   COPD, mild Sleep Apnea PFTs reviewed - mild obstructive disease pattern noted    Patient does use home O2 qHS   Hepatic/GI:   Hiatal Hernia,    Musculoskeletal:   Arthritis   Spine Disorders: thoracic    Neurological:   Neuromuscular Disease,    Psych:   Psychiatric History anxiety depression          Physical Exam  General:  Well nourished, Cooperative and Alert      Airway/Jaw/Neck:  Mouth Opening: Normal   Tongue: Normal   Mallampati: II TM Distance: Normal   Neck ROM: Normal ROM        Chest/Lungs:  Chest/Lungs Findings: Clear to auscultation, Normal Respiratory Rate      Heart/Vascular:  Heart Findings: Rate: Normal  Rhythm: Regular Rhythm               Anesthesia Plan  Type of Anesthesia, risks & benefits discussed:  Anesthesia Type:  Gen ETT, Regional    Patient's Preference:   Plan Factors:          Intra-op Monitoring Plan: Standard ASA Monitors  Intra-op Monitoring Plan Comments:   Post Op Pain Control Plan: multimodal analgesia  Post Op Pain Control Plan Comments:     Induction:   IV  Beta Blocker:         Informed Consent: Informed consent signed with the Patient and all parties understand the risks and agree with anesthesia plan.  All questions answered.  Anesthesia  "consent signed with patient.  ASA Score: 3     Day of Surgery Review of History & Physical: I have interviewed and examined the patient. I have reviewed the patient's H&P dated:    H&P Update referred to the surgeon/provider.          Ready For Surgery From Anesthesia Perspective.     NPO greater than 8 hours  NAC  NKDA     Hct 30  1/10/22 EKG: NSR 69  1/11/22 CXR: "Minimal blunting left costophrenic angle may represent minimal residual or recurrent atelectasis or infiltrate."     Anxiety Depression   COPD (chronic obstructive pulmonary disease) Hyperlipidemia   Hypertension Pain in joint of left hip   Pain in joint of left knee Cancer   Emphysema of lung Insomnia   Fungal esophagitis Acute superficial gastritis without hemorrhage   HH (hiatus hernia) Arthritis   NGOZI (CPAP)  Home oxygen at night and PRN  H/o cervical cancer  GERD  SaO2 at home runs 89 - 96%               Physical Exam  General: Well nourished, Cooperative and Alert    Airway:  Mallampati: II   Mouth Opening: Normal  TM Distance: Normal  Tongue: Normal  Neck ROM: Normal ROM    Chest/Lungs:  Clear to auscultation, Normal Respiratory Rate    Heart:  Rate: Normal  Rhythm: Regular Rhythm          Anesthesia Plan  Type of Anesthesia, risks & benefits discussed:    Anesthesia Type: Gen ETT, Regional  Intra-op Monitoring Plan: Standard ASA Monitors  Post Op Pain Control Plan: multimodal analgesia  Induction:  IV  Airway Plan: Direct, Post-Induction  Informed Consent: Informed consent signed with the Patient and all parties understand the risks and agree with anesthesia plan.  All questions answered.   ASA Score: 3  Day of Surgery Review of History & Physical: H&P Update referred to the surgeon/provider.I have interviewed and examined the patient. I have reviewed the patient's H&P dated: There are no significant changes.     Ready For Surgery From Anesthesia Perspective.       .      "

## 2023-10-30 NOTE — SUBJECTIVE & OBJECTIVE
Past Medical History:   Diagnosis Date    Acute superficial gastritis without hemorrhage 6/23/2021    Acute superficial gastritis without hemorrhage 6/23/2021    Anxiety     Arthritis     newly dx'd RA: seelea NAVARRO in [place]    Bronchitis 7/22/2021    Bursitis of left shoulder 6/10/2021    Cancer     hx of cervical cancer    COPD (chronic obstructive pulmonary disease)     Depression     Emphysema of lung     Fungal esophagitis 6/23/2021    HH (hiatus hernia) 6/23/2021    Hyperlipidemia     Hypertension     Insomnia 04/16/2020    Low back pain 04/28/2021    Pain in joint of left hip 04/28/2021    Pain in joint of left knee 04/28/2021    Pneumonia of both lower lobes due to infectious organism 1/14/2022       Past Surgical History:   Procedure Laterality Date    OPEN REDUCTION AND INTERNAL FIXATION (ORIF) OF INJURY OF HIP Left 1/12/2022    Procedure: ORIF, HIP;  Surgeon: Adolfo Randall MD;  Location: [facility];  Service: Orthopedics;  Laterality: Left;    ROBOTIC ARTHROPLASTY, HIP Left 12/13/2021    Procedure: ROBOTIC ARTHROPLASTY,HIP;  Surgeon: Adolfo Randall MD;  Location: [facility];  Service: Orthopedics;  Laterality: Left;    SHOULDER SURGERY Right 2017, 2019       Review of patient's allergies indicates:  No Known Allergies    No current facility-administered medications on file prior to encounter.     Current Outpatient Medications on File Prior to Encounter   Medication Sig    albuterol (PROVENTIL) 2.5 mg /3 mL (0.083 %) nebulizer solution albuterol sulfate 2.5 mg/3 mL (0.083 %) solution for nebulization    amLODIPine (NORVASC) 5 MG tablet Take 1 tablet (5 mg total) by mouth once daily.    busPIRone (BUSPAR) 10 MG tablet Take 1 tablet (10 mg total) by mouth every evening.    fluticasone propionate (FLONASE) 50 mcg/actuation nasal spray 1 spray (50 mcg total) by Each Nostril route once daily.    furosemide (LASIX) 20 MG tablet TAKE 1 TABLET BY MOUTH DAILY FOR 3 DAYS THEN WEIGH DAILY AND IF NO MORE  THAN 3 POUND WEIGHT GAIN, TAKE LASIX    lovastatin (MEVACOR) 20 MG tablet Take 1 tablet (20 mg total) by mouth once daily.    oxyCODONE-acetaminophen (PERCOCET) 7.5-325 mg per tablet Take 1 tablet by mouth 2 (two) times daily as needed.    pantoprazole (PROTONIX) 40 MG tablet Take 1 tablet (40 mg total) by mouth once daily.    tiotropium (SPIRIVA) 18 mcg inhalation capsule Inhale 1 capsule (18 mcg total) into the lungs once daily. Controller    VENTOLIN HFA 90 mcg/actuation inhaler Inhale 2 puffs into the lungs every 4 (four) hours as needed for Wheezing (q 4-6 hours).    cetirizine 10 mg chewable tablet Take 10 mg by mouth once daily.    DULoxetine (CYMBALTA) 30 MG capsule 1 capsule.    ketoconazole (NIZORAL) 2 % shampoo Apply topically twice a week.    predniSONE (DELTASONE) 20 MG tablet Take 1 tablet by mouth once daily.     Family History       Problem Relation (Age of Onset)    Cancer Mother    Hypertension Sister, Maternal Aunt, Maternal Uncle          Tobacco Use    Smoking status: Some Days     Current packs/day: 2.00     Average packs/day: 2.0 packs/day for 20.0 years (40.0 ttl pk-yrs)     Types: Cigarettes     Passive exposure: Past    Smokeless tobacco: Never    Tobacco comments:     Smoked one cigarette today   Substance and Sexual Activity    Alcohol use: Never    Drug use: Yes     Types: Oxycodone     Comment: rx for hip pain: surg planned    Sexual activity: Not on file     Review of Systems   Constitutional:  Positive for activity change. Negative for chills, diaphoresis, fatigue and fever.   HENT:  Negative for sinus pressure, sneezing, sore throat, tinnitus and trouble swallowing.    Eyes:  Negative for discharge, itching and visual disturbance.   Respiratory:  Positive for cough. Negative for chest tightness and shortness of breath.    Cardiovascular:  Positive for leg swelling. Negative for chest pain and palpitations.   Gastrointestinal:  Negative for abdominal pain, constipation, nausea and  vomiting.   Genitourinary:  Negative for urgency.   Musculoskeletal:  Positive for arthralgias and myalgias. Negative for neck pain and neck stiffness.   Skin:  Negative for wound.   Neurological:  Negative for tremors, seizures, syncope, speech difficulty and numbness.   Psychiatric/Behavioral:  Negative for agitation, behavioral problems and confusion.      Objective:     Vital Signs (Most Recent):  Temp: 97.8 °F (36.6 °C) (10/30/23 1720)  Pulse: 93 (10/30/23 1720)  Resp: 17 (10/30/23 1720)  BP: (!) 103/58 (10/30/23 1720)  SpO2: (!) 94 % (10/30/23 1825) Vital Signs (24h Range):  Temp:  [97.8 °F (36.6 °C)-98.2 °F (36.8 °C)] 97.8 °F (36.6 °C)  Pulse:  [78-95] 93  Resp:  [15-20] 17  SpO2:  [85 %-96 %] 94 %  BP: (103-157)/(58-82) 103/58     Weight: 72.6 kg (160 lb)  Body mass index is 27.46 kg/m².     Physical Exam  Vitals and nursing note reviewed.   Constitutional:       General: She is not in acute distress.     Appearance: Normal appearance. She is not ill-appearing, toxic-appearing or diaphoretic.   HENT:      Head: Normocephalic and atraumatic.      Right Ear: External ear normal.      Left Ear: External ear normal.      Nose: Nose normal.      Mouth/Throat:      Pharynx: Oropharynx is clear.   Eyes:      General:         Right eye: No discharge.         Left eye: No discharge.      Conjunctiva/sclera: Conjunctivae normal.   Cardiovascular:      Rate and Rhythm: Normal rate and regular rhythm.      Pulses: Normal pulses.      Heart sounds: Normal heart sounds.   Pulmonary:      Effort: Pulmonary effort is normal.      Breath sounds: Normal breath sounds. No wheezing, rhonchi or rales.   Abdominal:      Palpations: Abdomen is soft.      Tenderness: There is no abdominal tenderness. There is no guarding.      Hernia: No hernia is present.   Musculoskeletal:      Cervical back: Neck supple.      Right lower leg: Edema present.      Left lower leg: Edema present.      Comments: Left shoulder with sling in place     Skin:     General: Skin is warm.   Neurological:      Mental Status: She is alert and oriented to person, place, and time.   Psychiatric:         Mood and Affect: Mood normal.         Behavior: Behavior normal.         Thought Content: Thought content normal.         Judgment: Judgment normal.          Significant Labs: All pertinent labs within the past 24 hours have been reviewed.    Significant Imaging: I have reviewed all pertinent imaging results/findings within the past 24 hours.  I have reviewed and interpreted all pertinent imaging results/findings within the past 24 hours.

## 2023-10-30 NOTE — H&P
H&P completed  has been reviewed, the patient has been examined and:  I concur with the findings and no changes have occurred since H&P was written.    There are no hospital problems to display for this patient.          Yany Silverio returns to clinic for a follow up visit regarding       ICD-10-CM ICD-9-CM   1. Primary osteoarthritis of left shoulder  M19.012 715.11         Patient received an injection on 7/6 and reports the injection did not relieve her symptoms.                 PAST MEDICAL HISTORY:        Past Medical History:   Diagnosis Date    Acute superficial gastritis without hemorrhage 6/23/2021    Acute superficial gastritis without hemorrhage 6/23/2021    Anxiety      Arthritis       newly dx'd RA: sees MD in [place]    Bronchitis 7/22/2021    Bursitis of left shoulder 6/10/2021    Cancer       hx of cervical cancer    COPD (chronic obstructive pulmonary disease)      Depression      Emphysema of lung      Fungal esophagitis 6/23/2021    HH (hiatus hernia) 6/23/2021    Hyperlipidemia      Hypertension      Insomnia 04/16/2020    Low back pain 04/28/2021    Pain in joint of left hip 04/28/2021    Pain in joint of left knee 04/28/2021    Pneumonia of both lower lobes due to infectious organism 1/14/2022      PAST SURGICAL HISTORY:         Past Surgical History:   Procedure Laterality Date    OPEN REDUCTION AND INTERNAL FIXATION (ORIF) OF INJURY OF HIP Left 1/12/2022     Procedure: ORIF, HIP;  Surgeon: Adolfo Randall MD;  Location: [facility];  Service: Orthopedics;  Laterality: Left;    ROBOTIC ARTHROPLASTY, HIP Left 12/13/2021     Procedure: ROBOTIC ARTHROPLASTY,HIP;  Surgeon: Adolfo Randall MD;  Location: [facility];  Service: Orthopedics;  Laterality: Left;    SHOULDER SURGERY Right 2017, 2019            PHYSICAL EXAMINATION:  General     Constitutional: She is oriented to person, place, and time. She appears well-developed and well-nourished. No distress.   HENT:   Head:  Normocephalic and atraumatic.   Eyes: Pupils are equal, round, and reactive to light.   Cardiovascular:  Normal rate, regular rhythm and intact distal pulses.            Pulmonary/Chest: Effort normal. No respiratory distress. She exhibits no tenderness.   Abdominal: Soft. Bowel sounds are normal. She exhibits no distension. There is no abdominal tenderness.   Neurological: She is alert and oriented to person, place, and time. She has normal reflexes. She displays normal reflexes. She exhibits normal muscle tone. Coordination normal.   Psychiatric: She has a normal mood and affect. Her behavior is normal. Judgment and thought content normal.                  Left Shoulder Exam      Tenderness   The patient is tender to palpation of the acromioclavicular joint and supraspinatus.     Tests & Signs   Drop arm: positive  Lag Sign 0 degrees: positive      Comments:  Patient demonstrates evidence of pseudoparalysis with limited active forward elevation        Muscle Strength   Left Upper Extremity  Shoulder Internal Rotation: 3/5   Shoulder External Rotation: 3/5   Supraspinatus: 3/5   Subscapularis: 3/5            IMAGING:  No results found.   Xrays reviewed-- severe left shoulder glenohumeral joint OA     ASSESSMENT:        ICD-10-CM ICD-9-CM   1. Primary osteoarthritis of left shoulder  M19.012 715.11         PLAN:     -Findings and treatment options were discussed with the patient  -All questions answered  Natural history and expected course discussed. Questions answered.  Educational material distributed.     MRI findings were reviewed with the patient.  The patient has a symptomatic left shoulder glenohumeral osteoarthritis with evidence of superior humeral head migration perhaps indicative of rotator cuff arthropathy.  Would benefit from reverse shoulder arthroplasty versus total shoulder arthroplasty.  Given the glenoid deformity but I suspect I would suspect the reverse shoulder arthroplasty will be the optimal  form of arthroplasty for her left shoulder.     The patient understands the likely convalescence after surgery, in particular the expected postop rehab and recovery course. Alternatives both operative and non-operative with associated risks and benefits discussed. The outlined risks and potential complications of the proposed procedure include but are not limited to: infection, poor wound healing, scarring, stiffness, swelling, continued or recurrent pain, instability, hardware or prosthetic failure, symptomatic hardware requiring removal, weakness, neurovascular injury, numbness, chronic regional pain disorder, tissue nonhealing/irreparability/retear, contralateral ligament injury, chondral injury, arthritis, fracture, blood clot formation, inability to return to previous level of activity, anesthetic or regional block complication up to death, need for additional procedure as indicated, and potential need for further surgery.      she was also informed and understands the risks of surgery are greater for patients with a current condition or history of heart disease, obesity, clotting disorders, recurrent infections, steroid use, current or past smoking, and factors such as sedentary lifestyle and noncompliance with medications, therapy or follow-up. The degree of the increased risk is hard to estimate with any degree of precision.     All questions were answered. The patient has verbalized understanding of these issues and wishes to proceed as discussed. The patient understands that recovery time can be up to 6-12 months.        Due to the severe nature of her arthritis and deformity would like to get a CT scan prior to surgery as well to better appreciate the significant defect of the posterior glenoid.  Will plan for left reverse shoulder arthroplasty will need clearance from primary care provider Dr. Valderrama who is an excellent Dr.  We need to wait at least 3 months prior to her shoulder arthroplasty as she had an  injection on 07/06     We have also discussed smoking cessation she has cut back drastically smoking as well she understands that smoking can limit some of the soft tissue healing.     Time spent:  3-10 minutes ([id number])     We discussed the importance, over both the short and long-term, of smoking cessation; reviewed the patient's willingness to quit; overall history and current use of tobacco smoking products; that there are a variety of methods to help with smoking diminution and cessation (stopping alone, using nicotine substitution medications/gums, Chantix, other medications, etcetera, which the patient will also discuss with his or her primary care physician); that the patient should set a date for smoking cessation; and the patient will follow up with his or her primary care physician for further support and oversight.     We also discussed that for the patient to have surgery while using nicotine, wound healing may be compromised relative to those who do not smoke; that recovery from anesthesia may sometimes be more difficult; and that quitting may decrease the heart, vascular, pulmonary effects in the short term as well as over the long term.  Smoking cessation may be useful in important for any patient will have an orthopaedic surgeon(bone fusions, wound closures, etc.)  since surgical results with respect to wound healing, susceptibility to recovery from infection, bone fusion, and tissue and blood vessel health may be impaired or less optimal smokers then nonsmokers.  We talked about the elevated risk of surgical bone fusion failure in the setting of smoking and the potential need for a higher-risk revision surgery should fusion not occur.     I reviewed this with the patient in detail and all questions were answered.  We discussed nature of the patient's condition; real alternatives and realistic options; pros and cons, risks and benefits of all the options, in detail.  I believe the patient understands  the above and wishes to proceed as outlined.     There are no Patient Instructions on file for this visit.     No orders of the defined types were placed in this encounter.

## 2023-10-30 NOTE — ASSESSMENT & PLAN NOTE
- Day 0, S/P left reverse shoulder arthroplasty on 10/30/2023   - Continue pain med as ordered by primary team

## 2023-10-30 NOTE — HPI
Patient is a 63 female with a medical history of COPD, asthma, emphysema, HTN< HLD, Anxiety, depression , hiatus hernia, NGOZI on CPAP, and osteoarthritis who was seen and evaluated by Dr. Adolfo Randall for severe osteoarthritis of the left shoulder. Patient underwent left reverse shoulder arthroplasty today (10/30/2023) by Dr. Randall. Patient was seen and examined after the surgery. She was afebrile and vital signs stable with no complaints. Pain is controlled and is being managed by the primary team. Hospital medicine was consulted for medical management post surgery.

## 2023-10-31 LAB
ANION GAP SERPL CALCULATED.3IONS-SCNC: 10 MMOL/L (ref 7–16)
BASOPHILS # BLD AUTO: 0.02 K/UL (ref 0–0.2)
BASOPHILS NFR BLD AUTO: 0.1 % (ref 0–1)
BUN SERPL-MCNC: 6 MG/DL (ref 7–18)
BUN/CREAT SERPL: 9 (ref 6–20)
CALCIUM SERPL-MCNC: 8.4 MG/DL (ref 8.5–10.1)
CHLORIDE SERPL-SCNC: 111 MMOL/L (ref 98–107)
CO2 SERPL-SCNC: 25 MMOL/L (ref 21–32)
CREAT SERPL-MCNC: 0.68 MG/DL (ref 0.55–1.02)
DIFFERENTIAL METHOD BLD: ABNORMAL
EGFR (NO RACE VARIABLE) (RUSH/TITUS): 97 ML/MIN/1.73M2
EOSINOPHIL # BLD AUTO: 0 K/UL (ref 0–0.5)
EOSINOPHIL NFR BLD AUTO: 0 % (ref 1–4)
ERYTHROCYTE [DISTWIDTH] IN BLOOD BY AUTOMATED COUNT: 17.1 % (ref 11.5–14.5)
GLUCOSE SERPL-MCNC: 121 MG/DL (ref 74–106)
HCT VFR BLD AUTO: 32.3 % (ref 38–47)
HGB BLD-MCNC: 10.9 G/DL (ref 12–16)
IMM GRANULOCYTES # BLD AUTO: 0.08 K/UL (ref 0–0.04)
IMM GRANULOCYTES NFR BLD: 0.6 % (ref 0–0.4)
LYMPHOCYTES # BLD AUTO: 0.76 K/UL (ref 1–4.8)
LYMPHOCYTES NFR BLD AUTO: 5.4 % (ref 27–41)
MCH RBC QN AUTO: 32.4 PG (ref 27–31)
MCHC RBC AUTO-ENTMCNC: 33.7 G/DL (ref 32–36)
MCV RBC AUTO: 96.1 FL (ref 80–96)
MONOCYTES # BLD AUTO: 0.93 K/UL (ref 0–0.8)
MONOCYTES NFR BLD AUTO: 6.6 % (ref 2–6)
MPC BLD CALC-MCNC: 10 FL (ref 9.4–12.4)
NEUTROPHILS # BLD AUTO: 12.31 K/UL (ref 1.8–7.7)
NEUTROPHILS NFR BLD AUTO: 87.3 % (ref 53–65)
NRBC # BLD AUTO: 0 X10E3/UL
NRBC, AUTO (.00): 0 %
PLATELET # BLD AUTO: 268 K/UL (ref 150–400)
POTASSIUM SERPL-SCNC: 3.1 MMOL/L (ref 3.5–5.1)
RBC # BLD AUTO: 3.36 M/UL (ref 4.2–5.4)
SODIUM SERPL-SCNC: 143 MMOL/L (ref 136–145)
WBC # BLD AUTO: 14.1 K/UL (ref 4.5–11)

## 2023-10-31 PROCEDURE — 86580 TB INTRADERMAL TEST: CPT | Performed by: ORTHOPAEDIC SURGERY

## 2023-10-31 PROCEDURE — 63600175 PHARM REV CODE 636 W HCPCS: Performed by: ORTHOPAEDIC SURGERY

## 2023-10-31 PROCEDURE — 99214 PR OFFICE/OUTPT VISIT, EST, LEVL IV, 30-39 MIN: ICD-10-PCS | Mod: ,,, | Performed by: FAMILY MEDICINE

## 2023-10-31 PROCEDURE — 99900035 HC TECH TIME PER 15 MIN (STAT)

## 2023-10-31 PROCEDURE — 80048 BASIC METABOLIC PNL TOTAL CA: CPT | Performed by: ORTHOPAEDIC SURGERY

## 2023-10-31 PROCEDURE — 25000003 PHARM REV CODE 250: Performed by: ORTHOPAEDIC SURGERY

## 2023-10-31 PROCEDURE — 27000221 HC OXYGEN, UP TO 24 HOURS

## 2023-10-31 PROCEDURE — 97161 PT EVAL LOW COMPLEX 20 MIN: CPT

## 2023-10-31 PROCEDURE — 99214 OFFICE O/P EST MOD 30 MIN: CPT | Mod: ,,, | Performed by: FAMILY MEDICINE

## 2023-10-31 PROCEDURE — 30200315 PPD INTRADERMAL TEST REV CODE 302: Performed by: ORTHOPAEDIC SURGERY

## 2023-10-31 PROCEDURE — 94640 AIRWAY INHALATION TREATMENT: CPT | Mod: XB

## 2023-10-31 PROCEDURE — 25000242 PHARM REV CODE 250 ALT 637 W/ HCPCS: Performed by: ORTHOPAEDIC SURGERY

## 2023-10-31 PROCEDURE — 85025 COMPLETE CBC W/AUTO DIFF WBC: CPT | Performed by: ORTHOPAEDIC SURGERY

## 2023-10-31 PROCEDURE — 94761 N-INVAS EAR/PLS OXIMETRY MLT: CPT

## 2023-10-31 RX ORDER — ONDANSETRON 2 MG/ML
8 INJECTION INTRAMUSCULAR; INTRAVENOUS EVERY 6 HOURS PRN
Status: DISCONTINUED | OUTPATIENT
Start: 2023-10-31 | End: 2023-11-01 | Stop reason: HOSPADM

## 2023-10-31 RX ORDER — ACETAMINOPHEN 500 MG
1000 TABLET ORAL EVERY 6 HOURS PRN
Status: DISCONTINUED | OUTPATIENT
Start: 2023-10-31 | End: 2023-11-01 | Stop reason: HOSPADM

## 2023-10-31 RX ORDER — TRAZODONE HYDROCHLORIDE 50 MG/1
50 TABLET ORAL NIGHTLY PRN
Status: DISCONTINUED | OUTPATIENT
Start: 2023-10-31 | End: 2023-11-01 | Stop reason: HOSPADM

## 2023-10-31 RX ORDER — BISACODYL 5 MG
10 TABLET, DELAYED RELEASE (ENTERIC COATED) ORAL DAILY PRN
Status: DISCONTINUED | OUTPATIENT
Start: 2023-10-31 | End: 2023-11-01 | Stop reason: HOSPADM

## 2023-10-31 RX ORDER — IBUPROFEN 200 MG
1 TABLET ORAL DAILY
Status: DISCONTINUED | OUTPATIENT
Start: 2023-11-01 | End: 2023-11-01

## 2023-10-31 RX ORDER — GUAIFENESIN/DEXTROMETHORPHAN 100-10MG/5
10 SYRUP ORAL EVERY 6 HOURS PRN
Status: DISCONTINUED | OUTPATIENT
Start: 2023-10-31 | End: 2023-11-01 | Stop reason: HOSPADM

## 2023-10-31 RX ORDER — SIMETHICONE 80 MG
1 TABLET,CHEWABLE ORAL 3 TIMES DAILY PRN
Status: DISCONTINUED | OUTPATIENT
Start: 2023-10-31 | End: 2023-11-01 | Stop reason: HOSPADM

## 2023-10-31 RX ADMIN — DOCUSATE SODIUM 100 MG: 100 CAPSULE, LIQUID FILLED ORAL at 09:10

## 2023-10-31 RX ADMIN — ALBUTEROL SULFATE 2.5 MG: 2.5 SOLUTION RESPIRATORY (INHALATION) at 07:10

## 2023-10-31 RX ADMIN — DOCUSATE SODIUM 100 MG: 100 CAPSULE, LIQUID FILLED ORAL at 10:10

## 2023-10-31 RX ADMIN — IPRATROPIUM BROMIDE 0.5 MG: 0.5 SOLUTION RESPIRATORY (INHALATION) at 07:10

## 2023-10-31 RX ADMIN — IPRATROPIUM BROMIDE 0.5 MG: 0.5 SOLUTION RESPIRATORY (INHALATION) at 02:10

## 2023-10-31 RX ADMIN — ALBUTEROL SULFATE 2.5 MG: 2.5 SOLUTION RESPIRATORY (INHALATION) at 01:10

## 2023-10-31 RX ADMIN — IPRATROPIUM BROMIDE 0.5 MG: 0.5 SOLUTION RESPIRATORY (INHALATION) at 01:10

## 2023-10-31 RX ADMIN — DULOXETINE HYDROCHLORIDE 30 MG: 30 CAPSULE, DELAYED RELEASE ORAL at 10:10

## 2023-10-31 RX ADMIN — ACETAMINOPHEN 1000 MG: 500 TABLET ORAL at 05:10

## 2023-10-31 RX ADMIN — PANTOPRAZOLE SODIUM 40 MG: 40 TABLET, DELAYED RELEASE ORAL at 10:10

## 2023-10-31 RX ADMIN — FLUTICASONE PROPIONATE 50 MCG: 50 SPRAY, METERED NASAL at 10:10

## 2023-10-31 RX ADMIN — OXYCODONE HYDROCHLORIDE 10 MG: 5 TABLET ORAL at 05:10

## 2023-10-31 RX ADMIN — CEFAZOLIN 2 G: 2 INJECTION, POWDER, FOR SOLUTION INTRAMUSCULAR; INTRAVENOUS at 05:10

## 2023-10-31 RX ADMIN — ALBUTEROL SULFATE 2.5 MG: 2.5 SOLUTION RESPIRATORY (INHALATION) at 02:10

## 2023-10-31 RX ADMIN — AMLODIPINE BESYLATE 5 MG: 5 TABLET ORAL at 10:10

## 2023-10-31 RX ADMIN — BUSPIRONE HYDROCHLORIDE 10 MG: 5 TABLET ORAL at 09:10

## 2023-10-31 RX ADMIN — PREGABALIN 75 MG: 75 CAPSULE ORAL at 10:10

## 2023-10-31 RX ADMIN — PRAVASTATIN SODIUM 20 MG: 10 TABLET ORAL at 10:10

## 2023-10-31 RX ADMIN — PREGABALIN 75 MG: 75 CAPSULE ORAL at 09:10

## 2023-10-31 RX ADMIN — PREDNISONE 20 MG: 20 TABLET ORAL at 10:10

## 2023-10-31 RX ADMIN — CELECOXIB 200 MG: 100 CAPSULE ORAL at 09:10

## 2023-10-31 RX ADMIN — CELECOXIB 200 MG: 100 CAPSULE ORAL at 10:10

## 2023-10-31 RX ADMIN — FUROSEMIDE 20 MG: 20 TABLET ORAL at 10:10

## 2023-10-31 RX ADMIN — TUBERCULIN PURIFIED PROTEIN DERIVATIVE 5 UNITS: 5 INJECTION, SOLUTION INTRADERMAL at 05:10

## 2023-10-31 NOTE — PLAN OF CARE
Problem: Adult Inpatient Plan of Care  Goal: Plan of Care Review  10/31/2023 1331 by Gem Bassett LPN  Outcome: Ongoing, Progressing  10/31/2023 1330 by Gem Bassett LPN  Outcome: Ongoing, Progressing  Goal: Patient-Specific Goal (Individualized)  10/31/2023 1331 by Gem Bassett LPN  Outcome: Ongoing, Progressing  10/31/2023 1330 by Gem Bassett LPN  Outcome: Ongoing, Progressing  Goal: Absence of Hospital-Acquired Illness or Injury  10/31/2023 1331 by Gem Bassett LPN  Outcome: Ongoing, Progressing  10/31/2023 1330 by Gem Bassett LPN  Outcome: Ongoing, Progressing  Goal: Optimal Comfort and Wellbeing  10/31/2023 1331 by Gem Bassett LPN  Outcome: Ongoing, Progressing  10/31/2023 1330 by Gem Bassett LPN  Outcome: Ongoing, Progressing  Goal: Readiness for Transition of Care  10/31/2023 1331 by Gem Bassett LPN  Outcome: Ongoing, Progressing  10/31/2023 1330 by Gem Bassett LPN  Outcome: Ongoing, Progressing     Problem: Fall Injury Risk  Goal: Absence of Fall and Fall-Related Injury  10/31/2023 1331 by Gem Bassett LPN  Outcome: Ongoing, Progressing  10/31/2023 1330 by Gem Bassett LPN  Outcome: Ongoing, Progressing     Problem: Pain Acute  Goal: Acceptable Pain Control and Functional Ability  Outcome: Ongoing, Progressing     Problem: Infection  Goal: Absence of Infection Signs and Symptoms  Outcome: Ongoing, Progressing

## 2023-10-31 NOTE — SUBJECTIVE & OBJECTIVE
Interval History:    No significant events overnight, patient with no complaints this morning. Ambulating in her room within minimal difficulty however still requiring some assistance with ADLs. Plan to d/c to [facility] as early as tomorrow.     Review of Systems   Constitutional:  Positive for activity change. Negative for chills, diaphoresis, fatigue and fever.   HENT:  Negative for sinus pressure, sneezing, sore throat, tinnitus and trouble swallowing.    Eyes:  Negative for discharge, itching and visual disturbance.   Respiratory:  Positive for cough. Negative for chest tightness and shortness of breath.    Cardiovascular:  Positive for leg swelling. Negative for chest pain and palpitations.   Gastrointestinal:  Negative for abdominal pain, constipation, nausea and vomiting.   Genitourinary:  Negative for urgency.   Musculoskeletal:  Positive for arthralgias and myalgias. Negative for neck pain and neck stiffness.   Skin:  Negative for wound.   Neurological:  Negative for tremors, seizures, syncope, speech difficulty and numbness.   Psychiatric/Behavioral:  Negative for agitation, behavioral problems and confusion.      Objective:     Vital Signs (Most Recent):  Temp: 98.2 °F (36.8 °C) (10/31/23 1420)  Pulse: 96 (10/31/23 1446)  Resp: 20 (10/31/23 1446)  BP: 113/73 (10/31/23 1420)  SpO2: (!) 91 % (10/31/23 1446) Vital Signs (24h Range):  Temp:  [97.8 °F (36.6 °C)-99.1 °F (37.3 °C)] 98.2 °F (36.8 °C)  Pulse:  [] 96  Resp:  [15-20] 20  SpO2:  [85 %-97 %] 91 %  BP: ()/(50-76) 113/73     Weight: 72.6 kg (160 lb)  Body mass index is 27.46 kg/m².     Physical Exam  Vitals and nursing note reviewed.   Constitutional:       General: She is not in acute distress.     Appearance: Normal appearance. She is not ill-appearing, toxic-appearing or diaphoretic.   HENT:      Head: Normocephalic and atraumatic.      Right Ear: External ear normal.      Left Ear: External ear normal.      Nose: Nose normal.       Mouth/Throat:      Pharynx: Oropharynx is clear.   Eyes:      General:         Right eye: No discharge.         Left eye: No discharge.      Conjunctiva/sclera: Conjunctivae normal.   Cardiovascular:      Rate and Rhythm: Normal rate and regular rhythm.      Pulses: Normal pulses.      Heart sounds: Normal heart sounds.   Pulmonary:      Effort: Pulmonary effort is normal.      Breath sounds: Normal breath sounds. No wheezing, rhonchi or rales.   Abdominal:      Palpations: Abdomen is soft.      Tenderness: There is no abdominal tenderness. There is no guarding.      Hernia: No hernia is present.   Musculoskeletal:      Cervical back: Neck supple.      Right lower leg: Edema present.      Left lower leg: Edema present.      Comments: Left shoulder with sling in place    Skin:     General: Skin is warm.   Neurological:      Mental Status: She is alert and oriented to person, place, and time.   Psychiatric:         Mood and Affect: Mood normal.         Behavior: Behavior normal.         Thought Content: Thought content normal.         Judgment: Judgment normal.          Significant Labs: All pertinent labs within the past 24 hours have been reviewed.    Significant Imaging: I have reviewed all pertinent imaging results/findings within the past 24 hours.  I have reviewed and interpreted all pertinent imaging results/findings within the past 24 hours.

## 2023-10-31 NOTE — PLAN OF CARE
Problem: Physical Therapy  Goal: Physical Therapy Goal  Description: Short Term Goals  Ambulate SBA - 200 feet with none assistive device.   Supine to sit stand-by  Sit to stand independent with device  SPT stand-by  5. Independent with HEP    Long Term Goals  Ambulate SBA - 400 feet with none assistive device.   Supine to sit independent without device  Sit to stand independent without device  SPT independent without device  Needed equipment for home.         Outcome: Ongoing, Progressing

## 2023-10-31 NOTE — CONSULTS
Consult acknowledged for swingbed placement. Sw spoke with pt and Julio Cesar Silverio (son) [phone] at bedside to discuss swingbed. Choice obtained for [facility]. Referral sent on this morning. Pt will need insurance precert for placement, as well as TB skin test, CXR, and rapid covid test. Ss following for dc needs and recommendations.

## 2023-10-31 NOTE — PROGRESS NOTES
[facility]  [facility] Medicine  Progress Note    Patient Name: Yany Silverio  MRN: [id number]  Patient Class: OP- Outpatient Recovery   Admission Date: 10/30/2023  Length of Stay: 0 days  Attending Physician: Adolfo Randall MD  Primary Care Provider: Sanaz Jones FNP        Subjective:     Principal Problem:Primary osteoarthritis of left shoulder        HPI:  Patient is a 63 female with a medical history of COPD, asthma, emphysema, HTN< HLD, Anxiety, depression , hiatus hernia, NGOZI on CPAP, and osteoarthritis who was seen and evaluated by Dr. Adolfo Randall for severe osteoarthritis of the left shoulder. Patient underwent left reverse shoulder arthroplasty today (10/30/2023) by Dr. Randall. Patient was seen and examined after the surgery. She was afebrile and vital signs stable with no complaints. Pain is controlled and is being managed by the primary team. Hospital medicine was consulted for medical management post surgery.      Overview/Hospital Course:  No notes on file    Interval History:    No significant events overnight, patient with no complaints this morning. Ambulating in her room within minimal difficulty however still requiring some assistance with ADLs. Plan to d/c to [facility] as early as tomorrow.     Review of Systems   Constitutional:  Positive for activity change. Negative for chills, diaphoresis, fatigue and fever.   HENT:  Negative for sinus pressure, sneezing, sore throat, tinnitus and trouble swallowing.    Eyes:  Negative for discharge, itching and visual disturbance.   Respiratory:  Positive for cough. Negative for chest tightness and shortness of breath.    Cardiovascular:  Positive for leg swelling. Negative for chest pain and palpitations.   Gastrointestinal:  Negative for abdominal pain, constipation, nausea and vomiting.   Genitourinary:  Negative for urgency.   Musculoskeletal:  Positive for arthralgias and myalgias. Negative for neck pain and neck stiffness.    Skin:  Negative for wound.   Neurological:  Negative for tremors, seizures, syncope, speech difficulty and numbness.   Psychiatric/Behavioral:  Negative for agitation, behavioral problems and confusion.      Objective:     Vital Signs (Most Recent):  Temp: 98.2 °F (36.8 °C) (10/31/23 1420)  Pulse: 96 (10/31/23 1446)  Resp: 20 (10/31/23 1446)  BP: 113/73 (10/31/23 1420)  SpO2: (!) 91 % (10/31/23 1446) Vital Signs (24h Range):  Temp:  [97.8 °F (36.6 °C)-99.1 °F (37.3 °C)] 98.2 °F (36.8 °C)  Pulse:  [] 96  Resp:  [15-20] 20  SpO2:  [85 %-97 %] 91 %  BP: ()/(50-76) 113/73     Weight: 72.6 kg (160 lb)  Body mass index is 27.46 kg/m².     Physical Exam  Vitals and nursing note reviewed.   Constitutional:       General: She is not in acute distress.     Appearance: Normal appearance. She is not ill-appearing, toxic-appearing or diaphoretic.   HENT:      Head: Normocephalic and atraumatic.      Right Ear: External ear normal.      Left Ear: External ear normal.      Nose: Nose normal.      Mouth/Throat:      Pharynx: Oropharynx is clear.   Eyes:      General:         Right eye: No discharge.         Left eye: No discharge.      Conjunctiva/sclera: Conjunctivae normal.   Cardiovascular:      Rate and Rhythm: Normal rate and regular rhythm.      Pulses: Normal pulses.      Heart sounds: Normal heart sounds.   Pulmonary:      Effort: Pulmonary effort is normal.      Breath sounds: Normal breath sounds. No wheezing, rhonchi or rales.   Abdominal:      Palpations: Abdomen is soft.      Tenderness: There is no abdominal tenderness. There is no guarding.      Hernia: No hernia is present.   Musculoskeletal:      Cervical back: Neck supple.      Right lower leg: Edema present.      Left lower leg: Edema present.      Comments: Left shoulder with sling in place    Skin:     General: Skin is warm.   Neurological:      Mental Status: She is alert and oriented to person, place, and time.   Psychiatric:         Mood and  Affect: Mood normal.         Behavior: Behavior normal.         Thought Content: Thought content normal.         Judgment: Judgment normal.          Significant Labs: All pertinent labs within the past 24 hours have been reviewed.    Significant Imaging: I have reviewed all pertinent imaging results/findings within the past 24 hours.  I have reviewed and interpreted all pertinent imaging results/findings within the past 24 hours.      Assessment/Plan:      * Primary osteoarthritis of left shoulder  - Day 0, S/P left reverse shoulder arthroplasty on 10/30/2023   - Continue pain med as ordered by primary team      Anemia  H&H stable  Repeat CBC in AM    Dyslipidemia  Continue home statin      Anxiety and depression  Patient has persistent depression which is moderate and is currently controlled. Will Continue anti-depressant medications. We will not consult psychiatry at this time. Patient does not display psychosis at this time. Continue to monitor closely and adjust plan of care as needed.        Hypertension  Blood pressure current controlled  Continue home medications  Monitor blood pressure and adjust medications as indicated    NGOZI on CPAP  CPAP at night while at the hospital      Chronic obstructive pulmonary disease  Patient's COPD is controlled currently.  Patient is currently off COPD Pathway. Continue scheduled inhalers Steroids, Antibiotics and Supplemental oxygen and monitor respiratory status closely.     Continue home steroid and bronchodilators      HH (hiatus hernia)  Continue home PPI        VTE Risk Mitigation (From admission, onward)         Ordered     IP VTE LOW RISK PATIENT  Once         10/30/23 1650                Discharge Planning   PARVEZ:      Code Status: Prior   Is the patient medically ready for discharge?:     Reason for patient still in hospital (select all that apply): Treatment  Discharge Plan A: Skilled Nursing Facility                  Kathy Samuel DO  Department of Hospital  Medicine   [facility]

## 2023-10-31 NOTE — PLAN OF CARE
[facility]  Initial Discharge Assessment       Primary Care Provider: Sanaz Jones FNP    Admission Diagnosis: Primary osteoarthritis of left shoulder [M19.012]  Primary osteoarthritis, left shoulder [M19.012]    Admission Date: 10/30/2023  Expected Discharge Date:     Transition of Care Barriers: None    Payor: [employer] MANAGED MEDICARE / Plan: [employer] HMO PPO SPECIAL NEEDS / Product Type: Medicare Advantage /     Extended Emergency Contact Information  Primary Emergency Contact: Juilo Cesar Silverio  Mobile Phone: [phone]  Relation: Son  Preferred language: English   needed? No  Secondary Emergency Contact: Deana Silverio  Mobile Phone: [phone]  Relation: Daughter  Preferred language: English    Discharge Plan A: Skilled Nursing Facility  Discharge Plan B: [facility]      [facility] [address] [place] [id number]  Phone: [phone] Fax: [phone]      Initial Assessment (most recent)       Adult Discharge Assessment - 10/31/23 1050          Discharge Assessment    Assessment Type Discharge Planning Assessment     Source of Information patient;family     If unable to respond/provide information was family/caregiver contacted? Yes     Contact Name/Number Julio Cesar Silverio (son) [phone]     Communicated PARVEZ with patient/caregiver Date not available/Unable to determine     Reason For Admission Primary osteoarthritis of left shoulder     People in Home alone     Facility Arrived From: home     Do you expect to return to your current living situation? No     Do you have help at home or someone to help you manage your care at home? Yes     Who are your caregiver(s) and their phone number(s)? Julio Cesar Silverio (son) [phone]     Prior to hospitilization cognitive status: Unable to Assess     Current cognitive status: Alert/Oriented     Home Accessibility wheelchair accessible     Home Layout Able to live on 1st  floor     Equipment Currently Used at Home walker, rolling;wheelchair;cane, straight;crutches;shower chair;bedside commode     Readmission within 30 days? No     Patient currently being followed by outpatient case management? No     Do you currently have service(s) that help you manage your care at home? No     Do you take prescription medications? Yes     Do you have prescription coverage? Yes     Do you have any problems affording any of your prescribed medications? No     Is the patient taking medications as prescribed? yes     Who is going to help you get home at discharge? Julio Cesar Silverio (son) [phone]     How do you get to doctors appointments? family or friend will provide     Are you on dialysis? No     Do you take coumadin? No     DME Needed Upon Discharge  none     Discharge Plan discussed with: Patient;Adult children     Transition of Care Barriers None     Discharge Plan A Skilled Nursing Facility     Discharge Plan B Home Health        Physical Activity    On average, how many days per week do you engage in moderate to strenuous exercise (like a brisk walk)? 0 days     On average, how many minutes do you engage in exercise at this level? 0 min        Financial Resource Strain    How hard is it for you to pay for the very basics like food, housing, medical care, and heating? Not hard at all        Housing Stability    In the last 12 months, was there a time when you were not able to pay the mortgage or rent on time? No     In the last 12 months, how many places have you lived? 1     In the last 12 months, was there a time when you did not have a steady place to sleep or slept in a shelter (including now)? No        Transportation Needs    In the past 12 months, has lack of transportation kept you from medical appointments or from getting medications? No     In the past 12 months, has lack of transportation kept you from meetings, work, or from getting things needed for daily living? No        Food  Insecurity    Within the past 12 months, you worried that your food would run out before you got the money to buy more. Never true     Within the past 12 months, the food you bought just didn't last and you didn't have money to get more. Never true        Stress    Do you feel stress - tense, restless, nervous, or anxious, or unable to sleep at night because your mind is troubled all the time - these days? Rather much        Social Connections    In a typical week, how many times do you talk on the phone with family, friends, or neighbors? More than three times a week     How often do you get together with friends or relatives? Three times a week     How often do you attend Baptist or Latter day services? Never     Do you belong to any clubs or organizations such as Baptist groups, unions, fraternal or athletic groups, or school groups? No     How often do you attend meetings of the clubs or organizations you belong to? Never     Are you , , , , never , or living with a partner?         Alcohol Use    Q1: How often do you have a drink containing alcohol? 4 or more times a week     Q2: How many drinks containing alcohol do you have on a typical day when you are drinking? 3 or 4     Q3: How often do you have six or more drinks on one occasion? Never                   Sw spoke with pt and Julio Cesar Silverio (son) [phone] at bedside. Pta, pt lived home alone. Pt has ramp, bsc, cane, walker, shower chair, and wheelchair. [facility]. Referral faxed on this morning. Pt will need CXR, TB Skin test, TB SS form, and COVID test for admission. Pt will need [employer] precert for placement. Clinical updates have been sent for review. Ss following.

## 2023-10-31 NOTE — ANESTHESIA POSTPROCEDURE EVALUATION
Anesthesia Post Evaluation    Patient: Yany Silverio    Procedure(s) Performed: Procedure(s) (LRB):  ARTHROPLASTY, SHOULDER, TOTAL, REVERSE (Left)    Final Anesthesia Type: general      Patient location during evaluation: PACU  Patient participation: Yes- Able to Participate  Level of consciousness: awake and alert and oriented  Post-procedure vital signs: reviewed and stable  Pain management: adequate  Airway patency: patent  NGOZI mitigation strategies: Multimodal analgesia and Use of major conduction anesthesia (spinal/epidural) or peripheral nerve block  PONV status at discharge: No PONV  Anesthetic complications: no      Cardiovascular status: hemodynamically stable  Respiratory status: unassisted and spontaneous ventilation  Hydration status: euvolemic  Follow-up not needed.          Vitals Value Taken Time   /67 10/31/23 0150   Temp 36.7 °C (98 °F) 10/31/23 0150   Pulse 89 10/31/23 0150   Resp 18 10/31/23 0527   SpO2 97 % 10/31/23 0150         Event Time   Out of Recovery 17:09:00         Pain/Consuelo Score: Pain Rating Prior to Med Admin: 9 (10/31/2023  5:27 AM)  Consuelo Score: 9 (10/30/2023  5:05 PM)

## 2023-10-31 NOTE — CONSULTS
[facility]  [facility] Medicine  Consult Note    Patient Name: Yany Silverio  MRN: [id number]  Admission Date: 10/30/2023  Hospital Length of Stay: 0 days  Attending Physician: Adolfo Randall MD   Primary Care Provider: Sanaz Jones FNP           Patient information was obtained from patient, past medical records and primary team.     Consults  Subjective:     Principal Problem: Primary osteoarthritis of left shoulder    Chief Complaint:   Chief Complaint   Patient presents with    Shoulder Pain        HPI: Patient is a 63 female with a medical history of COPD, asthma, emphysema, HTN< HLD, Anxiety, depression , hiatus hernia, NGOZI on CPAP, and osteoarthritis who was seen and evaluated by Dr. Adolfo Randall for severe osteoarthritis of the left shoulder. Patient underwent left reverse shoulder arthroplasty today (10/30/2023) by Dr. Randall. Patient was seen and examined after the surgery. She was afebrile and vital signs stable with no complaints. Pain is controlled and is being managed by the primary team. Hospital medicine was consulted for medical management post surgery.      Past Medical History:   Diagnosis Date    Acute superficial gastritis without hemorrhage 6/23/2021    Acute superficial gastritis without hemorrhage 6/23/2021    Anxiety     Arthritis     newly dx'd RA: sees MD in [place]    Bronchitis 7/22/2021    Bursitis of left shoulder 6/10/2021    Cancer     hx of cervical cancer    COPD (chronic obstructive pulmonary disease)     Depression     Emphysema of lung     Fungal esophagitis 6/23/2021    HH (hiatus hernia) 6/23/2021    Hyperlipidemia     Hypertension     Insomnia 04/16/2020    Low back pain 04/28/2021    Pain in joint of left hip 04/28/2021    Pain in joint of left knee 04/28/2021    Pneumonia of both lower lobes due to infectious organism 1/14/2022       Past Surgical History:   Procedure Laterality Date    OPEN REDUCTION AND INTERNAL FIXATION (ORIF) OF INJURY OF  HIP Left 1/12/2022    Procedure: ORIF, HIP;  Surgeon: Adolfo Randall MD;  Location: [facility] OR;  Service: Orthopedics;  Laterality: Left;    ROBOTIC ARTHROPLASTY, HIP Left 12/13/2021    Procedure: ROBOTIC ARTHROPLASTY,HIP;  Surgeon: Adolfo Randall MD;  Location: [facility] OR;  Service: Orthopedics;  Laterality: Left;    SHOULDER SURGERY Right 2017, 2019       Review of patient's allergies indicates:  No Known Allergies    No current facility-administered medications on file prior to encounter.     Current Outpatient Medications on File Prior to Encounter   Medication Sig    albuterol (PROVENTIL) 2.5 mg /3 mL (0.083 %) nebulizer solution albuterol sulfate 2.5 mg/3 mL (0.083 %) solution for nebulization    amLODIPine (NORVASC) 5 MG tablet Take 1 tablet (5 mg total) by mouth once daily.    busPIRone (BUSPAR) 10 MG tablet Take 1 tablet (10 mg total) by mouth every evening.    fluticasone propionate (FLONASE) 50 mcg/actuation nasal spray 1 spray (50 mcg total) by Each Nostril route once daily.    furosemide (LASIX) 20 MG tablet TAKE 1 TABLET BY MOUTH DAILY FOR 3 DAYS THEN WEIGH DAILY AND IF NO MORE THAN 3 POUND WEIGHT GAIN, TAKE LASIX    lovastatin (MEVACOR) 20 MG tablet Take 1 tablet (20 mg total) by mouth once daily.    oxyCODONE-acetaminophen (PERCOCET) 7.5-325 mg per tablet Take 1 tablet by mouth 2 (two) times daily as needed.    pantoprazole (PROTONIX) 40 MG tablet Take 1 tablet (40 mg total) by mouth once daily.    tiotropium (SPIRIVA) 18 mcg inhalation capsule Inhale 1 capsule (18 mcg total) into the lungs once daily. Controller    VENTOLIN HFA 90 mcg/actuation inhaler Inhale 2 puffs into the lungs every 4 (four) hours as needed for Wheezing (q 4-6 hours).    cetirizine 10 mg chewable tablet Take 10 mg by mouth once daily.    DULoxetine (CYMBALTA) 30 MG capsule 1 capsule.    ketoconazole (NIZORAL) 2 % shampoo Apply topically twice a week.    predniSONE (DELTASONE) 20 MG tablet Take 1 tablet by mouth once  daily.     Family History       Problem Relation (Age of Onset)    Cancer Mother    Hypertension Sister, Maternal Aunt, Maternal Uncle          Tobacco Use    Smoking status: Some Days     Current packs/day: 2.00     Average packs/day: 2.0 packs/day for 20.0 years (40.0 ttl pk-yrs)     Types: Cigarettes     Passive exposure: Past    Smokeless tobacco: Never    Tobacco comments:     Smoked one cigarette today   Substance and Sexual Activity    Alcohol use: Never    Drug use: Yes     Types: Oxycodone     Comment: rx for hip pain: surg planned    Sexual activity: Not on file     Review of Systems   Constitutional:  Positive for activity change. Negative for chills, diaphoresis, fatigue and fever.   HENT:  Negative for sinus pressure, sneezing, sore throat, tinnitus and trouble swallowing.    Eyes:  Negative for discharge, itching and visual disturbance.   Respiratory:  Positive for cough. Negative for chest tightness and shortness of breath.    Cardiovascular:  Positive for leg swelling. Negative for chest pain and palpitations.   Gastrointestinal:  Negative for abdominal pain, constipation, nausea and vomiting.   Genitourinary:  Negative for urgency.   Musculoskeletal:  Positive for arthralgias and myalgias. Negative for neck pain and neck stiffness.   Skin:  Negative for wound.   Neurological:  Negative for tremors, seizures, syncope, speech difficulty and numbness.   Psychiatric/Behavioral:  Negative for agitation, behavioral problems and confusion.      Objective:     Vital Signs (Most Recent):  Temp: 97.8 °F (36.6 °C) (10/30/23 1720)  Pulse: 93 (10/30/23 1720)  Resp: 17 (10/30/23 1720)  BP: (!) 103/58 (10/30/23 1720)  SpO2: (!) 94 % (10/30/23 1825) Vital Signs (24h Range):  Temp:  [97.8 °F (36.6 °C)-98.2 °F (36.8 °C)] 97.8 °F (36.6 °C)  Pulse:  [78-95] 93  Resp:  [15-20] 17  SpO2:  [85 %-96 %] 94 %  BP: (103-157)/(58-82) 103/58     Weight: 72.6 kg (160 lb)  Body mass index is 27.46 kg/m².     Physical Exam  Vitals  and nursing note reviewed.   Constitutional:       General: She is not in acute distress.     Appearance: Normal appearance. She is not ill-appearing, toxic-appearing or diaphoretic.   HENT:      Head: Normocephalic and atraumatic.      Right Ear: External ear normal.      Left Ear: External ear normal.      Nose: Nose normal.      Mouth/Throat:      Pharynx: Oropharynx is clear.   Eyes:      General:         Right eye: No discharge.         Left eye: No discharge.      Conjunctiva/sclera: Conjunctivae normal.   Cardiovascular:      Rate and Rhythm: Normal rate and regular rhythm.      Pulses: Normal pulses.      Heart sounds: Normal heart sounds.   Pulmonary:      Effort: Pulmonary effort is normal.      Breath sounds: Normal breath sounds. No wheezing, rhonchi or rales.   Abdominal:      Palpations: Abdomen is soft.      Tenderness: There is no abdominal tenderness. There is no guarding.      Hernia: No hernia is present.   Musculoskeletal:      Cervical back: Neck supple.      Right lower leg: Edema present.      Left lower leg: Edema present.      Comments: Left shoulder with sling in place    Skin:     General: Skin is warm.   Neurological:      Mental Status: She is alert and oriented to person, place, and time.   Psychiatric:         Mood and Affect: Mood normal.         Behavior: Behavior normal.         Thought Content: Thought content normal.         Judgment: Judgment normal.          Significant Labs: All pertinent labs within the past 24 hours have been reviewed.    Significant Imaging: I have reviewed all pertinent imaging results/findings within the past 24 hours.  I have reviewed and interpreted all pertinent imaging results/findings within the past 24 hours.    Assessment/Plan:     * Primary osteoarthritis of left shoulder  - Day 0, S/P left reverse shoulder arthroplasty on 10/30/2023   - Continue pain med as ordered by primary team  - CBC and BMP in AM      Chronic obstructive pulmonary  disease  Patient's COPD is controlled currently.  Patient is currently off COPD Pathway. Continue scheduled inhalers Steroids, Antibiotics and Supplemental oxygen and monitor respiratory status closely.     Continue home steroid and bronchodilators      Hypertension  Blood pressure current controlled  Continue home medications  Monitor blood pressure and adjust medications as indicated    Anxiety and depression  Patient has persistent depression which is moderate and is currently controlled. Will Continue anti-depressant medications. We will not consult psychiatry at this time. Patient does not display psychosis at this time. Continue to monitor closely and adjust plan of care as needed.        HH (hiatus hernia)  Continue home PPI      Anemia  H&H stable  Repeat CBC in AM    Dyslipidemia  Continue home statin      NGOZI on CPAP  CPAP at night while at the hospital      VTE Risk Mitigation (From admission, onward)           Ordered     IP VTE LOW RISK PATIENT  Once         10/30/23 1650                        Thank you for your consult. I will follow-up with patient. Please contact us if you have any additional questions.    Brian Foy MD  Department of Hospital Medicine   [facility]

## 2023-10-31 NOTE — PT/OT/SLP EVAL
Physical Therapy Evaluation    Patient Name:  Yany Silverio   MRN:  [id number]    Recommendations:     Discharge Recommendations: Low Intensity Therapy   Discharge Equipment Recommendations: none   Barriers to discharge: Decreased caregiver support    Assessment:     Yany Silverio is a 63 y.o. female admitted with a medical diagnosis of Primary osteoarthritis of left shoulder.  She presents with the following impairments/functional limitations: decreased ROM, impaired functional mobility, pain Patient with some difficulty with bed mobility, adls, and transfers due to being immobilized left ue. Patient lives alone and may benefit from continued rehab to progress independence before going home alone. .    Rehab Prognosis: Good; patient would benefit from acute skilled PT services to address these deficits and reach maximum level of function.    Recent Surgery: Procedure(s) (LRB):  ARTHROPLASTY, SHOULDER, TOTAL, REVERSE (Left) 1 Day Post-Op    Plan:     During this hospitalization, patient to be seen 5 x/week to address the identified rehab impairments via gait training, therapeutic activities, therapeutic exercises and progress toward the following goals:    Plan of Care Expires:       Subjective     Chief Complaint: left shoulder pain  Patient/Family Comments/goals: Patient doesn't feel safe returning home alone  Pain/Comfort:  Pain Rating 1: 7/10  Location - Side 1: Left  Location 1: shoulder  Pain Addressed 1: Cessation of Activity, Pre-medicate for activity    Patients cultural, spiritual, Adventism conflicts given the current situation: no    Living Environment:  Lives alone  Prior to admission, patients level of function was independent.  Equipment used at home: 3-in-1 commode, CPAP.  DME owned (not currently used): none.  Upon discharge, patient will have assistance from family.    Objective:     Communicated with nurse prior to session.  Patient found supine with    upon PT entry to room.    General  Precautions: Standard, fall  Orthopedic Precautions:    Braces: Shoulder abduction brace  Respiratory Status: Nasal cannula, flow 2 L/min    Exams:  Cognitive Exam:  Patient is oriented to Person, Place, Time, and Situation  LUE ROM: immobilized  RLE ROM: WFL  RLE Strength: 4/5  LLE ROM: WFL  LLE Strength: 4/5    Functional Mobility:  Bed Mobility:     Supine to Sit: minimum assistance  Sit to Supine: minimum assistance  Transfers:     Sit to Stand:  contact guard assistance with no AD  Gait: ambulated 60 feet cga, 50% normal ana paula      AM-PAC 6 CLICK MOBILITY  Total Score:18       Treatment & Education:  Tx plan    Patient left supine with all lines intact.    GOALS:   Multidisciplinary Problems       Physical Therapy Goals          Problem: Physical Therapy    Goal Priority Disciplines Outcome Goal Variances Interventions   Physical Therapy Goal     PT, PT/OT Ongoing, Progressing     Description: Short Term Goals  Ambulate SBA - 200 feet with none assistive device.   Supine to sit stand-by  Sit to stand independent with device  SPT stand-by  5. Independent with HEP    Long Term Goals  Ambulate SBA - 400 feet with none assistive device.   Supine to sit independent without device  Sit to stand independent without device  SPT independent without device  Needed equipment for home.                              History:     Past Medical History:   Diagnosis Date    Acute superficial gastritis without hemorrhage 6/23/2021    Acute superficial gastritis without hemorrhage 6/23/2021    Anxiety     Arthritis     newly dx'd RA: seelea NAVARRO in [place]    Bronchitis 7/22/2021    Bursitis of left shoulder 6/10/2021    Cancer     hx of cervical cancer    COPD (chronic obstructive pulmonary disease)     Depression     Emphysema of lung     Fungal esophagitis 6/23/2021    HH (hiatus hernia) 6/23/2021    Hyperlipidemia     Hypertension     Insomnia 04/16/2020    Low back pain 04/28/2021    Pain in joint of left hip 04/28/2021     Pain in joint of left knee 04/28/2021    Pneumonia of both lower lobes due to infectious organism 1/14/2022       Past Surgical History:   Procedure Laterality Date    OPEN REDUCTION AND INTERNAL FIXATION (ORIF) OF INJURY OF HIP Left 1/12/2022    Procedure: ORIF, HIP;  Surgeon: Adolfo Randall MD;  Location: [facility];  Service: Orthopedics;  Laterality: Left;    REVERSE TOTAL SHOULDER ARTHROPLASTY Left 10/30/2023    Procedure: ARTHROPLASTY, SHOULDER, TOTAL, REVERSE;  Surgeon: Adolfo Randall MD;  Location: [facility];  Service: Orthopedics;  Laterality: Left;    ROBOTIC ARTHROPLASTY, HIP Left 12/13/2021    Procedure: ROBOTIC ARTHROPLASTY,HIP;  Surgeon: Adolfo Randall MD;  Location: [facility];  Service: Orthopedics;  Laterality: Left;    SHOULDER SURGERY Right 2017, 2019       Time Tracking:     PT Received On: 10/31/23  PT Start Time: 0900     PT Stop Time: 0920  PT Total Time (min): 20 min     Billable Minutes: Evaluation 20      10/31/2023

## 2023-11-01 VITALS
TEMPERATURE: 98 F | HEART RATE: 98 BPM | BODY MASS INDEX: 27.31 KG/M2 | HEIGHT: 64 IN | RESPIRATION RATE: 19 BRPM | SYSTOLIC BLOOD PRESSURE: 116 MMHG | OXYGEN SATURATION: 95 % | DIASTOLIC BLOOD PRESSURE: 64 MMHG | WEIGHT: 160 LBS

## 2023-11-01 LAB
ANION GAP SERPL CALCULATED.3IONS-SCNC: 9 MMOL/L (ref 7–16)
BASOPHILS # BLD AUTO: 0.02 K/UL (ref 0–0.2)
BASOPHILS NFR BLD AUTO: 0.1 % (ref 0–1)
BUN SERPL-MCNC: 10 MG/DL (ref 7–18)
BUN/CREAT SERPL: 18 (ref 6–20)
CALCIUM SERPL-MCNC: 8.5 MG/DL (ref 8.5–10.1)
CHLORIDE SERPL-SCNC: 108 MMOL/L (ref 98–107)
CO2 SERPL-SCNC: 26 MMOL/L (ref 21–32)
CREAT SERPL-MCNC: 0.56 MG/DL (ref 0.55–1.02)
DIFFERENTIAL METHOD BLD: ABNORMAL
EGFR (NO RACE VARIABLE) (RUSH/TITUS): 102 ML/MIN/1.73M2
EOSINOPHIL # BLD AUTO: 0 K/UL (ref 0–0.5)
EOSINOPHIL NFR BLD AUTO: 0 % (ref 1–4)
ERYTHROCYTE [DISTWIDTH] IN BLOOD BY AUTOMATED COUNT: 17.1 % (ref 11.5–14.5)
GLUCOSE SERPL-MCNC: 101 MG/DL (ref 74–106)
HCT VFR BLD AUTO: 27.9 % (ref 38–47)
HGB BLD-MCNC: 9.5 G/DL (ref 12–16)
IMM GRANULOCYTES # BLD AUTO: 0.1 K/UL (ref 0–0.04)
IMM GRANULOCYTES NFR BLD: 0.6 % (ref 0–0.4)
LYMPHOCYTES # BLD AUTO: 2.05 K/UL (ref 1–4.8)
LYMPHOCYTES NFR BLD AUTO: 13.1 % (ref 27–41)
MCH RBC QN AUTO: 32.2 PG (ref 27–31)
MCHC RBC AUTO-ENTMCNC: 34.1 G/DL (ref 32–36)
MCV RBC AUTO: 94.6 FL (ref 80–96)
MONOCYTES # BLD AUTO: 1.51 K/UL (ref 0–0.8)
MONOCYTES NFR BLD AUTO: 9.6 % (ref 2–6)
MPC BLD CALC-MCNC: 9.8 FL (ref 9.4–12.4)
NEUTROPHILS # BLD AUTO: 12.01 K/UL (ref 1.8–7.7)
NEUTROPHILS NFR BLD AUTO: 76.6 % (ref 53–65)
NRBC # BLD AUTO: 0 X10E3/UL
NRBC, AUTO (.00): 0 %
PLATELET # BLD AUTO: 225 K/UL (ref 150–400)
POTASSIUM SERPL-SCNC: 3.2 MMOL/L (ref 3.5–5.1)
RBC # BLD AUTO: 2.95 M/UL (ref 4.2–5.4)
SARS-COV-2 RDRP RESP QL NAA+PROBE: NEGATIVE
SODIUM SERPL-SCNC: 140 MMOL/L (ref 136–145)
WBC # BLD AUTO: 15.69 K/UL (ref 4.5–11)

## 2023-11-01 PROCEDURE — 25000003 PHARM REV CODE 250: Performed by: ORTHOPAEDIC SURGERY

## 2023-11-01 PROCEDURE — 25000003 PHARM REV CODE 250: Performed by: HOSPITALIST

## 2023-11-01 PROCEDURE — 27000221 HC OXYGEN, UP TO 24 HOURS

## 2023-11-01 PROCEDURE — 94640 AIRWAY INHALATION TREATMENT: CPT | Mod: XB

## 2023-11-01 PROCEDURE — 63600175 PHARM REV CODE 636 W HCPCS: Performed by: ORTHOPAEDIC SURGERY

## 2023-11-01 PROCEDURE — 25000242 PHARM REV CODE 250 ALT 637 W/ HCPCS: Performed by: ORTHOPAEDIC SURGERY

## 2023-11-01 PROCEDURE — 80048 BASIC METABOLIC PNL TOTAL CA: CPT | Performed by: ORTHOPAEDIC SURGERY

## 2023-11-01 PROCEDURE — 99900035 HC TECH TIME PER 15 MIN (STAT)

## 2023-11-01 PROCEDURE — 85025 COMPLETE CBC W/AUTO DIFF WBC: CPT | Performed by: ORTHOPAEDIC SURGERY

## 2023-11-01 PROCEDURE — 97530 THERAPEUTIC ACTIVITIES: CPT

## 2023-11-01 PROCEDURE — 94761 N-INVAS EAR/PLS OXIMETRY MLT: CPT

## 2023-11-01 PROCEDURE — S4991 NICOTINE PATCH NONLEGEND: HCPCS | Performed by: HOSPITALIST

## 2023-11-01 PROCEDURE — 87635 SARS-COV-2 COVID-19 AMP PRB: CPT | Performed by: ORTHOPAEDIC SURGERY

## 2023-11-01 PROCEDURE — 97116 GAIT TRAINING THERAPY: CPT

## 2023-11-01 RX ORDER — OXYCODONE HYDROCHLORIDE 5 MG/1
10 TABLET ORAL EVERY 4 HOURS PRN
Status: DISCONTINUED | OUTPATIENT
Start: 2023-11-01 | End: 2023-11-01 | Stop reason: HOSPADM

## 2023-11-01 RX ORDER — DOCUSATE SODIUM 100 MG/1
100 CAPSULE, LIQUID FILLED ORAL 2 TIMES DAILY
Status: DISCONTINUED | OUTPATIENT
Start: 2023-11-01 | End: 2023-11-01 | Stop reason: HOSPADM

## 2023-11-01 RX ORDER — ACETAMINOPHEN 10 MG/ML
1000 INJECTION, SOLUTION INTRAVENOUS ONCE
Status: DISCONTINUED | OUTPATIENT
Start: 2023-11-01 | End: 2023-11-01 | Stop reason: HOSPADM

## 2023-11-01 RX ORDER — SODIUM CHLORIDE 0.9 % (FLUSH) 0.9 %
2 SYRINGE (ML) INJECTION
Status: DISCONTINUED | OUTPATIENT
Start: 2023-11-01 | End: 2023-11-01 | Stop reason: HOSPADM

## 2023-11-01 RX ORDER — OXYCODONE HYDROCHLORIDE 5 MG/1
5 TABLET ORAL EVERY 4 HOURS PRN
Status: DISCONTINUED | OUTPATIENT
Start: 2023-11-01 | End: 2023-11-01 | Stop reason: HOSPADM

## 2023-11-01 RX ORDER — PROMETHAZINE HYDROCHLORIDE 25 MG/1
25 TABLET ORAL EVERY 6 HOURS PRN
Status: DISCONTINUED | OUTPATIENT
Start: 2023-11-01 | End: 2023-11-01 | Stop reason: HOSPADM

## 2023-11-01 RX ORDER — IBUPROFEN 200 MG
1 TABLET ORAL DAILY
Status: DISCONTINUED | OUTPATIENT
Start: 2023-11-01 | End: 2023-11-01 | Stop reason: HOSPADM

## 2023-11-01 RX ORDER — ONDANSETRON 4 MG/1
8 TABLET, ORALLY DISINTEGRATING ORAL EVERY 8 HOURS PRN
Status: DISCONTINUED | OUTPATIENT
Start: 2023-11-01 | End: 2023-11-01 | Stop reason: HOSPADM

## 2023-11-01 RX ADMIN — IPRATROPIUM BROMIDE 0.5 MG: 0.5 SOLUTION RESPIRATORY (INHALATION) at 12:11

## 2023-11-01 RX ADMIN — IPRATROPIUM BROMIDE 0.5 MG: 0.5 SOLUTION RESPIRATORY (INHALATION) at 07:11

## 2023-11-01 RX ADMIN — GUAIFENESIN AND DEXTROMETHORPHAN 10 ML: 100; 10 SYRUP ORAL at 12:11

## 2023-11-01 RX ADMIN — OXYCODONE HYDROCHLORIDE 5 MG: 5 TABLET ORAL at 06:11

## 2023-11-01 RX ADMIN — ALBUTEROL SULFATE 2.5 MG: 2.5 SOLUTION RESPIRATORY (INHALATION) at 12:11

## 2023-11-01 RX ADMIN — ALBUTEROL SULFATE 2.5 MG: 2.5 SOLUTION RESPIRATORY (INHALATION) at 07:11

## 2023-11-01 RX ADMIN — AMLODIPINE BESYLATE 5 MG: 5 TABLET ORAL at 10:11

## 2023-11-01 RX ADMIN — GUAIFENESIN AND DEXTROMETHORPHAN 10 ML: 100; 10 SYRUP ORAL at 10:11

## 2023-11-01 RX ADMIN — PREDNISONE 20 MG: 20 TABLET ORAL at 10:11

## 2023-11-01 RX ADMIN — PRAVASTATIN SODIUM 20 MG: 10 TABLET ORAL at 10:11

## 2023-11-01 RX ADMIN — DOCUSATE SODIUM 100 MG: 100 CAPSULE, LIQUID FILLED ORAL at 10:11

## 2023-11-01 RX ADMIN — NICOTINE 1 PATCH: 14 PATCH, EXTENDED RELEASE TRANSDERMAL at 09:11

## 2023-11-01 RX ADMIN — NICOTINE 1 PATCH: 14 PATCH, EXTENDED RELEASE TRANSDERMAL at 12:11

## 2023-11-01 RX ADMIN — CELECOXIB 200 MG: 100 CAPSULE ORAL at 10:11

## 2023-11-01 RX ADMIN — PANTOPRAZOLE SODIUM 40 MG: 40 TABLET, DELAYED RELEASE ORAL at 10:11

## 2023-11-01 RX ADMIN — FLUTICASONE PROPIONATE 50 MCG: 50 SPRAY, METERED NASAL at 10:11

## 2023-11-01 RX ADMIN — PREGABALIN 75 MG: 75 CAPSULE ORAL at 10:11

## 2023-11-01 RX ADMIN — DULOXETINE HYDROCHLORIDE 30 MG: 30 CAPSULE, DELAYED RELEASE ORAL at 10:11

## 2023-11-01 NOTE — PT/OT/SLP PROGRESS
Physical Therapy Treatment    Patient Name:  Yany Silverio   MRN:  [id number]    Recommendations:     Discharge Recommendations: Low Intensity Therapy  Discharge Equipment Recommendations: none  Barriers to discharge: Decreased caregiver support    Assessment:     Yany Silverio is a 63 y.o. female admitted with a medical diagnosis of Primary osteoarthritis of left shoulder.  She presents with the following impairments/functional limitations: decreased ROM, impaired functional mobility, pain Patient accepted to [facility] today. .    Rehab Prognosis: Good; patient would benefit from acute skilled PT services to address these deficits and reach maximum level of function.    Recent Surgery: Procedure(s) (LRB):  ARTHROPLASTY, SHOULDER, TOTAL, REVERSE (Left) 2 Days Post-Op    Plan:     During this hospitalization, patient to be seen 5 x/week to address the identified rehab impairments via gait training, therapeutic activities, therapeutic exercises and progress toward the following goals:    Plan of Care Expires:       Subjective     Chief Complaint: post op pain  Patient/Family Comments/goals: patient has been accepted to sb. Patient wants to work on getting dressed  Pain/Comfort:  Pain Rating 1: 4/10  Location - Side 1: Left  Location 1: shoulder  Pain Addressed 1: Cessation of Activity, Pre-medicate for activity      Objective:     Communicated with nurse prior to session.  Patient found supine with   upon PT entry to room.     General Precautions: Standard, fall  Orthopedic Precautions:    Braces: Shoulder abduction brace  Respiratory Status: Room air     Functional Mobility:  Bed Mobility:     Supine to Sit: minimum assistance  Sit to Supine: minimum assistance  Transfers:     Sit to Stand:  contact guard assistance with no AD  Gait: ambulated 100 feet cga      AM-PAC 6 CLICK MOBILITY  Turning over in bed (including adjusting bedclothes, sheets and blankets)?: 3  Sitting down on and standing up from a chair with arms  (e.g., wheelchair, bedside commode, etc.): 3  Moving from lying on back to sitting on the side of the bed?: 3  Moving to and from a bed to a chair (including a wheelchair)?: 3  Need to walk in hospital room?: 3  Climbing 3-5 steps with a railing?: 3  Basic Mobility Total Score: 18       Treatment & Education:  Worked on upper extremity dressing. Patient able to put shirt on min assist. Cues for starting with involved upper extremity.   Educated in don/doff abd pillow    Patient left up in chair with all lines intact..    GOALS:   Multidisciplinary Problems       Physical Therapy Goals          Problem: Physical Therapy    Goal Priority Disciplines Outcome Goal Variances Interventions   Physical Therapy Goal     PT, PT/OT Ongoing, Progressing     Description: Short Term Goals  Ambulate SBA - 200 feet with none assistive device.   Supine to sit stand-by  Sit to stand independent with device  SPT stand-by  5. Independent with HEP    Long Term Goals  Ambulate SBA - 400 feet with none assistive device.   Supine to sit independent without device  Sit to stand independent without device  SPT independent without device  Needed equipment for home.                              Time Tracking:     PT Received On: 11/01/23  PT Start Time: 0930     PT Stop Time: 0955  PT Total Time (min): 25 min     Billable Minutes: gait 10, therapeutic activity 15    Treatment Type: Treatment  PT/PTA: PT     Number of PTA visits since last PT visit: 0     11/01/2023

## 2023-11-01 NOTE — PLAN OF CARE
[facility]  Discharge Final Note    Primary Care Provider: Sanaz Jones FNP    Expected Discharge Date: 11/1/2023    Final Discharge Note (most recent)       Final Note - 11/01/23 1132          Final Note    Assessment Type Final Discharge Note     Anticipated Discharge Disposition Skilled Nursing Facility     What phone number can be called within the next 1-3 days to see how you are doing after discharge? [phone]        Post-Acute Status    Post-Acute Authorization Placement     Post-Acute Placement Status Set-up Complete/Auth obtained     Patient choice form signed by patient/caregiver List with quality metrics by geographic area provided;List from CMS Compare;List from System Post-Acute Care     Discharge Delays None known at this time                      Follow-up providers       Anum Schuler FNP   Specialty: Family Medicine, Emergency Medicine, Orthopedic Surgery    [address] [place] [address] [facility] [place] [address]   Phone: [phone]       Next Steps: Follow up    Instructions: NOVEMBER 7, 2023 @ 1:40 PM-TUESDAY              After-discharge care                Destination       [facility]   Service: Skilled Nursing    [address] [place] [address] [place] [address]   Phone: [phone]                             Pt to dc to [facility] on this day. Facility has 2pm cut off time. No further dc needs noted.

## 2023-11-06 ENCOUNTER — TELEPHONE (OUTPATIENT)
Dept: ORTHOPEDICS | Facility: CLINIC | Age: 63
End: 2023-11-06
Payer: MEDICARE

## 2023-11-06 NOTE — DISCHARGE SUMMARY
[facility]  Discharge Note  Short Stay    Procedure(s) (LRB):  ARTHROPLASTY, SHOULDER, TOTAL, REVERSE (Left)      OUTCOME: Patient tolerated treatment/procedure well without complication and is now ready for discharge.  Yany Silverio was admitted following the aforementioned surgical procedure.  she received perioperative antibiotics over 23 hours following the surgery and participated in post operative physical therapy.  she was made familiar with the post-operative rehabilitation process and the need for DVT prophylaxis was discussed prior to discharged.  Yany Silverio was discharged safely having suffered no untoward events.       DISPOSITION: Home or Self Care    FINAL DIAGNOSIS:  Primary osteoarthritis of left shoulder    FOLLOWUP: In clinic    DISCHARGE INSTRUCTIONS:    Discharge Procedure Orders   SLING ORTHOPEDIC MEDIUM FOR HOME USE   Order Comments: Abduction pillow sling for small, medium, large, massive rotator cuff repairs; Abduction pillow sling for proximal humerus ORIF, reverse shoulder arthroplasty, total shoulder arthroplasty.  Regular sling for clavicle ORIF, shoulder debridements.     Diet general     Call MD for:  temperature >100.4     Call MD for:  persistent nausea and vomiting     Call MD for:  severe uncontrolled pain     Call MD for:  difficulty breathing, headache or visual disturbances     Call MD for:  redness, tenderness, or signs of infection (pain, swelling, redness, odor or green/yellow discharge around incision site)     Call MD for:  hives     Call MD for:  persistent dizziness or light-headedness     Call MD for:  extreme fatigue     Leave dressing on - Keep it clean, dry, and intact until clinic visit   Order Comments: For shoulder arthroscopy patients, ok to remove dressing in 3 days and apply band-aids to portal sites.  For ORIF, total shoulder arthroplasty, or reverse shoulder arthroplasty, keeping dressing clean, dry, and intact until clinic  follow up.     Non weight bearing         Clinical Reference Documents Added to Patient Instructions         Document    [facility] AVS FACTSHEET    SHOULDER REPLACEMENT DISCHARGE INSTRUCTIONS (ENGLISH)            TIME SPENT ON DISCHARGE: 9 minutes

## 2023-11-06 NOTE — TELEPHONE ENCOUNTER
----- Message from Funmilayo Roberson sent at 11/3/2023  2:51 PM CDT -----  Regarding: MEDICATION  PATIENT IS CALLING TO SEE IF HER MEDICATION CAN BE INCREASED. SHE SAYS SHE IS HURTING REALLY BAD AND NOT ABLE TO SLEEP.  CALL BACK NUMBER IS [phone].

## 2023-11-07 ENCOUNTER — OFFICE VISIT (OUTPATIENT)
Dept: ORTHOPEDICS | Facility: CLINIC | Age: 63
End: 2023-11-07
Payer: MEDICARE

## 2023-11-07 ENCOUNTER — TELEPHONE (OUTPATIENT)
Dept: ORTHOPEDICS | Facility: CLINIC | Age: 63
End: 2023-11-07
Payer: MEDICARE

## 2023-11-07 DIAGNOSIS — Z96.612 STATUS POST REVERSE TOTAL REPLACEMENT OF LEFT SHOULDER: ICD-10-CM

## 2023-11-07 DIAGNOSIS — M25.511 CHRONIC RIGHT SHOULDER PAIN: Primary | ICD-10-CM

## 2023-11-07 DIAGNOSIS — G89.29 CHRONIC RIGHT SHOULDER PAIN: Primary | ICD-10-CM

## 2023-11-07 PROCEDURE — 99211 OFF/OP EST MAY X REQ PHY/QHP: CPT | Mod: PBBFAC,25 | Performed by: NURSE PRACTITIONER

## 2023-11-07 PROCEDURE — 99999PBSHW PR PBB SHADOW TECHNICAL ONLY FILED TO HB: ICD-10-PCS | Mod: PBBFAC,,,

## 2023-11-07 PROCEDURE — 20610 LARGE JOINT ASPIRATION/INJECTION: R SUBACROMIAL BURSA: ICD-10-PCS | Mod: S$PBB,79,RT, | Performed by: NURSE PRACTITIONER

## 2023-11-07 PROCEDURE — 99999PBSHW PR PBB SHADOW TECHNICAL ONLY FILED TO HB: Mod: PBBFAC,,,

## 2023-11-07 PROCEDURE — 4010F PR ACE/ARB THEARPY RXD/TAKEN: ICD-10-PCS | Mod: CPTII,,, | Performed by: NURSE PRACTITIONER

## 2023-11-07 PROCEDURE — 99024 POSTOP FOLLOW-UP VISIT: CPT | Mod: S$PBB,,, | Performed by: NURSE PRACTITIONER

## 2023-11-07 PROCEDURE — 4010F ACE/ARB THERAPY RXD/TAKEN: CPT | Mod: CPTII,,, | Performed by: NURSE PRACTITIONER

## 2023-11-07 PROCEDURE — 20610 DRAIN/INJ JOINT/BURSA W/O US: CPT | Mod: PBBFAC | Performed by: NURSE PRACTITIONER

## 2023-11-07 PROCEDURE — 99024 PR POST-OP FOLLOW-UP VISIT: ICD-10-PCS | Mod: S$PBB,,, | Performed by: NURSE PRACTITIONER

## 2023-11-07 RX ORDER — TRIAMCINOLONE ACETONIDE 40 MG/ML
80 INJECTION, SUSPENSION INTRA-ARTICULAR; INTRAMUSCULAR
Status: DISCONTINUED | OUTPATIENT
Start: 2023-11-07 | End: 2023-11-07 | Stop reason: HOSPADM

## 2023-11-07 RX ADMIN — TRIAMCINOLONE ACETONIDE 80 MG: 40 INJECTION, SUSPENSION INTRA-ARTICULAR; INTRAMUSCULAR at 01:11

## 2023-11-07 NOTE — TELEPHONE ENCOUNTER
CALLED PATIENT TO LET HER KNOW WE DO NOT HAVE A 3:30 APPOINTMENT AVAILABLE. SHE AGREED TO KEEP HER 1:40 APPOINTMENT.     ----- Message from Funmilayo Roberson sent at 11/7/2023  8:22 AM CST -----  Regarding: LATER TIME  PATIENT IS TRYING TO SEE IF SHE CAN COME IN AT 3:30 INSTEAD OF 1:40.  CALL BACK NUMBER IS [phone].

## 2023-11-07 NOTE — PROGRESS NOTES
HISTORY OF PRESENT ILLNESS:       No surgery found No surgery found      Pt is here today for First post-operative followup of her No surgery found.  she is doing well.  We have reviewed her findings and discussed plan of care and future treatment options, including the physical therapy plan.      Patient is 1 weeks post op left reverse total shoulder arthroplasty, she is doing well.  She is currently at [facility].  She does have a good bit of bruising to her shoulder.  Incisions look good today.  Steri-Strips applied.  Reviewed plan of care in detail.  She is wearing her sling today.  She is also requesting another injection in her right shoulder today                                                                               PHYSICAL EXAMINATION:     Incision sites healed well  No evidence of any erythema, infection or induration  Minimal effusion  2+ DP pulse    Left shoulder range of motion, passively forward flexion 0-60, external rotation 0-10                                                                               ASSESSMENT:                                                                                                                                               1. Status post above, doing well.                                                                                                                               PLAN:       Wounds were treated today  DVT prophylaxis discussed  Therapy plan discussed in great detail today; all questions answered.                                                                          Right shoulder subacromial injection today.  Reviewed plan of care in detail.  Continue PT.  Return to clinic with Dr. Randall in 4 weeks.                                                                     There are no Patient Instructions on file for this visit.

## 2023-11-07 NOTE — PROCEDURES
Large Joint Aspiration/Injection: R subacromial bursa    Date/Time: 11/7/2023 1:40 PM    Performed by: Anum Schuler FNP  Authorized by: Anum Schuler FNP    Consent Done?:  Yes (Verbal)  Indications:  Pain  Site marked: the procedure site was marked    Local anesthetic:  Bupivacaine 0.25% without epinephrine (4 cc's of 0.25% bupivicaine)    Details:  Needle Size:  22 G  Approach:  Posterior  Location:  Shoulder  Site:  R subacromial bursa  Medications:  80 mg triamcinolone acetonide 40 mg/mL  Patient tolerance:  Patient tolerated the procedure well with no immediate complications

## 2023-11-16 ENCOUNTER — TELEPHONE (OUTPATIENT)
Dept: ORTHOPEDICS | Facility: CLINIC | Age: 63
End: 2023-11-16
Payer: MEDICARE

## 2023-11-16 DIAGNOSIS — Z96.612 STATUS POST REVERSE TOTAL REPLACEMENT OF LEFT SHOULDER: Primary | ICD-10-CM

## 2023-11-16 NOTE — TELEPHONE ENCOUNTER
----- Message from Funmilayo Roberson sent at 11/16/2023  9:03 AM CST -----  Regarding: PAPER  PATIENT SAYS SHE WOULD LIKE TO SPEAK WITH THE NURSE ABOUT FILLING OUT A REHAB PHYSICAL THERAPY PAPER.  CALL BACK NUMBER IS [phone].

## 2023-11-21 PROCEDURE — G0180 MD CERTIFICATION HHA PATIENT: HCPCS | Mod: ,,, | Performed by: ORTHOPAEDIC SURGERY

## 2023-11-27 ENCOUNTER — PATIENT OUTREACH (OUTPATIENT)
Dept: FAMILY MEDICINE | Facility: CLINIC | Age: 63
End: 2023-11-27
Payer: MEDICARE

## 2023-11-27 NOTE — PROGRESS NOTES
Ronak gap report  for hypertension reviewed.  Patient is now under the care of Dr. Simon.  Spoke with her and asked that she call [facility] to update PCP.  Verbalized understanding.

## 2023-12-04 DIAGNOSIS — Z96.612 STATUS POST REVERSE TOTAL REPLACEMENT OF LEFT SHOULDER: Primary | ICD-10-CM

## 2023-12-05 ENCOUNTER — DOCUMENT SCAN (OUTPATIENT)
Dept: HOME HEALTH SERVICES | Facility: HOSPITAL | Age: 63
End: 2023-12-05
Payer: MEDICARE

## 2023-12-07 ENCOUNTER — OFFICE VISIT (OUTPATIENT)
Dept: ORTHOPEDICS | Facility: CLINIC | Age: 63
End: 2023-12-07
Payer: MEDICARE

## 2023-12-07 ENCOUNTER — HOSPITAL ENCOUNTER (OUTPATIENT)
Dept: RADIOLOGY | Facility: HOSPITAL | Age: 63
Discharge: HOME | End: 2023-12-07
Attending: ORTHOPAEDIC SURGERY
Payer: MEDICARE

## 2023-12-07 DIAGNOSIS — Z96.612 STATUS POST REVERSE TOTAL REPLACEMENT OF LEFT SHOULDER: ICD-10-CM

## 2023-12-07 DIAGNOSIS — Z96.612 STATUS POST REVERSE TOTAL REPLACEMENT OF LEFT SHOULDER: Primary | ICD-10-CM

## 2023-12-07 PROCEDURE — 99024 POSTOP FOLLOW-UP VISIT: CPT | Mod: ,,, | Performed by: ORTHOPAEDIC SURGERY

## 2023-12-07 PROCEDURE — 1159F PR MEDICATION LIST DOCUMENTED IN MEDICAL RECORD: ICD-10-PCS | Mod: CPTII,,, | Performed by: ORTHOPAEDIC SURGERY

## 2023-12-07 PROCEDURE — 4010F PR ACE/ARB THEARPY RXD/TAKEN: ICD-10-PCS | Mod: CPTII,,, | Performed by: ORTHOPAEDIC SURGERY

## 2023-12-07 PROCEDURE — 99213 OFFICE O/P EST LOW 20 MIN: CPT | Mod: PBBFAC | Performed by: ORTHOPAEDIC SURGERY

## 2023-12-07 PROCEDURE — 1159F MED LIST DOCD IN RCRD: CPT | Mod: CPTII,,, | Performed by: ORTHOPAEDIC SURGERY

## 2023-12-07 PROCEDURE — 1160F PR REVIEW ALL MEDS BY PRESCRIBER/CLIN PHARMACIST DOCUMENTED: ICD-10-PCS | Mod: CPTII,,, | Performed by: ORTHOPAEDIC SURGERY

## 2023-12-07 PROCEDURE — 73030 XR SHOULDER COMPLETE 2 OR MORE VIEWS LEFT: ICD-10-PCS | Mod: 26,LT,, | Performed by: ORTHOPAEDIC SURGERY

## 2023-12-07 PROCEDURE — 99024 PR POST-OP FOLLOW-UP VISIT: ICD-10-PCS | Mod: ,,, | Performed by: ORTHOPAEDIC SURGERY

## 2023-12-07 PROCEDURE — 1160F RVW MEDS BY RX/DR IN RCRD: CPT | Mod: CPTII,,, | Performed by: ORTHOPAEDIC SURGERY

## 2023-12-07 PROCEDURE — 4010F ACE/ARB THERAPY RXD/TAKEN: CPT | Mod: CPTII,,, | Performed by: ORTHOPAEDIC SURGERY

## 2023-12-07 PROCEDURE — 73030 X-RAY EXAM OF SHOULDER: CPT | Mod: TC,LT

## 2023-12-07 PROCEDURE — 73030 X-RAY EXAM OF SHOULDER: CPT | Mod: 26,LT,, | Performed by: ORTHOPAEDIC SURGERY

## 2023-12-07 NOTE — PROGRESS NOTES
Post-Operative Shoulder Arthroplasty      Arthroplasty, Shoulder, Total, Reverse - Left 10/30/2023      Pt is here today for Second post-operative followup of her Arthroplasty, Shoulder, Total, Reverse - Left.      she is doing well.  She is still doing home health PT.        We have reviewed her findings and discussed plan of care and future treatment options, including the physical therapy plan.          Physical Exam:     The affected shoulder was inspected today.   The wound is healing well with no signs of erythema or warmth.  There is no drainage.  No clinical signs or symptoms of infection are present. Sensation is well maintained in the axillary nerve distribution.  NVI in the upper extremity.  No neurovascular deficits seen  ROM: 0-160 ff, 0-45 er        X-Ray Shoulder 2 or More Views Left    Result Date: 12/7/2023  See Procedure Notes for results. IMPRESSION: Please see Ortho procedure notes for report.  This procedure was auto-finalized by: Virtual Radiologist   3 X-rays of the left shoulder were taken today. Reverse shoulder prosthesis appears to be in excellent alignment. The stem is well seated. No evidence of loosening.       Yany was seen today for follow-up.    Diagnoses and all orders for this visit:    Status post reverse total replacement of left shoulder          We will continue post-operative physical therapy until the patient feels that they are independent with a home rehab program.  Will continue with our schedule post op plan of care.   Return to work status/ return to activities status was discussed today in detail.   All questions were answered.      The use of stockings and DVT prophylaxis was discussed.   Will follow up in additional 4-6 weeks with radiographs at that time.     There are no Patient Instructions on file for this visit.

## 2023-12-20 ENCOUNTER — EXTERNAL HOME HEALTH (OUTPATIENT)
Dept: HOME HEALTH SERVICES | Facility: HOSPITAL | Age: 63
End: 2023-12-20
Payer: MEDICARE

## 2023-12-22 ENCOUNTER — DOCUMENT SCAN (OUTPATIENT)
Dept: HOME HEALTH SERVICES | Facility: HOSPITAL | Age: 63
End: 2023-12-22
Payer: MEDICARE

## 2024-01-09 ENCOUNTER — TELEPHONE (OUTPATIENT)
Dept: SLEEP MEDICINE | Facility: CLINIC | Age: 64
End: 2024-01-09
Payer: MEDICARE

## 2024-02-12 DIAGNOSIS — Z96.612 STATUS POST REVERSE TOTAL REPLACEMENT OF LEFT SHOULDER: Primary | ICD-10-CM

## 2024-04-10 DIAGNOSIS — Z96.612 STATUS POST REVERSE TOTAL REPLACEMENT OF LEFT SHOULDER: Primary | ICD-10-CM

## 2024-04-11 ENCOUNTER — OFFICE VISIT (OUTPATIENT)
Dept: ORTHOPEDICS | Facility: CLINIC | Age: 64
End: 2024-04-11
Payer: MEDICARE

## 2024-04-11 ENCOUNTER — HOSPITAL ENCOUNTER (OUTPATIENT)
Dept: RADIOLOGY | Facility: HOSPITAL | Age: 64
Discharge: HOME | End: 2024-04-11
Attending: ORTHOPAEDIC SURGERY
Payer: MEDICARE

## 2024-04-11 DIAGNOSIS — M25.511 CHRONIC RIGHT SHOULDER PAIN: Primary | ICD-10-CM

## 2024-04-11 DIAGNOSIS — M19.012 OSTEOARTHRITIS OF BILATERAL GLENOHUMERAL JOINTS: Primary | ICD-10-CM

## 2024-04-11 DIAGNOSIS — Z96.612 STATUS POST REVERSE TOTAL REPLACEMENT OF LEFT SHOULDER: ICD-10-CM

## 2024-04-11 DIAGNOSIS — G89.29 CHRONIC RIGHT SHOULDER PAIN: Primary | ICD-10-CM

## 2024-04-11 DIAGNOSIS — M19.011 OSTEOARTHRITIS OF GLENOHUMERAL JOINT, RIGHT: ICD-10-CM

## 2024-04-11 DIAGNOSIS — M19.011 OSTEOARTHRITIS OF BILATERAL GLENOHUMERAL JOINTS: Primary | ICD-10-CM

## 2024-04-11 DIAGNOSIS — S72.002D CLOSED FRACTURE OF LEFT HIP WITH ROUTINE HEALING, SUBSEQUENT ENCOUNTER: ICD-10-CM

## 2024-04-11 DIAGNOSIS — S72.002D CLOSED FRACTURE OF LEFT HIP WITH ROUTINE HEALING, SUBSEQUENT ENCOUNTER: Primary | ICD-10-CM

## 2024-04-11 PROCEDURE — 73502 X-RAY EXAM HIP UNI 2-3 VIEWS: CPT | Mod: 26,LT,, | Performed by: ORTHOPAEDIC SURGERY

## 2024-04-11 PROCEDURE — 73502 X-RAY EXAM HIP UNI 2-3 VIEWS: CPT | Mod: TC,LT

## 2024-04-11 PROCEDURE — 73030 X-RAY EXAM OF SHOULDER: CPT | Mod: TC,LT

## 2024-04-11 PROCEDURE — 1160F RVW MEDS BY RX/DR IN RCRD: CPT | Mod: CPTII,,, | Performed by: ORTHOPAEDIC SURGERY

## 2024-04-11 PROCEDURE — 99213 OFFICE O/P EST LOW 20 MIN: CPT | Mod: PBBFAC,25 | Performed by: ORTHOPAEDIC SURGERY

## 2024-04-11 PROCEDURE — 99214 OFFICE O/P EST MOD 30 MIN: CPT | Mod: S$PBB,,, | Performed by: ORTHOPAEDIC SURGERY

## 2024-04-11 PROCEDURE — 73030 X-RAY EXAM OF SHOULDER: CPT | Mod: 26,LT,, | Performed by: ORTHOPAEDIC SURGERY

## 2024-04-11 PROCEDURE — 1159F MED LIST DOCD IN RCRD: CPT | Mod: CPTII,,, | Performed by: ORTHOPAEDIC SURGERY

## 2024-04-11 RX ORDER — TRANEXAMIC ACID 10 MG/ML
1000 INJECTION, SOLUTION INTRAVENOUS
Status: CANCELLED | OUTPATIENT
Start: 2024-04-11

## 2024-04-11 RX ORDER — SODIUM CHLORIDE 9 MG/ML
INJECTION, SOLUTION INTRAVENOUS CONTINUOUS
Status: CANCELLED | OUTPATIENT
Start: 2024-04-11

## 2024-04-11 RX ORDER — MUPIROCIN 20 MG/G
OINTMENT TOPICAL
Status: CANCELLED | OUTPATIENT
Start: 2024-04-11

## 2024-04-11 NOTE — H&P (VIEW-ONLY)
Yany Silverio returns to clinic for a follow up visit regarding     ICD-10-CM ICD-9-CM   1. Closed fracture of left hip with routine healing, subsequent encounter  S72.002D V54.13   2. Status post reverse total replacement of left shoulder  Z96.612 V43.61       She is here today for a yearly check of her left hip and a 6 month check after her left RTSA. Her left shoulder is doing well and her right shoulder is hurting today. Her hip is doing fine, she states.  Complains of severe Rt shoulder pain.  We have diagnosed her with right shoulder OA and cuff arthropathy    PAST MEDICAL HISTORY:   Past Medical History:   Diagnosis Date    Acute superficial gastritis without hemorrhage 6/23/2021    Acute superficial gastritis without hemorrhage 6/23/2021    Anxiety     Arthritis     newly dx'd RA: anai NAVARRO in [place]    Bronchitis 7/22/2021    Bursitis of left shoulder 6/10/2021    Cancer     hx of cervical cancer    COPD (chronic obstructive pulmonary disease)     Depression     Emphysema of lung     Fungal esophagitis 6/23/2021    HH (hiatus hernia) 6/23/2021    Hyperlipidemia     Hypertension     Insomnia 04/16/2020    Low back pain 04/28/2021    Pain in joint of left hip 04/28/2021    Pain in joint of left knee 04/28/2021    Pneumonia of both lower lobes due to infectious organism 1/14/2022     PAST SURGICAL HISTORY:   Past Surgical History:   Procedure Laterality Date    OPEN REDUCTION AND INTERNAL FIXATION (ORIF) OF INJURY OF HIP Left 1/12/2022    Procedure: ORIF, HIP;  Surgeon: Adolfo Randall MD;  Location: [facility] OR;  Service: Orthopedics;  Laterality: Left;    REVERSE TOTAL SHOULDER ARTHROPLASTY Left 10/30/2023    Procedure: ARTHROPLASTY, SHOULDER, TOTAL, REVERSE;  Surgeon: Adolfo Randall MD;  Location: [facility] OR;  Service: Orthopedics;  Laterality: Left;    ROBOTIC ARTHROPLASTY, HIP Left 12/13/2021    Procedure: ROBOTIC ARTHROPLASTY,HIP;  Surgeon: Adolfo Randall MD;  Location: [facility]  OR;  Service: Orthopedics;  Laterality: Left;    SHOULDER SURGERY Right 2017, 2019         PHYSICAL EXAMINATION:  General    Nursing note and vitals reviewed.  Constitutional: She is oriented to person, place, and time. She appears well-developed and well-nourished. No distress.   HENT:   Head: Normocephalic and atraumatic.   Neck: Neck supple.   Cardiovascular:  Normal rate, regular rhythm and intact distal pulses.            Pulmonary/Chest: Effort normal and breath sounds normal. No respiratory distress. She exhibits no tenderness.   Abdominal: Soft. She exhibits no distension. There is no abdominal tenderness. There is no rebound.   Neurological: She is alert and oriented to person, place, and time. She has normal reflexes. She displays normal reflexes. No cranial nerve deficit. She exhibits normal muscle tone.   Psychiatric: She has a normal mood and affect. Her behavior is normal. Judgment and thought content normal.         Left Hip Exam     Inspection   Swelling: absent  No deformity of hip.    Range of Motion   Adduction:  normal   Extension:  normal   Flexion:  normal   External rotation:  normal   Internal rotation: normal     Tests   Pain w/ forced internal rotation (ANTONIO): present  Pain w/ forced external rotation (FADIR): present      Right Shoulder Exam     Inspection/Observation   Swelling: present  Scapular Dyskinesia: positive    Tenderness   The patient is tender to palpation of the acromioclavicular joint, supraspinatus, greater tuberosity and biceps tendon.    Crepitus   The patient has crepitus of the AC joint and greater tuberosity.    Range of Motion   Active abduction:  abnormal   Extension:  abnormal   Forward Flexion:  abnormal   Forward Elevation: abnormal  Adduction: abnormal    Tests & Signs   Cross arm: positive  Drop arm: positive  Rueña test: positive  Impingement: positive  Sulcus: absent  Rotator Cuff Painful Arc/Range: moderate  Anterosuperior Escape: negative  Lag Sign 90  degrees: positive  Lift Off Sign: positive  Belly Press: positive  Bear Hug: positive  Jerk Test: negative    Other   Sensation: normal    Comments:  Patient demonstrates evidence of pseudoparalysis with limited active forward elevation    Left Shoulder Exam   Left shoulder exam is normal.       Muscle Strength   Right Upper Extremity   Supraspinatus: 3/5   Subscapularis: 3/5   Biceps: 4/5   Left Upper Extremity  Shoulder External Rotation: 5/5   Supraspinatus: 5/5     Reflexes     Right Side   Right Thomas's Sign:  absent    Vascular Exam     Right Pulses      Radial:                    2+          IMAGING:  X-Ray Shoulder 2 or More Views Left    Result Date: 4/11/2024  See Procedure Notes for results. IMPRESSION: Please see Ortho procedure notes for report.  This procedure was auto-finalized by: Virtual Radiologist     3 X-rays of the leftshoulder were taken today. Reverse shoulder prosthesis appears to be in excellent alignment. The stem is well seated. No evidence of loosening.     X-Ray Hip 2 or 3 views Left (with Pelvis when performed)    Result Date: 4/11/2024  See Procedure Notes for results. IMPRESSION: Please see Ortho procedure notes for report.  This procedure was auto-finalized by: Virtual Radiologist    3 Radiographs of the left hip were performed today.  Radiographs demonstrate a well positioned hip arthroplasty. Components show no signs of loosening or fracture. Prior cerclage fixation demonstrated and in stable position  The lesser trochanter is appropriately aligned with the contralateral lesser trochanter.  No adverse interval change compared to prior imaging.       I reviewed prior [facility] shoulder radiographs from 2023. Severe Rt shoulder OA and right cuff arthropathy is shown.     ASSESSMENT:      ICD-10-CM ICD-9-CM   1. Closed fracture of left hip with routine healing, subsequent encounter  S72.002D V54.13   2. Status post reverse total replacement of left shoulder  Z96.612 V43.61       PLAN:      -Findings and treatment options were discussed with the patient  -All questions answered  Natural history and expected course discussed. Questions answered.  Educational material distributed.  Reduction in offending activity.    he patient has a symptomatic full-thickness rotator cuff tear with associated glenohumeral osteoarthritis right shoulder.   Treatment options were reviewed to include both operative and nonoperative measures.  The patient has failed an initial course of conservative treatment.  Given the extent and duration of the patient's complaints, operative intervention is certainly reasonable at this point.  The details of right reverse arthroplasty were discussed.     The patient understands the likely convalescence after surgery, in particular the expected postop rehab and recovery course. Alternatives both operative and non-operative with associated risks and benefits discussed. The outlined risks and potential complications of the proposed procedure include but are not limited to: infection, poor wound healing, scarring, stiffness, swelling, continued or recurrent pain, instability, hardware or prosthetic failure, symptomatic hardware requiring removal, weakness, neurovascular injury, numbness, chronic regional pain disorder, tissue nonhealing/irreparability/retear, contralateral ligament injury, chondral injury, arthritis, fracture, blood clot formation, inability to return to previous level of activity, anesthetic or regional block complication up to death, need for additional procedure as indicated, and potential need for further surgery.     she was also informed and understands the risks of surgery are greater for patients with a current condition or history of heart disease, obesity, clotting disorders, recurrent infections, steroid use, current or past smoking, and factors such as sedentary lifestyle and noncompliance with medications, therapy or follow-up. The degree of the increased risk  is hard to estimate with any degree of precision.    All questions were answered. The patient has verbalized understanding of these issues and wishes to proceed as discussed. The patient understands that recovery time can be up to 6-12 months.    Went to swing bed after RTSA last time and did great.     There are no Patient Instructions on file for this visit.    Orders Placed This Encounter   Procedures    X-Ray Hip 2 or 3 views Left (with Pelvis when performed)       Procedures

## 2024-04-11 NOTE — PROGRESS NOTES
Yany Silverio returns to clinic for a follow up visit regarding     ICD-10-CM ICD-9-CM   1. Closed fracture of left hip with routine healing, subsequent encounter  S72.002D V54.13   2. Status post reverse total replacement of left shoulder  Z96.612 V43.61       She is here today for a yearly check of her left hip and a 6 month check after her left RTSA. Her left shoulder is doing well and her right shoulder is hurting today. Her hip is doing fine, she states.  Complains of severe Rt shoulder pain.  We have diagnosed her with right shoulder OA and cuff arthropathy    PAST MEDICAL HISTORY:   Past Medical History:   Diagnosis Date    Acute superficial gastritis without hemorrhage 6/23/2021    Acute superficial gastritis without hemorrhage 6/23/2021    Anxiety     Arthritis     newly dx'd RA: anai NAVARRO in [place]    Bronchitis 7/22/2021    Bursitis of left shoulder 6/10/2021    Cancer     hx of cervical cancer    COPD (chronic obstructive pulmonary disease)     Depression     Emphysema of lung     Fungal esophagitis 6/23/2021    HH (hiatus hernia) 6/23/2021    Hyperlipidemia     Hypertension     Insomnia 04/16/2020    Low back pain 04/28/2021    Pain in joint of left hip 04/28/2021    Pain in joint of left knee 04/28/2021    Pneumonia of both lower lobes due to infectious organism 1/14/2022     PAST SURGICAL HISTORY:   Past Surgical History:   Procedure Laterality Date    OPEN REDUCTION AND INTERNAL FIXATION (ORIF) OF INJURY OF HIP Left 1/12/2022    Procedure: ORIF, HIP;  Surgeon: Adolfo Randall MD;  Location: [facility] OR;  Service: Orthopedics;  Laterality: Left;    REVERSE TOTAL SHOULDER ARTHROPLASTY Left 10/30/2023    Procedure: ARTHROPLASTY, SHOULDER, TOTAL, REVERSE;  Surgeon: Adolfo Randall MD;  Location: [facility] OR;  Service: Orthopedics;  Laterality: Left;    ROBOTIC ARTHROPLASTY, HIP Left 12/13/2021    Procedure: ROBOTIC ARTHROPLASTY,HIP;  Surgeon: Adolfo Randall MD;  Location: [facility]  OR;  Service: Orthopedics;  Laterality: Left;    SHOULDER SURGERY Right 2017, 2019         PHYSICAL EXAMINATION:  General    Nursing note and vitals reviewed.  Constitutional: She is oriented to person, place, and time. She appears well-developed and well-nourished. No distress.   HENT:   Head: Normocephalic and atraumatic.   Neck: Neck supple.   Cardiovascular:  Normal rate, regular rhythm and intact distal pulses.            Pulmonary/Chest: Effort normal and breath sounds normal. No respiratory distress. She exhibits no tenderness.   Abdominal: Soft. She exhibits no distension. There is no abdominal tenderness. There is no rebound.   Neurological: She is alert and oriented to person, place, and time. She has normal reflexes. She displays normal reflexes. No cranial nerve deficit. She exhibits normal muscle tone.   Psychiatric: She has a normal mood and affect. Her behavior is normal. Judgment and thought content normal.         Left Hip Exam     Inspection   Swelling: absent  No deformity of hip.    Range of Motion   Adduction:  normal   Extension:  normal   Flexion:  normal   External rotation:  normal   Internal rotation: normal     Tests   Pain w/ forced internal rotation (ANTONIO): present  Pain w/ forced external rotation (FADIR): present      Right Shoulder Exam     Inspection/Observation   Swelling: present  Scapular Dyskinesia: positive    Tenderness   The patient is tender to palpation of the acromioclavicular joint, supraspinatus, greater tuberosity and biceps tendon.    Crepitus   The patient has crepitus of the AC joint and greater tuberosity.    Range of Motion   Active abduction:  abnormal   Extension:  abnormal   Forward Flexion:  abnormal   Forward Elevation: abnormal  Adduction: abnormal    Tests & Signs   Cross arm: positive  Drop arm: positive  Ureña test: positive  Impingement: positive  Sulcus: absent  Rotator Cuff Painful Arc/Range: moderate  Anterosuperior Escape: negative  Lag Sign 90  degrees: positive  Lift Off Sign: positive  Belly Press: positive  Bear Hug: positive  Jerk Test: negative    Other   Sensation: normal    Comments:  Patient demonstrates evidence of pseudoparalysis with limited active forward elevation    Left Shoulder Exam   Left shoulder exam is normal.       Muscle Strength   Right Upper Extremity   Supraspinatus: 3/5   Subscapularis: 3/5   Biceps: 4/5   Left Upper Extremity  Shoulder External Rotation: 5/5   Supraspinatus: 5/5     Reflexes     Right Side   Right Thomas's Sign:  absent    Vascular Exam     Right Pulses      Radial:                    2+          IMAGING:  X-Ray Shoulder 2 or More Views Left    Result Date: 4/11/2024  See Procedure Notes for results. IMPRESSION: Please see Ortho procedure notes for report.  This procedure was auto-finalized by: Virtual Radiologist     3 X-rays of the leftshoulder were taken today. Reverse shoulder prosthesis appears to be in excellent alignment. The stem is well seated. No evidence of loosening.     X-Ray Hip 2 or 3 views Left (with Pelvis when performed)    Result Date: 4/11/2024  See Procedure Notes for results. IMPRESSION: Please see Ortho procedure notes for report.  This procedure was auto-finalized by: Virtual Radiologist    3 Radiographs of the left hip were performed today.  Radiographs demonstrate a well positioned hip arthroplasty. Components show no signs of loosening or fracture. Prior cerclage fixation demonstrated and in stable position  The lesser trochanter is appropriately aligned with the contralateral lesser trochanter.  No adverse interval change compared to prior imaging.       I reviewed prior [facility] shoulder radiographs from 2023. Severe Rt shoulder OA and right cuff arthropathy is shown.     ASSESSMENT:      ICD-10-CM ICD-9-CM   1. Closed fracture of left hip with routine healing, subsequent encounter  S72.002D V54.13   2. Status post reverse total replacement of left shoulder  Z96.612 V43.61       PLAN:      -Findings and treatment options were discussed with the patient  -All questions answered  Natural history and expected course discussed. Questions answered.  Educational material distributed.  Reduction in offending activity.    he patient has a symptomatic full-thickness rotator cuff tear with associated glenohumeral osteoarthritis right shoulder.   Treatment options were reviewed to include both operative and nonoperative measures.  The patient has failed an initial course of conservative treatment.  Given the extent and duration of the patient's complaints, operative intervention is certainly reasonable at this point.  The details of right reverse arthroplasty were discussed.     The patient understands the likely convalescence after surgery, in particular the expected postop rehab and recovery course. Alternatives both operative and non-operative with associated risks and benefits discussed. The outlined risks and potential complications of the proposed procedure include but are not limited to: infection, poor wound healing, scarring, stiffness, swelling, continued or recurrent pain, instability, hardware or prosthetic failure, symptomatic hardware requiring removal, weakness, neurovascular injury, numbness, chronic regional pain disorder, tissue nonhealing/irreparability/retear, contralateral ligament injury, chondral injury, arthritis, fracture, blood clot formation, inability to return to previous level of activity, anesthetic or regional block complication up to death, need for additional procedure as indicated, and potential need for further surgery.     she was also informed and understands the risks of surgery are greater for patients with a current condition or history of heart disease, obesity, clotting disorders, recurrent infections, steroid use, current or past smoking, and factors such as sedentary lifestyle and noncompliance with medications, therapy or follow-up. The degree of the increased risk  is hard to estimate with any degree of precision.    All questions were answered. The patient has verbalized understanding of these issues and wishes to proceed as discussed. The patient understands that recovery time can be up to 6-12 months.    Went to swing bed after RTSA last time and did great.     There are no Patient Instructions on file for this visit.    Orders Placed This Encounter   Procedures    X-Ray Hip 2 or 3 views Left (with Pelvis when performed)       Procedures

## 2024-04-12 ENCOUNTER — TELEPHONE (OUTPATIENT)
Dept: ORTHOPEDICS | Facility: CLINIC | Age: 64
End: 2024-04-12
Payer: MEDICARE

## 2024-04-12 DIAGNOSIS — Z01.818 PREPROCEDURAL EXAMINATION: Primary | ICD-10-CM

## 2024-04-22 ENCOUNTER — TELEPHONE (OUTPATIENT)
Dept: ORTHOPEDICS | Facility: CLINIC | Age: 64
End: 2024-04-22
Payer: MEDICARE

## 2024-04-22 NOTE — TELEPHONE ENCOUNTER
----- Message from Lucia Oliveira sent at 4/22/2024  3:06 PM CDT -----  Dr Pugh office returning a call. Call back # [phone]

## 2024-04-23 ENCOUNTER — TELEPHONE (OUTPATIENT)
Dept: ORTHOPEDICS | Facility: CLINIC | Age: 64
End: 2024-04-23
Payer: MEDICARE

## 2024-04-23 NOTE — TELEPHONE ENCOUNTER
----- Message from Jennifer Mendoza sent at 4/23/2024 11:48 AM CDT -----  Pt calling to speak with nurse regarding ct she is scheduled for on 4/25 - call back # [phone]

## 2024-04-25 ENCOUNTER — TELEPHONE (OUTPATIENT)
Dept: ORTHOPEDICS | Facility: CLINIC | Age: 64
End: 2024-04-25
Payer: MEDICARE

## 2024-04-25 ENCOUNTER — HOSPITAL ENCOUNTER (OUTPATIENT)
Dept: RADIOLOGY | Facility: HOSPITAL | Age: 64
Discharge: HOME | End: 2024-04-25
Attending: ORTHOPAEDIC SURGERY
Payer: MEDICARE

## 2024-04-25 DIAGNOSIS — G89.29 CHRONIC RIGHT SHOULDER PAIN: ICD-10-CM

## 2024-04-25 DIAGNOSIS — M25.511 CHRONIC RIGHT SHOULDER PAIN: ICD-10-CM

## 2024-04-25 PROCEDURE — 73200 CT UPPER EXTREMITY W/O DYE: CPT | Mod: TC,RT

## 2024-04-25 PROCEDURE — 73200 CT UPPER EXTREMITY W/O DYE: CPT | Mod: 26,RT,, | Performed by: RADIOLOGY

## 2024-04-25 NOTE — TELEPHONE ENCOUNTER
I spoke to the patient to lot let her know Masha and Dr. Randall will look over the surgery schedule tomorrow and give her a call back.     ----- Message from Mellisa Barrientos sent at 4/25/2024  8:48 AM CDT -----  All tests for surg will be completed today per pt. Her surg is sched for may 1st. She had EKG and CXR and labs done yesterday. She is having CT today. She is asking if surg date is still on for May 1st. Call her at [phone].

## 2024-04-29 ENCOUNTER — TELEPHONE (OUTPATIENT)
Dept: ORTHOPEDICS | Facility: CLINIC | Age: 64
End: 2024-04-29
Payer: MEDICARE

## 2024-04-29 NOTE — TELEPHONE ENCOUNTER
----- Message from Lyly Frausto sent at 4/29/2024  8:02 AM CDT -----  Regarding: Surgery  Pt is calling to see if you still during her surgery Wednesday. Please call her @ [phone]

## 2024-04-30 ENCOUNTER — TELEPHONE (OUTPATIENT)
Dept: ORTHOPEDICS | Facility: CLINIC | Age: 64
End: 2024-04-30
Payer: MEDICARE

## 2024-04-30 NOTE — TELEPHONE ENCOUNTER
----- Message from Lucia Oliveira sent at 4/30/2024  1:52 PM CDT -----  Please call Dinah at Dr Fernandez's office. She has question regarding the PT, PTT, INR. They have a machine down. She is there now. Call back # [phone]

## 2024-04-30 NOTE — TELEPHONE ENCOUNTER
----- Message from Funmilayo Roberson sent at 4/30/2024  3:08 PM CDT -----  Regarding: RETURN PHONE CALL  RETURNING A PHONE CALL TO [facility]. CALL BACK NUMBER IS [phone].

## 2024-05-01 ENCOUNTER — ANESTHESIA EVENT (OUTPATIENT)
Dept: SURGERY | Facility: HOSPITAL | Age: 64
End: 2024-05-01
Payer: MEDICARE

## 2024-05-01 ENCOUNTER — HOSPITAL ENCOUNTER (OUTPATIENT)
Facility: HOSPITAL | Age: 64
Discharge: NURSING, REHAB OR LONG-TERM CARE | End: 2024-05-06
Attending: ORTHOPAEDIC SURGERY
Payer: MEDICARE

## 2024-05-01 ENCOUNTER — ANESTHESIA (OUTPATIENT)
Dept: SURGERY | Facility: HOSPITAL | Age: 64
End: 2024-05-01
Payer: MEDICARE

## 2024-05-01 DIAGNOSIS — M19.011 OSTEOARTHRITIS OF GLENOHUMERAL JOINT, RIGHT: Primary | ICD-10-CM

## 2024-05-01 DIAGNOSIS — M19.011 OSTEOARTHRITIS OF BILATERAL GLENOHUMERAL JOINTS: ICD-10-CM

## 2024-05-01 DIAGNOSIS — M19.012 OSTEOARTHRITIS OF BILATERAL GLENOHUMERAL JOINTS: ICD-10-CM

## 2024-05-01 LAB — GLUCOSE SERPL-MCNC: 168 MG/DL (ref 70–105)

## 2024-05-01 PROCEDURE — 64415 NJX AA&/STRD BRCH PLXS IMG: CPT | Mod: 59,RT | Performed by: ANESTHESIOLOGY

## 2024-05-01 PROCEDURE — 63600175 PHARM REV CODE 636 W HCPCS: Performed by: ANESTHESIOLOGY

## 2024-05-01 PROCEDURE — 36000711: Performed by: ORTHOPAEDIC SURGERY

## 2024-05-01 PROCEDURE — 23472 RECONSTRUCT SHOULDER JOINT: CPT | Mod: RT,,, | Performed by: ORTHOPAEDIC SURGERY

## 2024-05-01 PROCEDURE — 37000009 HC ANESTHESIA EA ADD 15 MINS: Performed by: ORTHOPAEDIC SURGERY

## 2024-05-01 PROCEDURE — 36000710: Performed by: ORTHOPAEDIC SURGERY

## 2024-05-01 PROCEDURE — 82962 GLUCOSE BLOOD TEST: CPT

## 2024-05-01 PROCEDURE — 25000003 PHARM REV CODE 250: Performed by: ORTHOPAEDIC SURGERY

## 2024-05-01 PROCEDURE — 71000033 HC RECOVERY, INTIAL HOUR: Performed by: ORTHOPAEDIC SURGERY

## 2024-05-01 PROCEDURE — 25000003 PHARM REV CODE 250: Performed by: NURSE ANESTHETIST, CERTIFIED REGISTERED

## 2024-05-01 PROCEDURE — 25000242 PHARM REV CODE 250 ALT 637 W/ HCPCS: Performed by: ANESTHESIOLOGY

## 2024-05-01 PROCEDURE — 71000039 HC RECOVERY, EACH ADD'L HOUR: Performed by: ORTHOPAEDIC SURGERY

## 2024-05-01 PROCEDURE — 27000221 HC OXYGEN, UP TO 24 HOURS

## 2024-05-01 PROCEDURE — 94761 N-INVAS EAR/PLS OXIMETRY MLT: CPT

## 2024-05-01 PROCEDURE — C1769 GUIDE WIRE: HCPCS | Performed by: ORTHOPAEDIC SURGERY

## 2024-05-01 PROCEDURE — D9220A PRA ANESTHESIA: Mod: CRNA,,, | Performed by: NURSE ANESTHETIST, CERTIFIED REGISTERED

## 2024-05-01 PROCEDURE — C1776 JOINT DEVICE (IMPLANTABLE): HCPCS | Performed by: ORTHOPAEDIC SURGERY

## 2024-05-01 PROCEDURE — 63600175 PHARM REV CODE 636 W HCPCS: Performed by: NURSE ANESTHETIST, CERTIFIED REGISTERED

## 2024-05-01 PROCEDURE — 63600175 PHARM REV CODE 636 W HCPCS: Performed by: ORTHOPAEDIC SURGERY

## 2024-05-01 PROCEDURE — C1713 ANCHOR/SCREW BN/BN,TIS/BN: HCPCS | Performed by: ORTHOPAEDIC SURGERY

## 2024-05-01 PROCEDURE — D9220A PRA ANESTHESIA: Mod: ANES,,, | Performed by: ANESTHESIOLOGY

## 2024-05-01 PROCEDURE — 99900035 HC TECH TIME PER 15 MIN (STAT)

## 2024-05-01 PROCEDURE — 94640 AIRWAY INHALATION TREATMENT: CPT

## 2024-05-01 PROCEDURE — 27201423 OPTIME MED/SURG SUP & DEVICES STERILE SUPPLY: Performed by: ORTHOPAEDIC SURGERY

## 2024-05-01 PROCEDURE — 37000008 HC ANESTHESIA 1ST 15 MINUTES: Performed by: ORTHOPAEDIC SURGERY

## 2024-05-01 DEVICE — HUMERAL CUP
Type: IMPLANTABLE DEVICE | Site: SHOULDER | Status: FUNCTIONAL
Brand: REUNION

## 2024-05-01 DEVICE — 4.5MM PERIPHERAL SCREW
Type: IMPLANTABLE DEVICE | Site: SHOULDER | Status: FUNCTIONAL
Brand: REUNION

## 2024-05-01 DEVICE — GLENOID BASEPLATE
Type: IMPLANTABLE DEVICE | Site: SHOULDER | Status: FUNCTIONAL
Brand: REUNION

## 2024-05-01 DEVICE — PRESS-FIT HUMERAL STEM
Type: IMPLANTABLE DEVICE | Site: SHOULDER | Status: FUNCTIONAL
Brand: REUNION

## 2024-05-01 DEVICE — 6.5MM CENTER SCREW
Type: IMPLANTABLE DEVICE | Site: SHOULDER | Status: FUNCTIONAL
Brand: REUNION

## 2024-05-01 DEVICE — IMPLANTABLE DEVICE: Type: IMPLANTABLE DEVICE | Site: SHOULDER | Status: FUNCTIONAL

## 2024-05-01 DEVICE — GLENOSPHERE , CONCENTRIC
Type: IMPLANTABLE DEVICE | Site: SHOULDER | Status: FUNCTIONAL
Brand: REUNION

## 2024-05-01 RX ORDER — PROMETHAZINE HYDROCHLORIDE 25 MG/1
25 TABLET ORAL EVERY 6 HOURS PRN
Status: DISCONTINUED | OUTPATIENT
Start: 2024-05-01 | End: 2024-05-04

## 2024-05-01 RX ORDER — OXYCODONE HYDROCHLORIDE 5 MG/1
10 TABLET ORAL EVERY 4 HOURS PRN
Status: DISCONTINUED | OUTPATIENT
Start: 2024-05-01 | End: 2024-05-04

## 2024-05-01 RX ORDER — MORPHINE SULFATE 10 MG/ML
4 INJECTION INTRAMUSCULAR; INTRAVENOUS; SUBCUTANEOUS EVERY 5 MIN PRN
Status: DISCONTINUED | OUTPATIENT
Start: 2024-05-01 | End: 2024-05-01 | Stop reason: HOSPADM

## 2024-05-01 RX ORDER — SODIUM CHLORIDE 9 MG/ML
INJECTION, SOLUTION INTRAVENOUS CONTINUOUS
Status: DISCONTINUED | OUTPATIENT
Start: 2024-05-01 | End: 2024-05-01

## 2024-05-01 RX ORDER — DOCUSATE SODIUM 100 MG/1
100 CAPSULE, LIQUID FILLED ORAL 2 TIMES DAILY
Status: DISCONTINUED | OUTPATIENT
Start: 2024-05-01 | End: 2024-05-05

## 2024-05-01 RX ORDER — LIDOCAINE HYDROCHLORIDE 20 MG/ML
INJECTION, SOLUTION EPIDURAL; INFILTRATION; INTRACAUDAL; PERINEURAL
Status: DISCONTINUED | OUTPATIENT
Start: 2024-05-01 | End: 2024-05-01

## 2024-05-01 RX ORDER — DIPHENHYDRAMINE HYDROCHLORIDE 50 MG/ML
25 INJECTION INTRAMUSCULAR; INTRAVENOUS EVERY 6 HOURS PRN
Status: DISCONTINUED | OUTPATIENT
Start: 2024-05-01 | End: 2024-05-01 | Stop reason: HOSPADM

## 2024-05-01 RX ORDER — DEXAMETHASONE SODIUM PHOSPHATE 4 MG/ML
INJECTION, SOLUTION INTRA-ARTICULAR; INTRALESIONAL; INTRAMUSCULAR; INTRAVENOUS; SOFT TISSUE
Status: DISCONTINUED | OUTPATIENT
Start: 2024-05-01 | End: 2024-05-01

## 2024-05-01 RX ORDER — MEPERIDINE HYDROCHLORIDE 25 MG/ML
25 INJECTION INTRAMUSCULAR; INTRAVENOUS; SUBCUTANEOUS ONCE AS NEEDED
Status: DISCONTINUED | OUTPATIENT
Start: 2024-05-01 | End: 2024-05-01 | Stop reason: HOSPADM

## 2024-05-01 RX ORDER — MUPIROCIN 20 MG/G
OINTMENT TOPICAL
Status: DISCONTINUED | OUTPATIENT
Start: 2024-05-01 | End: 2024-05-01 | Stop reason: HOSPADM

## 2024-05-01 RX ORDER — TRANEXAMIC ACID 10 MG/ML
1000 INJECTION, SOLUTION INTRAVENOUS
Status: DISCONTINUED | OUTPATIENT
Start: 2024-05-01 | End: 2024-05-01 | Stop reason: HOSPADM

## 2024-05-01 RX ORDER — CELECOXIB 100 MG/1
200 CAPSULE ORAL 2 TIMES DAILY
Status: DISCONTINUED | OUTPATIENT
Start: 2024-05-01 | End: 2024-05-06 | Stop reason: HOSPADM

## 2024-05-01 RX ORDER — PROPOFOL 10 MG/ML
VIAL (ML) INTRAVENOUS
Status: DISCONTINUED | OUTPATIENT
Start: 2024-05-01 | End: 2024-05-01

## 2024-05-01 RX ORDER — CEFAZOLIN SODIUM 1 G/3ML
INJECTION, POWDER, FOR SOLUTION INTRAMUSCULAR; INTRAVENOUS
Status: DISCONTINUED | OUTPATIENT
Start: 2024-05-01 | End: 2024-05-01

## 2024-05-01 RX ORDER — MUPIROCIN 20 MG/G
OINTMENT TOPICAL 2 TIMES DAILY
Status: COMPLETED | OUTPATIENT
Start: 2024-05-01 | End: 2024-05-03

## 2024-05-01 RX ORDER — PRAVASTATIN SODIUM 10 MG/1
20 TABLET ORAL DAILY
Status: DISCONTINUED | OUTPATIENT
Start: 2024-05-02 | End: 2024-05-06 | Stop reason: HOSPADM

## 2024-05-01 RX ORDER — TRANEXAMIC ACID 100 MG/ML
INJECTION, SOLUTION INTRAVENOUS
Status: DISCONTINUED | OUTPATIENT
Start: 2024-05-01 | End: 2024-05-01

## 2024-05-01 RX ORDER — ALBUTEROL SULFATE 0.83 MG/ML
2.5 SOLUTION RESPIRATORY (INHALATION) EVERY 4 HOURS PRN
Status: DISCONTINUED | OUTPATIENT
Start: 2024-05-01 | End: 2024-05-06 | Stop reason: HOSPADM

## 2024-05-01 RX ORDER — HYDROMORPHONE HYDROCHLORIDE 2 MG/ML
0.5 INJECTION, SOLUTION INTRAMUSCULAR; INTRAVENOUS; SUBCUTANEOUS EVERY 5 MIN PRN
Status: DISCONTINUED | OUTPATIENT
Start: 2024-05-01 | End: 2024-05-01 | Stop reason: HOSPADM

## 2024-05-01 RX ORDER — PREGABALIN 75 MG/1
75 CAPSULE ORAL 2 TIMES DAILY
Status: DISCONTINUED | OUTPATIENT
Start: 2024-05-01 | End: 2024-05-06 | Stop reason: HOSPADM

## 2024-05-01 RX ORDER — ROCURONIUM BROMIDE 10 MG/ML
INJECTION, SOLUTION INTRAVENOUS
Status: DISCONTINUED | OUTPATIENT
Start: 2024-05-01 | End: 2024-05-01

## 2024-05-01 RX ORDER — SODIUM CHLORIDE 0.9 % (FLUSH) 0.9 %
5 SYRINGE (ML) INJECTION
Status: DISCONTINUED | OUTPATIENT
Start: 2024-05-01 | End: 2024-05-06 | Stop reason: HOSPADM

## 2024-05-01 RX ORDER — IPRATROPIUM BROMIDE AND ALBUTEROL SULFATE 2.5; .5 MG/3ML; MG/3ML
3 SOLUTION RESPIRATORY (INHALATION) ONCE AS NEEDED
Status: COMPLETED | OUTPATIENT
Start: 2024-05-01 | End: 2024-05-01

## 2024-05-01 RX ORDER — ONDANSETRON 4 MG/1
8 TABLET, ORALLY DISINTEGRATING ORAL EVERY 8 HOURS PRN
Status: DISCONTINUED | OUTPATIENT
Start: 2024-05-01 | End: 2024-05-04

## 2024-05-01 RX ORDER — AMLODIPINE BESYLATE 5 MG/1
5 TABLET ORAL DAILY
Status: DISCONTINUED | OUTPATIENT
Start: 2024-05-02 | End: 2024-05-06 | Stop reason: HOSPADM

## 2024-05-01 RX ORDER — FUROSEMIDE 20 MG/1
20 TABLET ORAL DAILY
Status: DISCONTINUED | OUTPATIENT
Start: 2024-05-02 | End: 2024-05-06 | Stop reason: HOSPADM

## 2024-05-01 RX ORDER — PHENYLEPHRINE HYDROCHLORIDE 10 MG/ML
INJECTION INTRAVENOUS
Status: DISCONTINUED | OUTPATIENT
Start: 2024-05-01 | End: 2024-05-01

## 2024-05-01 RX ORDER — FENTANYL CITRATE 50 UG/ML
INJECTION, SOLUTION INTRAMUSCULAR; INTRAVENOUS
Status: DISCONTINUED | OUTPATIENT
Start: 2024-05-01 | End: 2024-05-01

## 2024-05-01 RX ORDER — OXYCODONE HYDROCHLORIDE 5 MG/1
5 TABLET ORAL EVERY 4 HOURS PRN
Status: DISCONTINUED | OUTPATIENT
Start: 2024-05-01 | End: 2024-05-04

## 2024-05-01 RX ORDER — ONDANSETRON HYDROCHLORIDE 2 MG/ML
4 INJECTION, SOLUTION INTRAVENOUS DAILY PRN
Status: DISCONTINUED | OUTPATIENT
Start: 2024-05-01 | End: 2024-05-01 | Stop reason: HOSPADM

## 2024-05-01 RX ORDER — ALBUTEROL SULFATE 90 UG/1
2 AEROSOL, METERED RESPIRATORY (INHALATION) EVERY 4 HOURS PRN
Status: DISCONTINUED | OUTPATIENT
Start: 2024-05-01 | End: 2024-05-01

## 2024-05-01 RX ORDER — ACETAMINOPHEN 500 MG
1000 TABLET ORAL EVERY 8 HOURS
Status: COMPLETED | OUTPATIENT
Start: 2024-05-01 | End: 2024-05-02

## 2024-05-01 RX ORDER — ROPIVACAINE HYDROCHLORIDE 7.5 MG/ML
INJECTION, SOLUTION EPIDURAL; PERINEURAL
Status: COMPLETED | OUTPATIENT
Start: 2024-05-01 | End: 2024-05-01

## 2024-05-01 RX ADMIN — DEXAMETHASONE SODIUM PHOSPHATE 8 MG: 4 INJECTION, SOLUTION INTRA-ARTICULAR; INTRALESIONAL; INTRAMUSCULAR; INTRAVENOUS; SOFT TISSUE at 03:05

## 2024-05-01 RX ADMIN — LIDOCAINE HYDROCHLORIDE 25 MG: 20 INJECTION, SOLUTION INTRAVENOUS at 12:05

## 2024-05-01 RX ADMIN — PHENYLEPHRINE HYDROCHLORIDE 200 MCG: 10 INJECTION INTRAVENOUS at 03:05

## 2024-05-01 RX ADMIN — PHENYLEPHRINE HYDROCHLORIDE 100 MCG: 10 INJECTION INTRAVENOUS at 02:05

## 2024-05-01 RX ADMIN — CEFAZOLIN 2 G: 1 INJECTION, POWDER, FOR SOLUTION INTRAMUSCULAR; INTRAVENOUS; PARENTERAL at 12:05

## 2024-05-01 RX ADMIN — CEFAZOLIN 2 G: 2 INJECTION, POWDER, FOR SOLUTION INTRAMUSCULAR; INTRAVENOUS at 07:05

## 2024-05-01 RX ADMIN — VANCOMYCIN HYDROCHLORIDE 1500 MG: 1 INJECTION, POWDER, LYOPHILIZED, FOR SOLUTION INTRAVENOUS at 01:05

## 2024-05-01 RX ADMIN — ACETAMINOPHEN 1000 MG: 500 TABLET ORAL at 09:05

## 2024-05-01 RX ADMIN — PHENYLEPHRINE HYDROCHLORIDE 200 MCG: 10 INJECTION INTRAVENOUS at 02:05

## 2024-05-01 RX ADMIN — FENTANYL CITRATE 50 MCG: 50 INJECTION INTRAMUSCULAR; INTRAVENOUS at 01:05

## 2024-05-01 RX ADMIN — Medication 50 MG: at 12:05

## 2024-05-01 RX ADMIN — SODIUM CHLORIDE: 9 INJECTION, SOLUTION INTRAVENOUS at 12:05

## 2024-05-01 RX ADMIN — DOCUSATE SODIUM 100 MG: 100 CAPSULE, LIQUID FILLED ORAL at 09:05

## 2024-05-01 RX ADMIN — TRANEXAMIC ACID 1000 MG: 100 INJECTION, SOLUTION INTRAVENOUS at 02:05

## 2024-05-01 RX ADMIN — CELECOXIB 200 MG: 100 CAPSULE ORAL at 09:05

## 2024-05-01 RX ADMIN — PHENYLEPHRINE HYDROCHLORIDE 100 MCG: 10 INJECTION INTRAVENOUS at 01:05

## 2024-05-01 RX ADMIN — FENTANYL CITRATE 100 MCG: 50 INJECTION INTRAMUSCULAR; INTRAVENOUS at 12:05

## 2024-05-01 RX ADMIN — ROCURONIUM BROMIDE 50 MG: 10 INJECTION, SOLUTION INTRAVENOUS at 12:05

## 2024-05-01 RX ADMIN — SUGAMMADEX 200 MG: 100 INJECTION, SOLUTION INTRAVENOUS at 03:05

## 2024-05-01 RX ADMIN — PHENYLEPHRINE HYDROCHLORIDE 200 MCG: 10 INJECTION INTRAVENOUS at 01:05

## 2024-05-01 RX ADMIN — Medication 150 MG: at 12:05

## 2024-05-01 RX ADMIN — ONDANSETRON 4 MG: 2 INJECTION INTRAMUSCULAR; INTRAVENOUS at 02:05

## 2024-05-01 RX ADMIN — TRANEXAMIC ACID 1000 MG: 100 INJECTION, SOLUTION INTRAVENOUS at 12:05

## 2024-05-01 RX ADMIN — MUPIROCIN: 20 OINTMENT TOPICAL at 09:05

## 2024-05-01 RX ADMIN — FENTANYL CITRATE 50 MCG: 50 INJECTION INTRAMUSCULAR; INTRAVENOUS at 03:05

## 2024-05-01 RX ADMIN — OXYCODONE 10 MG: 5 TABLET ORAL at 07:05

## 2024-05-01 RX ADMIN — ROPIVACAINE HYDROCHLORIDE 15 ML: 7.5 INJECTION, SOLUTION EPIDURAL; PERINEURAL at 12:05

## 2024-05-01 RX ADMIN — IPRATROPIUM BROMIDE AND ALBUTEROL SULFATE 3 ML: .5; 3 SOLUTION RESPIRATORY (INHALATION) at 03:05

## 2024-05-01 RX ADMIN — PREGABALIN 75 MG: 75 CAPSULE ORAL at 09:05

## 2024-05-01 NOTE — OP NOTE
[facility]  OPERATIVE REPORT   ORTHOPAEDIC SURGERY   PROVIDER: DR. ADOLFO RANDALL MD    PATIENT INFORMATION   Yany Silverio 63 y.o. female 1960   MRN: [id number]   LOCATION: [facility]    DATE OF PROCEDURE: 5/1/2024     PREOPERATIVE DIAGNOSES:   Osteoarthritis of bilateral glenohumeral joints [M19.011, M19.012]    POSTOPERATIVE DIAGNOSES:   Osteoarthritis of bilateral glenohumeral joints [M19.011, M19.012]    PROCEDURES PERFORMED:   Right reverse shoulder arthroplasty (CPT 64874)  Right shoulder open biceps tenodesis (CPT 95619)    Surgeons and Role:     * Adolfo Randall MD - Primary    ANESTHESIA: General + interscalene block    ESTIMATED BLOOD LOSS: less than 50 mL    IMPLANTS:   Implant Name Type Inv. Item Serial No.  Lot No. LRB No. Used Action   PIN 3.2MM 4 YULISA SQUARE - [id number]  PIN 3.2MM 4 YULISA SQUARE  [facility] ([facility] [[email or web address]  Right 1 Implanted and Explanted   WIRE FIXATION REUN NIT PILT - [id number]  WIRE FIXATION REUN NIT PILT  [employer]OS[employer]. [id number] Right 1 Implanted and Explanted   WIRE FIXATION REUN NIT PILT - [id number]  WIRE FIXATION REUN NIT PILT  [employer] HOSSEIN. [id number] Right 1 Implanted and Explanted   BASEPLATE REUNION MEME 63D87CB - [id number]  BASEPLATE REUNION MEME [id number]  [employer] HOSSEIN. [id number] Right 1 Implanted   SCREW BONE GLENOID 4.5X24MM - [id number]  SCREW BONE GLENOID 4.5X24MM  [employer] HOSSEIN. [id number] Right 1 Implanted   SCREW BONE TSA 4.5X32MM - [id number]  SCREW BONE TSA 4.5X32MM  [employer] HOSSEIN. [id number] Right 1 Implanted   SCREW BONE CENTER 28X6.5MM - [id number]  SCREW BONE CENTER 28X6.5MM  [employer] HOSSEIN. [id number] Right 1 Implanted   SCREW BONE GLENOID 4.5X24MM - [id number]  SCREW BONE GLENOID 4.5X24MM  [employer] HOSSEIN. [id number] Right 1 Implanted   COMPONENT GLENOSPHERE 32X2MM - [id number]  COMPONENT GLENOSPHERE 32X2MM  [employer] HOSSEIN. [id number] Right 1 Implanted   CEMENT BONE  SPEED SET - [id number]  CEMENT BONE SPEED SET  [employer]OSSE[employer]. [id number] Right 2 Wasted   CUP HUMERAL REUN RSA 32X4MM - [id number]  CUP HUMERAL REUN RSA 32X4MM  ANG[employer]LICA [employer] HOSSEIN. [id number] Right 1 Implanted   STEM REUNION HUM PRESSFIT 96MM - [id number]  STEM REUNION HUM PRESSFIT 96MM  ANG[employer]ICA [employer] HOSSEIN. [id number] Right 1 Implanted   ANGÉLICA REUNION RSA X3 HUMERAL INSERT, CEMENTLESS    ANGÉLICA [id number] Right 1 Implanted        FINDINGS: Massive chronic rotator cuff tear with degenerative changes.    SPECIMENS:   Specimen (24h ago, onward)      None            COMPLICATIONS: None.     INTRAOPERATIVE COUNTS: Correct.     PROPHYLACTIC IV ANTIBIOTICS: Given per [facility] Protocol.    INDICATIONS FOR OPERATION: Yany Silverio 63 y.o. female has been seen and evaluated in the office and found to havelifestyle-limiting pain secondary to advanced shoulder arthropathy. The patient has both pain and weakness and inability to forward flex the shoulder.  After a lengthy discussion with the patient , the patient elected to proceed with surgical intervention. The patient was extensively counseled and  fully informed of risks and benefits.    DESCRIPTION OF PROCEDURE:     The patient was taken to the operating room and placed in the beach chair position.  Anesthesia was administered and pre-operative antibiotics were given.  A timeout was performed.  Patient was positioned appropriately and prepped and draped in the usual sterile fashion with the operative arm suspended in a Spider Arm shelton. A standard deltopectoral incision was then undertaken to the operative extremity. The skin was cut, and then bovie electrocautery was used on the skin edges to control bleeding and reduce the bacterial burden of p. Acnes.  The cephalic vein was identified and mobilized laterally.  The deltopectoral interval was thus exposed.  A Kolbel self-retaining retractor was placed.  The clavipectoral fascia was identified and released.  The  leading edge of the pectoralis major tendon was partially released, thus exposing the biceps tendon.  The biceps was grasped with an Allis clamp, and a No. 5 ethibond suture was used to perform a tenodesis of the biceps tendon to the adjacent pec major tendon.  The biceps was then cut at this point following a secure tenodesis.  Attention was then turned towards releasing the adhesions present in the subacromial space and subdeltoid space.  With proper releases performed, the biceps was tracked proximally to identify the biceps groove and adjacent subscapularis.  The anterior humeral circumflex vessels were identified and ligated. A subscapularis peel was performed and the subscapularis was tagged with a No 2 suture.  A curved Shraddha was placed on the medial border of the humerus to facilitate a release of the capsule.  This helped to dislocate the humeral head and afford exposure of the arthritic head.       Rotator cuff tearing of the supraspinatus was not clearly evident.       A Darrach retractor was placed posterior to the humeral head.  Humeral head osteophytes were removed with a rangeur.  An extramedullary guide was used to make the head cut in between 20-30 degrees of retroversion, utilizing the supraspinatus tendon as the superior endpoint of the cut.  The cuff was protected as the cut was made.  The head cut was then sized on the back table.  The canal was then prepared in the standard fashion with a canal finder, reamer, and broach until appropriate stability had been achieved with the trial component.  The trial was left in place and attention was turned towards glenoid preparation. The subscapularis was inspected and adhesions were released anterior and posterior to this.  The axillary nerve was palpated as well.  With the nerve protected, a curved bardales scissors was used to release the middle glenohumeral ligament and superior glenohumeral ligament.  A lighted retractor was placed on the anterior edge  of the glenoid.  A radial cut was made in the labrum and a circumferential labral takedown was performed.  Retractors were placed posteriorly and anterosuperiorly to improve exposure.      The glenoid wear pattern was A1     The glenoid was then prepared for the standard baseplate which was placed and secured with the center screw and 3 peripheral locking screws.  The  glenosphere was then impacted onto the baseplate.    Trialing was performed off the humeral side and a final stem was placed. The final humeral socket was impacted onto the stem. The joint was reduced, showed excellent stability and motion.      Dilute betadine wash followed by normal saline was used to irrigate the wound. V. Tranexamic acid was given intravenously before incision to limit blood loss.      The wounds were copiously irrigated and closed in the standard fashion.  Sterile dressing was applied with a Polar Care and shoulder abduction pillow sling.  Patient was  taken to recovery room.    POSTOPERATIVE PLAN: The patient will be admitted for postoperative care per protocol.  24 hours of antibiotic prophylaxis.  DVT prophylaxis given.  Follow a RTSA rehab protocol.

## 2024-05-01 NOTE — INTERVAL H&P NOTE
The patient has been examined and the H&P has been reviewed:    I concur with the findings and no changes have occurred since H&P was written.    Anesthesia/Surgery risks, benefits and alternative options discussed and understood by patient/family.    To OR for Rt reverse shoulder      There are no hospital problems to display for this patient.

## 2024-05-01 NOTE — ANESTHESIA PROCEDURE NOTES
Intubation    Date/Time: 5/1/2024 12:53 PM    Performed by: Christine Davis CRNA  Authorized by: Brandon Encarnacion DO    Intubation:     Induction:  Intravenous    Intubated:  Postinduction    Mask Ventilation:  Easy mask    Attempts:  1    Attempted By:  CRNA    Method of Intubation:  Direct    Blade:  Gracie 3    Laryngeal View Grade: Grade I - full view of cords      Difficult Airway Encountered?: No      Complications:  None    Airway Device:  Oral endotracheal tube    Airway Device Size:  7.0    Style/Cuff Inflation:  Cuffed    Inflation Amount (mL):  5    Tube secured:  22    Secured at:  The lips    Placement Verified By:  Capnometry    Complicating Factors:  None    Findings Post-Intubation:  BS equal bilateral and atraumatic/condition of teeth unchanged

## 2024-05-01 NOTE — ANESTHESIA PREPROCEDURE EVALUATION
"                                                                                                             05/01/2024  Yany Silverio is a 63 y.o., female.    Anesthesia Evaluation    I have reviewed the Patient Summary Reports.     I have reviewed the Nursing Notes. I have reviewed the NPO Status.   I have reviewed the Medications.     Review of Systems  Anesthesia Hx:  No problems with previous Anesthesia             Denies Family Hx of Anesthesia complications.    Denies Personal Hx of Anesthesia complications.                    Social:  Smoker, Alcohol Use       Cardiovascular:  Exercise tolerance: good   Hypertension           hyperlipidemia   ECG has been reviewed.     Surgical preoperative "clearance" through her primary care physician    Preop labs from OSH                         Pulmonary:   COPD, mild     Sleep Apnea PFTs reviewed - mild obstructive disease pattern noted    Patient does use home O2 qHS               Hepatic/GI:    Hiatal Hernia,              Musculoskeletal:  Arthritis          Spine Disorders: thoracic            Neurological:    Neuromuscular Disease,                                   Psych:  Psychiatric History anxiety depression                Physical Exam  General:  Well nourished, Cooperative and Alert       Airway/Jaw/Neck:   Mouth Opening: Normal     Tongue: Normal           Mallampati: II   TM Distance: Normal       Neck ROM: Normal ROM           Chest/Lungs:  Chest/Lungs Findings:  Clear to auscultation, Normal Respiratory Rate       Heart/Vascular:  Heart Findings: Rate: Normal  Rhythm: Regular Rhythm                               Anesthesia Plan  Type of Anesthesia, risks & benefits discussed:  Anesthesia Type:  Gen ETT, Regional    Patient's Preference:   Plan Factors:          Intra-op Monitoring Plan: Standard ASA Monitors  Intra-op Monitoring Plan Comments:   Post Op Pain Control Plan: multimodal analgesia  Post Op Pain Control Plan Comments:     Induction:   " IV  Beta Blocker:         Informed Consent: Informed consent signed with the Patient and all parties understand the risks and agree with anesthesia plan.  All questions answered.  Anesthesia consent signed with patient.  ASA Score: 3     Day of Surgery Review of History & Physical: I have interviewed and examined the patient. I have reviewed the patient's H&P dated:  There are no significant changes.  H&P Update referred to the surgeon/provider.          Ready For Surgery From Anesthesia Perspective.            Anxiety Depression   COPD (chronic obstructive pulmonary disease) Hyperlipidemia   Hypertension Pain in joint of left hip   Pain in joint of left knee Cancer   Emphysema of lung Insomnia   Fungal esophagitis Acute superficial gastritis without hemorrhage   HH (hiatus hernia) Arthritis   NGOZI (CPAP)  Home oxygen at night and PRN  H/o cervical cancer  GERD  SaO2 at home runs 89 - 96%               Physical Exam  General: Well nourished, Cooperative and Alert    Airway:  Mallampati: II   Mouth Opening: Normal  TM Distance: Normal  Tongue: Normal  Neck ROM: Normal ROM    Chest/Lungs:  Clear to auscultation, Normal Respiratory Rate    Heart:  Rate: Normal  Rhythm: Regular Rhythm          Anesthesia Plan  Type of Anesthesia, risks & benefits discussed:    Anesthesia Type: Gen ETT, Regional  Intra-op Monitoring Plan: Standard ASA Monitors  Post Op Pain Control Plan: multimodal analgesia  Induction:  IV  Airway Plan: Direct, Post-Induction  Informed Consent: Informed consent signed with the Patient and all parties understand the risks and agree with anesthesia plan.  All questions answered.   ASA Score: 3  Day of Surgery Review of History & Physical: H&P Update referred to the surgeon/provider.I have interviewed and examined the patient. I have reviewed the patient's H&P dated: There are no significant changes.     Ready For Surgery From Anesthesia Perspective.       .

## 2024-05-01 NOTE — ANESTHESIA PROCEDURE NOTES
Peripheral Block    Patient location during procedure: pre-op   Block not for primary anesthetic.  Reason for block: at surgeon's request and post-op pain management   Post-op Pain Location: right shoulder   Start time: 5/1/2024 12:46 PM  Timeout: 5/1/2024 12:45 PM   End time: 5/1/2024 12:52 PM    Staffing  Authorizing Provider: Brandon Encarnacion DO  Performing Provider: Brandon Encarnacion DO    Staffing  Performed by: Brandon Encarnacion DO  Authorized by: Brandon Encarnacion DO    Preanesthetic Checklist  Completed: patient identified, IV checked, site marked, risks and benefits discussed, surgical consent, monitors and equipment checked, pre-op evaluation and timeout performed  Peripheral Block  Patient position: sitting  Prep: ChloraPrep  Patient monitoring: heart rate, cardiac monitor, continuous pulse ox, continuous capnometry and frequent blood pressure checks  Block type: interscalene  Laterality: right  Injection technique: single shot  Needle  Needle type: Stimuplex   Needle gauge: 22 G  Needle length: 2 in  Needle localization: anatomical landmarks and ultrasound guidance   -ultrasound image captured on disc.  Assessment  Injection assessment: negative aspiration, negative parasthesia and local visualized surrounding nerve  Paresthesia pain: none  Heart rate change: no  Slow fractionated injection: yes  Pain Tolerance: comfortable throughout block  Medications:    Medications: ROPIvacaine (NAROPIN) injection 0.75% - Perineural   15 mL - 5/1/2024 12:46:00 PM    Additional Notes  VSS.  [facility] RN monitoring vitals throughout procedure.  Patient tolerated procedure well.

## 2024-05-01 NOTE — PLAN OF CARE
[facility] - Orthopedic Periop Services  Initial Discharge Assessment       Primary Care Provider: Nitesh Simon MD    Admission Diagnosis: Osteoarthritis of bilateral glenohumeral joints [M19.011, M19.012]  Osteoarthritis of glenohumeral joint, right [M19.011]    Admission Date: 5/1/2024  Expected Discharge Date:     Transition of Care Barriers: None    Payor: HU[employer] MANAGED MEDICARE / Plan: HU[employer] SNP HMO PPO SPECIAL NEEDS / Product Type: Medicare Advantage /     Extended Emergency Contact Information  Primary Emergency Contact: Julio Cesar Silverio  Mobile Phone: [phone]  Relation: Son  Preferred language: English   needed? No  Secondary Emergency Contact: Deana Silverio  Mobile Phone: [phone]  Relation: Daughter  Preferred language: English    Discharge Plan A: Home Health, Home  Discharge Plan B: Home Health, Home      [facility] [address] [place] [id number]  Phone: [phone] Fax: [phone]      Initial Assessment (most recent)       Adult Discharge Assessment - 05/01/24 1433          Discharge Assessment    Assessment Type Discharge Planning Assessment     Source of Information patient;family     People in Home alone     Do you expect to return to your current living situation? Yes     Do you have help at home or someone to help you manage your care at home? Yes     Who are your caregiver(s) and their phone number(s)? Deana Silverio- Daughter [phone]  Julio Cesar Silverio- Son [phone]     Prior to hospitilization cognitive status: Unable to Assess     Current cognitive status: Alert/Oriented     Walking or Climbing Stairs Difficulty yes     Walking or Climbing Stairs ambulation difficulty, requires equipment     Mobility Management rw     Dressing/Bathing Difficulty no     Home Accessibility stairs to enter home     Number of Stairs, Main Entrance two     Equipment Currently Used at Home CPAP;oxygen;rollator;walker,  rolling;shower chair;bedside commode     Readmission within 30 days? No     Patient currently being followed by outpatient case management? No     Do you currently have service(s) that help you manage your care at home? No     Do you take prescription medications? Yes     Do you have prescription coverage? Yes     Coverage [employer] Managed Medicare     Do you have any problems affording any of your prescribed medications? No     Is the patient taking medications as prescribed? yes     Who is going to help you get home at discharge? Son     How do you get to doctors appointments? family or friend will provide;public transportation   [place] [employer]    Are you on dialysis? No     Do you take coumadin? No     Discharge Plan A Home Health;Home     Discharge Plan B Home Health;Home     DME Needed Upon Discharge  none     Discharge Plan discussed with: Patient;Adult children     Transition of Care Barriers None        Physical Activity    On average, how many days per week do you engage in moderate to strenuous exercise (like a brisk walk)? 0 days     On average, how many minutes do you engage in exercise at this level? 0 min        Financial Resource Strain    How hard is it for you to pay for the very basics like food, housing, medical care, and heating? Not hard at all        Housing Stability    In the last 12 months, was there a time when you were not able to pay the mortgage or rent on time? No     At any time in the past 12 months, were you homeless or living in a shelter (including now)? No        Transportation Needs    In the past 12 months, has lack of transportation kept you from medical appointments or from getting medications? No     In the past 12 months, has lack of transportation kept you from meetings, work, or from getting things needed for daily living? No        Food Insecurity    Within the past 12 months, you worried that your food would run out before you got the money to buy more. Never true      Within the past 12 months, the food you bought just didn't last and you didn't have money to get more. Never true        Stress    Do you feel stress - tense, restless, nervous, or anxious, or unable to sleep at night because your mind is troubled all the time - these days? Not at all        Alcohol Use    Q1: How often do you have a drink containing alcohol? 2-3 times a week     Q2: How many drinks containing alcohol do you have on a typical day when you are drinking? 3 or 4     Q3: How often do you have six or more drinks on one occasion? Never                   LEN spoke with pt and sonJulio Cesar at bedside. Pt lives at home alone, current with [facility] hh and uses a bsc, cane, cpap, oxygen, rollator, rw and shower chair. Pt plans to dc back home with hh choice obtained. LEN faxed initial hh referral to [facility] and informed Katheryn of referral. SDOH completed and I.M. not obtained due to pt being OP. LEN will cont to follow for further dc needs.

## 2024-05-01 NOTE — PLAN OF CARE
Labored respirations noted with bilateral wheezing. Respiratory to PACU to give duoneb treatment. SaO2 at 84% on oxygen via simple face mask at 10L/M.

## 2024-05-01 NOTE — TRANSFER OF CARE
"Anesthesia Transfer of Care Note    Patient: Yany Silverio    Procedure(s) Performed: Procedure(s) (LRB):  ARTHROPLASTY, SHOULDER, TOTAL, REVERSE (Right)    Patient location: PACU    Anesthesia Type: general and regional    Transport from OR: Transported from OR on room air with adequate spontaneous ventilation    Post pain: adequate analgesia    Post assessment: no apparent anesthetic complications    Post vital signs: stable    Level of consciousness: awake, alert and oriented    Nausea/Vomiting: no nausea/vomiting    Complications: none    Transfer of care protocol was followed      Last vitals: Visit Vitals  /71   Pulse 81   Temp 36.4 °C (97.5 °F) (Oral)   Resp (!) 24   Ht 5' 5" (1.651 m)   Wt 77.1 kg (170 lb)   SpO2 (!) 93%   Breastfeeding No   BMI 28.29 kg/m²     "

## 2024-05-02 LAB
ANION GAP SERPL CALCULATED.3IONS-SCNC: 9 MMOL/L (ref 7–16)
BASOPHILS # BLD AUTO: 0.05 K/UL (ref 0–0.2)
BASOPHILS NFR BLD AUTO: 0.5 % (ref 0–1)
BUN SERPL-MCNC: 11 MG/DL (ref 7–18)
BUN/CREAT SERPL: 17 (ref 6–20)
CALCIUM SERPL-MCNC: 8.4 MG/DL (ref 8.5–10.1)
CHLORIDE SERPL-SCNC: 110 MMOL/L (ref 98–107)
CO2 SERPL-SCNC: 25 MMOL/L (ref 21–32)
CREAT SERPL-MCNC: 0.65 MG/DL (ref 0.55–1.02)
DIFFERENTIAL METHOD BLD: ABNORMAL
EGFR (NO RACE VARIABLE) (RUSH/TITUS): 98 ML/MIN/1.73M2
EOSINOPHIL # BLD AUTO: 0.19 K/UL (ref 0–0.5)
EOSINOPHIL NFR BLD AUTO: 1.7 % (ref 1–4)
ERYTHROCYTE [DISTWIDTH] IN BLOOD BY AUTOMATED COUNT: 16.6 % (ref 11.5–14.5)
GLUCOSE SERPL-MCNC: 120 MG/DL (ref 74–106)
HCT VFR BLD AUTO: 29.7 % (ref 38–47)
HGB BLD-MCNC: 9.2 G/DL (ref 12–16)
IMM GRANULOCYTES # BLD AUTO: 0.03 K/UL (ref 0–0.04)
IMM GRANULOCYTES NFR BLD: 0.3 % (ref 0–0.4)
LYMPHOCYTES # BLD AUTO: 1.51 K/UL (ref 1–4.8)
LYMPHOCYTES NFR BLD AUTO: 13.6 % (ref 27–41)
MCH RBC QN AUTO: 27.7 PG (ref 27–31)
MCHC RBC AUTO-ENTMCNC: 31 G/DL (ref 32–36)
MCV RBC AUTO: 89.5 FL (ref 80–96)
MONOCYTES # BLD AUTO: 1 K/UL (ref 0–0.8)
MONOCYTES NFR BLD AUTO: 9 % (ref 2–6)
MPC BLD CALC-MCNC: 10.3 FL (ref 9.4–12.4)
NEUTROPHILS # BLD AUTO: 8.31 K/UL (ref 1.8–7.7)
NEUTROPHILS NFR BLD AUTO: 74.9 % (ref 53–65)
NRBC # BLD AUTO: 0 X10E3/UL
NRBC, AUTO (.00): 0 %
PLATELET # BLD AUTO: 309 K/UL (ref 150–400)
POTASSIUM SERPL-SCNC: 3.3 MMOL/L (ref 3.5–5.1)
RBC # BLD AUTO: 3.32 M/UL (ref 4.2–5.4)
SODIUM SERPL-SCNC: 141 MMOL/L (ref 136–145)
WBC # BLD AUTO: 11.09 K/UL (ref 4.5–11)

## 2024-05-02 PROCEDURE — 36415 COLL VENOUS BLD VENIPUNCTURE: CPT | Performed by: ORTHOPAEDIC SURGERY

## 2024-05-02 PROCEDURE — 99900035 HC TECH TIME PER 15 MIN (STAT)

## 2024-05-02 PROCEDURE — 99214 OFFICE O/P EST MOD 30 MIN: CPT | Mod: ,,, | Performed by: HOSPITALIST

## 2024-05-02 PROCEDURE — 25000003 PHARM REV CODE 250: Performed by: HOSPITALIST

## 2024-05-02 PROCEDURE — 63600175 PHARM REV CODE 636 W HCPCS: Performed by: ORTHOPAEDIC SURGERY

## 2024-05-02 PROCEDURE — 85025 COMPLETE CBC W/AUTO DIFF WBC: CPT | Performed by: ORTHOPAEDIC SURGERY

## 2024-05-02 PROCEDURE — 80048 BASIC METABOLIC PNL TOTAL CA: CPT | Performed by: ORTHOPAEDIC SURGERY

## 2024-05-02 PROCEDURE — 97161 PT EVAL LOW COMPLEX 20 MIN: CPT

## 2024-05-02 PROCEDURE — 25000003 PHARM REV CODE 250: Performed by: ORTHOPAEDIC SURGERY

## 2024-05-02 PROCEDURE — 94761 N-INVAS EAR/PLS OXIMETRY MLT: CPT

## 2024-05-02 PROCEDURE — 97165 OT EVAL LOW COMPLEX 30 MIN: CPT

## 2024-05-02 PROCEDURE — 27000221 HC OXYGEN, UP TO 24 HOURS

## 2024-05-02 RX ORDER — ASPIRIN 81 MG/1
81 TABLET ORAL DAILY
COMMUNITY

## 2024-05-02 RX ORDER — DOCUSATE SODIUM 100 MG/1
100 CAPSULE, LIQUID FILLED ORAL DAILY
COMMUNITY

## 2024-05-02 RX ORDER — POLYETHYLENE GLYCOL 3350 17 G/17G
17 POWDER, FOR SOLUTION ORAL DAILY
COMMUNITY

## 2024-05-02 RX ORDER — SERTRALINE HYDROCHLORIDE 50 MG/1
50 TABLET, FILM COATED ORAL DAILY
COMMUNITY

## 2024-05-02 RX ADMIN — MUPIROCIN: 20 OINTMENT TOPICAL at 09:05

## 2024-05-02 RX ADMIN — POTASSIUM BICARBONATE 50 MEQ: 977.5 TABLET, EFFERVESCENT ORAL at 05:05

## 2024-05-02 RX ADMIN — OXYCODONE 5 MG: 5 TABLET ORAL at 01:05

## 2024-05-02 RX ADMIN — CELECOXIB 200 MG: 100 CAPSULE ORAL at 08:05

## 2024-05-02 RX ADMIN — CELECOXIB 200 MG: 100 CAPSULE ORAL at 09:05

## 2024-05-02 RX ADMIN — PREGABALIN 75 MG: 75 CAPSULE ORAL at 08:05

## 2024-05-02 RX ADMIN — AMLODIPINE BESYLATE 5 MG: 5 TABLET ORAL at 08:05

## 2024-05-02 RX ADMIN — ACETAMINOPHEN 1000 MG: 500 TABLET ORAL at 06:05

## 2024-05-02 RX ADMIN — OXYCODONE 10 MG: 5 TABLET ORAL at 11:05

## 2024-05-02 RX ADMIN — DOCUSATE SODIUM 100 MG: 100 CAPSULE, LIQUID FILLED ORAL at 09:05

## 2024-05-02 RX ADMIN — ACETAMINOPHEN 1000 MG: 500 TABLET ORAL at 02:05

## 2024-05-02 RX ADMIN — OXYCODONE 5 MG: 5 TABLET ORAL at 06:05

## 2024-05-02 RX ADMIN — PREGABALIN 75 MG: 75 CAPSULE ORAL at 09:05

## 2024-05-02 RX ADMIN — OXYCODONE 10 MG: 5 TABLET ORAL at 07:05

## 2024-05-02 RX ADMIN — PRAVASTATIN SODIUM 20 MG: 10 TABLET ORAL at 08:05

## 2024-05-02 RX ADMIN — CEFAZOLIN 2 G: 2 INJECTION, POWDER, FOR SOLUTION INTRAMUSCULAR; INTRAVENOUS at 06:05

## 2024-05-02 RX ADMIN — CEFAZOLIN 2 G: 2 INJECTION, POWDER, FOR SOLUTION INTRAMUSCULAR; INTRAVENOUS at 01:05

## 2024-05-02 RX ADMIN — OXYCODONE 10 MG: 5 TABLET ORAL at 03:05

## 2024-05-02 RX ADMIN — DOCUSATE SODIUM 100 MG: 100 CAPSULE, LIQUID FILLED ORAL at 08:05

## 2024-05-02 RX ADMIN — FUROSEMIDE 20 MG: 20 TABLET ORAL at 08:05

## 2024-05-02 NOTE — ANESTHESIA POSTPROCEDURE EVALUATION
Anesthesia Post Evaluation    Patient: Yany Silverio    Procedure(s) Performed: Procedure(s) (LRB):  ARTHROPLASTY, SHOULDER, TOTAL, REVERSE (Right)    Final Anesthesia Type: general      Patient location during evaluation: PACU  Patient participation: Yes- Able to Participate  Level of consciousness: awake and alert and oriented  Post-procedure vital signs: reviewed and stable  Pain management: adequate  Airway patency: patent  NGOZI mitigation strategies: Multimodal analgesia and Use of major conduction anesthesia (spinal/epidural) or peripheral nerve block  PONV status at discharge: No PONV  Anesthetic complications: no      Cardiovascular status: hemodynamically stable  Respiratory status: unassisted and spontaneous ventilation  Hydration status: euvolemic  Follow-up not needed.              Vitals Value Taken Time   /69 05/02/24 0444   Temp 36.7 °C (98.1 °F) 05/02/24 0444   Pulse 89 05/02/24 0444   Resp 18 05/02/24 0609   SpO2 93 % 05/02/24 0444         Event Time   Out of Recovery 17:07:19         Pain/Consuelo Score: Pain Rating Prior to Med Admin: 10 (5/2/2024  6:09 AM)  Pain Rating Post Med Admin: 0 (5/2/2024  2:19 AM)  Consuelo Score: 8 (5/1/2024  7:47 PM)

## 2024-05-02 NOTE — PT/OT/SLP EVAL
Physical Therapy Evaluation    Patient Name:  Yany Silverio   MRN:  [id number]    Recommendations:     Discharge Recommendations: Moderate Intensity Therapy   Discharge Equipment Recommendations: none   Barriers to discharge: None    Assessment:     Yany Silverio is a 63 y.o. female admitted with a medical diagnosis of Osteoarthritis of glenohumeral joint, right.  She presents with the following impairments/functional limitations: decreased ROM, orthopedic precautions, impaired functional mobility Patient with slightly limited mobility due to right UE being immobilized in abd pillow sling. Patient with recent falls in the past the required surgical intervention. All LE injuries healed but patient is fearful of falling at home alone being down and UE post RTS. Patient requesting sb prior to return home alone    Rehab Prognosis: Good; patient would benefit from acute skilled PT services to address these deficits and reach maximum level of function.    Recent Surgery: Procedure(s) (LRB):  ARTHROPLASTY, SHOULDER, TOTAL, REVERSE (Right) 1 Day Post-Op    Plan:     During this hospitalization, patient to be seen daily to address the identified rehab impairments via gait training, therapeutic activities and progress toward the following goals:    Plan of Care Expires:  05/23/24    Subjective     Chief Complaint: post pain  Patient/Family Comments/goals: fearful of falling. Patient wants sb prior to dc home. Patient with past history of left RTS  Pain/Comfort:  Pain Rating 1: 6/10  Location - Side 1: Right  Location 1: shoulder  Pain Addressed 1: Cessation of Activity, Pre-medicate for activity    Patients cultural, spiritual, Restorationist conflicts given the current situation:      Living Environment:  Lives alone, 3 steps  Prior to admission, patients level of function was independent.  Equipment used at home: cane, straight, walker, rolling.  DME owned (not currently used): rolling walker.  Upon discharge, patient will  have assistance from none.    Objective:     Communicated with nurse prior to session.  Patient found supine with peripheral IV  upon PT entry to room.    General Precautions: Standard, fall  Orthopedic Precautions:RUE non weight bearing   Braces: Shoulder abduction brace  Respiratory Status: Room air    Exams:  Cognitive Exam:  Patient is oriented to Person, Place, Time, and Situation  RLE ROM: WNL  RLE Strength: WNL  LLE ROM: WNL  LLE Strength: WNL    Functional Mobility:  Bed Mobility:     Supine to Sit: minimum assistance  Sit to Supine: minimum assistance  Transfers:     Sit to Stand:  minimum assistance with no AD  Gait: ambulated 75 feet cga, 50% ana paula      AM-PAC 6 CLICK MOBILITY  Total Score:18       Treatment & Education:  Don/doff abd pillow and shirt  Call for assistance with oob mobility    Patient left supine with all lines intact.    GOALS:   Multidisciplinary Problems       Physical Therapy Goals       Not on file                    History:     Past Medical History:   Diagnosis Date    Acute superficial gastritis without hemorrhage 06/23/2021    Acute superficial gastritis without hemorrhage 06/23/2021    Anxiety     Arthritis     newly dx'd RA: seelea NAVARRO in [place]    Bronchitis 07/22/2021    Bursitis of left shoulder 06/10/2021    Cancer     hx of cervical cancer    COPD (chronic obstructive pulmonary disease)     Depression     Emphysema of lung     Fungal esophagitis 06/23/2021    HH (hiatus hernia) 06/23/2021    Hyperlipidemia     Hypertension     Insomnia 04/16/2020    Low back pain 04/28/2021    Pain in joint of left hip 04/28/2021    Pain in joint of left knee 04/28/2021    Pneumonia of both lower lobes due to infectious organism 01/14/2022    Sleep apnea        Past Surgical History:   Procedure Laterality Date    OPEN REDUCTION AND INTERNAL FIXATION (ORIF) OF INJURY OF HIP Left 1/12/2022    Procedure: ORIF, HIP;  Surgeon: Adolfo Randall MD;  Location: [facility] OR;  Service:  Orthopedics;  Laterality: Left;    REVERSE TOTAL SHOULDER ARTHROPLASTY Left 10/30/2023    Procedure: ARTHROPLASTY, SHOULDER, TOTAL, REVERSE;  Surgeon: Adolfo Randall MD;  Location: [facility] OR;  Service: Orthopedics;  Laterality: Left;    REVERSE TOTAL SHOULDER ARTHROPLASTY Right 5/1/2024    Procedure: ARTHROPLASTY, SHOULDER, TOTAL, REVERSE;  Surgeon: Adolfo Randall MD;  Location: [facility] OR;  Service: Orthopedics;  Laterality: Right;    ROBOTIC ARTHROPLASTY, HIP Left 12/13/2021    Procedure: ROBOTIC ARTHROPLASTY,HIP;  Surgeon: Adolfo Randall MD;  Location: [facility] OR;  Service: Orthopedics;  Laterality: Left;    SHOULDER SURGERY Right 2017, 2019       Time Tracking:     PT Received On:    PT Start Time: 1030     PT Stop Time: 1050  PT Total Time (min): 20 min     Billable Minutes: Evaluation 20 05/02/2024

## 2024-05-02 NOTE — CONSULTS
[facility]  [facility] Medicine  Consult Note    Patient Name: Yany Silverio  MRN: [id number]  Admission Date: 5/1/2024  Hospital Length of Stay: 0 days  Attending Physician: Adolfo Randall MD   Primary Care Provider: Nitesh Simon MD           Patient information was obtained from patient, past medical records, and ER records.     Consults  Subjective:     Principal Problem: Osteoarthritis of glenohumeral joint, right    Chief Complaint: No chief complaint on file.       HPI: 64yo woman history of COPD on home O2 at night, NGOZI on CPAP, prior tobacco use, alcohol use, HTN, HLD, GERD who presents with R osteoarthritis that has failed all conservatives measures.  She is now s/p R shoulder arthropasty.  She has no complaints at this time.  Vitals stable except some O2 desat and she is off oxygen currently.      Past Medical History:   Diagnosis Date    Acute superficial gastritis without hemorrhage 06/23/2021    Acute superficial gastritis without hemorrhage 06/23/2021    Anxiety     Arthritis     newly dx'd RA: sees MD in [place]    Bronchitis 07/22/2021    Bursitis of left shoulder 06/10/2021    Cancer     hx of cervical cancer    COPD (chronic obstructive pulmonary disease)     Depression     Emphysema of lung     Fungal esophagitis 06/23/2021    HH (hiatus hernia) 06/23/2021    Hyperlipidemia     Hypertension     Insomnia 04/16/2020    Low back pain 04/28/2021    Pain in joint of left hip 04/28/2021    Pain in joint of left knee 04/28/2021    Pneumonia of both lower lobes due to infectious organism 01/14/2022    Sleep apnea        Past Surgical History:   Procedure Laterality Date    OPEN REDUCTION AND INTERNAL FIXATION (ORIF) OF INJURY OF HIP Left 1/12/2022    Procedure: ORIF, HIP;  Surgeon: Adolfo Randall MD;  Location: [facility];  Service: Orthopedics;  Laterality: Left;    REVERSE TOTAL SHOULDER ARTHROPLASTY Left 10/30/2023    Procedure: ARTHROPLASTY, SHOULDER, TOTAL, REVERSE;   Surgeon: Adolfo Randall MD;  Location: [facility] OR;  Service: Orthopedics;  Laterality: Left;    REVERSE TOTAL SHOULDER ARTHROPLASTY Right 5/1/2024    Procedure: ARTHROPLASTY, SHOULDER, TOTAL, REVERSE;  Surgeon: Adolfo Randall MD;  Location: [facility] OR;  Service: Orthopedics;  Laterality: Right;    ROBOTIC ARTHROPLASTY, HIP Left 12/13/2021    Procedure: ROBOTIC ARTHROPLASTY,HIP;  Surgeon: Adolfo Randall MD;  Location: [facility] OR;  Service: Orthopedics;  Laterality: Left;    SHOULDER SURGERY Right 2017, 2019       Review of patient's allergies indicates:  No Known Allergies    Current Facility-Administered Medications   Medication Dose Route Frequency Provider Last Rate Last Admin    albuterol nebulizer solution 2.5 mg  2.5 mg Nebulization Q4H PRN Adolfo Randall MD        amLODIPine tablet 5 mg  5 mg Oral Daily Adolfo Randall MD   5 mg at 05/02/24 0858    celecoxib capsule 200 mg  200 mg Oral BID Adolfo Randall MD   200 mg at 05/02/24 0858    docusate sodium capsule 100 mg  100 mg Oral BID Adolfo Randall MD   100 mg at 05/02/24 0858    furosemide tablet 20 mg  20 mg Oral Daily Adolfo Randall MD   20 mg at 05/02/24 0859    mupirocin 2 % ointment   Nasal BID Adolfo Randall MD   Given at 05/02/24 0901    ondansetron disintegrating tablet 8 mg  8 mg Oral Q8H PRN Adolfo Randall MD        oxyCODONE immediate release tablet 10 mg  10 mg Oral Q4H PRN Adolfo Randall MD   10 mg at 05/02/24 1508    oxyCODONE immediate release tablet 5 mg  5 mg Oral Q4H PRN Adolfo Randall MD   5 mg at 05/02/24 0609    potassium bicarbonate disintegrating tablet 50 mEq  50 mEq Oral BID Silvia Chisholm MD        pravastatin tablet 20 mg  20 mg Oral Daily Adolfo Randall MD   20 mg at 05/02/24 0858    pregabalin capsule 75 mg  75 mg Oral BID Adolfo Randall MD   75 mg at 05/02/24 0858    promethazine tablet 25 mg  25 mg Oral Q6H PRN Adolfo Randall MD        sodium chloride 0.9% flush 5 mL  5 mL Intravenous PRN Adolfo Randall, MD          Family History       Problem Relation (Age of Onset)    Cancer Mother    Hypertension Sister, Maternal Aunt, Maternal Uncle          Tobacco Use    Smoking status: Some Days     Current packs/day: 2.00     Average packs/day: 2.0 packs/day for 20.0 years (40.0 ttl pk-yrs)     Types: Cigarettes     Passive exposure: Past    Smokeless tobacco: Never    Tobacco comments:     Smoked one cigarette today   Substance and Sexual Activity    Alcohol use: Never    Drug use: Yes     Types: Oxycodone     Comment: rx for hip pain: surg planned    Sexual activity: Not on file     Review of Systems   Constitutional:  Negative for activity change, chills, fatigue and fever.   HENT:  Negative for congestion and sore throat.    Respiratory:  Negative for chest tightness.    Gastrointestinal:  Negative for abdominal distention, abdominal pain and diarrhea.   Endocrine: Negative for cold intolerance and heat intolerance.   Genitourinary:  Negative for dysuria.   Musculoskeletal:  Negative for arthralgias and back pain.        R shoulder pain    Skin:  Negative for color change and rash.   Neurological:  Negative for dizziness, tremors and headaches.   Psychiatric/Behavioral:  Negative for agitation. The patient is not nervous/anxious.      Objective:     Vital Signs (Most Recent):  Temp: 98.6 °F (37 °C) (05/02/24 1547)  Pulse: 88 (05/02/24 1547)  Resp: 18 (05/02/24 1547)  BP: 122/79 (05/02/24 1547)  SpO2: (!) 94 % (05/02/24 1547) Vital Signs (24h Range):  Temp:  [97.5 °F (36.4 °C)-98.6 °F (37 °C)] 98.6 °F (37 °C)  Pulse:  [] 88  Resp:  [17-20] 18  SpO2:  [89 %-96 %] 94 %  BP: ()/(58-80) 122/79     Weight: 77.1 kg (170 lb)  Body mass index is 28.29 kg/m².     Physical Exam  Constitutional:       Appearance: Normal appearance.   HENT:      Head: Normocephalic and atraumatic.      Nose: Nose normal.      Mouth/Throat:      Mouth: Mucous membranes are moist.      Pharynx: Oropharynx is clear.   Eyes:      Extraocular  Movements: Extraocular movements intact.      Conjunctiva/sclera: Conjunctivae normal.      Pupils: Pupils are equal, round, and reactive to light.   Cardiovascular:      Rate and Rhythm: Normal rate and regular rhythm.      Pulses: Normal pulses.      Heart sounds: Normal heart sounds.   Pulmonary:      Effort: Pulmonary effort is normal.      Breath sounds: Normal breath sounds.   Abdominal:      General: Abdomen is flat. Bowel sounds are normal.      Palpations: Abdomen is soft.   Musculoskeletal:         General: Normal range of motion.      Cervical back: Normal range of motion and neck supple.      Comments: Post-surgical R shoulder changes.    Skin:     General: Skin is warm and dry.   Neurological:      General: No focal deficit present.      Mental Status: She is alert and oriented to person, place, and time.   Psychiatric:         Mood and Affect: Mood normal.         Behavior: Behavior normal.          Significant Labs: All pertinent labs within the past 24 hours have been reviewed.    Significant Imaging: I have reviewed all pertinent imaging results/findings within the past 24 hours.  Assessment/Plan:     * Osteoarthritis of glenohumeral joint, right  S/p R arthroplasty by Dr. Randall.   Mgmt per ortho.       Dyslipidemia  Continue home statin.       Alcoholism  Counseled on cessation.        Anemia  Patient's anemia is currently controlled. Has not received any PRBCs to date. Etiology likely d/t Iron deficiency  Current CBC reviewed-   Lab Results   Component Value Date    HGB 9.2 (L) 05/02/2024    HCT 29.7 (L) 05/02/2024     Monitor serial CBC and transfuse if patient becomes hemodynamically unstable, symptomatic or H/H drops below 7/21.    Anxiety and depression  Patient has recurrent depression which is mild and is currently controlled. Will Continue anti-depressant medications. We will not consult psychiatry at this time. Patient does not display psychosis at this time. Continue to monitor closely and  adjust plan of care as needed.        Hypertension  Chronic, controlled. Latest blood pressure and vitals reviewed-     Temp:  [97.5 °F (36.4 °C)-98.6 °F (37 °C)]   Pulse:  []   Resp:  [17-20]   BP: ()/(58-80)   SpO2:  [89 %-96 %] .   Home meds for hypertension were reviewed and noted below.   Hypertension Medications               amLODIPine (NORVASC) 5 MG tablet Take 1 tablet (5 mg total) by mouth once daily.    furosemide (LASIX) 20 MG tablet TAKE 1 TABLET BY MOUTH DAILY FOR 3 DAYS THEN WEIGH DAILY AND IF NO MORE THAN 3 POUND WEIGHT GAIN, TAKE LASIX            While in the hospital, will manage blood pressure as follows; Continue home antihypertensive regimen    Will utilize p.r.n. blood pressure medication only if patient's blood pressure greater than 160/100 and she develops symptoms such as worsening chest pain or shortness of breath.    NGOZI on CPAP  No in-patient cpap per patient request       Chronic obstructive pulmonary disease  Patient's COPD is with exacerbation noted by continued dyspnea and worsening of baseline hypoxia currently.  Patient is currently off COPD Pathway. Continue scheduled inhalers Supplemental oxygen and monitor respiratory status closely.       VTE Risk Mitigation (From admission, onward)           Ordered     Place COREY hose  Until discontinued         05/01/24 1827     Place sequential compression device  Until discontinued         05/01/24 1827                        Thank you for your consult. I will follow-up with patient. Please contact us if you have any additional questions.    Silvia Paniagua MD  Department of Hospital Medicine   [facility]

## 2024-05-02 NOTE — PLAN OF CARE
[facility]  Discharge Reassessment    Primary Care Provider: Nitesh Simon MD    Expected Discharge Date:     Reassessment (most recent)       Discharge Reassessment - 05/02/24 1130          Discharge Reassessment    Assessment Type Discharge Planning Reassessment     Discharge Plan discussed with: Patient     Discharge Plan A Skilled Nursing Facility     Discharge Plan B Home Health;Home     DME Needed Upon Discharge  none     Transition of Care Barriers None     Why the patient remains in the hospital Requires continued medical care        Post-Acute Status    Post-Acute Authorization Placement     Post-Acute Placement Status Referrals Sent     Patient choice form signed by patient/caregiver List with quality metrics by geographic area provided;List from CMS Compare     Discharge Delays None known at this time                   SW consulted for SNF. SW spoke with pt at bedside and pt gave choice for [facility]. Referral faxed. SW awaiting therapy notes to send so that auth can be submitted. SW following.

## 2024-05-02 NOTE — ASSESSMENT & PLAN NOTE
Patient's COPD is with exacerbation noted by continued dyspnea and worsening of baseline hypoxia currently.  Patient is currently off COPD Pathway. Continue scheduled inhalers Supplemental oxygen and monitor respiratory status closely.

## 2024-05-02 NOTE — DISCHARGE INSTRUCTIONS
Physical Therapy Instructions    Complete the exercises standing at the kitchen sink for stability. Work up to 30 repetitions each 2 times a day. Take rest breaks as needed and keep a chair close by to sit down if needed.      Toe/Heel Raises            Stand while holding a stable object. Rise up on toes. Then rock back on heels. Hold each position 2 seconds.  Repeat 30 times per session. Do 2 sessions per day.            Squat: Double Leg (Supported)        With feet shoulder width apart, hold support. Squat, keeping lower leg vertical, knee in line with second toe. Use legs, do not pull up and down with arms. Only squat down as low as you are comfortable. Repeat 30 times with rest breaks as needed. Do 2 sessions per day         Hip Flexion (Standing)        Stand with support. Lift right knee upward. Repeat 30 times. Repeat with other leg.   Do 2 sessions per day.      HIP: Abduction - Standing        Lift one leg out to the side holding on to a support. Keep toe pointing forward to focus the exercise on the muscles on the outside of your hip.  Repeat 30 times per side. 2 times per day        SINGLE LIMB STANCE        Stand facing your kitchen counter for safety.  Balance on one leg. Hold as long as possible.    Repeat with 5 times per leg.    Copyright © [employer]. All rights reserved.                                                         NAME:_________________________________     DATE: _________________________________    PHYSICIAN:____________________________                                  DR. BABI TREVINO                                                                                  Reverse Total Shoulder Arthroplasty                                                                                        Post-operative Protocol     *Regular sling for 2-3 days  **No Pendulums                          Phase 1 - (QUIET)                 No Internal Rotation   Week 1-2                  Phase 2 - (ACTIVE-ASSIST)             Active-Assist Range of Motion with Passive Stretch to prescribed limits  Week 2-6          Supine-Seated External Rotation - Gradually increase to full              Supine-Seated Forward Elevation - Progress to Seated              Internal Rotation - Gradually increase to full         Phase 3 - (RESISTED)                      NO Pendulums, continue with phase 2.  Week 7                                                Scapular mobilization                                                    External and Internal Rotation                                                   Standing forward punch                                                  Seated rows                                                   Shoulder Shrugs                                                   Bicep Curls                                                  Bear Hugs                                                   Concentrate on Deltoid strengthening                    Weight Training                     Keep hands within eyesight, keep elbows bent  Week 12                                              Minimize overhead activities                                                  (No  press, pull-down behind head, or wide  bench)        Return to Activities     Computer                    4 weeks  Golf                              3 months  Tennis                         4 months

## 2024-05-02 NOTE — ASSESSMENT & PLAN NOTE
Chronic, controlled. Latest blood pressure and vitals reviewed-     Temp:  [97.5 °F (36.4 °C)-98.6 °F (37 °C)]   Pulse:  []   Resp:  [17-20]   BP: ()/(58-80)   SpO2:  [89 %-96 %] .   Home meds for hypertension were reviewed and noted below.   Hypertension Medications               amLODIPine (NORVASC) 5 MG tablet Take 1 tablet (5 mg total) by mouth once daily.    furosemide (LASIX) 20 MG tablet TAKE 1 TABLET BY MOUTH DAILY FOR 3 DAYS THEN WEIGH DAILY AND IF NO MORE THAN 3 POUND WEIGHT GAIN, TAKE LASIX            While in the hospital, will manage blood pressure as follows; Continue home antihypertensive regimen    Will utilize p.r.n. blood pressure medication only if patient's blood pressure greater than 160/100 and she develops symptoms such as worsening chest pain or shortness of breath.

## 2024-05-02 NOTE — SUBJECTIVE & OBJECTIVE
Past Medical History:   Diagnosis Date    Acute superficial gastritis without hemorrhage 06/23/2021    Acute superficial gastritis without hemorrhage 06/23/2021    Anxiety     Arthritis     newly dx'd RA: anai NAVARRO in [place]    Bronchitis 07/22/2021    Bursitis of left shoulder 06/10/2021    Cancer     hx of cervical cancer    COPD (chronic obstructive pulmonary disease)     Depression     Emphysema of lung     Fungal esophagitis 06/23/2021    HH (hiatus hernia) 06/23/2021    Hyperlipidemia     Hypertension     Insomnia 04/16/2020    Low back pain 04/28/2021    Pain in joint of left hip 04/28/2021    Pain in joint of left knee 04/28/2021    Pneumonia of both lower lobes due to infectious organism 01/14/2022    Sleep apnea        Past Surgical History:   Procedure Laterality Date    OPEN REDUCTION AND INTERNAL FIXATION (ORIF) OF INJURY OF HIP Left 1/12/2022    Procedure: ORIF, HIP;  Surgeon: Adolfo Randall MD;  Location: [facility] OR;  Service: Orthopedics;  Laterality: Left;    REVERSE TOTAL SHOULDER ARTHROPLASTY Left 10/30/2023    Procedure: ARTHROPLASTY, SHOULDER, TOTAL, REVERSE;  Surgeon: Adolfo Randall MD;  Location: [facility] OR;  Service: Orthopedics;  Laterality: Left;    REVERSE TOTAL SHOULDER ARTHROPLASTY Right 5/1/2024    Procedure: ARTHROPLASTY, SHOULDER, TOTAL, REVERSE;  Surgeon: Adolfo Randall MD;  Location: [facility] OR;  Service: Orthopedics;  Laterality: Right;    ROBOTIC ARTHROPLASTY, HIP Left 12/13/2021    Procedure: ROBOTIC ARTHROPLASTY,HIP;  Surgeon: Adolfo Randall MD;  Location: [facility] OR;  Service: Orthopedics;  Laterality: Left;    SHOULDER SURGERY Right 2017, 2019       Review of patient's allergies indicates:  No Known Allergies    Current Facility-Administered Medications   Medication Dose Route Frequency Provider Last Rate Last Admin    albuterol nebulizer solution 2.5 mg  2.5 mg Nebulization Q4H PRN Adolfo Randall MD        amLODIPine tablet 5 mg  5 mg Oral Daily  Adolfo Randall MD   5 mg at 05/02/24 0858    celecoxib capsule 200 mg  200 mg Oral BID Adolfo Randall MD   200 mg at 05/02/24 0858    docusate sodium capsule 100 mg  100 mg Oral BID Adolfo Randall MD   100 mg at 05/02/24 0858    furosemide tablet 20 mg  20 mg Oral Daily Adolfo Randall MD   20 mg at 05/02/24 0859    mupirocin 2 % ointment   Nasal BID Adolfo Randall MD   Given at 05/02/24 0901    ondansetron disintegrating tablet 8 mg  8 mg Oral Q8H PRN Adolfo Randall MD        oxyCODONE immediate release tablet 10 mg  10 mg Oral Q4H PRN Adolfo Randall MD   10 mg at 05/02/24 1508    oxyCODONE immediate release tablet 5 mg  5 mg Oral Q4H PRN Adolfo Randall MD   5 mg at 05/02/24 0609    potassium bicarbonate disintegrating tablet 50 mEq  50 mEq Oral BID AC Silvia Paniagua MD        pravastatin tablet 20 mg  20 mg Oral Daily Adolfo Randall MD   20 mg at 05/02/24 0858    pregabalin capsule 75 mg  75 mg Oral BID Adolfo Randall MD   75 mg at 05/02/24 0858    promethazine tablet 25 mg  25 mg Oral Q6H PRN Adolfo Randall MD        sodium chloride 0.9% flush 5 mL  5 mL Intravenous PRN Adolfo Randall MD         Family History       Problem Relation (Age of Onset)    Cancer Mother    Hypertension Sister, Maternal Aunt, Maternal Uncle          Tobacco Use    Smoking status: Some Days     Current packs/day: 2.00     Average packs/day: 2.0 packs/day for 20.0 years (40.0 ttl pk-yrs)     Types: Cigarettes     Passive exposure: Past    Smokeless tobacco: Never    Tobacco comments:     Smoked one cigarette today   Substance and Sexual Activity    Alcohol use: Never    Drug use: Yes     Types: Oxycodone     Comment: rx for hip pain: surg planned    Sexual activity: Not on file     Review of Systems   Constitutional:  Negative for activity change, chills, fatigue and fever.   HENT:  Negative for congestion and sore throat.    Respiratory:  Negative for chest tightness.    Gastrointestinal:  Negative for abdominal distention, abdominal pain and  diarrhea.   Endocrine: Negative for cold intolerance and heat intolerance.   Genitourinary:  Negative for dysuria.   Musculoskeletal:  Negative for arthralgias and back pain.        R shoulder pain    Skin:  Negative for color change and rash.   Neurological:  Negative for dizziness, tremors and headaches.   Psychiatric/Behavioral:  Negative for agitation. The patient is not nervous/anxious.      Objective:     Vital Signs (Most Recent):  Temp: 98.6 °F (37 °C) (05/02/24 1547)  Pulse: 88 (05/02/24 1547)  Resp: 18 (05/02/24 1547)  BP: 122/79 (05/02/24 1547)  SpO2: (!) 94 % (05/02/24 1547) Vital Signs (24h Range):  Temp:  [97.5 °F (36.4 °C)-98.6 °F (37 °C)] 98.6 °F (37 °C)  Pulse:  [] 88  Resp:  [17-20] 18  SpO2:  [89 %-96 %] 94 %  BP: ()/(58-80) 122/79     Weight: 77.1 kg (170 lb)  Body mass index is 28.29 kg/m².     Physical Exam  Constitutional:       Appearance: Normal appearance.   HENT:      Head: Normocephalic and atraumatic.      Nose: Nose normal.      Mouth/Throat:      Mouth: Mucous membranes are moist.      Pharynx: Oropharynx is clear.   Eyes:      Extraocular Movements: Extraocular movements intact.      Conjunctiva/sclera: Conjunctivae normal.      Pupils: Pupils are equal, round, and reactive to light.   Cardiovascular:      Rate and Rhythm: Normal rate and regular rhythm.      Pulses: Normal pulses.      Heart sounds: Normal heart sounds.   Pulmonary:      Effort: Pulmonary effort is normal.      Breath sounds: Normal breath sounds.   Abdominal:      General: Abdomen is flat. Bowel sounds are normal.      Palpations: Abdomen is soft.   Musculoskeletal:         General: Normal range of motion.      Cervical back: Normal range of motion and neck supple.      Comments: Post-surgical R shoulder changes.    Skin:     General: Skin is warm and dry.   Neurological:      General: No focal deficit present.      Mental Status: She is alert and oriented to person, place, and time.   Psychiatric:          Mood and Affect: Mood normal.         Behavior: Behavior normal.          Significant Labs: All pertinent labs within the past 24 hours have been reviewed.    Significant Imaging: I have reviewed all pertinent imaging results/findings within the past 24 hours.

## 2024-05-02 NOTE — PLAN OF CARE
Problem: Occupational Therapy  Goal: Occupational Therapy Goal  Description: ST.Pt will perform bathing with Susanna with setup at EOB  2.Pt will perform UE dressing with Louie  3.Pt will perform LE dressing with Louie  4.Pt will transfer bed/chair/bsc with SBA  5.Pt will perform standing task x 2 min with SBA  6.Tolerate 15 min of tx without fatigue.      LTG:   Restore to max I with selfcare and mobility.      Outcome: Progressing

## 2024-05-02 NOTE — PT/OT/SLP EVAL
Occupational Therapy   Evaluation    Name: Yany Silverio  MRN: [id number]  Admitting Diagnosis: Osteoarthritis of glenohumeral joint, right  Recent Surgery: Procedure(s) (LRB):  ARTHROPLASTY, SHOULDER, TOTAL, REVERSE (Right) 1 Day Post-Op    Recommendations:     Discharge Recommendations: Moderate Intensity Therapy  Discharge Equipment Recommendations:  none  Barriers to discharge:  None    Assessment:     Yany Silverio is a 63 y.o. female with a medical diagnosis of Osteoarthritis of glenohumeral joint, right.  She presents with s/p right reverse shoulder replacement on 5/1/24. Performance deficits affecting function: impaired self care skills, impaired functional mobility, pain, orthopedic precautions.      Rehab Prognosis: Good; patient would benefit from acute skilled OT services to address these deficits and reach maximum level of function.       Plan:     Patient to be seen 5 x/week to address the above listed problems via self-care/home management, therapeutic activities, therapeutic exercises  Plan of Care Expires: 05/30/24  Plan of Care Reviewed with: patient    Subjective     Chief Complaint: s/p reverse shoulder replacement  Patient/Family Comments/goals: pt agreeable to OT eval    Occupational Profile:  Living Environment: pt lives alone in one story home with 2 steps to enter  Previous level of function: independent with all ADL tasks, IADL tasks, and functional mobility   Roles and Routines: perform self care; homemaker  Equipment Used at Home: cane, straight, walker, rolling, shower chair, bedside commode, crutches, axillary, CPAP, grab bar  Assistance upon Discharge: swing bed versus home health    Pain/Comfort:  Pain Rating 1: 6/10  Location - Side 1: Right  Location 1: shoulder  Pain Addressed 1: Pre-medicate for activity    Patients cultural, spiritual, Protestant conflicts given the current situation: no    Objective:     Communicated with: SHALINI Wynn prior to session.  Patient found up in  chair with peripheral IV upon OT entry to room.    General Precautions: Standard, fall  Orthopedic Precautions: RUE non weight bearing  Braces: UE Sling  Respiratory Status: Room air    Occupational Performance:    Bed Mobility:    Not performed    Functional Mobility/Transfers:  Patient completed Sit <> Stand Transfer with stand by assistance  with  no assistive device   Functional Mobility: pt performed functional mobility into hallway and back to chair with SBA with no AD    Activities of Daily Living:  Lower Body Dressing: modified independence to lindsay pants and socks    Cognitive/Visual Perceptual:  Cognitive/Psychosocial Skills:     -       Oriented to: Person, Place, Time, and Situation   -       Follows Commands/attention:Follows multistep  commands  -       Mood/Affect/Coping skills/emotional control: Cooperative    Physical Exam:  Balance:    -       WFL  Upper Extremity Range of Motion:     -       Right Upper Extremity: elbow, wrist, and hand WFL  -       Left Upper Extremity: WFL  Upper Extremity Strength:    -       Right Upper Extremity: NT  -       Left Upper Extremity: WFL  Gross motor coordination:   decreased d/t s/p right reverse shoulder replacement    AMPAC 6 Click ADL:  AMPAC Total Score: 20    Treatment & Education:  Pt educated on OT role/POC.   Importance of OOB activity with staff assistance.  Importance of sitting up in the chair throughout the day as tolerated, especially for meals   Safety during functional t/f and mobility   Importance of assisting with self-care activities   All questions/concerns answered within OT scope of practice      Patient left up in chair with all lines intact, call button in reach, and SHALINI Wynn notified    GOALS:   Multidisciplinary Problems       Occupational Therapy Goals          Problem: Occupational Therapy    Goal Priority Disciplines Outcome Interventions   Occupational Therapy Goal     OT, PT/OT Progressing    Description: ST.Pt will perform  bathing with Susanna with setup at EOB  2.Pt will perform UE dressing with Louie  3.Pt will perform LE dressing with Louie  4.Pt will transfer bed/chair/bsc with SBA  5.Pt will perform standing task x 2 min with SBA  6.Tolerate 15 min of tx without fatigue.      LTG:   Restore to max I with selfcare and mobility.                           History:     Past Medical History:   Diagnosis Date    Acute superficial gastritis without hemorrhage 06/23/2021    Acute superficial gastritis without hemorrhage 06/23/2021    Anxiety     Arthritis     newly dx'd RA: anai NAVARRO in [place]    Bronchitis 07/22/2021    Bursitis of left shoulder 06/10/2021    Cancer     hx of cervical cancer    COPD (chronic obstructive pulmonary disease)     Depression     Emphysema of lung     Fungal esophagitis 06/23/2021    HH (hiatus hernia) 06/23/2021    Hyperlipidemia     Hypertension     Insomnia 04/16/2020    Low back pain 04/28/2021    Pain in joint of left hip 04/28/2021    Pain in joint of left knee 04/28/2021    Pneumonia of both lower lobes due to infectious organism 01/14/2022    Sleep apnea          Past Surgical History:   Procedure Laterality Date    OPEN REDUCTION AND INTERNAL FIXATION (ORIF) OF INJURY OF HIP Left 1/12/2022    Procedure: ORIF, HIP;  Surgeon: Adolfo Randall MD;  Location: [facility] OR;  Service: Orthopedics;  Laterality: Left;    REVERSE TOTAL SHOULDER ARTHROPLASTY Left 10/30/2023    Procedure: ARTHROPLASTY, SHOULDER, TOTAL, REVERSE;  Surgeon: Adolfo Randall MD;  Location: [facility] OR;  Service: Orthopedics;  Laterality: Left;    REVERSE TOTAL SHOULDER ARTHROPLASTY Right 5/1/2024    Procedure: ARTHROPLASTY, SHOULDER, TOTAL, REVERSE;  Surgeon: Adolfo Randall MD;  Location: [facility] OR;  Service: Orthopedics;  Laterality: Right;    ROBOTIC ARTHROPLASTY, HIP Left 12/13/2021    Procedure: ROBOTIC ARTHROPLASTY,HIP;  Surgeon: Adolfo Randall MD;  Location: [facility] OR;  Service: Orthopedics;  Laterality:  Left;    SHOULDER SURGERY Right 2017, 2019       Time Tracking:     OT Date of Treatment: 05/02/24  OT Start Time: 0920  OT Stop Time: 0930  OT Total Time (min): 10 min    Billable Minutes:Evaluation OT min complexity eval    5/2/2024

## 2024-05-02 NOTE — HPI
62yo woman history of COPD on home O2 at night, NGOZI on CPAP, prior tobacco use, alcohol use, HTN, HLD, GERD who presents with R osteoarthritis that has failed all conservatives measures.  She is now s/p R shoulder arthropasty.  She has no complaints at this time.  Vitals stable except some O2 desat and she is off oxygen currently.

## 2024-05-02 NOTE — ASSESSMENT & PLAN NOTE
Patient's anemia is currently controlled. Has not received any PRBCs to date. Etiology likely d/t Iron deficiency  Current CBC reviewed-   Lab Results   Component Value Date    HGB 9.2 (L) 05/02/2024    HCT 29.7 (L) 05/02/2024     Monitor serial CBC and transfuse if patient becomes hemodynamically unstable, symptomatic or H/H drops below 7/21.

## 2024-05-03 LAB
ANION GAP SERPL CALCULATED.3IONS-SCNC: 10 MMOL/L (ref 7–16)
BASOPHILS # BLD AUTO: 0.05 K/UL (ref 0–0.2)
BASOPHILS NFR BLD AUTO: 0.4 % (ref 0–1)
BUN SERPL-MCNC: 9 MG/DL (ref 7–18)
BUN/CREAT SERPL: 15 (ref 6–20)
CALCIUM SERPL-MCNC: 8.6 MG/DL (ref 8.5–10.1)
CHLORIDE SERPL-SCNC: 112 MMOL/L (ref 98–107)
CO2 SERPL-SCNC: 25 MMOL/L (ref 21–32)
CREAT SERPL-MCNC: 0.61 MG/DL (ref 0.55–1.02)
DIFFERENTIAL METHOD BLD: ABNORMAL
EGFR (NO RACE VARIABLE) (RUSH/TITUS): 100 ML/MIN/1.73M2
EOSINOPHIL # BLD AUTO: 0.42 K/UL (ref 0–0.5)
EOSINOPHIL NFR BLD AUTO: 3.7 % (ref 1–4)
ERYTHROCYTE [DISTWIDTH] IN BLOOD BY AUTOMATED COUNT: 16.6 % (ref 11.5–14.5)
GLUCOSE SERPL-MCNC: 101 MG/DL (ref 74–106)
HCT VFR BLD AUTO: 26.5 % (ref 38–47)
HGB BLD-MCNC: 8.3 G/DL (ref 12–16)
IMM GRANULOCYTES # BLD AUTO: 0.03 K/UL (ref 0–0.04)
IMM GRANULOCYTES NFR BLD: 0.3 % (ref 0–0.4)
LYMPHOCYTES # BLD AUTO: 2.09 K/UL (ref 1–4.8)
LYMPHOCYTES NFR BLD AUTO: 18.6 % (ref 27–41)
MAGNESIUM SERPL-MCNC: 2.1 MG/DL (ref 1.7–2.3)
MCH RBC QN AUTO: 28.1 PG (ref 27–31)
MCHC RBC AUTO-ENTMCNC: 31.3 G/DL (ref 32–36)
MCV RBC AUTO: 89.8 FL (ref 80–96)
MONOCYTES # BLD AUTO: 1.22 K/UL (ref 0–0.8)
MONOCYTES NFR BLD AUTO: 10.9 % (ref 2–6)
MPC BLD CALC-MCNC: 10.1 FL (ref 9.4–12.4)
NEUTROPHILS # BLD AUTO: 7.42 K/UL (ref 1.8–7.7)
NEUTROPHILS NFR BLD AUTO: 66.1 % (ref 53–65)
NRBC # BLD AUTO: 0 X10E3/UL
NRBC, AUTO (.00): 0 %
PLATELET # BLD AUTO: 290 K/UL (ref 150–400)
POTASSIUM SERPL-SCNC: 3.6 MMOL/L (ref 3.5–5.1)
RBC # BLD AUTO: 2.95 M/UL (ref 4.2–5.4)
SODIUM SERPL-SCNC: 143 MMOL/L (ref 136–145)
WBC # BLD AUTO: 11.23 K/UL (ref 4.5–11)

## 2024-05-03 PROCEDURE — 63600175 PHARM REV CODE 636 W HCPCS: Performed by: ORTHOPAEDIC SURGERY

## 2024-05-03 PROCEDURE — 99900035 HC TECH TIME PER 15 MIN (STAT)

## 2024-05-03 PROCEDURE — 97110 THERAPEUTIC EXERCISES: CPT

## 2024-05-03 PROCEDURE — 83735 ASSAY OF MAGNESIUM: CPT | Performed by: HOSPITALIST

## 2024-05-03 PROCEDURE — 25000003 PHARM REV CODE 250: Performed by: ORTHOPAEDIC SURGERY

## 2024-05-03 PROCEDURE — 86580 TB INTRADERMAL TEST: CPT | Performed by: ORTHOPAEDIC SURGERY

## 2024-05-03 PROCEDURE — 94761 N-INVAS EAR/PLS OXIMETRY MLT: CPT

## 2024-05-03 PROCEDURE — 80048 BASIC METABOLIC PNL TOTAL CA: CPT | Performed by: ORTHOPAEDIC SURGERY

## 2024-05-03 PROCEDURE — 36415 COLL VENOUS BLD VENIPUNCTURE: CPT | Performed by: ORTHOPAEDIC SURGERY

## 2024-05-03 PROCEDURE — 99214 OFFICE O/P EST MOD 30 MIN: CPT | Mod: ,,, | Performed by: HOSPITALIST

## 2024-05-03 PROCEDURE — 85025 COMPLETE CBC W/AUTO DIFF WBC: CPT | Performed by: ORTHOPAEDIC SURGERY

## 2024-05-03 PROCEDURE — 25000003 PHARM REV CODE 250: Performed by: HOSPITALIST

## 2024-05-03 PROCEDURE — 27000758 HC SPIROMETER

## 2024-05-03 PROCEDURE — 30200315 PPD INTRADERMAL TEST REV CODE 302: Performed by: ORTHOPAEDIC SURGERY

## 2024-05-03 RX ORDER — MUPIROCIN 20 MG/G
OINTMENT TOPICAL 2 TIMES DAILY
Status: COMPLETED | OUTPATIENT
Start: 2024-05-03 | End: 2024-05-05

## 2024-05-03 RX ORDER — ONDANSETRON 4 MG/1
4 TABLET, ORALLY DISINTEGRATING ORAL EVERY 8 HOURS PRN
Qty: 30 TABLET | Refills: 0 | Status: SHIPPED | OUTPATIENT
Start: 2024-05-03

## 2024-05-03 RX ORDER — OXYCODONE HYDROCHLORIDE 5 MG/1
5 TABLET ORAL EVERY 4 HOURS PRN
Status: DISCONTINUED | OUTPATIENT
Start: 2024-05-03 | End: 2024-05-06 | Stop reason: HOSPADM

## 2024-05-03 RX ORDER — DOCUSATE SODIUM 100 MG/1
100 CAPSULE, LIQUID FILLED ORAL 2 TIMES DAILY
Status: DISCONTINUED | OUTPATIENT
Start: 2024-05-03 | End: 2024-05-06 | Stop reason: HOSPADM

## 2024-05-03 RX ORDER — OXYCODONE AND ACETAMINOPHEN 10; 325 MG/1; MG/1
1 TABLET ORAL EVERY 6 HOURS PRN
Qty: 30 TABLET | Refills: 0 | Status: SHIPPED | OUTPATIENT
Start: 2024-05-03

## 2024-05-03 RX ORDER — ONDANSETRON 4 MG/1
8 TABLET, ORALLY DISINTEGRATING ORAL EVERY 8 HOURS PRN
Status: DISCONTINUED | OUTPATIENT
Start: 2024-05-03 | End: 2024-05-06 | Stop reason: HOSPADM

## 2024-05-03 RX ORDER — ACETAMINOPHEN 10 MG/ML
1000 INJECTION, SOLUTION INTRAVENOUS ONCE
Status: COMPLETED | OUTPATIENT
Start: 2024-05-03 | End: 2024-05-03

## 2024-05-03 RX ORDER — SODIUM CHLORIDE 0.9 % (FLUSH) 0.9 %
2 SYRINGE (ML) INJECTION
Status: DISCONTINUED | OUTPATIENT
Start: 2024-05-03 | End: 2024-05-06 | Stop reason: HOSPADM

## 2024-05-03 RX ORDER — OXYCODONE HYDROCHLORIDE 5 MG/1
10 TABLET ORAL EVERY 4 HOURS PRN
Status: DISCONTINUED | OUTPATIENT
Start: 2024-05-03 | End: 2024-05-06 | Stop reason: HOSPADM

## 2024-05-03 RX ORDER — PROMETHAZINE HYDROCHLORIDE 25 MG/1
25 TABLET ORAL EVERY 6 HOURS PRN
Status: DISCONTINUED | OUTPATIENT
Start: 2024-05-03 | End: 2024-05-06 | Stop reason: HOSPADM

## 2024-05-03 RX ADMIN — PREGABALIN 75 MG: 75 CAPSULE ORAL at 08:05

## 2024-05-03 RX ADMIN — DOCUSATE SODIUM 100 MG: 100 CAPSULE, LIQUID FILLED ORAL at 09:05

## 2024-05-03 RX ADMIN — OXYCODONE 10 MG: 5 TABLET ORAL at 09:05

## 2024-05-03 RX ADMIN — CELECOXIB 200 MG: 100 CAPSULE ORAL at 09:05

## 2024-05-03 RX ADMIN — TUBERCULIN PURIFIED PROTEIN DERIVATIVE 5 UNITS: 5 INJECTION, SOLUTION INTRADERMAL at 02:05

## 2024-05-03 RX ADMIN — POTASSIUM BICARBONATE 50 MEQ: 977.5 TABLET, EFFERVESCENT ORAL at 06:05

## 2024-05-03 RX ADMIN — OXYCODONE 10 MG: 5 TABLET ORAL at 12:05

## 2024-05-03 RX ADMIN — CELECOXIB 200 MG: 100 CAPSULE ORAL at 08:05

## 2024-05-03 RX ADMIN — MUPIROCIN: 20 OINTMENT TOPICAL at 09:05

## 2024-05-03 RX ADMIN — OXYCODONE 5 MG: 5 TABLET ORAL at 03:05

## 2024-05-03 RX ADMIN — MUPIROCIN: 20 OINTMENT TOPICAL at 08:05

## 2024-05-03 RX ADMIN — OXYCODONE 5 MG: 5 TABLET ORAL at 04:05

## 2024-05-03 RX ADMIN — ACETAMINOPHEN 1000 MG: 10 INJECTION, SOLUTION INTRAVENOUS at 04:05

## 2024-05-03 RX ADMIN — PREGABALIN 75 MG: 75 CAPSULE ORAL at 09:05

## 2024-05-03 RX ADMIN — DOCUSATE SODIUM 100 MG: 100 CAPSULE, LIQUID FILLED ORAL at 08:05

## 2024-05-03 RX ADMIN — OXYCODONE 5 MG: 5 TABLET ORAL at 08:05

## 2024-05-03 RX ADMIN — AMLODIPINE BESYLATE 5 MG: 5 TABLET ORAL at 08:05

## 2024-05-03 RX ADMIN — PRAVASTATIN SODIUM 20 MG: 10 TABLET ORAL at 08:05

## 2024-05-03 RX ADMIN — FUROSEMIDE 20 MG: 20 TABLET ORAL at 08:05

## 2024-05-03 NOTE — PROGRESS NOTES
Daily Orthopaedic Progress Note    Yany Silverio is a 63 y.o. female admitted on 5/1/2024  Hospital Day: 0  Post Op Day: 2 Days Post-Op    Antibiotics (From admission, onward)      Start     Stop Route Frequency Ordered    05/01/24 2100  mupirocin 2 % ointment         05/04/24 0859 Nasl 2 times daily 05/01/24 1827             The patient was seen and examined this morning at the bedside. Patient reports no acute issues overnight and adequate control of pain on current regimen.  Patient worked with physical therapy over the last 24 hours.      PHYSICAL EXAM:  Awake/alert/oriented x3, No acute distress, Afebrile, Vital signs stable  Normocephalic, Atraumatic  Good inspiratory effort with unlaboured breathing  Dressings clean/dry/intact, Operative limb neurovascularly intact with 5/5 strength distally and sensation intact to light touch distally; some decreased sensation over the area of the regional block and palpable pulses  Vitals:    05/03/24 0452 05/03/24 0801 05/03/24 0824 05/03/24 1210   BP: 105/61 110/66 110/66 117/67   BP Location:    Left arm   Patient Position:    Sitting   Pulse: 83 92  92   Resp:  17 18 17   Temp:  97.4 °F (36.3 °C)  97.7 °F (36.5 °C)   TempSrc:  Oral  Oral   SpO2:  95%  97%   Weight:       Height:         I/O last 3 completed shifts:  In: 2240 [P.O.:2240]  Out: 601 [Urine:601]    Significant Labs:  Recent Lab Results         05/03/24  0245        Anion Gap 10       Baso # 0.05       Basophil % 0.4       BUN 9       BUN/CREAT RATIO 15       Calcium 8.6       Chloride 112       CO2 25       Creatinine 0.61       Differential Method Auto       eGFR 100       Eos # 0.42       Eos % 3.7       Glucose 101       Hematocrit 26.5       Hemoglobin 8.3       Immature Grans (Abs) 0.03       Immature Granulocytes 0.3       Lymph # 2.09       Lymph % 18.6       Magnesium  2.1       MCH 28.1       MCHC 31.3       MCV 89.8       Mono # 1.22       Mono % 10.9       MPV 10.1       Neutrophils, Abs  7.42       Neutrophils Relative 66.1       nRBC 0.0       NUCLEATED RBC ABSOLUTE 0.00       Platelet Count 290       Potassium 3.6       RBC 2.95       RDW 16.6       Sodium 143       WBC 11.23               Significant Diagnostics:  X-Ray Shoulder 1 View Right  Narrative: EXAMINATION:  XR SHOULDER 1 VIEW RIGHT    CLINICAL HISTORY:  po; po;Primary osteoarthritis, right shoulder    COMPARISON:  CT 25 April 2024    TECHNIQUE:  XR SHOULDER 1 VIEW RIGHT    FINDINGS:  There has beeninterval right shoulder arthroplasty. Arthroplasty components appear normal alignment. No periprosthetic fractures seen. Expected postsurgical changes are present in the adjacent soft tissues.  Impression: Expected postsurgical appearance of right shoulder post-arthroplasty.    Electronically signed by: Quinn Aiken  Date:    05/01/2024  Time:    16:02       A/P: 63 y.o. female 2 Days Post-Op s/p right rtsa  -Continue with current pain control regimen  -Continue with current physical therapy plan  -Continue with DVT prophylaxis  -Anticipate discharge to swing bed monday    Adolfo Randall MD  Orthopaedic Staff Surgeon

## 2024-05-03 NOTE — PT/OT/SLP PROGRESS
Occupational Therapy   Treatment    Name: Yany Silverio  MRN: [id number]  Admitting Diagnosis:  Osteoarthritis of glenohumeral joint, right  2 Days Post-Op    Recommendations:     Discharge Recommendations: Moderate Intensity Therapy  Discharge Equipment Recommendations:  none  Barriers to discharge:  None    Assessment:     Yany Silverio is a 63 y.o. female with a medical diagnosis of Osteoarthritis of glenohumeral joint, right.  She presents with weakness and decline in ADLs. Performance deficits affecting function are impaired self care skills, impaired functional mobility, pain, orthopedic precautions.     Rehab Prognosis:  Good; patient would benefit from acute skilled OT services to address these deficits and reach maximum level of function.       Plan:     Patient to be seen 5 x/week to address the above listed problems via self-care/home management, therapeutic activities, therapeutic exercises  Plan of Care Expires: 05/30/24  Plan of Care Reviewed with: patient    Subjective     Chief Complaint: s/p right reverse shoulder replacement  Patient/Family Comments/goals: pt agreeable to OT tx  Pain/Comfort:  Pain Rating 1: 4/10  Location - Side 1: Right  Location 1: shoulder  Pain Addressed 1: Pre-medicate for activity    Objective:     Communicated with: SHALINI Tristan prior to session.  Patient found up in chair with peripheral IV upon OT entry to room.    General Precautions: Standard, fall    Orthopedic Precautions:RUE non weight bearing  Braces: UE Sling  Respiratory Status: Room air     Occupational Performance:     Bed Mobility:    Not performed     Functional Mobility/Transfers:  Not performed    Activities of Daily Living:  Not performed; pt performed total body bathing with cleansing wipes and dressing prior to OT arrival       [facility] 6 Click ADL:      Treatment & Education:  Pt performed x 20 reps each LUE bicep curls 2#db, chest press 2#db, shoulder flexion 2#db, and IR/ER 2#db; RUE composite finger  flexion/extension, wrist flexion/extension, and pronation/supination.     Patient left up in chair with all lines intact and call button in reach    GOALS:   Multidisciplinary Problems       Occupational Therapy Goals          Problem: Occupational Therapy    Goal Priority Disciplines Outcome Interventions   Occupational Therapy Goal     OT, PT/OT Progressing    Description: ST.Pt will perform bathing with Susanna with setup at EOB  2.Pt will perform UE dressing with Louie  3.Pt will perform LE dressing with Louie  4.Pt will transfer bed/chair/bsc with SBA  5.Pt will perform standing task x 2 min with SBA  6.Tolerate 15 min of tx without fatigue.      LTG:   Restore to max I with selfcare and mobility.                           Time Tracking:     OT Date of Treatment: 24  OT Start Time: 904  OT Stop Time: 919  OT Total Time (min): 15 min    Billable Minutes:Therapeutic Exercise 15 minutes    OT/EDA: OT          5/3/2024

## 2024-05-03 NOTE — PLAN OF CARE
Updates faxed updates to Tanna at [facility]. Insurance still pending. Pt needs a tb skin test and cxr, nurse and MD informed. SW following.     1300: Pt ambulated 120' today, min assist. LEN informed MD that pt would not qualify for swb since pt ambulated 120'. LEN spoke with SS Dir, Dinorah and pt will not qualify for obs status and the hospital will probably not be paid.     1500: LEN informed MD and pt that pt would need to dc home and hospital will not be paid. Per MD, pt will stay until Monday. SW following.

## 2024-05-03 NOTE — PLAN OF CARE
Problem: Physical Therapy  Goal: Physical Therapy Goal  Description: Short Term Goals  Ambulate SBA - 100 feet with none assistive device.   Supine to sit stand-by  Sit to stand stand-by  SPT stand-by  5. Independent with HEP    Long Term Goals  Ambulate SBA -  300 feet with none assistive device.   Supine to sit independent without device  Sit to stand independent without device  SPT independent without device  Needed equipment for home.         Outcome: Progressing

## 2024-05-03 NOTE — PLAN OF CARE
Problem: Adult Inpatient Plan of Care  Goal: Plan of Care Review  Outcome: Progressing  Goal: Patient-Specific Goal (Individualized)  Outcome: Progressing  Goal: Absence of Hospital-Acquired Illness or Injury  Outcome: Progressing  Goal: Optimal Comfort and Wellbeing  Outcome: Progressing  Goal: Readiness for Transition of Care  Outcome: Progressing     Problem: Wound  Goal: Optimal Coping  Outcome: Progressing  Goal: Optimal Functional Ability  Outcome: Progressing  Goal: Absence of Infection Signs and Symptoms  Outcome: Progressing  Goal: Improved Oral Intake  Outcome: Progressing  Goal: Optimal Pain Control and Function  Outcome: Progressing  Goal: Skin Health and Integrity  Outcome: Progressing  Goal: Optimal Wound Healing  Outcome: Progressing     Problem: Fall Injury Risk  Goal: Absence of Fall and Fall-Related Injury  Outcome: Progressing

## 2024-05-03 NOTE — NURSING
Patient was ordered to be discharged today to home with home health. Patient expressed concern about Monday with SWB and that she does not have a ride today and lives alone. Notified MD and LEN and was told by SW that patient will stay probably until Monday and then discharged home.

## 2024-05-03 NOTE — PT/OT/SLP PROGRESS
Physical Therapy Treatment    Patient Name:  Yany Silverio   MRN:  [id number]    Recommendations:     Discharge Recommendations: Moderate Intensity Therapy  Discharge Equipment Recommendations: none  Barriers to discharge: None    Assessment:     Yany Silverio is a 63 y.o. female admitted with a medical diagnosis of Osteoarthritis of glenohumeral joint, right.  She presents with the following impairments/functional limitations: pain, decreased ROM, orthopedic precautions Patient ambulated 120 ft with supervision and no AD. Implemented LE strengthening exercises and pt tolerated with no complaint and only mild fatigue. She is awaiting dc.     Rehab Prognosis: Good; patient would benefit from acute skilled PT services to address these deficits and reach maximum level of function.    Recent Surgery: Procedure(s) (LRB):  ARTHROPLASTY, SHOULDER, TOTAL, REVERSE (Right) 2 Days Post-Op    Plan:     During this hospitalization, patient to be seen daily to address the identified rehab impairments via gait training, therapeutic exercises and progress toward the following goals:    Plan of Care Expires:  05/23/24    Subjective     Chief Complaint: pain in R shoulder  Patient/Family Comments/goals: Ready to leave the hospital  Pain/Comfort:  Pain Rating 1: 4/10  Location - Side 1: Right  Location 1: shoulder  Pain Addressed 1: Pre-medicate for activity  Pain Rating Post-Intervention 1: 4/10      Objective:     Communicated with nurse prior to session.  Patient found up in chair with peripheral IV upon PT entry to room.     General Precautions: Standard, fall  Orthopedic Precautions: RUE non weight bearing  Braces: Shoulder abduction brace  Respiratory Status: Room air     Functional Mobility:  Transfers:     Sit to Stand:  independence with no AD  Gait: 120 ft with no AD and stand by assist      AM-PAC 6 CLICK MOBILITY  Turning over in bed (including adjusting bedclothes, sheets and blankets)?: 3  Sitting down on and  standing up from a chair with arms (e.g., wheelchair, bedside commode, etc.): 4  Moving from lying on back to sitting on the side of the bed?: 3  Moving to and from a bed to a chair (including a wheelchair)?: 4  Need to walk in hospital room?: 4  Climbing 3-5 steps with a railing?: 3  Basic Mobility Total Score: 21       Treatment & Education:  Therapeutic exercise:  Seated marches, Seated hip abduction, LAQ, Heel/toe raises; all 3x10 each LE     Gait training: as noted above    Patient left up in chair with call button in reach..    GOALS:   Multidisciplinary Problems       Physical Therapy Goals          Problem: Physical Therapy    Goal Priority Disciplines Outcome Goal Variances Interventions   Physical Therapy Goal     PT, PT/OT Progressing     Description: Short Term Goals  Ambulate SBA - 100 feet with none assistive device.   Supine to sit stand-by  Sit to stand stand-by  SPT stand-by  5. Independent with HEP    Long Term Goals  Ambulate SBA -  300 feet with none assistive device.   Supine to sit independent without device  Sit to stand independent without device  SPT independent without device  Needed equipment for home.                              Time Tracking:     PT Received On: 05/03/24  PT Start Time: 0951     PT Stop Time: 1006  PT Total Time (min): 15 min     Billable Minutes: Gait Training 5 and Therapeutic Exercise 10    Treatment Type: Treatment  PT/PTA: PT     Number of PTA visits since last PT visit: 0     05/03/2024

## 2024-05-04 PROCEDURE — 97116 GAIT TRAINING THERAPY: CPT

## 2024-05-04 PROCEDURE — 99900035 HC TECH TIME PER 15 MIN (STAT)

## 2024-05-04 PROCEDURE — 94761 N-INVAS EAR/PLS OXIMETRY MLT: CPT

## 2024-05-04 PROCEDURE — 25000003 PHARM REV CODE 250: Performed by: ORTHOPAEDIC SURGERY

## 2024-05-04 PROCEDURE — 27000221 HC OXYGEN, UP TO 24 HOURS

## 2024-05-04 RX ADMIN — CELECOXIB 200 MG: 100 CAPSULE ORAL at 09:05

## 2024-05-04 RX ADMIN — OXYCODONE 5 MG: 5 TABLET ORAL at 01:05

## 2024-05-04 RX ADMIN — OXYCODONE 5 MG: 5 TABLET ORAL at 09:05

## 2024-05-04 RX ADMIN — MUPIROCIN: 20 OINTMENT TOPICAL at 08:05

## 2024-05-04 RX ADMIN — FUROSEMIDE 20 MG: 20 TABLET ORAL at 08:05

## 2024-05-04 RX ADMIN — CELECOXIB 200 MG: 100 CAPSULE ORAL at 08:05

## 2024-05-04 RX ADMIN — MUPIROCIN: 20 OINTMENT TOPICAL at 09:05

## 2024-05-04 RX ADMIN — PREGABALIN 75 MG: 75 CAPSULE ORAL at 09:05

## 2024-05-04 RX ADMIN — DOCUSATE SODIUM 100 MG: 100 CAPSULE, LIQUID FILLED ORAL at 09:05

## 2024-05-04 RX ADMIN — DOCUSATE SODIUM 100 MG: 100 CAPSULE, LIQUID FILLED ORAL at 08:05

## 2024-05-04 RX ADMIN — PRAVASTATIN SODIUM 20 MG: 10 TABLET ORAL at 08:05

## 2024-05-04 RX ADMIN — PREGABALIN 75 MG: 75 CAPSULE ORAL at 08:05

## 2024-05-04 RX ADMIN — AMLODIPINE BESYLATE 5 MG: 5 TABLET ORAL at 08:05

## 2024-05-04 RX ADMIN — OXYCODONE 5 MG: 5 TABLET ORAL at 08:05

## 2024-05-04 RX ADMIN — OXYCODONE 5 MG: 5 TABLET ORAL at 05:05

## 2024-05-04 NOTE — PROGRESS NOTES
[facility]  [facility] Medicine  Progress Note    Patient Name: Yany Silverio  MRN: [id number]  Patient Class: OP- Outpatient Recovery   Admission Date: 5/1/2024  Length of Stay: 0 days  Attending Physician: Adolfo Randall MD  Primary Care Provider: Nitesh Simon MD        Subjective:     Principal Problem:Osteoarthritis of glenohumeral joint, right    HPI:  64yo woman history of COPD on home O2 at night, NGOZI on CPAP, prior tobacco use, alcohol use, HTN, HLD, GERD who presents with R osteoarthritis that has failed all conservatives measures.  She is now s/p R shoulder arthropasty.  She has no complaints at this time.  Vitals stable except some O2 desat and she is off oxygen currently.      Overview/Hospital Course:  No notes on file    Interval History:     Review of Systems   Constitutional:  Negative for activity change, chills, fatigue and fever.   HENT:  Negative for congestion and sore throat.    Respiratory:  Negative for chest tightness.    Gastrointestinal:  Negative for abdominal distention, abdominal pain and diarrhea.   Endocrine: Negative for cold intolerance and heat intolerance.   Genitourinary:  Negative for dysuria.   Musculoskeletal:  Negative for arthralgias and back pain.        R shoulder pain    Skin:  Negative for color change and rash.   Neurological:  Negative for dizziness, tremors and headaches.   Psychiatric/Behavioral:  Negative for agitation. The patient is not nervous/anxious.      Objective:     Vital Signs (Most Recent):  Temp: 97.8 °F (36.6 °C) (05/03/24 1930)  Pulse: 87 (05/03/24 1930)  Resp: 18 (05/03/24 1930)  BP: 118/73 (05/03/24 1930)  SpO2: (!) 93 % (05/03/24 1930) Vital Signs (24h Range):  Temp:  [97.4 °F (36.3 °C)-98.2 °F (36.8 °C)] 97.8 °F (36.6 °C)  Pulse:  [82-94] 87  Resp:  [17-20] 18  SpO2:  [90 %-98 %] 93 %  BP: ()/(61-73) 118/73     Weight: 77.1 kg (170 lb)  Body mass index is 28.29 kg/m².  No intake or output data in the 24 hours ending  05/03/24 2051      Physical Exam  Constitutional:       Appearance: Normal appearance.   HENT:      Head: Normocephalic and atraumatic.      Nose: Nose normal.      Mouth/Throat:      Mouth: Mucous membranes are moist.      Pharynx: Oropharynx is clear.   Eyes:      Extraocular Movements: Extraocular movements intact.      Conjunctiva/sclera: Conjunctivae normal.      Pupils: Pupils are equal, round, and reactive to light.   Cardiovascular:      Rate and Rhythm: Normal rate and regular rhythm.      Pulses: Normal pulses.      Heart sounds: Normal heart sounds.   Pulmonary:      Effort: Pulmonary effort is normal.      Breath sounds: Normal breath sounds.   Abdominal:      General: Abdomen is flat. Bowel sounds are normal.      Palpations: Abdomen is soft.   Musculoskeletal:         General: Normal range of motion.      Cervical back: Normal range of motion and neck supple.      Comments: Post-surgical R shoulder changes.    Skin:     General: Skin is warm and dry.   Neurological:      General: No focal deficit present.      Mental Status: She is alert and oriented to person, place, and time.   Psychiatric:         Mood and Affect: Mood normal.         Behavior: Behavior normal.             Significant Labs: All pertinent labs within the past 24 hours have been reviewed.  CBC:   Recent Labs   Lab 05/02/24  0248 05/03/24  0245   WBC 11.09* 11.23*   HGB 9.2* 8.3*   HCT 29.7* 26.5*    290       Significant Imaging: I have reviewed all pertinent imaging results/findings within the past 24 hours.    Assessment/Plan:      * Osteoarthritis of glenohumeral joint, right  S/p R arthroplasty by Dr. Randall.   Mgmt per ortho.     5/3: vitals and labs remain stable.       Dyslipidemia  Continue home statin.       Alcoholism  Counseled on cessation.        Anemia  Patient's anemia is currently controlled. Has not received any PRBCs to date. Etiology likely d/t Iron deficiency  Current CBC reviewed-   Lab Results   Component  Value Date    HGB 9.2 (L) 05/02/2024    HCT 29.7 (L) 05/02/2024     Monitor serial CBC and transfuse if patient becomes hemodynamically unstable, symptomatic or H/H drops below 7/21.    Anxiety and depression  Patient has recurrent depression which is mild and is currently controlled. Will Continue anti-depressant medications. We will not consult psychiatry at this time. Patient does not display psychosis at this time. Continue to monitor closely and adjust plan of care as needed.        Hypertension  Chronic, controlled. Latest blood pressure and vitals reviewed-     Temp:  [97.5 °F (36.4 °C)-98.6 °F (37 °C)]   Pulse:  []   Resp:  [17-20]   BP: ()/(58-80)   SpO2:  [89 %-96 %] .   Home meds for hypertension were reviewed and noted below.   Hypertension Medications               amLODIPine (NORVASC) 5 MG tablet Take 1 tablet (5 mg total) by mouth once daily.    furosemide (LASIX) 20 MG tablet TAKE 1 TABLET BY MOUTH DAILY FOR 3 DAYS THEN WEIGH DAILY AND IF NO MORE THAN 3 POUND WEIGHT GAIN, TAKE LASIX            While in the hospital, will manage blood pressure as follows; Continue home antihypertensive regimen    Will utilize p.r.n. blood pressure medication only if patient's blood pressure greater than 160/100 and she develops symptoms such as worsening chest pain or shortness of breath.    NGOZI on CPAP  No in-patient cpap per patient request       Chronic obstructive pulmonary disease  Patient's COPD is with exacerbation noted by continued dyspnea and worsening of baseline hypoxia currently.  Patient is currently off COPD Pathway. Continue scheduled inhalers Supplemental oxygen and monitor respiratory status closely.       VTE Risk Mitigation (From admission, onward)           Ordered     Place COREY hose  Until discontinued         05/01/24 1827     Place sequential compression device  Until discontinued         05/01/24 1827                    Discharge Planning   PARVEZ: 5/3/2024     Code Status: Full Code    Is the patient medically ready for discharge?:     Reason for patient still in hospital (select all that apply): Treatment  Discharge Plan A: Skilled Nursing Facility   Discharge Delays: None known at this time              Silvia Paniagua MD  Department of Hospital Medicine   [facility]

## 2024-05-04 NOTE — SUBJECTIVE & OBJECTIVE
Interval History:     Review of Systems   Constitutional:  Negative for activity change, chills, fatigue and fever.   HENT:  Negative for congestion and sore throat.    Respiratory:  Negative for chest tightness.    Gastrointestinal:  Negative for abdominal distention, abdominal pain and diarrhea.   Endocrine: Negative for cold intolerance and heat intolerance.   Genitourinary:  Negative for dysuria.   Musculoskeletal:  Negative for arthralgias and back pain.        R shoulder pain    Skin:  Negative for color change and rash.   Neurological:  Negative for dizziness, tremors and headaches.   Psychiatric/Behavioral:  Negative for agitation. The patient is not nervous/anxious.      Objective:     Vital Signs (Most Recent):  Temp: 97.8 °F (36.6 °C) (05/03/24 1930)  Pulse: 87 (05/03/24 1930)  Resp: 18 (05/03/24 1930)  BP: 118/73 (05/03/24 1930)  SpO2: (!) 93 % (05/03/24 1930) Vital Signs (24h Range):  Temp:  [97.4 °F (36.3 °C)-98.2 °F (36.8 °C)] 97.8 °F (36.6 °C)  Pulse:  [82-94] 87  Resp:  [17-20] 18  SpO2:  [90 %-98 %] 93 %  BP: ()/(61-73) 118/73     Weight: 77.1 kg (170 lb)  Body mass index is 28.29 kg/m².  No intake or output data in the 24 hours ending 05/03/24 2051      Physical Exam  Constitutional:       Appearance: Normal appearance.   HENT:      Head: Normocephalic and atraumatic.      Nose: Nose normal.      Mouth/Throat:      Mouth: Mucous membranes are moist.      Pharynx: Oropharynx is clear.   Eyes:      Extraocular Movements: Extraocular movements intact.      Conjunctiva/sclera: Conjunctivae normal.      Pupils: Pupils are equal, round, and reactive to light.   Cardiovascular:      Rate and Rhythm: Normal rate and regular rhythm.      Pulses: Normal pulses.      Heart sounds: Normal heart sounds.   Pulmonary:      Effort: Pulmonary effort is normal.      Breath sounds: Normal breath sounds.   Abdominal:      General: Abdomen is flat. Bowel sounds are normal.      Palpations: Abdomen is soft.    Musculoskeletal:         General: Normal range of motion.      Cervical back: Normal range of motion and neck supple.      Comments: Post-surgical R shoulder changes.    Skin:     General: Skin is warm and dry.   Neurological:      General: No focal deficit present.      Mental Status: She is alert and oriented to person, place, and time.   Psychiatric:         Mood and Affect: Mood normal.         Behavior: Behavior normal.             Significant Labs: All pertinent labs within the past 24 hours have been reviewed.  CBC:   Recent Labs   Lab 05/02/24  0248 05/03/24  0245   WBC 11.09* 11.23*   HGB 9.2* 8.3*   HCT 29.7* 26.5*    290       Significant Imaging: I have reviewed all pertinent imaging results/findings within the past 24 hours.

## 2024-05-04 NOTE — PT/OT/SLP PROGRESS
"Physical Therapy Treatment    Patient Name:  Yany Silverio   MRN:  [id number]    Recommendations:     Discharge Recommendations: Moderate Intensity Therapy (to low intensity therapy)  Discharge Equipment Recommendations: none  Barriers to discharge: Decreased caregiver support and awaiting approval for swing bed    Assessment:     Yany Silverio is a 63 y.o. female admitted with a medical diagnosis of Osteoarthritis of glenohumeral joint, right.  She presents with the following impairments/functional limitations: impaired self care skills, decreased upper extremity function, pain, orthopedic precautions, impaired cardiopulmonary response to activity .    Patient demonstrated safe mobility without AD but mild SOB/DC and required seated rest breaks to complete activities due to deconditioning. Patient reports she lives alone and is concerned about managing her ADLs with shoulder abduction brace and UE restrictions. Patient advised that if her insurance did not approve swing bed she would have to go home and arrange for her family to assist along with home health.    Rehab Prognosis: Good; patient would benefit from acute skilled PT services to address these deficits and reach maximum level of function.    Recent Surgery: Procedure(s) (LRB):  ARTHROPLASTY, SHOULDER, TOTAL, REVERSE (Right) 3 Days Post-Op    Plan:     During this hospitalization, patient to be seen daily to address the identified rehab impairments via gait training, therapeutic activities, therapeutic exercises and progress toward the following goals:    Plan of Care Expires:  05/23/24    Subjective     Chief Complaint: Osteoarthritis of glenohumeral joint, right   Patient/Family Comments/goals: "I live by myself so I think I would be better to go to swing bed for a week or two until I could manage with this arm better."  Pain/Comfort:  Pain Rating 1: 2/10  Location - Side 1: Right  Location 1: shoulder  Pain Addressed 1: Pre-medicate for activity, " Reposition, Distraction, Cessation of Activity  Pain Rating Post-Intervention 1: 2/10      Objective:     Communicated with Azalea Krishna RN prior to session.  Patient found up in chair with peripheral IV upon PT entry to room.     General Precautions: Standard, fall  Orthopedic Precautions: RUE non weight bearing  Braces: Shoulder abduction brace  Respiratory Status: Room air     Functional Mobility:  Transfers:     Sit to Stand:  modified independence with no AD  Gait: 120' x 2 trials with no AD, SBA, mild SOB/DC and seated rest break b/w trials  Stairs:  Pt ascended/descended 6 stair(s) with No Assistive Device with left handrail with Contact Guard Assistance and verbal cues for safety.       AM-PAC 6 CLICK MOBILITY  Turning over in bed (including adjusting bedclothes, sheets and blankets)?: 3  Sitting down on and standing up from a chair with arms (e.g., wheelchair, bedside commode, etc.): 4  Moving from lying on back to sitting on the side of the bed?: 3  Moving to and from a bed to a chair (including a wheelchair)?: 4  Need to walk in hospital room?: 4  Climbing 3-5 steps with a railing?: 3  Basic Mobility Total Score: 21       Treatment & Education:  Gait and stairs as above  Bilateral lower extremity exercise x 30 reps: ankle pumps, hip abduction/adduction, Long arc quads, and Marching with verbal cues and brief rest breaks b/w exercises due to deconditioning      Patient left up in chair with all lines intact, call button in reach, and Azalea Krishna RN notified..    GOALS:   Multidisciplinary Problems       Physical Therapy Goals          Problem: Physical Therapy    Goal Priority Disciplines Outcome Goal Variances Interventions   Physical Therapy Goal     PT, PT/OT Progressing     Description: Short Term Goals  Ambulate SBA - 100 feet with none assistive device.   Supine to sit stand-by  Sit to stand stand-by  SPT stand-by  5. Independent with HEP    Long Term Goals  Ambulate SBA -  300 feet with none  assistive device.   Supine to sit independent without device  Sit to stand independent without device  SPT independent without device  Needed equipment for home.                              Time Tracking:     PT Received On: 05/04/24  PT Start Time: 1358     PT Stop Time: 1414  PT Total Time (min): 16 min     Billable Minutes: Gait Training 15 minutes    Treatment Type: Treatment  PT/PTA: PT     Number of PTA visits since last PT visit: 0     05/04/2024

## 2024-05-05 PROCEDURE — 25000003 PHARM REV CODE 250: Performed by: ORTHOPAEDIC SURGERY

## 2024-05-05 PROCEDURE — 99214 OFFICE O/P EST MOD 30 MIN: CPT | Mod: ,,, | Performed by: HOSPITALIST

## 2024-05-05 PROCEDURE — 97116 GAIT TRAINING THERAPY: CPT

## 2024-05-05 PROCEDURE — 99900035 HC TECH TIME PER 15 MIN (STAT)

## 2024-05-05 PROCEDURE — 97110 THERAPEUTIC EXERCISES: CPT

## 2024-05-05 PROCEDURE — 25000003 PHARM REV CODE 250: Performed by: HOSPITALIST

## 2024-05-05 RX ORDER — BISACODYL 5 MG
10 TABLET, DELAYED RELEASE (ENTERIC COATED) ORAL DAILY PRN
Status: DISCONTINUED | OUTPATIENT
Start: 2024-05-05 | End: 2024-05-06 | Stop reason: HOSPADM

## 2024-05-05 RX ORDER — POLYETHYLENE GLYCOL 3350 17 G/17G
17 POWDER, FOR SOLUTION ORAL DAILY
Status: DISCONTINUED | OUTPATIENT
Start: 2024-05-05 | End: 2024-05-06 | Stop reason: HOSPADM

## 2024-05-05 RX ADMIN — OXYCODONE 10 MG: 5 TABLET ORAL at 01:05

## 2024-05-05 RX ADMIN — DOCUSATE SODIUM 100 MG: 100 CAPSULE, LIQUID FILLED ORAL at 09:05

## 2024-05-05 RX ADMIN — MUPIROCIN: 20 OINTMENT TOPICAL at 08:05

## 2024-05-05 RX ADMIN — CELECOXIB 200 MG: 100 CAPSULE ORAL at 08:05

## 2024-05-05 RX ADMIN — OXYCODONE 10 MG: 5 TABLET ORAL at 09:05

## 2024-05-05 RX ADMIN — PREGABALIN 75 MG: 75 CAPSULE ORAL at 08:05

## 2024-05-05 RX ADMIN — AMLODIPINE BESYLATE 5 MG: 5 TABLET ORAL at 09:05

## 2024-05-05 RX ADMIN — MUPIROCIN: 20 OINTMENT TOPICAL at 09:05

## 2024-05-05 RX ADMIN — OXYCODONE 5 MG: 5 TABLET ORAL at 05:05

## 2024-05-05 RX ADMIN — FUROSEMIDE 20 MG: 20 TABLET ORAL at 09:05

## 2024-05-05 RX ADMIN — CELECOXIB 200 MG: 100 CAPSULE ORAL at 09:05

## 2024-05-05 RX ADMIN — OXYCODONE 10 MG: 5 TABLET ORAL at 11:05

## 2024-05-05 RX ADMIN — POLYETHYLENE GLYCOL 3350 17 G: 17 POWDER, FOR SOLUTION ORAL at 12:05

## 2024-05-05 RX ADMIN — OXYCODONE 10 MG: 5 TABLET ORAL at 06:05

## 2024-05-05 RX ADMIN — DOCUSATE SODIUM 100 MG: 100 CAPSULE, LIQUID FILLED ORAL at 08:05

## 2024-05-05 RX ADMIN — PRAVASTATIN SODIUM 20 MG: 10 TABLET ORAL at 09:05

## 2024-05-05 RX ADMIN — PREGABALIN 75 MG: 75 CAPSULE ORAL at 09:05

## 2024-05-05 NOTE — ASSESSMENT & PLAN NOTE
Patient's COPD is with exacerbation noted by continued dyspnea and worsening of baseline hypoxia currently.  Patient is currently off COPD Pathway. Continue scheduled inhalers Supplemental oxygen and monitor respiratory status closely.       5/5:  Patient is stable at this time.

## 2024-05-05 NOTE — PROGRESS NOTES
[facility]  [facility] Medicine  Progress Note    Patient Name: Yany Silverio  MRN: [id number]  Patient Class: OP- Outpatient Recovery   Admission Date: 5/1/2024  Length of Stay: 0 days  Attending Physician: Adolfo Randall MD  Primary Care Provider: Nitesh Simon MD        Subjective:     Principal Problem:Osteoarthritis of glenohumeral joint, right    HPI:  62yo woman history of COPD on home O2 at night, NGOZI on CPAP, prior tobacco use, alcohol use, HTN, HLD, GERD who presents with R osteoarthritis that has failed all conservatives measures.  She is now s/p R shoulder arthropasty.  She has no complaints at this time.  Vitals stable except some O2 desat and she is off oxygen currently.      Overview/Hospital Course:  No notes on file    Interval History:     Review of Systems   Constitutional:  Negative for activity change, chills, fatigue and fever.   HENT:  Negative for congestion and sore throat.    Respiratory:  Negative for chest tightness.    Gastrointestinal:  Negative for abdominal distention, abdominal pain and diarrhea.   Endocrine: Negative for cold intolerance and heat intolerance.   Genitourinary:  Negative for dysuria.   Musculoskeletal:  Negative for arthralgias and back pain.        R shoulder pain    Skin:  Negative for color change and rash.   Neurological:  Negative for dizziness, tremors and headaches.   Psychiatric/Behavioral:  Negative for agitation. The patient is not nervous/anxious.      Objective:     Vital Signs (Most Recent):  Temp: 98.1 °F (36.7 °C) (05/05/24 1253)  Pulse: 90 (05/05/24 1253)  Resp: 18 (05/05/24 1253)  BP: 108/68 (05/05/24 1253)  SpO2: (!) 94 % (05/05/24 1253) Vital Signs (24h Range):  Temp:  [97.3 °F (36.3 °C)-98.1 °F (36.7 °C)] 98.1 °F (36.7 °C)  Pulse:  [78-98] 90  Resp:  [18-20] 18  SpO2:  [89 %-98 %] 94 %  BP: (104-119)/(64-78) 108/68     Weight: 77.1 kg (170 lb)  Body mass index is 28.29 kg/m².  No intake or output data in the 24 hours ending  05/05/24 1340      Physical Exam  Constitutional:       Appearance: Normal appearance.   HENT:      Head: Normocephalic and atraumatic.      Nose: Nose normal.      Mouth/Throat:      Mouth: Mucous membranes are moist.      Pharynx: Oropharynx is clear.   Eyes:      Extraocular Movements: Extraocular movements intact.      Conjunctiva/sclera: Conjunctivae normal.      Pupils: Pupils are equal, round, and reactive to light.   Cardiovascular:      Rate and Rhythm: Normal rate and regular rhythm.      Pulses: Normal pulses.      Heart sounds: Normal heart sounds.   Pulmonary:      Effort: Pulmonary effort is normal.      Breath sounds: Normal breath sounds.   Abdominal:      General: Abdomen is flat. Bowel sounds are normal.      Palpations: Abdomen is soft.   Musculoskeletal:         General: Normal range of motion.      Cervical back: Normal range of motion and neck supple.      Comments: Post-surgical R shoulder changes.    Skin:     General: Skin is warm and dry.   Neurological:      General: No focal deficit present.      Mental Status: She is alert and oriented to person, place, and time.   Psychiatric:         Mood and Affect: Mood normal.         Behavior: Behavior normal.             Significant Labs: All pertinent labs within the past 24 hours have been reviewed.    Significant Imaging: I have reviewed all pertinent imaging results/findings within the past 24 hours.    Assessment/Plan:      * Osteoarthritis of glenohumeral joint, right  S/p R arthroplasty by Dr. Randall.   Mgmt per ortho.     5/3: vitals and labs remain stable.       5/5:  Continue care per Orthopedics.  Patient unable to care for self at home alone so would like to go to swing bed.      Dyslipidemia  Continue home statin.       Alcoholism  Counseled on cessation.        Anemia  Patient's anemia is currently controlled. Has not received any PRBCs to date. Etiology likely d/t Iron deficiency  Current CBC reviewed-   Lab Results   Component Value  Date    HGB 9.2 (L) 05/02/2024    HCT 29.7 (L) 05/02/2024     Monitor serial CBC and transfuse if patient becomes hemodynamically unstable, symptomatic or H/H drops below 7/21.    Anxiety and depression  Patient has recurrent depression which is mild and is currently controlled. Will Continue anti-depressant medications. We will not consult psychiatry at this time. Patient does not display psychosis at this time. Continue to monitor closely and adjust plan of care as needed.        Hypertension  Chronic, controlled. Latest blood pressure and vitals reviewed-     Temp:  [97.5 °F (36.4 °C)-98.6 °F (37 °C)]   Pulse:  []   Resp:  [17-20]   BP: ()/(58-80)   SpO2:  [89 %-96 %] .   Home meds for hypertension were reviewed and noted below.   Hypertension Medications               amLODIPine (NORVASC) 5 MG tablet Take 1 tablet (5 mg total) by mouth once daily.    furosemide (LASIX) 20 MG tablet TAKE 1 TABLET BY MOUTH DAILY FOR 3 DAYS THEN WEIGH DAILY AND IF NO MORE THAN 3 POUND WEIGHT GAIN, TAKE LASIX            While in the hospital, will manage blood pressure as follows; Continue home antihypertensive regimen    Will utilize p.r.n. blood pressure medication only if patient's blood pressure greater than 160/100 and she develops symptoms such as worsening chest pain or shortness of breath.    NGOZI on CPAP  No in-patient cpap per patient request       Chronic obstructive pulmonary disease  Patient's COPD is with exacerbation noted by continued dyspnea and worsening of baseline hypoxia currently.  Patient is currently off COPD Pathway. Continue scheduled inhalers Supplemental oxygen and monitor respiratory status closely.       5/5:  Patient is stable at this time.      VTE Risk Mitigation (From admission, onward)           Ordered     Place COREY hose  Until discontinued         05/01/24 1827     Place sequential compression device  Until discontinued         05/01/24 1827                    Discharge Planning   PARVEZ:  5/3/2024     Code Status: Full Code   Is the patient medically ready for discharge?:     Reason for patient still in hospital (select all that apply): Treatment  Discharge Plan A: Skilled Nursing Facility   Discharge Delays: None known at this time              Silvia Paniagua MD  Department of Hospital Medicine   [facility]

## 2024-05-05 NOTE — ASSESSMENT & PLAN NOTE
S/p R arthroplasty by Dr. Randall.   Mgmt per ortho.     5/3: vitals and labs remain stable.       5/5:  Continue care per Orthopedics.  Patient unable to care for self at home alone so would like to go to swing bed.

## 2024-05-05 NOTE — SUBJECTIVE & OBJECTIVE
Interval History:     Review of Systems   Constitutional:  Negative for activity change, chills, fatigue and fever.   HENT:  Negative for congestion and sore throat.    Respiratory:  Negative for chest tightness.    Gastrointestinal:  Negative for abdominal distention, abdominal pain and diarrhea.   Endocrine: Negative for cold intolerance and heat intolerance.   Genitourinary:  Negative for dysuria.   Musculoskeletal:  Negative for arthralgias and back pain.        R shoulder pain    Skin:  Negative for color change and rash.   Neurological:  Negative for dizziness, tremors and headaches.   Psychiatric/Behavioral:  Negative for agitation. The patient is not nervous/anxious.      Objective:     Vital Signs (Most Recent):  Temp: 98.1 °F (36.7 °C) (05/05/24 1253)  Pulse: 90 (05/05/24 1253)  Resp: 18 (05/05/24 1253)  BP: 108/68 (05/05/24 1253)  SpO2: (!) 94 % (05/05/24 1253) Vital Signs (24h Range):  Temp:  [97.3 °F (36.3 °C)-98.1 °F (36.7 °C)] 98.1 °F (36.7 °C)  Pulse:  [78-98] 90  Resp:  [18-20] 18  SpO2:  [89 %-98 %] 94 %  BP: (104-119)/(64-78) 108/68     Weight: 77.1 kg (170 lb)  Body mass index is 28.29 kg/m².  No intake or output data in the 24 hours ending 05/05/24 1340      Physical Exam  Constitutional:       Appearance: Normal appearance.   HENT:      Head: Normocephalic and atraumatic.      Nose: Nose normal.      Mouth/Throat:      Mouth: Mucous membranes are moist.      Pharynx: Oropharynx is clear.   Eyes:      Extraocular Movements: Extraocular movements intact.      Conjunctiva/sclera: Conjunctivae normal.      Pupils: Pupils are equal, round, and reactive to light.   Cardiovascular:      Rate and Rhythm: Normal rate and regular rhythm.      Pulses: Normal pulses.      Heart sounds: Normal heart sounds.   Pulmonary:      Effort: Pulmonary effort is normal.      Breath sounds: Normal breath sounds.   Abdominal:      General: Abdomen is flat. Bowel sounds are normal.      Palpations: Abdomen is soft.    Musculoskeletal:         General: Normal range of motion.      Cervical back: Normal range of motion and neck supple.      Comments: Post-surgical R shoulder changes.    Skin:     General: Skin is warm and dry.   Neurological:      General: No focal deficit present.      Mental Status: She is alert and oriented to person, place, and time.   Psychiatric:         Mood and Affect: Mood normal.         Behavior: Behavior normal.             Significant Labs: All pertinent labs within the past 24 hours have been reviewed.    Significant Imaging: I have reviewed all pertinent imaging results/findings within the past 24 hours.

## 2024-05-05 NOTE — PT/OT/SLP PROGRESS
"Physical Therapy Treatment    Patient Name:  Yany Silverio   MRN:  [id number]    Recommendations:     Discharge Recommendations: Moderate Intensity Therapy (to low intensity therapy)  Discharge Equipment Recommendations: none  Barriers to discharge: None    Assessment:     Yany Silverio is a 63 y.o. female admitted with a medical diagnosis of Osteoarthritis of glenohumeral joint, right.  She presents with the following impairments/functional limitations: impaired self care skills, decreased upper extremity function, pain, orthopedic precautions, impaired cardiopulmonary response to activity .    Patient demonstrates safe mobility without AD but requires rest breaks to complete all PT interventions due to SOB/DC and deconditioning. Patient will be safe to d/c home tomorrow with home health PT follow up and family assist PRN.    Rehab Prognosis: Good; patient would benefit from acute skilled PT services to address these deficits and reach maximum level of function.    Recent Surgery: Procedure(s) (LRB):  ARTHROPLASTY, SHOULDER, TOTAL, REVERSE (Right) 4 Days Post-Op    Plan:     During this hospitalization, patient to be seen daily to address the identified rehab impairments via gait training, therapeutic activities, therapeutic exercises and progress toward the following goals:    Plan of Care Expires:  05/23/24    Subjective     Chief Complaint: Osteoarthritis of glenohumeral joint, right   Patient/Family Comments/goals: "I think I will have to go home tomorrow. Can you give me a copy of the leg exercises so I can keep building myself up."  Pain/Comfort:  Pain Rating 1: 0/10  Pain Rating Post-Intervention 1: 0/10      Objective:     Communicated with Brianna Echeverria RN prior to session.  Patient found up in chair with peripheral IV upon PT entry to room.     General Precautions: Standard, fall  Orthopedic Precautions: RUE non weight bearing  Braces: Shoulder abduction brace  Respiratory Status: Room air   "   Functional Mobility:  Transfers:     Sit to Stand:  modified independence with no AD  Gait: 300' with no AD; SBA, one standing rest break  Stairs:  Pt ascended/descended 1 flight(s) with No Assistive Device with left handrail with Stand-by Assistance and reciprocal pattern; standing rest break after stair negotiation due to SOB/DC and fatigue.       AM-PAC 6 CLICK MOBILITY  Turning over in bed (including adjusting bedclothes, sheets and blankets)?: 4  Sitting down on and standing up from a chair with arms (e.g., wheelchair, bedside commode, etc.): 4  Moving from lying on back to sitting on the side of the bed?: 4  Moving to and from a bed to a chair (including a wheelchair)?: 4  Need to walk in hospital room?: 4  Climbing 3-5 steps with a railing?: 4  Basic Mobility Total Score: 24       Treatment & Education:  Gait, stairs as above  Standing exercises x 10 reps toe/heel raises, mini-squats, hip flexion, and hip abduction  with verbal cues and single UE support on over-bed table; seated rest break b/w each exercise.    Seated ex x 30: marching, LAQ, hip abd/add with short rest breaks b/w exercises. Mild SOB/DC  Single leg stance- 7 sec per LE    Patient left up in chair with all lines intact, call button in reach, and Brianna Echeverria, RN notified..    GOALS:   Multidisciplinary Problems       Physical Therapy Goals          Problem: Physical Therapy    Goal Priority Disciplines Outcome Goal Variances Interventions   Physical Therapy Goal     PT, PT/OT Progressing     Description: Short Term Goals  Ambulate SBA - 100 feet with none assistive device.   Supine to sit stand-by  Sit to stand stand-by  SPT stand-by  5. Independent with HEP    Long Term Goals  Ambulate SBA -  300 feet with none assistive device.   Supine to sit independent without device  Sit to stand independent without device  SPT independent without device  Needed equipment for home.                              Time Tracking:     PT Received On:  05/05/24  PT Start Time: 1354     PT Stop Time: 1423  PT Total Time (min): 29 min     Billable Minutes: Gait Training 14 minutes and Therapeutic Exercise 15 minutes    Treatment Type: Treatment  PT/PTA: PT     Number of PTA visits since last PT visit: 0     05/05/2024

## 2024-05-06 VITALS
BODY MASS INDEX: 28.32 KG/M2 | RESPIRATION RATE: 17 BRPM | TEMPERATURE: 97 F | WEIGHT: 170 LBS | HEIGHT: 65 IN | SYSTOLIC BLOOD PRESSURE: 129 MMHG | DIASTOLIC BLOOD PRESSURE: 75 MMHG | OXYGEN SATURATION: 93 % | HEART RATE: 95 BPM

## 2024-05-06 LAB
ANION GAP SERPL CALCULATED.3IONS-SCNC: 7 MMOL/L (ref 7–16)
BASOPHILS # BLD AUTO: 0.06 K/UL (ref 0–0.2)
BASOPHILS NFR BLD AUTO: 0.7 % (ref 0–1)
BUN SERPL-MCNC: 9 MG/DL (ref 7–18)
BUN/CREAT SERPL: 21 (ref 6–20)
CALCIUM SERPL-MCNC: 8.9 MG/DL (ref 8.5–10.1)
CHLORIDE SERPL-SCNC: 109 MMOL/L (ref 98–107)
CO2 SERPL-SCNC: 26 MMOL/L (ref 21–32)
CREAT SERPL-MCNC: 0.42 MG/DL (ref 0.55–1.02)
DIFFERENTIAL METHOD BLD: ABNORMAL
EGFR (NO RACE VARIABLE) (RUSH/TITUS): 109 ML/MIN/1.73M2
EOSINOPHIL # BLD AUTO: 0.47 K/UL (ref 0–0.5)
EOSINOPHIL NFR BLD AUTO: 5.6 % (ref 1–4)
ERYTHROCYTE [DISTWIDTH] IN BLOOD BY AUTOMATED COUNT: 17.2 % (ref 11.5–14.5)
GLUCOSE SERPL-MCNC: 97 MG/DL (ref 74–106)
HCT VFR BLD AUTO: 27.9 % (ref 38–47)
HGB BLD-MCNC: 8.4 G/DL (ref 12–16)
IMM GRANULOCYTES # BLD AUTO: 0.05 K/UL (ref 0–0.04)
IMM GRANULOCYTES NFR BLD: 0.6 % (ref 0–0.4)
LYMPHOCYTES # BLD AUTO: 2.21 K/UL (ref 1–4.8)
LYMPHOCYTES NFR BLD AUTO: 26.6 % (ref 27–41)
MAGNESIUM SERPL-MCNC: 2 MG/DL (ref 1.7–2.3)
MCH RBC QN AUTO: 27.4 PG (ref 27–31)
MCHC RBC AUTO-ENTMCNC: 30.1 G/DL (ref 32–36)
MCV RBC AUTO: 90.9 FL (ref 80–96)
MONOCYTES # BLD AUTO: 1 K/UL (ref 0–0.8)
MONOCYTES NFR BLD AUTO: 12 % (ref 2–6)
MPC BLD CALC-MCNC: 10.1 FL (ref 9.4–12.4)
NEUTROPHILS # BLD AUTO: 4.53 K/UL (ref 1.8–7.7)
NEUTROPHILS NFR BLD AUTO: 54.5 % (ref 53–65)
NRBC # BLD AUTO: 0 X10E3/UL
NRBC, AUTO (.00): 0 %
PLATELET # BLD AUTO: 317 K/UL (ref 150–400)
POTASSIUM SERPL-SCNC: 3.6 MMOL/L (ref 3.5–5.1)
RBC # BLD AUTO: 3.07 M/UL (ref 4.2–5.4)
SODIUM SERPL-SCNC: 138 MMOL/L (ref 136–145)
WBC # BLD AUTO: 8.32 K/UL (ref 4.5–11)

## 2024-05-06 PROCEDURE — 80048 BASIC METABOLIC PNL TOTAL CA: CPT | Performed by: HOSPITALIST

## 2024-05-06 PROCEDURE — 25000003 PHARM REV CODE 250: Performed by: HOSPITALIST

## 2024-05-06 PROCEDURE — 97110 THERAPEUTIC EXERCISES: CPT

## 2024-05-06 PROCEDURE — 85025 COMPLETE CBC W/AUTO DIFF WBC: CPT | Performed by: HOSPITALIST

## 2024-05-06 PROCEDURE — 83735 ASSAY OF MAGNESIUM: CPT | Performed by: HOSPITALIST

## 2024-05-06 PROCEDURE — 99214 OFFICE O/P EST MOD 30 MIN: CPT | Mod: ,,, | Performed by: HOSPITALIST

## 2024-05-06 PROCEDURE — 25000003 PHARM REV CODE 250: Performed by: ORTHOPAEDIC SURGERY

## 2024-05-06 PROCEDURE — 97116 GAIT TRAINING THERAPY: CPT

## 2024-05-06 PROCEDURE — 36415 COLL VENOUS BLD VENIPUNCTURE: CPT | Performed by: HOSPITALIST

## 2024-05-06 RX ADMIN — OXYCODONE 10 MG: 5 TABLET ORAL at 08:05

## 2024-05-06 RX ADMIN — PREGABALIN 75 MG: 75 CAPSULE ORAL at 08:05

## 2024-05-06 RX ADMIN — DOCUSATE SODIUM 100 MG: 100 CAPSULE, LIQUID FILLED ORAL at 08:05

## 2024-05-06 RX ADMIN — OXYCODONE 10 MG: 5 TABLET ORAL at 03:05

## 2024-05-06 RX ADMIN — CELECOXIB 200 MG: 100 CAPSULE ORAL at 08:05

## 2024-05-06 RX ADMIN — AMLODIPINE BESYLATE 5 MG: 5 TABLET ORAL at 08:05

## 2024-05-06 RX ADMIN — FUROSEMIDE 20 MG: 20 TABLET ORAL at 08:05

## 2024-05-06 RX ADMIN — PRAVASTATIN SODIUM 20 MG: 10 TABLET ORAL at 08:05

## 2024-05-06 RX ADMIN — POLYETHYLENE GLYCOL 3350 17 G: 17 POWDER, FOR SOLUTION ORAL at 08:05

## 2024-05-06 NOTE — PT/OT/SLP PROGRESS
Physical Therapy Treatment    Patient Name:  Yany Silverio   MRN:  [id number]    Recommendations:     Discharge Recommendations: Moderate Intensity Therapy (to low intensity therapy)  Discharge Equipment Recommendations: none  Barriers to discharge: Decreased caregiver support    Assessment:     Yany Silverio is a 63 y.o. female admitted with a medical diagnosis of Osteoarthritis of glenohumeral joint, right.  She presents with the following impairments/functional limitations: impaired self care skills, decreased upper extremity function, pain, orthopedic precautions, impaired cardiopulmonary response to activity Pt accepted to swing bed today. Ambulating well without AD. However, pt demonstrated mild shortness of breath, O2 sats noted to be 84% post ambulation Pt returned to 96% on 2.5 L. Otherwise, doing well with PT.    Rehab Prognosis: Good; patient would benefit from acute skilled PT services to address these deficits and reach maximum level of function.    Recent Surgery: Procedure(s) (LRB):  ARTHROPLASTY, SHOULDER, TOTAL, REVERSE (Right) 5 Days Post-Op    Plan:     During this hospitalization, patient to be seen daily to address the identified rehab impairments via gait training, therapeutic activities, therapeutic exercises and progress toward the following goals:    Plan of Care Expires:  05/23/24    Subjective     Chief Complaint: right shoulder replacement  Patient/Family Comments/goals: Pt is agreeable to PT   Pain/Comfort:  Pain Rating 1: 8/10  Location - Side 1: Right  Location 1: shoulder  Pain Addressed 1: Pre-medicate for activity  Pain Rating Post-Intervention 1: 7/10      Objective:     Communicated with ALFIE Nagel RN prior to session.  Patient found HOB elevated with peripheral IV upon PT entry to room.     General Precautions: Standard, fall  Orthopedic Precautions: RUE non weight bearing  Braces: Shoulder abduction brace  Respiratory Status: Room air     Functional Mobility:  Bed Mobility:      Scooting: modified independence  Supine to Sit: modified independence  Transfers:     Sit to Stand:  modified independence with no AD  Bed to Chair: modified independence with  no AD  using  Step Transfer  Gait: 150 ft, then 100 ft stand-by assistance, no AD, mild shortness of breath  Balance: good  Stairs:  Pt ascended/descended 1 flight(s) with No Assistive Device with left handrail with Supervision or Set-up Assistance.       AM-PAC 6 CLICK MOBILITY  Turning over in bed (including adjusting bedclothes, sheets and blankets)?: 4  Sitting down on and standing up from a chair with arms (e.g., wheelchair, bedside commode, etc.): 4  Moving to and from a bed to a chair (including a wheelchair)?: 4  Need to walk in hospital room?: 4  Climbing 3-5 steps with a railing?: 4       Treatment & Education:  Ambulation as noted above  Pt performed bilateral LE: seated exercises: ankle pumps and long arc quads x 30 each  Standing marches, hip abduction x 20  Lateral stepping 2x10 ft each direction    Patient left up in chair with all lines intact and call button in reach..    GOALS:   Multidisciplinary Problems       Physical Therapy Goals          Problem: Physical Therapy    Goal Priority Disciplines Outcome Goal Variances Interventions   Physical Therapy Goal     PT, PT/OT Progressing     Description: Short Term Goals  Ambulate SBA - 100 feet with none assistive device.   Supine to sit stand-by  Sit to stand stand-by  SPT stand-by  5. Independent with HEP    Long Term Goals  Ambulate SBA -  300 feet with none assistive device.   Supine to sit independent without device  Sit to stand independent without device  SPT independent without device  Needed equipment for home.                              Time Tracking:     PT Received On: 05/06/24  PT Start Time: 0921     PT Stop Time: 0945  PT Total Time (min): 24 min     Billable Minutes: Gait Training 10 and Therapeutic Exercise 14    Treatment Type: Treatment  PT/PTA: PT      Number of PTA visits since last PT visit: 0     05/06/2024

## 2024-05-06 NOTE — PLAN OF CARE
Adult Inpatient Plan of Care    Wound    Fall Injury Risk    Occupational Therapy    Physical Therapy

## 2024-05-06 NOTE — NURSING
This morning, the packet was not in the pt's cubby hole in the shelf. SW. reprinted packet and sent it up. I called [facility]- spoke  with Christianne for handoff report. I had put in a transport for [facility] to come and retrieve pt.  Pt requested son to pick her up. TB skin test read by two nurses (ANNALISA Swenson and a ICU nurse).  ...I stressed to the patient her son must make it before 2.00 p,m. He is here. Discharge instrictions read to the patient. IV  has been taken out and discharging pt\.

## 2024-05-06 NOTE — PLAN OF CARE
[facility]  Discharge Final Note    Primary Care Provider: Nitesh Simon MD    Expected Discharge Date: 5/3/2024    Final Discharge Note (most recent)       Final Note - 05/06/24 0910          Final Note    Assessment Type Final Discharge Note     Anticipated Discharge Disposition Skilled Nursing Facility        Post-Acute Status    Post-Acute Authorization Placement     Post-Acute Placement Status Set-up Complete/Auth obtained     Patient choice form signed by patient/caregiver List with quality metrics by geographic area provided;List from CMS Compare;List from System Post-Acute Care     Discharge Delays None known at this time                     Important Message from Medicare             After-discharge care                Destination       [facility]   Service: Skilled Nursing    [address] [place] [address]   Phone: [phone]                 Home Medical Care       [facility]   Service: Home Health Services    [address] [place] [address] [place] [address]   Phone: [phone]                     Pt is accepted to [facility] for swb. Pt to discharge today.

## 2024-05-07 NOTE — ASSESSMENT & PLAN NOTE
S/p R arthroplasty by Dr. Randall.   Mgmt per ortho.     5/3: vitals and labs remain stable.       5/5:  Continue care per Orthopedics.  Patient unable to care for self at home alone so would like to go to swing bed.    5/6:  Management per Orthopedics.  Plan for swing bed today.  Patient would benefit as she is unable to manage things with her right shoulder and she lives alone at home.

## 2024-05-07 NOTE — ASSESSMENT & PLAN NOTE
Patient's COPD is with exacerbation noted by continued dyspnea and worsening of baseline hypoxia currently.  Patient is currently off COPD Pathway. Continue scheduled inhalers Supplemental oxygen and monitor respiratory status closely.       5/5:  Patient is stable at this time.    5/6:  Some O2 sat durations were lower but she has COPD.  She will continue with oxygen she has oxygen at home.

## 2024-05-07 NOTE — SUBJECTIVE & OBJECTIVE
Interval History:     Review of Systems   Constitutional:  Negative for activity change, chills, fatigue and fever.   HENT:  Negative for congestion and sore throat.    Respiratory:  Negative for chest tightness.    Gastrointestinal:  Negative for abdominal distention, abdominal pain and diarrhea.   Endocrine: Negative for cold intolerance and heat intolerance.   Genitourinary:  Negative for dysuria.   Musculoskeletal:  Negative for arthralgias and back pain.        R shoulder pain    Skin:  Negative for color change and rash.   Neurological:  Negative for dizziness, tremors and headaches.   Psychiatric/Behavioral:  Negative for agitation. The patient is not nervous/anxious.      Objective:     Vital Signs (Most Recent):  Temp: 97.4 °F (36.3 °C) (05/06/24 1229)  Pulse: 95 (05/06/24 1229)  Resp: 17 (05/06/24 0853)  BP: 129/75 (05/06/24 1229)  SpO2: (!) 93 % (05/06/24 1229) Vital Signs (24h Range):  Temp:  [97.4 °F (36.3 °C)-99.2 °F (37.3 °C)] 97.4 °F (36.3 °C)  Pulse:  [95-97] 95  Resp:  [16-17] 17  SpO2:  [89 %-93 %] 93 %  BP: (109-129)/(69-75) 129/75     Weight: 77.1 kg (170 lb)  Body mass index is 28.29 kg/m².  No intake or output data in the 24 hours ending 05/07/24 0750      Physical Exam  Constitutional:       Appearance: Normal appearance.   HENT:      Head: Normocephalic and atraumatic.      Nose: Nose normal.      Mouth/Throat:      Mouth: Mucous membranes are moist.      Pharynx: Oropharynx is clear.   Eyes:      Extraocular Movements: Extraocular movements intact.      Conjunctiva/sclera: Conjunctivae normal.      Pupils: Pupils are equal, round, and reactive to light.   Cardiovascular:      Rate and Rhythm: Normal rate and regular rhythm.      Pulses: Normal pulses.      Heart sounds: Normal heart sounds.   Pulmonary:      Effort: Pulmonary effort is normal.      Breath sounds: Normal breath sounds.   Abdominal:      General: Abdomen is flat. Bowel sounds are normal.      Palpations: Abdomen is soft.    Musculoskeletal:         General: Normal range of motion.      Cervical back: Normal range of motion and neck supple.      Comments: Post-surgical R shoulder changes.    Skin:     General: Skin is warm and dry.   Neurological:      General: No focal deficit present.      Mental Status: She is alert and oriented to person, place, and time.   Psychiatric:         Mood and Affect: Mood normal.         Behavior: Behavior normal.             Significant Labs: All pertinent labs within the past 24 hours have been reviewed.    Significant Imaging: I have reviewed all pertinent imaging results/findings within the past 24 hours.

## 2024-05-07 NOTE — PROGRESS NOTES
[facility]  [facility] Medicine  Progress Note    Patient Name: Yany Silverio  MRN: [id number]  Patient Class: OP- Outpatient Recovery   Admission Date: 5/1/2024  Length of Stay: 0 days  Attending Physician: No att. providers found  Primary Care Provider: Nitesh Simon MD        Subjective:     Principal Problem:Osteoarthritis of glenohumeral joint, right    HPI:  64yo woman history of COPD on home O2 at night, NGOZI on CPAP, prior tobacco use, alcohol use, HTN, HLD, GERD who presents with R osteoarthritis that has failed all conservatives measures.  She is now s/p R shoulder arthropasty.  She has no complaints at this time.  Vitals stable except some O2 desat and she is off oxygen currently.      Overview/Hospital Course:  No notes on file    Interval History:     Review of Systems   Constitutional:  Negative for activity change, chills, fatigue and fever.   HENT:  Negative for congestion and sore throat.    Respiratory:  Negative for chest tightness.    Gastrointestinal:  Negative for abdominal distention, abdominal pain and diarrhea.   Endocrine: Negative for cold intolerance and heat intolerance.   Genitourinary:  Negative for dysuria.   Musculoskeletal:  Negative for arthralgias and back pain.        R shoulder pain    Skin:  Negative for color change and rash.   Neurological:  Negative for dizziness, tremors and headaches.   Psychiatric/Behavioral:  Negative for agitation. The patient is not nervous/anxious.      Objective:     Vital Signs (Most Recent):  Temp: 97.4 °F (36.3 °C) (05/06/24 1229)  Pulse: 95 (05/06/24 1229)  Resp: 17 (05/06/24 0853)  BP: 129/75 (05/06/24 1229)  SpO2: (!) 93 % (05/06/24 1229) Vital Signs (24h Range):  Temp:  [97.4 °F (36.3 °C)-99.2 °F (37.3 °C)] 97.4 °F (36.3 °C)  Pulse:  [95-97] 95  Resp:  [16-17] 17  SpO2:  [89 %-93 %] 93 %  BP: (109-129)/(69-75) 129/75     Weight: 77.1 kg (170 lb)  Body mass index is 28.29 kg/m².  No intake or output data in the 24 hours  ending 05/07/24 0750      Physical Exam  Constitutional:       Appearance: Normal appearance.   HENT:      Head: Normocephalic and atraumatic.      Nose: Nose normal.      Mouth/Throat:      Mouth: Mucous membranes are moist.      Pharynx: Oropharynx is clear.   Eyes:      Extraocular Movements: Extraocular movements intact.      Conjunctiva/sclera: Conjunctivae normal.      Pupils: Pupils are equal, round, and reactive to light.   Cardiovascular:      Rate and Rhythm: Normal rate and regular rhythm.      Pulses: Normal pulses.      Heart sounds: Normal heart sounds.   Pulmonary:      Effort: Pulmonary effort is normal.      Breath sounds: Normal breath sounds.   Abdominal:      General: Abdomen is flat. Bowel sounds are normal.      Palpations: Abdomen is soft.   Musculoskeletal:         General: Normal range of motion.      Cervical back: Normal range of motion and neck supple.      Comments: Post-surgical R shoulder changes.    Skin:     General: Skin is warm and dry.   Neurological:      General: No focal deficit present.      Mental Status: She is alert and oriented to person, place, and time.   Psychiatric:         Mood and Affect: Mood normal.         Behavior: Behavior normal.             Significant Labs: All pertinent labs within the past 24 hours have been reviewed.    Significant Imaging: I have reviewed all pertinent imaging results/findings within the past 24 hours.    Assessment/Plan:      * Osteoarthritis of glenohumeral joint, right  S/p R arthroplasty by Dr. Randall.   Mgmt per ortho.     5/3: vitals and labs remain stable.       5/5:  Continue care per Orthopedics.  Patient unable to care for self at home alone so would like to go to swing bed.    5/6:  Management per Orthopedics.  Plan for swing bed today.  Patient would benefit as she is unable to manage things with her right shoulder and she lives alone at home.      Dyslipidemia  Continue home statin.       Alcoholism  Counseled on cessation.         Anemia  Patient's anemia is currently controlled. Has not received any PRBCs to date. Etiology likely d/t Iron deficiency  Current CBC reviewed-   Lab Results   Component Value Date    HGB 9.2 (L) 05/02/2024    HCT 29.7 (L) 05/02/2024     Monitor serial CBC and transfuse if patient becomes hemodynamically unstable, symptomatic or H/H drops below 7/21.    Anxiety and depression  Patient has recurrent depression which is mild and is currently controlled. Will Continue anti-depressant medications. We will not consult psychiatry at this time. Patient does not display psychosis at this time. Continue to monitor closely and adjust plan of care as needed.        Hypertension  Chronic, controlled. Latest blood pressure and vitals reviewed-     Temp:  [97.5 °F (36.4 °C)-98.6 °F (37 °C)]   Pulse:  []   Resp:  [17-20]   BP: ()/(58-80)   SpO2:  [89 %-96 %] .   Home meds for hypertension were reviewed and noted below.   Hypertension Medications               amLODIPine (NORVASC) 5 MG tablet Take 1 tablet (5 mg total) by mouth once daily.    furosemide (LASIX) 20 MG tablet TAKE 1 TABLET BY MOUTH DAILY FOR 3 DAYS THEN WEIGH DAILY AND IF NO MORE THAN 3 POUND WEIGHT GAIN, TAKE LASIX            While in the hospital, will manage blood pressure as follows; Continue home antihypertensive regimen    Will utilize p.r.n. blood pressure medication only if patient's blood pressure greater than 160/100 and she develops symptoms such as worsening chest pain or shortness of breath.    NGOZI on CPAP  No in-patient cpap per patient request       Chronic obstructive pulmonary disease  Patient's COPD is with exacerbation noted by continued dyspnea and worsening of baseline hypoxia currently.  Patient is currently off COPD Pathway. Continue scheduled inhalers Supplemental oxygen and monitor respiratory status closely.       5/5:  Patient is stable at this time.    5/6:  Some O2 sat durations were lower but she has COPD.  She will  continue with oxygen she has oxygen at home.      VTE Risk Mitigation (From admission, onward)      None            Discharge Planning   PARVEZ: 5/3/2024     Code Status: Prior   Is the patient medically ready for discharge?:     Reason for patient still in hospital (select all that apply): Treatment  Discharge Plan A: Skilled Nursing Facility   Discharge Delays: None known at this time              Silvia Paniagua MD  Department of Hospital Medicine   [facility]

## 2024-05-08 ENCOUNTER — TELEPHONE (OUTPATIENT)
Dept: ORTHOPEDICS | Facility: CLINIC | Age: 64
End: 2024-05-08
Payer: MEDICARE

## 2024-05-08 NOTE — TELEPHONE ENCOUNTER
FOLLOW-UP SCHEDULED     ----- Message from Mellisa Barrientos sent at 5/8/2024  2:39 PM CDT -----  Chestina with [facility] called and pt doesn't have a postop appt from surg on May 1st. Call her at [phone].

## 2024-05-13 ENCOUNTER — OFFICE VISIT (OUTPATIENT)
Dept: ORTHOPEDICS | Facility: CLINIC | Age: 64
End: 2024-05-13
Payer: MEDICARE

## 2024-05-13 DIAGNOSIS — Z96.611 STATUS POST REVERSE TOTAL REPLACEMENT OF RIGHT SHOULDER: Primary | ICD-10-CM

## 2024-05-13 PROCEDURE — 1159F MED LIST DOCD IN RCRD: CPT | Mod: CPTII,,, | Performed by: NURSE PRACTITIONER

## 2024-05-13 PROCEDURE — 99024 POSTOP FOLLOW-UP VISIT: CPT | Mod: ,,, | Performed by: NURSE PRACTITIONER

## 2024-05-13 PROCEDURE — 99213 OFFICE O/P EST LOW 20 MIN: CPT | Mod: PBBFAC | Performed by: NURSE PRACTITIONER

## 2024-05-13 NOTE — PROGRESS NOTES
Post-Operative Shoulder Arthroplasty      Arthroplasty, Shoulder, Total, Reverse - Right Arthroplasty, Shoulder, Total, Reverse - Right       Pt is here today for First post-operative followup of her No surgery found.      she is doing well.  Patient is 1 week postop right reverse total shoulder arthroplasty, doing well.  Incision looks good today.  Steri-Strips applied.  No signs of infection.  She is wearing her arm sling.  We reviewed her plan of care.  Patient is in the nursing home at [facility].     We have reviewed her findings and discussed plan of care and future treatment options, including the physical therapy plan.          Physical Exam:     The affected shoulder was inspected today.   The wound is healing well with no signs of erythema or warmth.  There is no drainage.  No clinical signs or symptoms of infection are present. Sensation is well maintained in the axillary nerve distribution.  NVI in the upper extremity.  No neurovascular deficits seen  ROM:  Passive forward flexion 0-50, external rotation 0-10            There are no diagnoses linked to this encounter.      We will continue post-operative physical therapy until the patient feels that they are independent with a home rehab program.  Will continue with our schedule post op plan of care.   Return to work status/ return to activities status was discussed today in detail.   All questions were answered.      The use of stockings and DVT prophylaxis was discussed.   Will follow up in additional 4-6 weeks with radiographs at that time.     There are no Patient Instructions on file for this visit.

## 2024-05-13 NOTE — DISCHARGE SUMMARY
[facility]  Discharge Summary      Admit Date: 5/1/2024    Discharge Date and Time: 5/6/2024 12:25 PM    Attending Physician: Coco att. providers found     Reason for Admission: s/p Rt  RTSA    Procedures Performed: Procedure(s) (LRB):  ARTHROPLASTY, SHOULDER, TOTAL, REVERSE (Right)    Hospital Course (synopsis of major diagnoses, care, treatment, and services provided during the course of the hospital stay): Yany Silverio was admitted following the aforementioned surgical procedure.  she received perioperative antibiotics over 23 hours following the surgery and participated in post operative physical therapy.  she was made familiar with the post-operative rehabilitation process and the need for DVT prophylaxis was discussed prior to discharged.  Yany Silverio was in the hospital for 4 days following her surgery shoe waited swing bed placement.  She required a swing bed unit because she has had a history of falls in the past following major surgery which have complicated her perioperative course and required revision surgery.  I felt strongly that she would require further supervision more so than what she could receive at home.  She was discharged to swing bed unit 4 days postoperatively having suffered no untoward events       Goals of Care Treatment Preferences:  Code Status: Full Code      Consults: none    Significant Diagnostic Studies: N/A    Final Diagnoses:    Principal Problem: Osteoarthritis of glenohumeral joint, right   Secondary Diagnoses:  none    Discharged Condition: good    Disposition: Intermediate Care Facility    Follow Up/Patient Instructions:     Medications:  Reconciled Home Medications:      Medication List        START taking these medications      ondansetron 4 MG Tbdl  Commonly known as: ZOFRAN-ODT  Take 1 tablet (4 mg total) by mouth every 8 (eight) hours as needed (Nausea).     oxyCODONE-acetaminophen  mg per tablet  Commonly known as: PERCOCET  Take 1  tablet by mouth every 6 (six) hours as needed for Pain.            CHANGE how you take these medications      amLODIPine 5 MG tablet  Commonly known as: NORVASC  Take 1 tablet (5 mg total) by mouth once daily.  What changed: how much to take            CONTINUE taking these medications      * albuterol 2.5 mg /3 mL (0.083 %) nebulizer solution  Commonly known as: PROVENTIL  albuterol sulfate 2.5 mg/3 mL (0.083 %) solution for nebulization     * VENTOLIN HFA 90 mcg/actuation inhaler  Generic drug: albuterol  Inhale 2 puffs into the lungs every 4 (four) hours as needed for Wheezing (q 4-6 hours).     aspirin 81 MG EC tablet  Commonly known as: ECOTRIN  Take 81 mg by mouth once daily.     busPIRone 10 MG tablet  Commonly known as: BUSPAR  Take 1 tablet (10 mg total) by mouth every evening.     cetirizine 10 mg chewable tablet  Take 10 mg by mouth once daily.     docusate sodium 100 MG capsule  Commonly known as: COLACE  Take 100 mg by mouth once daily.     DULoxetine 30 MG capsule  Commonly known as: CYMBALTA  1 capsule.     fluticasone propionate 50 mcg/actuation nasal spray  Commonly known as: FLONASE  1 spray (50 mcg total) by Each Nostril route once daily.     furosemide 20 MG tablet  Commonly known as: LASIX  TAKE 1 TABLET BY MOUTH DAILY FOR 3 DAYS THEN WEIGH DAILY AND IF NO MORE THAN 3 POUND WEIGHT GAIN, TAKE LASIX     ketoconazole 2 % shampoo  Commonly known as: NIZORAL  Apply topically twice a week.     lovastatin 20 MG tablet  Commonly known as: MEVACOR  Take 1 tablet (20 mg total) by mouth once daily.     pantoprazole 40 MG tablet  Commonly known as: PROTONIX  Take 1 tablet (40 mg total) by mouth once daily.     polyethylene glycol 17 gram Pwpk  Commonly known as: GLYCOLAX  Take 17 g by mouth once daily. As needed     sertraline 50 MG tablet  Commonly known as: ZOLOFT  Take 50 mg by mouth once daily.     tiotropium 18 mcg inhalation capsule  Commonly known as: SPIRIVA  Inhale 1 capsule (18 mcg total) into the  lungs once daily. Controller           * This list has 2 medication(s) that are the same as other medications prescribed for you. Read the directions carefully, and ask your doctor or other care provider to review them with you.                STOP taking these medications      predniSONE 20 MG tablet  Commonly known as: DELTASONE            Discharge Procedure Orders   SLING ORTHOPEDIC MEDIUM FOR HOME USE   Order Comments: Abduction pillow sling for small, medium, large, massive rotator cuff repairs; Abduction pillow sling for proximal humerus ORIF, reverse shoulder arthroplasty, total shoulder arthroplasty.  Regular sling for clavicle ORIF, shoulder debridements.     Diet general     Call MD for:  temperature >100.4     Call MD for:  persistent nausea and vomiting     Call MD for:  severe uncontrolled pain     Call MD for:  difficulty breathing, headache or visual disturbances     Call MD for:  redness, tenderness, or signs of infection (pain, swelling, redness, odor or green/yellow discharge around incision site)     Call MD for:  hives     Call MD for:  persistent dizziness or light-headedness     Call MD for:  extreme fatigue     Leave dressing on - Keep it clean, dry, and intact until clinic visit   Order Comments: For shoulder arthroscopy patients, ok to remove dressing in 3 days and apply band-aids to portal sites.  For ORIF, total shoulder arthroplasty, or reverse shoulder arthroplasty, keeping dressing clean, dry, and intact until clinic follow up.     Non weight bearing      Contact information for after-discharge care       Destination       [facility] .    Service: Skilled Nursing  Contact information:  [address] [facility] [address]  [phone]                     Home Medical Care       [facility] .    Service: Home Health Services  Contact information:  [address] [place] [id number]  [phone]

## 2024-06-14 DIAGNOSIS — Z96.611 STATUS POST REVERSE TOTAL REPLACEMENT OF RIGHT SHOULDER: Primary | ICD-10-CM

## 2024-06-18 ENCOUNTER — HOSPITAL ENCOUNTER (OUTPATIENT)
Dept: RADIOLOGY | Facility: HOSPITAL | Age: 64
Discharge: HOME | End: 2024-06-18
Attending: ORTHOPAEDIC SURGERY
Payer: MEDICARE

## 2024-06-18 ENCOUNTER — OFFICE VISIT (OUTPATIENT)
Dept: ORTHOPEDICS | Facility: CLINIC | Age: 64
End: 2024-06-18
Payer: MEDICARE

## 2024-06-18 DIAGNOSIS — Z96.611 STATUS POST REVERSE TOTAL REPLACEMENT OF RIGHT SHOULDER: ICD-10-CM

## 2024-06-18 DIAGNOSIS — Z96.611 STATUS POST REVERSE TOTAL REPLACEMENT OF RIGHT SHOULDER: Primary | ICD-10-CM

## 2024-06-18 PROCEDURE — 99213 OFFICE O/P EST LOW 20 MIN: CPT | Mod: PBBFAC,25 | Performed by: ORTHOPAEDIC SURGERY

## 2024-06-18 PROCEDURE — 99024 POSTOP FOLLOW-UP VISIT: CPT | Mod: ,,, | Performed by: ORTHOPAEDIC SURGERY

## 2024-06-18 PROCEDURE — 73030 X-RAY EXAM OF SHOULDER: CPT | Mod: TC,RT

## 2024-06-18 PROCEDURE — 99999 PR PBB SHADOW E&M-EST. PATIENT-LVL III: CPT | Mod: PBBFAC,,, | Performed by: ORTHOPAEDIC SURGERY

## 2024-06-18 PROCEDURE — 1159F MED LIST DOCD IN RCRD: CPT | Mod: CPTII,,, | Performed by: ORTHOPAEDIC SURGERY

## 2024-06-18 PROCEDURE — 73030 X-RAY EXAM OF SHOULDER: CPT | Mod: 26,RT,, | Performed by: ORTHOPAEDIC SURGERY

## 2024-06-18 NOTE — PROGRESS NOTES
Post-Operative Shoulder Arthroplasty      Arthroplasty, Shoulder, Total, Reverse - Right 5/1/2024      Pt is here today for Second post-operative followup of her Arthroplasty, Shoulder, Total, Reverse - Right.  she is doing well.  We have reviewed her findings and discussed plan of care and future treatment options, including the physical therapy plan.          Physical Exam:     The affected shoulder was inspected today.   The wound is healing well with no signs of erythema or warmth.  There is no drainage.  No clinical signs or symptoms of infection are present. Sensation is well maintained in the axillary nerve distribution.  NVI in the upper extremity.  No neurovascular deficits seen  ROM: 0-60 FF, 0-45 ER        X-ray Shoulder 2 or More Views Right    Result Date: 6/18/2024  See Procedure Notes for results. IMPRESSION: Please see Ortho procedure notes for report.  This procedure was auto-finalized by: Virtual Radiologist   3 X-rays of the right shoulder were taken today. Reverse shoulder prosthesis appears to be in excellent alignment. The stem is well seated. No evidence of loosening.       There are no diagnoses linked to this encounter.      We will continue post-operative physical therapy until the patient feels that they are independent with a home rehab program.  Will continue with our schedule post op plan of care.   Return to work status/ return to activities status was discussed today in detail.   All questions were answered.      The use of stockings and DVT prophylaxis was discussed.   Will follow up in additional 4-6 weeks with radiographs at that time.     There are no Patient Instructions on file for this visit.

## 2024-07-31 DIAGNOSIS — Z96.611 STATUS POST REVERSE TOTAL REPLACEMENT OF RIGHT SHOULDER: Primary | ICD-10-CM

## 2024-08-06 ENCOUNTER — HOSPITAL ENCOUNTER (OUTPATIENT)
Dept: RADIOLOGY | Facility: HOSPITAL | Age: 64
Discharge: HOME | End: 2024-08-06
Attending: ORTHOPAEDIC SURGERY
Payer: MEDICARE

## 2024-08-06 ENCOUNTER — OFFICE VISIT (OUTPATIENT)
Dept: ORTHOPEDICS | Facility: CLINIC | Age: 64
End: 2024-08-06
Payer: MEDICARE

## 2024-08-06 DIAGNOSIS — Z96.611 STATUS POST REVERSE TOTAL REPLACEMENT OF RIGHT SHOULDER: ICD-10-CM

## 2024-08-06 DIAGNOSIS — Z96.611 STATUS POST REVERSE TOTAL REPLACEMENT OF RIGHT SHOULDER: Primary | ICD-10-CM

## 2024-08-06 PROCEDURE — 99213 OFFICE O/P EST LOW 20 MIN: CPT | Mod: PBBFAC,25 | Performed by: ORTHOPAEDIC SURGERY

## 2024-08-06 PROCEDURE — 73030 X-RAY EXAM OF SHOULDER: CPT | Mod: 26,RT,, | Performed by: ORTHOPAEDIC SURGERY

## 2024-08-06 PROCEDURE — 1159F MED LIST DOCD IN RCRD: CPT | Mod: CPTII,,, | Performed by: ORTHOPAEDIC SURGERY

## 2024-08-06 PROCEDURE — 99213 OFFICE O/P EST LOW 20 MIN: CPT | Mod: S$PBB,,, | Performed by: ORTHOPAEDIC SURGERY

## 2024-08-06 PROCEDURE — 99999 PR PBB SHADOW E&M-EST. PATIENT-LVL III: CPT | Mod: PBBFAC,,, | Performed by: ORTHOPAEDIC SURGERY

## 2024-08-06 PROCEDURE — 73030 X-RAY EXAM OF SHOULDER: CPT | Mod: TC,RT

## 2024-12-04 DIAGNOSIS — Z96.611 STATUS POST REVERSE TOTAL REPLACEMENT OF RIGHT SHOULDER: Primary | ICD-10-CM

## 2024-12-26 ENCOUNTER — OFFICE VISIT (OUTPATIENT)
Dept: ORTHOPEDICS | Facility: CLINIC | Age: 64
End: 2024-12-26
Payer: MEDICARE

## 2024-12-26 ENCOUNTER — HOSPITAL ENCOUNTER (OUTPATIENT)
Dept: RADIOLOGY | Facility: HOSPITAL | Age: 64
Discharge: HOME | End: 2024-12-26
Attending: ORTHOPAEDIC SURGERY
Payer: MEDICARE

## 2024-12-26 DIAGNOSIS — Z96.611 STATUS POST REVERSE TOTAL REPLACEMENT OF RIGHT SHOULDER: ICD-10-CM

## 2024-12-26 DIAGNOSIS — Z96.611 STATUS POST REVERSE TOTAL REPLACEMENT OF RIGHT SHOULDER: Primary | ICD-10-CM

## 2024-12-26 PROCEDURE — 99999 PR PBB SHADOW E&M-EST. PATIENT-LVL III: CPT | Mod: PBBFAC,,, | Performed by: ORTHOPAEDIC SURGERY

## 2024-12-26 PROCEDURE — 73030 X-RAY EXAM OF SHOULDER: CPT | Mod: TC,RT

## 2024-12-26 PROCEDURE — 99213 OFFICE O/P EST LOW 20 MIN: CPT | Mod: S$PBB,,, | Performed by: ORTHOPAEDIC SURGERY

## 2024-12-26 PROCEDURE — 1159F MED LIST DOCD IN RCRD: CPT | Mod: CPTII,,, | Performed by: ORTHOPAEDIC SURGERY

## 2024-12-26 PROCEDURE — 99213 OFFICE O/P EST LOW 20 MIN: CPT | Mod: PBBFAC,25 | Performed by: ORTHOPAEDIC SURGERY

## 2024-12-26 NOTE — PROGRESS NOTES
64 y.o. Female returns to clinic for a follow up visit regarding     ICD-10-CM ICD-9-CM   1. Status post reverse total replacement of right shoulder  Z96.611 V43.61        Patient is 7 1/2 months post-op.   She is doing well.        Past Medical History:   Diagnosis Date    Acute superficial gastritis without hemorrhage 06/23/2021    Acute superficial gastritis without hemorrhage 06/23/2021    Anxiety     Arthritis     newly dx'd RA: sees MD in [place]    Bronchitis 07/22/2021    Bursitis of left shoulder 06/10/2021    Cancer     hx of cervical cancer    COPD (chronic obstructive pulmonary disease)     Depression     Emphysema of lung     Fungal esophagitis 06/23/2021    HH (hiatus hernia) 06/23/2021    Hyperlipidemia     Hypertension     Insomnia 04/16/2020    Low back pain 04/28/2021    Pain in joint of left hip 04/28/2021    Pain in joint of left knee 04/28/2021    Pneumonia of both lower lobes due to infectious organism 01/14/2022    Sleep apnea      Past Surgical History:   Procedure Laterality Date    OPEN REDUCTION AND INTERNAL FIXATION (ORIF) OF INJURY OF HIP Left 1/12/2022    Procedure: ORIF, HIP;  Surgeon: Adoflo Randall MD;  Location: [facility] OR;  Service: Orthopedics;  Laterality: Left;    REVERSE TOTAL SHOULDER ARTHROPLASTY Left 10/30/2023    Procedure: ARTHROPLASTY, SHOULDER, TOTAL, REVERSE;  Surgeon: Adolfo Randall MD;  Location: [facility] OR;  Service: Orthopedics;  Laterality: Left;    REVERSE TOTAL SHOULDER ARTHROPLASTY Right 5/1/2024    Procedure: ARTHROPLASTY, SHOULDER, TOTAL, REVERSE;  Surgeon: Adolfo Randall MD;  Location: [facility] OR;  Service: Orthopedics;  Laterality: Right;    ROBOTIC ARTHROPLASTY, HIP Left 12/13/2021    Procedure: ROBOTIC ARTHROPLASTY,HIP;  Surgeon: Adolfo Randall MD;  Location: [facility] OR;  Service: Orthopedics;  Laterality: Left;    SHOULDER SURGERY Right 2017, 2019         PHYSICAL EXAMINATION:    Ortho/SPM Exam  Full range of motion right  shoulder excellent external rotation internal rotation of the L4 vertebral body    IMAGING:  X-ray Shoulder 2 or More Views Right    Result Date: 12/26/2024  See Procedure Notes for results. IMPRESSION: Please see Ortho procedure notes for report.  This procedure was auto-finalized by: Virtual Radiologist     Three views right shoulder demonstrate a stable reverse shoulder prosthesis in excellent alignment  ASSESSMENT:      ICD-10-CM ICD-9-CM   1. Status post reverse total replacement of right shoulder  Z96.611 V43.61       PLAN:     -Findings and treatment options were discussed with the patient  -All questions answered      Doing great follow up in 1 year    There are no Patient Instructions on file for this visit.      No orders of the defined types were placed in this encounter.        Procedures

## (undated) DEVICE — GLOVE SURGICAL PROTEXIS PI BLUE SIZE 7.0

## (undated) DEVICE — APPLICATOR CHLORAPREP ORN 26ML

## (undated) DEVICE — DRESSING OPSITE 6X11 LG TEGADERM

## (undated) DEVICE — DRAPE INVISISHIELD TWL 18X24IN

## (undated) DEVICE — NEEDLE SPINAL 18G X 3 1/2IN

## (undated) DEVICE — SUT CTD VICRYL 0 UND BR CT

## (undated) DEVICE — PAD ABDOMINAL 8X7.5 STERILE

## (undated) DEVICE — NDL QUINCKE SPINAL 18G 3.5IN

## (undated) DEVICE — BLADE SAW SGTTL 18.6X.8X73.8MM

## (undated) DEVICE — SUT #2 TI-CRON HGS-21 30IN

## (undated) DEVICE — SUT 2-0 12-18IN SILK

## (undated) DEVICE — NITINOL PILOT WIRE
Type: IMPLANTABLE DEVICE | Site: SHOULDER | Status: NON-FUNCTIONAL
Brand: REUNION
Removed: 2024-05-01

## (undated) DEVICE — GLOVE SURGICAL PROTEXIS PI CLASSIC SIZE 7.5

## (undated) DEVICE — DRAPE STERI INSTRUMENT 1018

## (undated) DEVICE — SPEEDSET FULL DOSE ANTIBIOTIC BONE CEMENT, 10 PACK CATALOG NUMBER IS 6192-1-010
Type: IMPLANTABLE DEVICE | Site: SHOULDER | Status: NON-FUNCTIONAL
Brand: SIMPLEX

## (undated) DEVICE — PACK BASIC

## (undated) DEVICE — MARKER SURGICAL PEN & RULER STERILE

## (undated) DEVICE — SUT BONE WAX SYNTHETIC

## (undated) DEVICE — KIT IRR SUCTION HND PIECE

## (undated) DEVICE — DRAPE KIT RIO ST MAKO

## (undated) DEVICE — CDS HIP TOTAL

## (undated) DEVICE — BLADE CARBON SURG SS SZ 15 STERILE

## (undated) DEVICE — BLADE INSULATED COATED 6IN

## (undated) DEVICE — SUTURE RETRIEVER HEWSON

## (undated) DEVICE — GLOVE SENSICARE PI GRN 6

## (undated) DEVICE — Device

## (undated) DEVICE — GLOVE SURGICAL PROTEXIS PI CLASSIC SIZE 8.0

## (undated) DEVICE — GLOVE SURGICAL PROTEXIS PI CLASSIC SIZE 6.5

## (undated) DEVICE — KIT STABILIZATION SHOULDER SPIDER2

## (undated) DEVICE — SLING ARM ULTRA III PAD LG

## (undated) DEVICE — BAG-A-JET FLUID DISPENSER SYSTEM

## (undated) DEVICE — GLOVE 7.0 PROTEXIS PI BLUE

## (undated) DEVICE — STAPLER SKIN PROXIMATE PLUS MD 35 REG DISP

## (undated) DEVICE — CLEANER CAUT TIP STRL 2X2IN

## (undated) DEVICE — PIN 3.2MM 4 KANT SQUARE
Type: IMPLANTABLE DEVICE | Site: SHOULDER | Status: NON-FUNCTIONAL
Removed: 2024-05-01

## (undated) DEVICE — GLOVE SENSICARE PI SURG 7.5

## (undated) DEVICE — SPONGE LAP XRAY 5/PK 12X12IN

## (undated) DEVICE — SUT MONOCRYL 3-0 PS-2 UND

## (undated) DEVICE — IMP WIRE KIRSCH DOUBLE TROCAR 1.6MM
Type: IMPLANTABLE DEVICE | Site: HIP | Status: NON-FUNCTIONAL
Removed: 2021-12-13

## (undated) DEVICE — SUTURE FIBERWIRE #2 BRAIDED BX/12

## (undated) DEVICE — GOWN TOGA SYS PEELWY ZIP 2 XL

## (undated) DEVICE — BIT DRILL 3.1MM

## (undated) DEVICE — IRRIGATOR INTERPULSE HANDPIECE SET

## (undated) DEVICE — DRAPE INVISISHIELD U 48X52IN

## (undated) DEVICE — SUTURE VICRYL 1 CP UD 27IN

## (undated) DEVICE — DRILL BIT 4X40MM

## (undated) DEVICE — SUCTION KAMVAC TUBE MINI STL

## (undated) DEVICE — GLOVE BIOGEL SKINSENSE PI 7.0

## (undated) DEVICE — GLOVE SENSICARE PI GRN 7.5

## (undated) DEVICE — CHECKPOINT TIB ST MAKO

## (undated) DEVICE — PACK ECLIPSE BASIC III SURG

## (undated) DEVICE — APPLICATOR CHLORAPREP HI-LITE TINTED ORANGE 26ML

## (undated) DEVICE — SUT TICRON #5

## (undated) DEVICE — SOL NACL IRR 1000ML BTL

## (undated) DEVICE — SPACESUIT TOGA T5 ZIPPER PEEL

## (undated) DEVICE — DRAPE TIBURON ORTHOPEDIC SPLIT

## (undated) DEVICE — SUT VICRYL 2-0 36 CT-1

## (undated) DEVICE — GLOVE SENSICARE PI SURG 6.5

## (undated) DEVICE — GLOVE SURGICAL PROTEXIS PI CLASSIC SIZE 8.5

## (undated) DEVICE — SOL IRRIGATION SALINE 0.9% 1000ML BOTTLE

## (undated) DEVICE — SYS REVOLUTION CEMENT MIXING

## (undated) DEVICE — STOCKINETTE IMPERVIOUS LG 9X48

## (undated) DEVICE — SYRINGE 30CC LL

## (undated) DEVICE — SPONGE COTTON TRAY 4X4IN

## (undated) DEVICE — RETRACTOR WAVEGUIDE NAR/FLAT

## (undated) DEVICE — DRESSING TRANS 6X8 TEGADERM

## (undated) DEVICE — GOWN TOGA W/ ZIPPER LARGE BLUE

## (undated) DEVICE — SUTURE TICRON 5 HOS-14 BLUE 30IN

## (undated) DEVICE — SET IRRIG CYSTO/BLADDER 78IN

## (undated) DEVICE — GLOVE SENSICARE PI GRN 8

## (undated) DEVICE — PIN BONE 4X140MM ST MAKO

## (undated) DEVICE — SPONGE LAP 18X18 PREWASHED

## (undated) DEVICE — STAPLER SKIN WIDE

## (undated) DEVICE — GLOVE SENSICARE PI SURG 6

## (undated) DEVICE — DRESSING TRANS 8X12 TEGADERM

## (undated) DEVICE — SUT BLU BR 2 TAPERD NDL 1/2

## (undated) DEVICE — BANDAGE COHES LTX TAN 4INX5YD

## (undated) DEVICE — DRAPE U STERI 1015 CLEAR 47 1/8X51 1/8

## (undated) DEVICE — GLOVE SURGICAL PROTEXIS PI CLASSIC SIZE 7.0

## (undated) DEVICE — DRESSING ABD SURGIPAD 8X7.5IN

## (undated) DEVICE — CHECKPOINT 3.5MM HEX IMPACTION ST MAKO

## (undated) DEVICE — SPONGE GAUZE 4X4 12 PLY STL AMD 10/TRAY

## (undated) DEVICE — GOWN TOGA SZ LG (DR. P)

## (undated) DEVICE — SET CYSTO IRR DRP CHMBR 84IN

## (undated) DEVICE — CLEANER TIP BOVIE ELECTROSURG

## (undated) DEVICE — GLOVE SURGICAL PROTEXIS PI BLUE SIZE 6.5

## (undated) DEVICE — SOL NACL 0.9% INJ 500ML BG

## (undated) DEVICE — GLOVE SURGICAL PROTEXIS PI BLUE SIZE 7.5

## (undated) DEVICE — GAUZE XEROFORM 1X8IN

## (undated) DEVICE — DRILL BIT ZIMMER 2.7

## (undated) DEVICE — SYRINGE 50CC LL STR 309653

## (undated) DEVICE — FILM IOBAN ANTIMICROBL 35X35CM

## (undated) DEVICE — SOL NACL IRR 3000ML

## (undated) DEVICE — DRESSING XEROFORM 5X9

## (undated) DEVICE — ADHESIVE DERMABOND ADVANCED

## (undated) DEVICE — SOL NACL INJ 1000ML 0.9%

## (undated) DEVICE — TRACKING KIT VIZADISC HIP PROCEDURE ST MAKO

## (undated) DEVICE — SUTURE VICRYL 2-0 UNDYED CT-1 ANTI

## (undated) DEVICE — PIN 3.2MM 4 KANT SQUARE
Type: IMPLANTABLE DEVICE | Site: SHOULDER | Status: NON-FUNCTIONAL
Removed: 2023-10-30

## (undated) DEVICE — GOWN POLY REINF BRTH SLV LG

## (undated) DEVICE — SUTURE VICRYL 2-0 CT-1 27"

## (undated) DEVICE — OVERLAY MATTRESS WAFFLE

## (undated) DEVICE — DRESSING OPSITE TRANS 11X6IN

## (undated) DEVICE — SUTURE VICRYL 1 CT UD BR 27

## (undated) DEVICE — GLOVE 8 PROTEXIS PI BLUE

## (undated) DEVICE — SOL IRRIGATION SALINE 3000ML BAG

## (undated) DEVICE — FILM IOBAN ANTIMICROBL 60X60CM

## (undated) DEVICE — WIRE FIXATION REUN NIT PILT
Type: IMPLANTABLE DEVICE | Site: SHOULDER | Status: NON-FUNCTIONAL
Removed: 2023-10-30

## (undated) DEVICE — GLOVE SENSICARE PI GRN 6.5